# Patient Record
Sex: FEMALE | Race: ASIAN | NOT HISPANIC OR LATINO | Employment: FULL TIME
[De-identification: names, ages, dates, MRNs, and addresses within clinical notes are randomized per-mention and may not be internally consistent; named-entity substitution may affect disease eponyms.]

---

## 2017-10-22 ENCOUNTER — HEALTH MAINTENANCE LETTER (OUTPATIENT)
Age: 32
End: 2017-10-22

## 2018-04-04 ENCOUNTER — HOSPITAL ENCOUNTER (EMERGENCY)
Facility: CLINIC | Age: 33
Discharge: HOME | End: 2018-04-04
Attending: NURSE PRACTITIONER | Admitting: NURSE PRACTITIONER
Payer: COMMERCIAL

## 2018-04-04 ENCOUNTER — APPOINTMENT (OUTPATIENT)
Dept: GENERAL RADIOLOGY | Facility: CLINIC | Age: 33
End: 2018-04-04
Attending: NURSE PRACTITIONER
Payer: COMMERCIAL

## 2018-04-04 VITALS
OXYGEN SATURATION: 98 % | DIASTOLIC BLOOD PRESSURE: 85 MMHG | SYSTOLIC BLOOD PRESSURE: 134 MMHG | RESPIRATION RATE: 12 BRPM | TEMPERATURE: 98.8 F

## 2018-04-04 DIAGNOSIS — J06.9 VIRAL URI WITH COUGH: ICD-10-CM

## 2018-04-04 LAB
FLUAV+FLUBV AG SPEC QL: NEGATIVE
FLUAV+FLUBV AG SPEC QL: NEGATIVE
INTERNAL QC OK POCT: YES
S PYO AG THROAT QL IA.RAPID: NEGATIVE
SPECIMEN SOURCE: NORMAL

## 2018-04-04 PROCEDURE — G0463 HOSPITAL OUTPT CLINIC VISIT: HCPCS | Mod: 25 | Performed by: NURSE PRACTITIONER

## 2018-04-04 PROCEDURE — 71046 X-RAY EXAM CHEST 2 VIEWS: CPT

## 2018-04-04 PROCEDURE — 99203 OFFICE O/P NEW LOW 30 MIN: CPT | Mod: Z6 | Performed by: NURSE PRACTITIONER

## 2018-04-04 PROCEDURE — 87880 STREP A ASSAY W/OPTIC: CPT | Performed by: NURSE PRACTITIONER

## 2018-04-04 PROCEDURE — 87081 CULTURE SCREEN ONLY: CPT | Performed by: NURSE PRACTITIONER

## 2018-04-04 PROCEDURE — 87804 INFLUENZA ASSAY W/OPTIC: CPT | Performed by: NURSE PRACTITIONER

## 2018-04-04 NOTE — ED AVS SNAPSHOT
[facility] Emergency Department    [address] [facility] [address]    Phone:  [phone]    Fax:  [phone]                                       Lady Saavedra   MRN: [id number]    Department:  [facility] Emergency Department   Date of Visit:  4/4/2018           After Visit Summary Signature Page     I have received my discharge instructions, and my questions have been answered. I have discussed any challenges I see with this plan with the nurse or doctor.    ..........................................................................................................................................  Patient/Patient Representative Signature      ..........................................................................................................................................  Patient Representative Print Name and Relationship to Patient    ..................................................               ................................................  Date                                            Time    ..........................................................................................................................................  Reviewed by Signature/Title    ...................................................              ..............................................  Date                                                            Time

## 2018-04-04 NOTE — DISCHARGE INSTRUCTIONS

## 2018-04-04 NOTE — ED PROVIDER NOTES
History     Chief Complaint   Patient presents with     Cough     uri, bodyaches     HPI  Lady Saavedra is a 33 year old female who presents to urgent care for evaluation of cough, body aches, and sore throat.  Patient states she has had cold symptoms for the past 7-8 days.  Sore throat and fever started 2 days ago.  Cough is nonproductive.  Reports fever up to 103 at home.  Denies nausea or vomiting.  Tolerating fluids.  Denies chest pain or shortness of breath.  Exposed to her child who has URI symptoms and being evaluated here as well.    Problem List:    Patient Active Problem List    Diagnosis Date Noted     Presence of intrauterine contraceptive device (IUD) 02/10/2015     Priority: Medium     Sklya 2/10/2015        CARDIOVASCULAR SCREENING; LDL GOAL LESS THAN 160 10/31/2010     Priority: Medium     Dysplasia of cervix, low grade (ANN 1) 10/18/2009     Priority: Medium     10/6/09:Pap--ASCUS, + HPV 16, recommend colposcopy.  11/16/09:Colpo--benign. Repeat pap 6 months.  6/28/10:pap--NIL. Repeat pap 6 months.  2/14/12:Pap--ASCUS, +HR HPV 16. Plan [facility]-  3/12/12:[facility]--ANN 1. Repeat pap 6 months. Reminder in epic.   1/31/13:Pap--NIL, Negative HPV. Repeat pap 6 months. Reminder in epic.   3/5/14: Pt lost to follow-up. Pap tracking file closed.   6/9/14: Pap - ASCUS, Neg high risk HPV. Pt pregnant. Pap at 6 week PP visit. In reminders  2/10/15:Pap--NIL, neg HPV            Past Medical History:    Past Medical History:   Diagnosis Date     ASCUS favor benign 6/2014     Cervical high risk HPV (human papillomavirus) test positive      Chickenpox      Depression      Gestational HTN 1/1/2015     History of colposcopy with cervical biopsy 3/2012     Seasonal allergies      Urinary tract bacterial infections        Past Surgical History:    Past Surgical History:   Procedure Laterality Date     HC ENLARGE BREAST WITH IMPLANT       MOUTH SURGERY  age 18    wisdom teeth       Family History:    Family History    Problem Relation Age of Onset     Unknown/Adopted Mother      Unknown/Adopted Father        Social History:  Marital Status:  Single [1]  Social History   Substance Use Topics     Smoking status: Never Smoker     Smokeless tobacco: Never Used     Alcohol use No      Comment: occas- quit with pregnancy        Medications:      buPROPion (WELLBUTRIN XL) 150 MG 24 hr tablet   Misc. Devices (BREAST PUMP) MISC   ibuprofen 200 MG capsule   ferrous gluconate (FERGON) 324 (38 FE) MG tablet   Prenatal Vit-Fe Fumarate-FA (PRENATAL MULTIVITAMIN  PLUS IRON) 27-0.8 MG TABS         Review of Systems  As mentioned above in the history present illness. All other systems were reviewed and are negative.    Physical Exam   BP: 134/85  Heart Rate: 108  Temp: 98.8  F (37.1  C)  Resp: 12  SpO2: 98 %      Physical Exam    GENERAL APPEARANCE: healthy, alert and no distress  EYES: EOMI,  PERRL, conjunctiva clear  HENT: ear canals and TM's normal.  Nose normal.  Posterior oropharynx erythema without exudate.  Uvula is midline.  No unilateral peritonsillar swelling.  NECK: supple, nontender, no lymphadenopathy  RESP: lungs clear to auscultation - no rales, rhonchi or wheezes  CV: regular rates and rhythm, normal S1 S2, no murmur noted      ED Course     ED Course     Procedures               Results for orders placed or performed during the hospital encounter of 04/04/18 (from the past 24 hour(s))   Rapid strep group A screen POCT   Result Value Ref Range    Rapid Strep A Screen NEGATIVE neg    Internal QC OK Yes    Influenza A/B antigen   Result Value Ref Range    Influenza A/B Agn Specimen Nasal     Influenza A Negative NEG^Negative    Influenza B Negative NEG^Negative   XR Chest 2 Views    Narrative    XR CHEST 2 VW 4/4/2018 2:12 PM    HISTORY: Cough and fever.    COMPARISON: None.    FINDINGS: No airspace consolidation, pleural effusion or pneumothorax.  Normal heart size.      Impression    IMPRESSION: No acute cardiopulmonary  abnormality.    ELA BALDERAS MD       Medications - No data to display    Assessments & Plan (with Medical Decision Making)   RST negative with culture pending.  No evidence of peritonsillar cellulitis or abscess.  Chest xray negative for pneumonia.  Influenza negative. However I strongly suspect an influenza like illness or other viral URI.  Symptomatic treatment. Instructed to have a low threshold for returning to the emergency department for any worsening symptoms including chest pain or shortness of breath.  Instructed  to do follow-up with her primary care provider if not improving in 2-3 days or persistent fever.  Stay well hydrated and continue OTC symptomatic treatment.        I have reviewed the nursing notes.    I have reviewed the findings, diagnosis, plan and need for follow up with the patient.      New Prescriptions    No medications on file       Final diagnoses:   Viral URI with cough       4/4/2018   [facility] EMERGENCY DEPARTMENT     Roseann Gonsalves APRN CNP  04/04/18 1444

## 2018-04-04 NOTE — ED AVS SNAPSHOT
[facility] Emergency Department    [address] [facility] [address]    Phone:  [phone]    Fax:  [phone]                                       Lady Saavedra   MRN: [id number]    Department:  [facility] Emergency Department   Date of Visit:  4/4/2018           Patient Information     Date Of Birth          1985        Your diagnoses for this visit were:     Viral URI with cough        You were seen by Roseann Gonsalves APRN CNP.      Follow-up Information     Follow up with Ana M Moseley MD.    Specialty:  Family Practice    Why:  As needed    Contact information:    [address] [facility] [address]  [phone]          Discharge Instructions         Viral Upper Respiratory Illness (Adult)  You have a viral upper respiratory illness (URI), which is another term for the common cold. This illness is contagious during the first few days. It is spread through the air by coughing and sneezing. It may also be spread by direct contact (touching the sick person and then touching your own eyes, nose, or mouth). Frequent handwashing will decrease risk of spread. Most viral illnesses go away within 7 to 10 days with rest and simple home remedies. Sometimes the illness may last for several weeks. Antibiotics will not kill a virus, and they are generally not prescribed for this condition.    Home care    If symptoms are severe, rest at home for the first 2 to 3 days. When you resume activity, don't let yourself get too tired.    Avoid being exposed to cigarette smoke (yours or others ).    You may use acetaminophen or ibuprofen to control pain and fever, unless another medicine was prescribed. (Note: If you have chronic liver or kidney disease, have ever had a stomach ulcer or gastrointestinal bleeding, or are taking blood-thinning medicines, talk with your healthcare provider before using these medicines.) Aspirin should never be given to anyone under 18 years of age  who is ill with a viral infection or fever. It may cause severe liver or brain damage.    Your appetite may be poor, so a light diet is fine. Avoid dehydration by drinking 6 to 8 glasses of fluids per day (water, soft drinks, juices, tea, or soup). Extra fluids will help loosen secretions in the nose and lungs.    Over-the-counter cold medicines will not shorten the length of time you re sick, but they may be helpful for the following symptoms: cough, sore throat, and nasal and sinus congestion. (Note: Do not use decongestants if you have high blood pressure.)  Follow-up care  Follow up with your healthcare provider, or as advised.  When to seek medical advice  Call your healthcare provider right away if any of these occur:    Cough with lots of colored sputum (mucus)    Severe headache; face, neck, or ear pain    Difficulty swallowing due to throat pain    Fever of 100.4 F (38 C)  Call 911, or get immediate medical care  Call emergency services right away if any of these occur:    Chest pain, shortness of breath, wheezing, or difficulty breathing    Coughing up blood    Inability to swallow due to throat pain  Date Last Reviewed: 9/13/2015 2000-2017 [employer]. [address] [place] [address]. All rights reserved. This information is not intended as a substitute for professional medical care. Always follow your healthcare professional's instructions.          24 Hour Appointment Hotline       To make an appointment at any [facility], call [phone] ([phone]). If you don't have a family doctor or clinic, we will help you find one. [facility] clinics are conveniently located to serve the needs of you and your family.             Review of your medicines      Our records show that you are taking the medicines listed below. If these are incorrect, please call your family doctor or clinic.        Dose / Directions Last dose taken    breast pump Misc   Dose:  1 each   Quantity:   1 Device        1 each as needed   Refills:  0        buPROPion 150 MG 24 hr tablet   Commonly known as:  WELLBUTRIN XL   Dose:  150 mg   Quantity:  30 tablet        Take 1 tablet (150 mg) by mouth every morning   Refills:  1        ferrous gluconate 324 (38 FE) MG tablet   Commonly known as:  FERGON   Dose:  324 mg   Quantity:  100 tablet        Take 1 tablet (324 mg) by mouth 2 times daily   Refills:  0        ibuprofen 200 MG capsule   Dose:  200 mg   Quantity:  120 capsule        Take 200 mg by mouth every 4 hours as needed for fever   Refills:  0        prenatal multivitamin plus iron 27-0.8 MG Tabs per tablet   Dose:  1 tablet   Indication:  Pregnancy        Take 1 tablet by mouth daily   Refills:  0                Procedures and tests performed during your visit     Beta strep group A r/o culture    Influenza A/B antigen    Rapid strep group A screen POCT    XR Chest 2 Views      Orders Needing Specimen Collection     None      Pending Results     Date and Time Order Name Status Description    4/4/2018 1341 Beta strep group A r/o culture In process             Pending Culture Results     Date and Time Order Name Status Description    4/4/2018 1341 Beta strep group A r/o culture In process             Pending Results Instructions     If you had any lab results that were not finalized at the time of your Discharge, you can call the ED Lab Result RN at [phone]. You will be contacted by this team for any positive Lab results or changes in treatment. The nurses are available 7 days a week from 10A to 6:30P.  You can leave a message 24 hours per day and they will return your call.        Test Results From Your Hospital Stay        4/4/2018  2:32 PM      Component Results     Component Value Ref Range & Units Status    Influenza A/B Agn Specimen Nasal  Final    Influenza A Negative NEG^Negative Final    Influenza B Negative NEG^Negative Final    Test results must be correlated with clinical data. If necessary,  results   should be confirmed by a molecular assay or viral culture.           4/4/2018  1:41 PM      Component Results     Component Value Ref Range & Units Status    Rapid Strep A Screen NEGATIVE neg Final    Internal QC OK Yes  Final         4/4/2018  1:53 PM         4/4/2018  2:17 PM      Narrative     XR CHEST 2 VW 4/4/2018 2:12 PM    HISTORY: Cough and fever.    COMPARISON: None.    FINDINGS: No airspace consolidation, pleural effusion or pneumothorax.  Normal heart size.        Impression     IMPRESSION: No acute cardiopulmonary abnormality.    ELA BALDERAS MD                Thank you for choosing [facility]       Thank you for choosing [facility] for your care. Our goal is always to provide you with excellent care. Hearing back from our patients is one way we can continue to improve our services. Please take a few minutes to complete the written survey that you may receive in the mail after you visit with us. Thank you!        [employer] Information     [employer] gives you secure access to your electronic health record. If you see a primary care provider, you can also send messages to your care team and make appointments. If you have questions, please call your primary care clinic.  If you do not have a primary care provider, please call [phone] and they will assist you.        Care EveryWhere ID     This is your Care EveryWhere ID. This could be used by other organizations to access your [facility] medical records  [id number]        Equal Access to Services     GAETANO KONG : Cachorro paulo Soastrid, waaxda luqadaha, qaybta kaalmada adeegyada, ruma smith. So [facility] [phone].    ATENCIÓN: Si habla español, tiene a horner disposición servicios gratuitos de asistencia lingüística. Llame al [phone].    We comply with applicable federal civil rights laws and [place] laws. We do not discriminate on the basis of race, color, national origin, age, disability, sex, sexual  orientation, or gender identity.            After Visit Summary       This is your record. Keep this with you and show to your community pharmacist(s) and doctor(s) at your next visit.

## 2018-04-06 LAB
BACTERIA SPEC CULT: NORMAL
Lab: NORMAL
SPECIMEN SOURCE: NORMAL

## 2018-04-16 ENCOUNTER — HOSPITAL ENCOUNTER (EMERGENCY)
Facility: CLINIC | Age: 33
Discharge: HOME | End: 2018-04-16
Attending: PHYSICIAN ASSISTANT | Admitting: PHYSICIAN ASSISTANT
Payer: COMMERCIAL

## 2018-04-16 VITALS
OXYGEN SATURATION: 98 % | DIASTOLIC BLOOD PRESSURE: 81 MMHG | RESPIRATION RATE: 14 BRPM | TEMPERATURE: 98.6 F | SYSTOLIC BLOOD PRESSURE: 118 MMHG

## 2018-04-16 DIAGNOSIS — J01.90 ACUTE BACTERIAL SINUSITIS: ICD-10-CM

## 2018-04-16 DIAGNOSIS — R07.0 THROAT PAIN: ICD-10-CM

## 2018-04-16 DIAGNOSIS — J20.9 ACUTE BRONCHITIS WITH SYMPTOMS > 10 DAYS: ICD-10-CM

## 2018-04-16 DIAGNOSIS — B96.89 ACUTE BACTERIAL SINUSITIS: ICD-10-CM

## 2018-04-16 LAB
BASOPHILS # BLD AUTO: 0 10E9/L (ref 0–0.2)
BASOPHILS NFR BLD AUTO: 0.2 %
DIFFERENTIAL METHOD BLD: ABNORMAL
EOSINOPHIL # BLD AUTO: 0.1 10E9/L (ref 0–0.7)
EOSINOPHIL NFR BLD AUTO: 1.2 %
ERYTHROCYTE [DISTWIDTH] IN BLOOD BY AUTOMATED COUNT: 11.9 % (ref 10–15)
HCT VFR BLD AUTO: 40.9 % (ref 35–47)
HETEROPH AB SER QL: NEGATIVE
HGB BLD-MCNC: 13.7 G/DL (ref 11.7–15.7)
IMM GRANULOCYTES # BLD: 0 10E9/L (ref 0–0.4)
IMM GRANULOCYTES NFR BLD: 0.2 %
INTERNAL QC OK POCT: YES
LYMPHOCYTES # BLD AUTO: 1.7 10E9/L (ref 0.8–5.3)
LYMPHOCYTES NFR BLD AUTO: 14.2 %
MCH RBC QN AUTO: 30.2 PG (ref 26.5–33)
MCHC RBC AUTO-ENTMCNC: 33.5 G/DL (ref 31.5–36.5)
MCV RBC AUTO: 90 FL (ref 78–100)
MONOCYTES # BLD AUTO: 0.5 10E9/L (ref 0–1.3)
MONOCYTES NFR BLD AUTO: 4.1 %
NEUTROPHILS # BLD AUTO: 9.6 10E9/L (ref 1.6–8.3)
NEUTROPHILS NFR BLD AUTO: 80.1 %
PLATELET # BLD AUTO: 332 10E9/L (ref 150–450)
RBC # BLD AUTO: 4.53 10E12/L (ref 3.8–5.2)
S PYO AG THROAT QL IA.RAPID: NEGATIVE
WBC # BLD AUTO: 11.9 10E9/L (ref 4–11)

## 2018-04-16 PROCEDURE — G0463 HOSPITAL OUTPT CLINIC VISIT: HCPCS

## 2018-04-16 PROCEDURE — 99213 OFFICE O/P EST LOW 20 MIN: CPT | Performed by: PHYSICIAN ASSISTANT

## 2018-04-16 PROCEDURE — 87077 CULTURE AEROBIC IDENTIFY: CPT | Performed by: PHYSICIAN ASSISTANT

## 2018-04-16 PROCEDURE — 87880 STREP A ASSAY W/OPTIC: CPT | Performed by: PHYSICIAN ASSISTANT

## 2018-04-16 PROCEDURE — 86308 HETEROPHILE ANTIBODY SCREEN: CPT | Performed by: PHYSICIAN ASSISTANT

## 2018-04-16 PROCEDURE — 85025 COMPLETE CBC W/AUTO DIFF WBC: CPT | Performed by: PHYSICIAN ASSISTANT

## 2018-04-16 PROCEDURE — 87081 CULTURE SCREEN ONLY: CPT | Performed by: PHYSICIAN ASSISTANT

## 2018-04-16 RX ORDER — BENZONATATE 200 MG/1
200 CAPSULE ORAL 3 TIMES DAILY PRN
Qty: 30 CAPSULE | Refills: 0 | Status: SHIPPED | OUTPATIENT
Start: 2018-04-16 | End: 2019-11-08

## 2018-04-16 ASSESSMENT — ENCOUNTER SYMPTOMS
VOMITING: 0
DIARRHEA: 0
RHINORRHEA: 1
SORE THROAT: 1
NECK PAIN: 0
DIZZINESS: 0
CONSTIPATION: 0
NAUSEA: 0
VOICE CHANGE: 1
WHEEZING: 0
HEADACHES: 1
COUGH: 1
TROUBLE SWALLOWING: 0
SHORTNESS OF BREATH: 0
LIGHT-HEADEDNESS: 0
SINUS PRESSURE: 1
FATIGUE: 1
NECK STIFFNESS: 0

## 2018-04-16 NOTE — ED AVS SNAPSHOT
[facility] Emergency Department    [address] [facility] [address]    Phone:  [phone]    Fax:  [phone]                                       Lady Saavedra   MRN: [id number]    Department:  [facility] Emergency Department   Date of Visit:  4/16/2018           Patient Information     Date Of Birth          1985        Your diagnoses for this visit were:     Acute bronchitis with symptoms > 10 days     Acute bacterial sinusitis     Throat pain        You were seen by Jo Pal PA-C.      Follow-up Information     Follow up with Ana M Moseley MD In 1 week.    Specialty:  Family Practice    Why:  As needed    Contact information:    [address] [facility] [address]  [phone]          Discharge Instructions       Use Medication as directed  Patient advised to call for any lab results (if obtained during visit) within 2-3 days.     Patient informed to rest, warm compresses over sinuses as needed, stay hydrated, cool humidifier, salt water gargles, and nasal saline sprays as needed.  Lots of rest and fluids.  Tylenol or ibuprofen as needed.    Return if any worsening symptoms or if not improving.  Follow up with primary care provider in 1-2 weeks.    Go to Emergency Room if sx worsen or change, worst headache of life, visual changes, confusion, fevers > 104F occur.     Patient voiced understanding of instructions given.       24 Hour Appointment Hotline       To make an appointment at any [facility] clinic, call [phone]). If you don't have a family doctor or clinic, we will help you find one. [facility] clinics are conveniently located to serve the needs of you and your family.             Review of your medicines      START taking        Dose / Directions Last dose taken    amoxicillin-clavulanate 875-125 MG per tablet   Commonly known as:  AUGMENTIN   Dose:  1 tablet   Quantity:  20 tablet        Take 1 tablet by mouth 2 times daily  for 10 days   Refills:  0        benzonatate 200 MG capsule   Commonly known as:  TESSALON   Dose:  200 mg   Quantity:  30 capsule        Take 1 capsule (200 mg) by mouth 3 times daily as needed   Refills:  0          Our records show that you are taking the medicines listed below. If these are incorrect, please call your family doctor or clinic.        Dose / Directions Last dose taken    breast pump Misc   Dose:  1 each   Quantity:  1 Device        1 each as needed   Refills:  0        buPROPion 150 MG 24 hr tablet   Commonly known as:  WELLBUTRIN XL   Dose:  150 mg   Quantity:  30 tablet        Take 1 tablet (150 mg) by mouth every morning   Refills:  1        ferrous gluconate 324 (38 Fe) MG tablet   Commonly known as:  FERGON   Dose:  324 mg   Quantity:  100 tablet        Take 1 tablet (324 mg) by mouth 2 times daily   Refills:  0        ibuprofen 200 MG capsule   Dose:  200 mg   Quantity:  120 capsule        Take 200 mg by mouth every 4 hours as needed for fever   Refills:  0        prenatal multivitamin plus iron 27-0.8 MG Tabs per tablet   Dose:  1 tablet   Indication:  Pregnancy        Take 1 tablet by mouth daily   Refills:  0                Prescriptions were sent or printed at these locations (2 Prescriptions)                   [facility] [place], [place] - [address]   [address] [address]    Telephone:  [phone]   Fax:  [phone]   Hours:                  E-Prescribed (2 of 2)         amoxicillin-clavulanate (AUGMENTIN) 875-125 MG per tablet               benzonatate (TESSALON) 200 MG capsule                Procedures and tests performed during your visit     Beta strep group A r/o culture    CBC with platelets differential    Mononucleosis screen    Rapid strep group A screen POCT      Orders Needing Specimen Collection     None      Pending Results     No orders found from 4/14/2018 to 4/17/2018.            Pending Culture Results     No orders found from  4/14/2018 to 4/17/2018.            Pending Results Instructions     If you had any lab results that were not finalized at the time of your Discharge, you can call the ED Lab Result RN at [phone]. You will be contacted by this team for any positive Lab results or changes in treatment. The nurses are available 7 days a week from 10A to 6:30P.  You can leave a message 24 hours per day and they will return your call.        Test Results From Your Hospital Stay        4/16/2018  1:19 PM      Component Results     Component Value Ref Range & Units Status    Rapid Strep A Screen negative neg Final    Internal QC OK Yes  Final         4/16/2018  1:41 PM      Component Results     Component Value Ref Range & Units Status    WBC 11.9 (H) 4.0 - 11.0 10e9/L Final    RBC Count 4.53 3.8 - 5.2 10e12/L Final    Hemoglobin 13.7 11.7 - 15.7 g/dL Final    Hematocrit 40.9 35.0 - 47.0 % Final    MCV 90 78 - 100 fl Final    MCH 30.2 26.5 - 33.0 pg Final    MCHC 33.5 31.5 - 36.5 g/dL Final    RDW 11.9 10.0 - 15.0 % Final    Platelet Count 332 150 - 450 10e9/L Final    Diff Method Automated Method  Final    % Neutrophils 80.1 % Final    % Lymphocytes 14.2 % Final    % Monocytes 4.1 % Final    % Eosinophils 1.2 % Final    % Basophils 0.2 % Final    % Immature Granulocytes 0.2 % Final    Absolute Neutrophil 9.6 (H) 1.6 - 8.3 10e9/L Final    Absolute Lymphocytes 1.7 0.8 - 5.3 10e9/L Final    Absolute Monocytes 0.5 0.0 - 1.3 10e9/L Final    Absolute Eosinophils 0.1 0.0 - 0.7 10e9/L Final    Absolute Basophils 0.0 0.0 - 0.2 10e9/L Final    Abs Immature Granulocytes 0.0 0 - 0.4 10e9/L Final         4/16/2018  2:01 PM      Component Results     Component Value Ref Range & Units Status    Mononucleosis Screen Negative NEG^Negative Final                Thank you for choosing [facility]       Thank you for choosing [facility] for your care. Our goal is always to provide you with excellent care. Hearing back from our patients is one way we can  continue to improve our services. Please take a few minutes to complete the written survey that you may receive in the mail after you visit with us. Thank you!        [employer] Information     [employer] gives you secure access to your electronic health record. If you see a primary care provider, you can also send messages to your care team and make appointments. If you have questions, please call your primary care clinic.  If you do not have a primary care provider, please call [phone] and they will assist you.        Care EveryWhere ID     This is your Care EveryWhere ID. This could be used by other organizations to access your [facility] medical records  [id number]        Equal Access to Services     GAETANO KONG : Cachorro Lockett, talia polo, isabella goddard, ruma smith. So [facility] [phone].    ATENCIÓN: Si habla español, tiene a horner disposición servicios gratuitos de asistencia lingüística. Shauna al [phone].    We comply with applicable federal civil rights laws and [place] laws. We do not discriminate on the basis of race, color, national origin, age, disability, sex, sexual orientation, or gender identity.            After Visit Summary       This is your record. Keep this with you and show to your community pharmacist(s) and doctor(s) at your next visit.

## 2018-04-16 NOTE — DISCHARGE INSTRUCTIONS
Use Medication as directed  Patient advised to call for any lab results (if obtained during visit) within 2-3 days.     Patient informed to rest, warm compresses over sinuses as needed, stay hydrated, cool humidifier, salt water gargles, and nasal saline sprays as needed.  Lots of rest and fluids.  Tylenol or ibuprofen as needed.    Return if any worsening symptoms or if not improving.  Follow up with primary care provider in 1-2 weeks.    Go to Emergency Room if sx worsen or change, worst headache of life, visual changes, confusion, fevers > 104F occur.     Patient voiced understanding of instructions given.

## 2018-04-16 NOTE — ED PROVIDER NOTES
History     Chief Complaint   Patient presents with     Pharyngitis     x 2 weeks     HPI  Lady Saavedra is a 33 year old female who presents with     ENT Symptoms             Symptoms: cc Present Absent Comment   Fever/Chills   x    Fatigue  x     Muscle Aches   x    Eye Irritation   x    Sneezing   x    Nasal Tone/Drg  x     Sinus Pressure/Pain  x     Loss of smell   x    Dental pain   x    Sore Throat  x     Swollen Glands  x     Ear Pain/Fullness  x  Left on and off    Cough  x     Wheeze   x    Chest Pain   x    Shortness of breath   x    Rash   x    Other   x  headache     Symptom duration:  2 weeks   Symptom severity:  mild and getting worse    Treatments tried:  tylenol/ibuprofen, over the counter cough medication   Contacts:  son with otitis media 2 weeks ago.          Problem list, Medication list, Allergies, and Medical/Social/Surgical histories reviewed in [facility] and updated as appropriate.    Problem List:    Patient Active Problem List    Diagnosis Date Noted     Presence of intrauterine contraceptive device (IUD) 02/10/2015     Priority: Medium     Sklya 2/10/2015        CARDIOVASCULAR SCREENING; LDL GOAL LESS THAN 160 10/31/2010     Priority: Medium     Dysplasia of cervix, low grade (ANN 1) 10/18/2009     Priority: Medium     10/6/09:Pap--ASCUS, + HPV 16, recommend colposcopy.  11/16/09:Colpo--benign. Repeat pap 6 months.  6/28/10:pap--NIL. Repeat pap 6 months.  2/14/12:Pap--ASCUS, +HR HPV 16. Plan [place]-  3/12/12:[place]--ANN 1. Repeat pap 6 months. Reminder in epic.   1/31/13:Pap--NIL, Negative HPV. Repeat pap 6 months. Reminder in epic.   3/5/14: Pt lost to follow-up. Pap tracking file closed.   6/9/14: Pap - ASCUS, Neg high risk HPV. Pt pregnant. Pap at 6 week PP visit. In reminders  2/10/15:Pap--NIL, neg HPV            Past Medical History:    Past Medical History:   Diagnosis Date     ASCUS favor benign 6/2014     Cervical high risk HPV (human papillomavirus) test positive      Chickenpox       Depression      Gestational HTN 1/1/2015     History of colposcopy with cervical biopsy 3/2012     Seasonal allergies      Urinary tract bacterial infections        Past Surgical History:    Past Surgical History:   Procedure Laterality Date     HC ENLARGE BREAST WITH IMPLANT       MOUTH SURGERY  age 18    wisdom teeth       Family History:    Family History   Problem Relation Age of Onset     Unknown/Adopted Mother      Unknown/Adopted Father        Social History:  Marital Status:  Single [1]  Social History   Substance Use Topics     Smoking status: Never Smoker     Smokeless tobacco: Never Used     Alcohol use No      Comment: occas- quit with pregnancy        Medications:      amoxicillin-clavulanate (AUGMENTIN) 875-125 MG per tablet   benzonatate (TESSALON) 200 MG capsule   buPROPion (WELLBUTRIN XL) 150 MG 24 hr tablet   Misc. Devices (BREAST PUMP) MISC   ibuprofen 200 MG capsule   ferrous gluconate (FERGON) 324 (38 FE) MG tablet   Prenatal Vit-Fe Fumarate-FA (PRENATAL MULTIVITAMIN  PLUS IRON) 27-0.8 MG TABS         Review of Systems   Constitutional: Positive for fatigue.   HENT: Positive for congestion, ear pain, rhinorrhea, sinus pressure, sore throat and voice change (hoarse voice on and off ). Negative for tinnitus and trouble swallowing.    Respiratory: Positive for cough. Negative for shortness of breath and wheezing.    Cardiovascular: Negative for chest pain.   Gastrointestinal: Negative for constipation, diarrhea, nausea and vomiting.   Musculoskeletal: Negative for neck pain and neck stiffness.   Neurological: Positive for headaches. Negative for dizziness and light-headedness.   All other systems reviewed and are negative.      Physical Exam   BP: 118/81  Heart Rate: 74  Temp: 98.6  F (37  C)  Resp: 14  SpO2: 98 %      Physical Exam     Constitutional: healthy, alert, no distress and cooperative   Cardiovascular: RRR. No murmurs, clicks gallops or rub  Respiratory: Lungs clear to auscultation  bilaterally. No rales, rhonchi, wheezing appreciated. No accessory muscle use or retractions.   ENT: right TM normal without fluid or infection, left TM fluid noted, neck has bilateral anterior cervical nodes enlarged, tonsils mildly red, +2 bilateral enlargement, without exudate present, mild maxillary sinus tenderness with palpation, post nasal drip noted and nasal mucosa congested  Abdomen: Abdomen soft, non-tender. BS normal. No masses, organomegaly  SKIN: no suspicious lesions or rashes    Discussed obtaining chest x-ray in office today due to cough, but at this time we will hold off due to clear breath sounds throughout and patient declining x-ray at this time.     Results for orders placed or performed during the hospital encounter of 04/16/18 (from the past 24 hour(s))   Rapid strep group A screen POCT   Result Value Ref Range    Rapid Strep A Screen negative neg    Internal QC OK Yes    CBC with platelets differential   Result Value Ref Range    WBC 11.9 (H) 4.0 - 11.0 10e9/L    RBC Count 4.53 3.8 - 5.2 10e12/L    Hemoglobin 13.7 11.7 - 15.7 g/dL    Hematocrit 40.9 35.0 - 47.0 %    MCV 90 78 - 100 fl    MCH 30.2 26.5 - 33.0 pg    MCHC 33.5 31.5 - 36.5 g/dL    RDW 11.9 10.0 - 15.0 %    Platelet Count 332 150 - 450 10e9/L    Diff Method Automated Method     % Neutrophils 80.1 %    % Lymphocytes 14.2 %    % Monocytes 4.1 %    % Eosinophils 1.2 %    % Basophils 0.2 %    % Immature Granulocytes 0.2 %    Absolute Neutrophil 9.6 (H) 1.6 - 8.3 10e9/L    Absolute Lymphocytes 1.7 0.8 - 5.3 10e9/L    Absolute Monocytes 0.5 0.0 - 1.3 10e9/L    Absolute Eosinophils 0.1 0.0 - 0.7 10e9/L    Absolute Basophils 0.0 0.0 - 0.2 10e9/L    Abs Immature Granulocytes 0.0 0 - 0.4 10e9/L             ED Course     ED Course     Procedures              Critical Care time:  none               Results for orders placed or performed during the hospital encounter of 04/16/18 (from the past 24 hour(s))   Rapid strep group A screen POCT    Result Value Ref Range    Rapid Strep A Screen negative neg    Internal QC OK Yes    CBC with platelets differential   Result Value Ref Range    WBC 11.9 (H) 4.0 - 11.0 10e9/L    RBC Count 4.53 3.8 - 5.2 10e12/L    Hemoglobin 13.7 11.7 - 15.7 g/dL    Hematocrit 40.9 35.0 - 47.0 %    MCV 90 78 - 100 fl    MCH 30.2 26.5 - 33.0 pg    MCHC 33.5 31.5 - 36.5 g/dL    RDW 11.9 10.0 - 15.0 %    Platelet Count 332 150 - 450 10e9/L    Diff Method Automated Method     % Neutrophils 80.1 %    % Lymphocytes 14.2 %    % Monocytes 4.1 %    % Eosinophils 1.2 %    % Basophils 0.2 %    % Immature Granulocytes 0.2 %    Absolute Neutrophil 9.6 (H) 1.6 - 8.3 10e9/L    Absolute Lymphocytes 1.7 0.8 - 5.3 10e9/L    Absolute Monocytes 0.5 0.0 - 1.3 10e9/L    Absolute Eosinophils 0.1 0.0 - 0.7 10e9/L    Absolute Basophils 0.0 0.0 - 0.2 10e9/L    Abs Immature Granulocytes 0.0 0 - 0.4 10e9/L   Mononucleosis screen   Result Value Ref Range    Mononucleosis Screen Negative NEG^Negative       Medications - No data to display    Assessments & Plan (with Medical Decision Making)     I have reviewed the nursing notes.    I have reviewed the findings, diagnosis, plan and need for follow up with the patient.   will treat with antibiotic due to 2 weeks of symptoms that are worsening and slightly elevated WBCs and neutrophils. Discussed with patient that augment has coverage in sinuses, throat, and lungs. Patient to return if symptoms worsen/change, chest pain, shortness of breath, or fevers occur. Patient to follow up with primary care provider for recheck in 1-2 weeks if symptoms fail to improve.     New Prescriptions    AMOXICILLIN-CLAVULANATE (AUGMENTIN) 875-125 MG PER TABLET    Take 1 tablet by mouth 2 times daily for 10 days    BENZONATATE (TESSALON) 200 MG CAPSULE    Take 1 capsule (200 mg) by mouth 3 times daily as needed       Final diagnoses:   Acute bronchitis with symptoms > 10 days   Acute bacterial sinusitis   Throat pain       4/16/2018    [facility] EMERGENCY DEPARTMENT     Jo Pal PA-C  04/16/18 [id number]

## 2018-04-16 NOTE — ED AVS SNAPSHOT
[facility] Emergency Department    [address] [address]    Phone:  [phone]    Fax:  [phone]                                       Lady Saavedra   MRN: [id number]    Department:  [facility] Emergency Department   Date of Visit:  4/16/2018           After Visit Summary Signature Page     I have received my discharge instructions, and my questions have been answered. I have discussed any challenges I see with this plan with the nurse or doctor.    ..........................................................................................................................................  Patient/Patient Representative Signature      ..........................................................................................................................................  Patient Representative Print Name and Relationship to Patient    ..................................................               ................................................  Date                                            Time    ..........................................................................................................................................  Reviewed by Signature/Title    ...................................................              ..............................................  Date                                                            Time

## 2018-04-18 LAB
BACTERIA SPEC CULT: ABNORMAL
SPECIMEN SOURCE: ABNORMAL

## 2018-10-29 ENCOUNTER — HEALTH MAINTENANCE LETTER (OUTPATIENT)
Age: 33
End: 2018-10-29

## 2019-01-13 ENCOUNTER — HOSPITAL ENCOUNTER (EMERGENCY)
Facility: CLINIC | Age: 34
Discharge: HOME | End: 2019-01-13
Attending: PHYSICIAN ASSISTANT | Admitting: PHYSICIAN ASSISTANT
Payer: COMMERCIAL

## 2019-01-13 VITALS
SYSTOLIC BLOOD PRESSURE: 128 MMHG | BODY MASS INDEX: 27.6 KG/M2 | WEIGHT: 150 LBS | RESPIRATION RATE: 16 BRPM | TEMPERATURE: 98.8 F | OXYGEN SATURATION: 97 % | DIASTOLIC BLOOD PRESSURE: 87 MMHG | HEIGHT: 62 IN

## 2019-01-13 DIAGNOSIS — J02.9 PHARYNGITIS, UNSPECIFIED ETIOLOGY: ICD-10-CM

## 2019-01-13 LAB
INTERNAL QC OK POCT: YES
S PYO AG THROAT QL IA.RAPID: NEGATIVE

## 2019-01-13 PROCEDURE — 99213 OFFICE O/P EST LOW 20 MIN: CPT | Mod: Z6 | Performed by: PHYSICIAN ASSISTANT

## 2019-01-13 PROCEDURE — G0463 HOSPITAL OUTPT CLINIC VISIT: HCPCS | Performed by: PHYSICIAN ASSISTANT

## 2019-01-13 PROCEDURE — 87081 CULTURE SCREEN ONLY: CPT | Performed by: PHYSICIAN ASSISTANT

## 2019-01-13 PROCEDURE — 87880 STREP A ASSAY W/OPTIC: CPT | Performed by: NURSE PRACTITIONER

## 2019-01-13 ASSESSMENT — MIFFLIN-ST. JEOR: SCORE: 1340.71

## 2019-01-13 NOTE — ED AVS SNAPSHOT
[facility] Emergency Department  [address] [address]  Phone:  [phone]  Fax:  [phone]                                    Lady Saavedra   MRN: [id number]    Department:  [facility] Emergency Department   Date of Visit:  1/13/2019           After Visit Summary Signature Page    I have received my discharge instructions, and my questions have been answered. I have discussed any challenges I see with this plan with the nurse or doctor.    ..........................................................................................................................................  Patient/Patient Representative Signature      ..........................................................................................................................................  Patient Representative Print Name and Relationship to Patient    ..................................................               ................................................  Date                                   Time    ..........................................................................................................................................  Reviewed by Signature/Title    ...................................................              ..............................................  Date                                               Time          22EPIC Rev 08/18

## 2019-01-13 NOTE — ED PROVIDER NOTES
History     Chief Complaint   Patient presents with     Pharyngitis     body aches since yesterday     HPI  Lady Saavedra is a 33 year old female who presents to the urgent care with concern over sore throat which is been present for the last 24 hours.  She initially complains of low-grade fever up to 100, chills, myalgias, cough.  She has not had any nasal congestion, dyspnea, wheezing, nausea, vomiting, diarrhea or abdominal pain.  She denies any close contacts with strep throat recently.    Problem List:    Patient Active Problem List    Diagnosis Date Noted     Presence of intrauterine contraceptive device (IUD) 02/10/2015     Priority: Medium     Sklya 2/10/2015        CARDIOVASCULAR SCREENING; LDL GOAL LESS THAN 160 10/31/2010     Priority: Medium     Dysplasia of cervix, low grade (ANN 1) 10/18/2009     Priority: Medium     10/6/09:Pap--ASCUS, + HPV 16, recommend colposcopy.  11/16/09:Colpo--benign. Repeat pap 6 months.  6/28/10:pap--NIL. Repeat pap 6 months.  2/14/12:Pap--ASCUS, +HR HPV 16. Plan [place]-  3/12/12:[place]--ANN 1. Repeat pap 6 months. Reminder in epic.   1/31/13:Pap--NIL, Negative HPV. Repeat pap 6 months. Reminder in epic.   3/5/14: Pt lost to follow-up. Pap tracking file closed.   6/9/14: Pap - ASCUS, Neg high risk HPV. Pt pregnant. Pap at 6 week PP visit. In reminders  2/10/15:Pap--NIL, neg HPV            Past Medical History:    Past Medical History:   Diagnosis Date     ASCUS favor benign 6/2014     Cervical high risk HPV (human papillomavirus) test positive      Chickenpox      Depression      Gestational HTN 1/1/2015     History of colposcopy with cervical biopsy 3/2012     Seasonal allergies      Urinary tract bacterial infections        Past Surgical History:    Past Surgical History:   Procedure Laterality Date     HC ENLARGE BREAST WITH IMPLANT       MOUTH SURGERY  age 18    wisdom teeth       Family History:    Family History   Problem Relation Age of Onset     Unknown/Adopted  "Mother      Unknown/Adopted Father        Social History:  Marital Status:  Single [1]  Social History     Tobacco Use     Smoking status: Never Smoker     Smokeless tobacco: Never Used   Substance Use Topics     Alcohol use: No     Comment: occas- quit with pregnancy     Drug use: No        Medications:      benzonatate (TESSALON) 200 MG capsule   buPROPion (WELLBUTRIN XL) 150 MG 24 hr tablet   ferrous gluconate (FERGON) 324 (38 FE) MG tablet   ibuprofen 200 MG capsule   Misc. Devices (BREAST PUMP) MISC   Prenatal Vit-Fe Fumarate-FA (PRENATAL MULTIVITAMIN  PLUS IRON) 27-0.8 MG TABS     Review of Systems  CONSTITUTIONAL:POSITIVE  for fever up to 100, chills, myalgias   INTEGUMENTARY/SKIN: NEGATIVE for worrisome rashes, moles or lesions  EYES: NEGATIVE for vision changes or irritation  ENT/MOUTH: POSITIVE for sore throat and NEGATIVE for ear pain   RESP:POSITIVE for mild cough and NEGATIVE for SOB/dyspnea and wheezing  GI: NEGATIVE for abdominal pain, diarrhea, nausea and vomiting  Physical Exam   BP: 128/87  Heart Rate: 110  Temp: 98.8  F (37.1  C)  Resp: 16  Height: 156.2 cm (5' 1.5\")  Weight: 68 kg (150 lb)  SpO2: 97 %  Physical Exam  GENERAL APPEARANCE: healthy, alert and no distress  EYES: EOMI,  PERRL, conjunctiva clear  HENT: ear canals and TM's normal.  Posterior pharynx is erythematous with post 2 tonsillar hypertrophy  NECK: supple, nontender, no lymphadenopathy  RESP: lungs clear to auscultation - no rales, rhonchi or wheezes  CV: tachycardia, regular rhythm, normal S1 S2, no murmur noted  SKIN: no suspicious lesions or rashes  ED Course        Procedures        Critical Care time:  none          Results for orders placed or performed during the hospital encounter of 01/13/19 (from the past 24 hour(s))   Rapid strep group A screen POCT   Result Value Ref Range    Rapid Strep A Screen negative neg    Internal QC OK Yes      Medications - No data to display    Assessments & Plan (with Medical Decision " Making)     I have reviewed the nursing notes.    I have reviewed the findings, diagnosis, plan and need for follow up with the patient.          Medication List      There are no discharge medications for this visit.       Final diagnoses:   Pharyngitis, unspecified etiology     RST negative with culture pending.  No evidence of peritonsillar cellulitis or abscess.   I do not suspect mono at this time.  Similarly I have low concern for influenza, pneumonia, I did discuss her/benefits of further evaluation and patient agreed to defer.  Similarly I discussed her/benefits of Decadron and she declined at this time. She was instructed to continue OTC symptomatic treatment.  Follow up with PCP if no improvement of fever in 2-3 days.  Worrisome reasons to seek care sooner discussed.      Disclaimer: This note consists of symbols derived from keyboarding, dictation, and/or voice recognition software. As a result, there may be errors in the script that have gone undetected.  Please consider this when interpreting information found in the chart.    1/13/2019   [facility] EMERGENCY DEPARTMENT     Belkis Fitzgerald PA-C  01/13/19 [id number]

## 2019-01-15 LAB
BACTERIA SPEC CULT: NORMAL
Lab: NORMAL
SPECIMEN SOURCE: NORMAL

## 2019-01-15 NOTE — RESULT ENCOUNTER NOTE
Final Beta strep group A r/o culture is NEGATIVE for Group A streptococcus.    No treatment or change in treatment per [facility] Strep protocol.

## 2019-11-08 ENCOUNTER — OFFICE VISIT (OUTPATIENT)
Dept: OBGYN | Facility: CLINIC | Age: 34
End: 2019-11-08
Payer: COMMERCIAL

## 2019-11-08 VITALS
RESPIRATION RATE: 16 BRPM | HEART RATE: 73 BPM | SYSTOLIC BLOOD PRESSURE: 145 MMHG | DIASTOLIC BLOOD PRESSURE: 96 MMHG | BODY MASS INDEX: 29.33 KG/M2 | TEMPERATURE: 98.3 F | WEIGHT: 159.4 LBS | HEIGHT: 62 IN

## 2019-11-08 DIAGNOSIS — Z30.432 ENCOUNTER FOR REMOVAL OF INTRAUTERINE CONTRACEPTIVE DEVICE: Primary | ICD-10-CM

## 2019-11-08 DIAGNOSIS — Z12.4 CERVICAL CANCER SCREENING: ICD-10-CM

## 2019-11-08 PROCEDURE — 58301 REMOVE INTRAUTERINE DEVICE: CPT | Performed by: OBSTETRICS & GYNECOLOGY

## 2019-11-08 PROCEDURE — G0145 SCR C/V CYTO,THINLAYER,RESCR: HCPCS | Performed by: OBSTETRICS & GYNECOLOGY

## 2019-11-08 PROCEDURE — 87624 HPV HI-RISK TYP POOLED RSLT: CPT | Performed by: OBSTETRICS & GYNECOLOGY

## 2019-11-08 ASSESSMENT — MIFFLIN-ST. JEOR: SCORE: 1378.34

## 2019-11-08 NOTE — NURSING NOTE
"Initial BP (!) 145/96 (BP Location: Right arm, Patient Position: Chair, Cuff Size: Adult Regular)   Pulse 73   Temp 98.3  F (36.8  C) (Tympanic)   Resp 16   Ht 1.562 m (5' 1.5\")   Wt 72.3 kg (159 lb 6.4 oz)   LMP 10/29/2019   Breastfeeding? No   BMI 29.63 kg/m   Estimated body mass index is 29.63 kg/m  as calculated from the following:    Height as of this encounter: 1.562 m (5' 1.5\").    Weight as of this encounter: 72.3 kg (159 lb 6.4 oz). .    Fior Fields, [facility]    "

## 2019-11-08 NOTE — PROGRESS NOTES
IUD Removal:  SUBJECTIVE:    Is a pregnancy test required: No.  Was a consent obtained?  Yes    Lady Saavedra is a 34 year old female,No obstetric history on file., Patient's last menstrual period was 10/29/2019. who presents today for IUD removal. Her current IUD was placed 2015. She has not had problems with the IUD. She requests removal of the IUD because she desires to conceive    Today's PHQ-2 Score:   PHQ-2 ( 1999 Pf[employer]) 2/10/2015   Q1: Little interest or pleasure in doing things 1   Q2: Feeling down, depressed or hopeless 1   PHQ-2 Score 2       PROCEDURE:    A speculum exam was performed and the cervix was visualized. The IUD string was visualized. Using ring forceps, the string  was grasped and the IUD removed intact.    POST PROCEDURE:    The patient tolerated the procedure well with minimal discomfort. Patient was discharged in stable condition.    Call if bleeding, pain or fever occur. and Pregnancy counseling given, including folic acid supplementation 800-1000 mg per day.    Deepika Carvajal MD

## 2019-11-12 LAB
COPATH REPORT: NORMAL
PAP: NORMAL

## 2019-11-14 LAB
FINAL DIAGNOSIS: NORMAL
HPV HR 12 DNA CVX QL NAA+PROBE: NEGATIVE
HPV16 DNA SPEC QL NAA+PROBE: NEGATIVE
HPV18 DNA SPEC QL NAA+PROBE: NEGATIVE
SPECIMEN DESCRIPTION: NORMAL
SPECIMEN SOURCE CVX/VAG CYTO: NORMAL

## 2020-02-24 ENCOUNTER — HEALTH MAINTENANCE LETTER (OUTPATIENT)
Age: 35
End: 2020-02-24

## 2020-11-30 ENCOUNTER — VIRTUAL VISIT (OUTPATIENT)
Dept: FAMILY MEDICINE | Facility: OTHER | Age: 35
End: 2020-11-30

## 2020-12-01 NOTE — PROGRESS NOTES
"Date: 2020 19:25:11  Clinician: Brandon Jackson  Clinician NPI: [id number]  Patient: Lady Saavedra  Patient : 1985  Patient Address: [address] [place] [address]  Patient Phone: [phone]  Visit Protocol: Low back pain  Patient Summary:  Lady is a 35 year old ( : 1985 ) female who initiated a [facility] Visit for evaluation of Low Back Pain. When asked the question \"Please sign me up to receive news, health information and promotions. \", Lady responded \"No\".    Her back pain began suddenly today. The pain is present on both sides and is located in the buttocks, low back, and mid back area.   The pain goes away when resting and varies depending on the activity.   She is able to walk on her toes and is able to walk on her heels with her toes lifted off the ground.   Lady is experiencing back muscle spasms and shooting pain.    Symptom Details   Pain: The pain is severe (7-9 on a 10 point pain scale). The pain shoots into her buttocks and thighs.    Lady denies loss of bowel control, numbness or tingling in the legs, vomiting, urinary retention, saddle anesthesia, urinary frequency, dysuria, abdominal pain, feeling feverish, hematuria, leg weakness, loss of bladder control, and a rash in the same area as the back pain. Her pain does not decrease when bending forward.   Precipitating events  The back pain did not begin in response to an injury that happened at her work. She doesn't know what caused her back pain.   Pertinent medical history  Lady has had similar episodes of back pain in the past. She has not had cancer, back surgery, kidney stones, and unintended weight loss in the last three months.   She has not seen a provider to treat this episode of low back pain.   aLdy has not been told by her provider to avoid NSAIDs.   Treatments  Lady tried using therapeutic remedies (such as cold pack, heat, chiropractic treatment, physical therapy) to manage her low " back pain.   Other approaches used to manage the back pain as reported by the patient (free text): I've been sitting with a heating pad on my back for about an hour and it seems a little better.   Lady has not tried using over-the-counter medications and prescription medications to manage her back pain.   Lady needs a return to work/school note.   She denies pregnancy and denies breastfeeding. She is currently menstruating.   Lady does not smoke or use smokeless tobacco.     MEDICATIONS: No current medications, ALLERGIES: NKDA  Clinician Response:  Dear Lady,   Based on the information you have provided, you may have low back pain due to pinched or compressed spinal nerves.  When the nerve root becomes compressed, the pressure could cause various symptoms including pain, weakness, numbness, and tingling. A more complete health history, examination, and testing are possibly needed. For this reason, I would like you to make an appointment to be seen in person.  Medication information  I am prescribing:       Methocarbamol (Robaxin-750) 750 mg oral tablet. Take 2 tablets by mouth every 6 hours for 2 days, then 1 tablet every 6 hours for 5 days. There are no refills with this prescription.      Ibuprofen 800 mg oral tablet. Take 1 tablet by mouth 3 times per day as needed for 7 days. Do not exceed 2400mg in 24 hours. There are no refills with this prescription.    Taking this medication with food will decrease the risk of stomach discomfort.     Unless you are allergic to the over-the-counter medication(s) below, I recommend using:     Lidocaine (Aspercreme) 4% patch. Apply the patch to the painful area. Patch may remain in place for up to 12 hours. No more than 1 patch should be used in a 24-hour period.   Over-the-counter medications do not require a prescription. Ask the pharmacist if you have any questions.  Self care  The following tips will help prevent and reduce back pain:     Apply heating pads  on the sore area for a short duration (10 minutes per hour and as needed). A hot bath or a heating pad on your lower back may also help reduce pain and stiffness.    Maintaining a good posture reduces stress on the back muscles. To help reduce the stress on your back:    Stand and sit up straight.    Try not to slouch when standing or sitting.    Try not to stay in the same position for a long time.    Switch positions every 20 to 30 minutes if possible.    When lifting heavy objects, squat down and use your legs rather than bending at the waist.          Stress can make your back pain worse. For this reason, take time to relax and try some relaxation techniques when you feel stressed.    Click the following link for more information: Prevent back pain.     If you have a history of fracture or osteoporosis, follow up with your primary care provider to find out whether your low back pain is caused by the fracture or osteoporosis.  When to seek care  Please make an appointment to be seen in a clinic or urgent care if any of the following occur:     You develop new symptoms or your symptoms becomes worse    A painful rash that appears as a stripe along one side of the body    Unexplained fever    Unbearable pain    Unrelenting night pain     Seek immediate medical care if you have problems with bowel or bladder control, worsening weakness or numbness in your legs, or numbness in the inner thighs, groin, or buttocks.   Diagnosis: Acute radiculopathy/radiculitis  Diagnosis ICD: M54.10  Prescription: methocarbamol (Robaxin-750) 750 mg oral tablet 36 tablet, 7 days supply. Take 2 tablets by mouth every 6 hours for 2 days, then 1 tablet every 6 hours for 5 days. Refills: 0, Refill as needed: no, Allow substitutions: yes  Prescription: ibuprofen (IBU) 800 mg oral tablet 21 tablet, 7 days supply. Take 1 tablet by mouth 3 times per day as needed for 7 days. Refills: 0, Refill as needed: no, Allow substitutions: yes  Pharmacy:  [id number] - [phone] - [address] [place] [id number]

## 2020-12-07 ENCOUNTER — OFFICE VISIT (OUTPATIENT)
Dept: FAMILY MEDICINE | Facility: CLINIC | Age: 35
End: 2020-12-07
Payer: COMMERCIAL

## 2020-12-07 VITALS
TEMPERATURE: 98.5 F | HEART RATE: 73 BPM | HEIGHT: 62 IN | BODY MASS INDEX: 29.48 KG/M2 | WEIGHT: 160.2 LBS | RESPIRATION RATE: 16 BRPM | OXYGEN SATURATION: 98 % | DIASTOLIC BLOOD PRESSURE: 80 MMHG | SYSTOLIC BLOOD PRESSURE: 124 MMHG

## 2020-12-07 DIAGNOSIS — Z23 ENCOUNTER FOR IMMUNIZATION: ICD-10-CM

## 2020-12-07 DIAGNOSIS — M53.3 SI (SACROILIAC) JOINT DYSFUNCTION: Primary | ICD-10-CM

## 2020-12-07 PROCEDURE — 90686 IIV4 VACC NO PRSV 0.5 ML IM: CPT | Performed by: FAMILY MEDICINE

## 2020-12-07 PROCEDURE — 90471 IMMUNIZATION ADMIN: CPT | Performed by: FAMILY MEDICINE

## 2020-12-07 PROCEDURE — 99203 OFFICE O/P NEW LOW 30 MIN: CPT | Mod: 25 | Performed by: FAMILY MEDICINE

## 2020-12-07 RX ORDER — IBUPROFEN 800 MG/1
TABLET, FILM COATED ORAL
COMMUNITY
Start: 2020-11-30 | End: 2021-03-25

## 2020-12-07 RX ORDER — METHOCARBAMOL 750 MG/1
TABLET, FILM COATED ORAL
COMMUNITY
Start: 2020-11-30 | End: 2021-03-25

## 2020-12-07 ASSESSMENT — MIFFLIN-ST. JEOR: SCORE: 1384.91

## 2020-12-07 NOTE — PATIENT INSTRUCTIONS
SI joint dysfunction from a muscle spasm or overuse.  Continue to use the muscle relaxer at bedtime while you feel the muscles are tight.  Ibuprofen avoid due to the stomach upset unless you need some pain relief, eat with it.    Bridging: pelvic tilt plus pushing the heels and lifting the pelvis into the air.  Piriformis stretch, pull the heel and ankle up towards you.  Clams: lay on side with knees bent lifting knee up like a clam 10 times both sides.    Patient Education     Exercises to Strengthen Your Lower Back  Strong lower back and abdominal muscles work together to support your spine. The exercises below will help strengthen the lower back. It's important that you start exercising slowly and increase levels gradually.   Always start any exercise program with stretching. If you feel pain while doing any of these exercises, stop and talk to your doctor about a more specific exercise program that better suits your condition.    Low back stretch  The point of stretching is to make you more flexible and increase your range of motion. Stretch only as much as you are able. Stretch slowly. Don't push your stretch to the limit. If at any point you feel pain while stretching, this is your (temporary) limit.     Lie on your back with your knees bent and both feet on the ground.    Slowly raise your left knee to your chest as you flatten your lower back against the floor. Hold for 5 seconds.    Relax and repeat the exercise with your right knee.    Do 10 of these exercises for each leg.    Repeat hugging both knees to your chest at the same time.  Building lower back strength  Start your exercise routine with 10 to 30 minutes a day, 1 to 3 times a day.  Initial exercises  Lying on your back:  1. Ankle pumps. Move your foot up and down, towards your head, and then away. Repeat 10 times with each foot.  2. Heel slides. Slowly bend your knee, drawing the heel of your foot towards you. Then slide your heel/foot from you,  straightening your knee. Don't lift your foot off the floor (this is not a leg lift).  3. Abdominal contraction. Bend your knees and put your hands on your stomach. Tighten your stomach muscles. Hold for 5 seconds, then relax. Repeat 10 times.  4. Straight leg raise. Bend one leg at the knee and keep the other leg straight. Tighten your stomach muscles. Slowly lift your straight leg 6 to 12 inches off the floor and hold for up to 5 seconds. Repeat 10 times on each side.  Standin. Wall squats. Stand with your back against the wall. Move your feet about 12 inches away from the wall. Tighten your stomach muscles, and slowly bend your knees until they are at about a 45 degree angle. Don't go down too far. Hold about 5 seconds. Then slowly return to your starting position. Repeat 10 times.  2. Heel raises. Stand facing the wall. Slowly raise the heels of your feet up and down, while keeping your toes on the floor. If you have trouble balancing, you can touch the wall with your hands. Repeat 10 times.  More advanced exercises  When you feel comfortable enough, try these exercises.  1. Kneeling lumbar extension. Start on your hands and knees. At the same time, raise and straighten your right arm and left leg until they are parallel to the ground. Hold for 2 seconds and come back slowly to a starting position. Repeat with left arm and right leg, alternating 10 times.  2. Prone lumbar extension. Lie face down, arms extended overhead, palms on the floor. At the same time, raise your right arm and left leg as high as comfortably possible. Hold for 10 seconds and slowly return to start. Repeat with left arm and right leg, alternating 10 times. Gradually build up to 20 times. (Advanced: Repeat this exercise raising both arms and both legs a few inches off the floor at the same time. Hold for 5 seconds and release.)  3. Pelvic tilt. Lie on the floor on your back with your knees bent at 90 degrees. Your feet should be flat on  the floor. Inhale, exhale, then slowly contract your abdominal muscles bringing your navel toward your spine. Let your pelvis rock back until your lower back is flat on the floor. Hold for 10 seconds while breathing smoothly.  4. Abdominal crunch. Perform a pelvic tilt (above) flattening your lower back against the floor. Holding the tension in your abdominal muscles, take another breath and raise your shoulder blades off the ground (this is not a full sit-up). Keep your head in line with your body (don t bend your neck forward). Hold for 2 seconds, then slowly lower.  [employer] last reviewed this educational content on 8/1/2019 2000-2020 [employer]. [address] [place] [address], [place] [id number]. All rights reserved. This information is not intended as a substitute for professional medical care. Always follow your healthcare professional's instructions.

## 2020-12-07 NOTE — PROGRESS NOTES
"Subjective     Lady Saavedra is a 35 year old female who presents to clinic today for the following health issues:    HPI         Back Pain  Onset/Duration: x 1 week. Did [facility] a week ago and they gave her a muscle relaxer and 800 MG  Description:   Location of pain: low back bilateral but primarily on the left side. Patient has also had a cramp in her left thigh about 3-4 days.  Character of pain: achy and with certain movement it will become sharper.  Pain radiation: radiates into the right buttocks, radiates into the right leg, cramping pain on left upper thigh.  New numbness or weakness in legs, not attributed to pain: no   Intensity: Currently 0/10. But as the day goes on and the more she is on her feet the more her back hurts.  Progression of Symptoms: improving but pain is still there.  History:   Specific cause: none  Pain interferes with job: YES  History of back problems: no prior back problems  Any previous MRI or X-rays: None  Sees a specialist for back pain: No  Alleviating factors:   Improved by: heat, muscle relaxants and NSAIDs    Precipitating factors:  Worsened by: Bending and prolonged standing  Therapies tried and outcome: heat, muscle relaxants and NSAIDs- all of these help for a ;little while but the pain is still present.    Woke up with low left back pain.    Accompanying Signs & Symptoms:  Risk of Fracture: None  Risk of Cauda Equina: None  Risk of Infection: None  Risk of Cancer: None  Risk of Ankylosing Spondylitis: Onset at age <35, male, AND morning back stiffness no       Review of Systems   Constitutional, HEENT, cardiovascular, pulmonary, gi and gu systems are negative, except as otherwise noted.      Objective    /80   Pulse 73   Temp 98.5  F (36.9  C) (Tympanic)   Resp 16   Ht 1.575 m (5' 2\")   Wt 72.7 kg (160 lb 3.2 oz)   LMP 11/26/2020 (Exact Date)   SpO2 98%   BMI 29.30 kg/m    Body mass index is 29.3 kg/m .  Physical Exam          Physical Exam:     Vitals:    " "12/07/20 0953   BP: 124/80   Pulse: 73   Resp: 16   Temp: 98.5  F (36.9  C)   TempSrc: Tympanic   SpO2: 98%   Weight: 72.7 kg (160 lb 3.2 oz)   Height: 1.575 m (5' 2\")     Body mass index is 29.3 kg/m .  GENERAL:: healthy, alert and no distress  Lumber/Thoracic Spine Exam: Tender:  left SI joint    Non-tender:  lumbar spinous processes, lumbar facet joints, left para lumbar muscles, right para lumbar muscles, left sciatic notch, right sciatic notch    Range of Motion:  lumbar flexion  full, lumbar extension  full, left lateral lumbar bending  full, right lateral lumbar bending feels stretch full, left lateral lumbar rotation  full, right lateral lumbar rotation  full    Strength and Neuro:    L4: Squat and Rise 5/5, knee extension 5/5 symmetric and Patella reflex 2+ symmetric, medial foot normal sensation  L5:  Heel Walking, dorsiflexion of ankle/great toe 5/5 symmetric; Sensation dorsal foot normal  S1: Walk on toes, plantarflexion of ankle/toes 5/5 symmetric; Achilles 2+ symmetric sensation lateral foot normal;     Special tests:  negative straight leg raises  Hip Exam: Hip ROM full  NEURO: Lower extremity light touch sensory exam grossly normal, Reflexes 2+ symmetric at patella and achilles.          No results found for this or any previous visit (from the past 24 hour(s)).        Assessment & Plan     Lady was seen today for back pain.    Diagnoses and all orders for this visit:    SI (sacroiliac) joint dysfunction: causing left low back pain in the SI joint from a muscle spasm  -continue muscle relaxer at bedtime  -IBP as needed as it causes stomach upset when taken scheduled.  -stretching and strengthening as discussed  -if not continuing to improve then plan formal Physical therapy.         BMI:   Estimated body mass index is 29.3 kg/m  as calculated from the following:    Height as of this encounter: 1.575 m (5' 2\").    Weight as of this encounter: 72.7 kg (160 lb 3.2 oz).   Weight management plan: " Discussed healthy diet and exercise guidelines         See Patient Instructions    Return in about 4 weeks (around 1/4/2021), or if symptoms worsen or fail to improve.    Franko Coyne MD  [facility]

## 2021-03-25 ENCOUNTER — VIRTUAL VISIT (OUTPATIENT)
Dept: URGENT CARE | Facility: CLINIC | Age: 36
End: 2021-03-25
Payer: COMMERCIAL

## 2021-03-25 ENCOUNTER — AMBULATORY - HEALTHEAST (OUTPATIENT)
Dept: FAMILY MEDICINE | Facility: CLINIC | Age: 36
End: 2021-03-25

## 2021-03-25 DIAGNOSIS — Z20.822 SUSPECTED COVID-19 VIRUS INFECTION: Primary | ICD-10-CM

## 2021-03-25 DIAGNOSIS — Z20.822 SUSPECTED COVID-19 VIRUS INFECTION: ICD-10-CM

## 2021-03-25 PROCEDURE — 99213 OFFICE O/P EST LOW 20 MIN: CPT | Mod: TEL | Performed by: NURSE PRACTITIONER

## 2021-03-25 NOTE — PROGRESS NOTES
"  Lady Saavedra is a 36 year old female who is being evaluated via a billable telephone visit.      The patient has been notified of following:     \"This telephone visit will be conducted via a call between you and your physician/provider. We have found that certain health care needs can be provided without the need for a physical exam.  This service lets us provide the care you need with a short phone conversation.  If a prescription is necessary we can send it directly to your pharmacy.  If lab work is needed we can place an order for that and you can then stop by our lab to have the test done at a later time.    If during the course of the call the physician/provider feels a telephone visit is not appropriate, you will not be charged for this service.\"     Patient has given verbal consent for Telephone visit?  Yes       ASSESSMENT:       ICD-10-CM    1. Suspected COVID-19 virus infection  Z20.822 Symptomatic COVID-19 Virus (Coronavirus) by PCR         Worrisome symptoms discussed with instructions to go to the ED.  Patient verbalized understanding and agreed with this plan.  Chief Complaint:    Chief Complaint   Patient presents with     Covid 19 Testing       HPI: Lady Saavedra is an 36 year old female who calls with concerns that include headache with ST 3 days ago, then has had diarrhea, body aches, fatigue, slight cough since then. 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      ROS:      A 10 point ROS is negative except as expressed in HPI.    Past Medical History  Past Medical History:   Diagnosis Date     Abnormal Pap smear of cervix 2009, 2012, 2014    see problem list     Cervical high risk HPV (human papillomavirus) test positive 2009, 2012    see problem list     Chickenpox      Depression      Gestational HTN 1/1/2015     History of colposcopy with cervical biopsy 3/2012    ANN 1     Seasonal allergies     Was on clariten uses occasional sudafed     Urinary tract bacterial infections     as " teenager         Allergies:  No Known Allergies     Current Meds:  No current outpatient medications on file.     PHYSICAL EXAM:     Patient is alert and oriented. Able to speak in full sentences. No wheezing or cough heard during telephone conversation. Mood is appropriate.     Patti Lindo CNP  3/25/2021, 10:40 AM      Phone call duration:  12 minutes

## 2021-03-26 LAB
SARS-COV-2 PCR COMMENT: NORMAL
SARS-COV-2 RNA SPEC QL NAA+PROBE: NEGATIVE
SARS-COV-2 VIRUS SPECIMEN SOURCE: NORMAL

## 2021-03-27 ENCOUNTER — COMMUNICATION - HEALTHEAST (OUTPATIENT)
Dept: SCHEDULING | Facility: CLINIC | Age: 36
End: 2021-03-27

## 2021-04-05 ENCOUNTER — TELEPHONE (OUTPATIENT)
Dept: FAMILY MEDICINE | Facility: CLINIC | Age: 36
End: 2021-04-05

## 2021-04-05 ENCOUNTER — HOSPITAL ENCOUNTER (INPATIENT)
Facility: CLINIC | Age: 36
LOS: 4 days | Discharge: HOME | DRG: 177 | End: 2021-04-09
Attending: FAMILY MEDICINE | Admitting: FAMILY MEDICINE
Payer: COMMERCIAL

## 2021-04-05 ENCOUNTER — APPOINTMENT (OUTPATIENT)
Dept: CT IMAGING | Facility: CLINIC | Age: 36
DRG: 177 | End: 2021-04-05
Attending: FAMILY MEDICINE
Payer: COMMERCIAL

## 2021-04-05 ENCOUNTER — APPOINTMENT (OUTPATIENT)
Dept: GENERAL RADIOLOGY | Facility: CLINIC | Age: 36
DRG: 177 | End: 2021-04-05
Attending: FAMILY MEDICINE
Payer: COMMERCIAL

## 2021-04-05 ENCOUNTER — VIRTUAL VISIT (OUTPATIENT)
Dept: FAMILY MEDICINE | Facility: CLINIC | Age: 36
End: 2021-04-05
Payer: COMMERCIAL

## 2021-04-05 VITALS — WEIGHT: 160 LBS | TEMPERATURE: 97.5 F | HEIGHT: 62 IN | BODY MASS INDEX: 29.44 KG/M2

## 2021-04-05 DIAGNOSIS — J12.82 PNEUMONIA DUE TO 2019 NOVEL CORONAVIRUS: ICD-10-CM

## 2021-04-05 DIAGNOSIS — R06.02 SHORTNESS OF BREATH: ICD-10-CM

## 2021-04-05 DIAGNOSIS — U07.1 PNEUMONIA DUE TO 2019 NOVEL CORONAVIRUS: ICD-10-CM

## 2021-04-05 DIAGNOSIS — U07.1 2019 NOVEL CORONAVIRUS DISEASE (COVID-19): Primary | ICD-10-CM

## 2021-04-05 DIAGNOSIS — R06.82 TACHYPNEA: ICD-10-CM

## 2021-04-05 LAB
ALBUMIN SERPL-MCNC: 3.4 G/DL (ref 3.4–5)
ALP SERPL-CCNC: 62 U/L (ref 40–150)
ALT SERPL W P-5'-P-CCNC: 91 U/L (ref 0–50)
ANION GAP SERPL CALCULATED.3IONS-SCNC: 6 MMOL/L (ref 3–14)
AST SERPL W P-5'-P-CCNC: 84 U/L (ref 0–45)
BASOPHILS # BLD AUTO: 0 10E9/L (ref 0–0.2)
BASOPHILS NFR BLD AUTO: 0 %
BILIRUB SERPL-MCNC: 0.4 MG/DL (ref 0.2–1.3)
BUN SERPL-MCNC: 6 MG/DL (ref 7–30)
CALCIUM SERPL-MCNC: 9 MG/DL (ref 8.5–10.1)
CHLORIDE SERPL-SCNC: 107 MMOL/L (ref 94–109)
CO2 SERPL-SCNC: 26 MMOL/L (ref 20–32)
CREAT SERPL-MCNC: 0.56 MG/DL (ref 0.52–1.04)
D DIMER PPP FEU-MCNC: 0.9 UG/ML FEU (ref 0–0.5)
DIFFERENTIAL METHOD BLD: ABNORMAL
EOSINOPHIL # BLD AUTO: 0 10E9/L (ref 0–0.7)
EOSINOPHIL NFR BLD AUTO: 0 %
ERYTHROCYTE [DISTWIDTH] IN BLOOD BY AUTOMATED COUNT: 11.7 % (ref 10–15)
GFR SERPL CREATININE-BSD FRML MDRD: >90 ML/MIN/{1.73_M2}
GLUCOSE SERPL-MCNC: 103 MG/DL (ref 70–99)
HCG SERPL QL: NEGATIVE
HCT VFR BLD AUTO: 43.2 % (ref 35–47)
HGB BLD-MCNC: 14.2 G/DL (ref 11.7–15.7)
LYMPHOCYTES # BLD AUTO: 0.7 10E9/L (ref 0.8–5.3)
LYMPHOCYTES NFR BLD AUTO: 13 %
MCH RBC QN AUTO: 29.9 PG (ref 26.5–33)
MCHC RBC AUTO-ENTMCNC: 32.9 G/DL (ref 31.5–36.5)
MCV RBC AUTO: 91 FL (ref 78–100)
MONOCYTES # BLD AUTO: 0.2 10E9/L (ref 0–1.3)
MONOCYTES NFR BLD AUTO: 3 %
NEUTROPHILS # BLD AUTO: 4.3 10E9/L (ref 1.6–8.3)
NEUTROPHILS NFR BLD AUTO: 84 %
PLATELET # BLD AUTO: 213 10E9/L (ref 150–450)
PLATELET # BLD EST: ABNORMAL 10*3/UL
POTASSIUM SERPL-SCNC: 3.4 MMOL/L (ref 3.4–5.3)
PROT SERPL-MCNC: 7.9 G/DL (ref 6.8–8.8)
RBC # BLD AUTO: 4.75 10E12/L (ref 3.8–5.2)
RBC MORPH BLD: NORMAL
SODIUM SERPL-SCNC: 139 MMOL/L (ref 133–144)
TROPONIN I SERPL-MCNC: <0.015 UG/L (ref 0–0.04)
WBC # BLD AUTO: 5.1 10E9/L (ref 4–11)

## 2021-04-05 PROCEDURE — 71045 X-RAY EXAM CHEST 1 VIEW: CPT

## 2021-04-05 PROCEDURE — 250N000009 HC RX 250: Performed by: FAMILY MEDICINE

## 2021-04-05 PROCEDURE — 120N000001 HC R&B MED SURG/OB

## 2021-04-05 PROCEDURE — 99223 1ST HOSP IP/OBS HIGH 75: CPT | Mod: AI | Performed by: FAMILY MEDICINE

## 2021-04-05 PROCEDURE — 85025 COMPLETE CBC W/AUTO DIFF WBC: CPT | Performed by: FAMILY MEDICINE

## 2021-04-05 PROCEDURE — 99285 EMERGENCY DEPT VISIT HI MDM: CPT | Performed by: FAMILY MEDICINE

## 2021-04-05 PROCEDURE — 84484 ASSAY OF TROPONIN QUANT: CPT | Performed by: FAMILY MEDICINE

## 2021-04-05 PROCEDURE — 85379 FIBRIN DEGRADATION QUANT: CPT | Performed by: FAMILY MEDICINE

## 2021-04-05 PROCEDURE — 258N000003 HC RX IP 258 OP 636: Performed by: PHYSICIAN ASSISTANT

## 2021-04-05 PROCEDURE — 250N000009 HC RX 250: Performed by: PHYSICIAN ASSISTANT

## 2021-04-05 PROCEDURE — 99285 EMERGENCY DEPT VISIT HI MDM: CPT | Mod: 25 | Performed by: FAMILY MEDICINE

## 2021-04-05 PROCEDURE — 80053 COMPREHEN METABOLIC PANEL: CPT | Performed by: FAMILY MEDICINE

## 2021-04-05 PROCEDURE — 71275 CT ANGIOGRAPHY CHEST: CPT

## 2021-04-05 PROCEDURE — 99213 OFFICE O/P EST LOW 20 MIN: CPT | Mod: GT | Performed by: NURSE PRACTITIONER

## 2021-04-05 PROCEDURE — 250N000011 HC RX IP 250 OP 636: Performed by: FAMILY MEDICINE

## 2021-04-05 PROCEDURE — XW033E5 INTRODUCTION OF REMDESIVIR ANTI-INFECTIVE INTO PERIPHERAL VEIN, PERCUTANEOUS APPROACH, NEW TECHNOLOGY GROUP 5: ICD-10-PCS | Performed by: FAMILY MEDICINE

## 2021-04-05 PROCEDURE — 84703 CHORIONIC GONADOTROPIN ASSAY: CPT | Performed by: FAMILY MEDICINE

## 2021-04-05 PROCEDURE — 99207 PR APP CREDIT; MD BILLING SHARED VISIT: CPT | Performed by: PHYSICIAN ASSISTANT

## 2021-04-05 PROCEDURE — 96374 THER/PROPH/DIAG INJ IV PUSH: CPT | Mod: 59 | Performed by: FAMILY MEDICINE

## 2021-04-05 PROCEDURE — 250N000011 HC RX IP 250 OP 636: Performed by: PHYSICIAN ASSISTANT

## 2021-04-05 RX ORDER — AMOXICILLIN 250 MG
1 CAPSULE ORAL 2 TIMES DAILY PRN
Status: DISCONTINUED | OUTPATIENT
Start: 2021-04-05 | End: 2021-04-09 | Stop reason: HOSPADM

## 2021-04-05 RX ORDER — NALOXONE HYDROCHLORIDE 0.4 MG/ML
0.4 INJECTION, SOLUTION INTRAMUSCULAR; INTRAVENOUS; SUBCUTANEOUS
Status: DISCONTINUED | OUTPATIENT
Start: 2021-04-05 | End: 2021-04-09 | Stop reason: HOSPADM

## 2021-04-05 RX ORDER — ACETAMINOPHEN 650 MG/1
650 SUPPOSITORY RECTAL EVERY 4 HOURS PRN
Status: DISCONTINUED | OUTPATIENT
Start: 2021-04-05 | End: 2021-04-09 | Stop reason: HOSPADM

## 2021-04-05 RX ORDER — POLYETHYLENE GLYCOL 3350 17 G/17G
17 POWDER, FOR SOLUTION ORAL DAILY PRN
Status: DISCONTINUED | OUTPATIENT
Start: 2021-04-05 | End: 2021-04-09 | Stop reason: HOSPADM

## 2021-04-05 RX ORDER — NALOXONE HYDROCHLORIDE 0.4 MG/ML
0.2 INJECTION, SOLUTION INTRAMUSCULAR; INTRAVENOUS; SUBCUTANEOUS
Status: DISCONTINUED | OUTPATIENT
Start: 2021-04-05 | End: 2021-04-09 | Stop reason: HOSPADM

## 2021-04-05 RX ORDER — LIDOCAINE 40 MG/G
CREAM TOPICAL
Status: DISCONTINUED | OUTPATIENT
Start: 2021-04-05 | End: 2021-04-09 | Stop reason: HOSPADM

## 2021-04-05 RX ORDER — PROCHLORPERAZINE 25 MG
25 SUPPOSITORY, RECTAL RECTAL EVERY 12 HOURS PRN
Status: DISCONTINUED | OUTPATIENT
Start: 2021-04-05 | End: 2021-04-09 | Stop reason: HOSPADM

## 2021-04-05 RX ORDER — ACETAMINOPHEN 325 MG/1
650 TABLET ORAL EVERY 4 HOURS PRN
Status: DISCONTINUED | OUTPATIENT
Start: 2021-04-05 | End: 2021-04-09 | Stop reason: HOSPADM

## 2021-04-05 RX ORDER — OXYCODONE HYDROCHLORIDE 5 MG/1
5-10 TABLET ORAL
Status: DISCONTINUED | OUTPATIENT
Start: 2021-04-05 | End: 2021-04-09 | Stop reason: HOSPADM

## 2021-04-05 RX ORDER — ONDANSETRON 4 MG/1
4 TABLET, ORALLY DISINTEGRATING ORAL EVERY 6 HOURS PRN
Status: DISCONTINUED | OUTPATIENT
Start: 2021-04-05 | End: 2021-04-09 | Stop reason: HOSPADM

## 2021-04-05 RX ORDER — DEXAMETHASONE SODIUM PHOSPHATE 10 MG/ML
6 INJECTION, SOLUTION INTRAMUSCULAR; INTRAVENOUS ONCE
Status: COMPLETED | OUTPATIENT
Start: 2021-04-05 | End: 2021-04-05

## 2021-04-05 RX ORDER — BENZONATATE 100 MG/1
100 CAPSULE ORAL 3 TIMES DAILY PRN
Status: DISCONTINUED | OUTPATIENT
Start: 2021-04-05 | End: 2021-04-09 | Stop reason: HOSPADM

## 2021-04-05 RX ORDER — AMOXICILLIN 250 MG
2 CAPSULE ORAL 2 TIMES DAILY PRN
Status: DISCONTINUED | OUTPATIENT
Start: 2021-04-05 | End: 2021-04-09 | Stop reason: HOSPADM

## 2021-04-05 RX ORDER — PROCHLORPERAZINE MALEATE 5 MG
10 TABLET ORAL EVERY 6 HOURS PRN
Status: DISCONTINUED | OUTPATIENT
Start: 2021-04-05 | End: 2021-04-09 | Stop reason: HOSPADM

## 2021-04-05 RX ORDER — IOPAMIDOL 755 MG/ML
67 INJECTION, SOLUTION INTRAVASCULAR ONCE
Status: COMPLETED | OUTPATIENT
Start: 2021-04-05 | End: 2021-04-05

## 2021-04-05 RX ORDER — ONDANSETRON 2 MG/ML
4 INJECTION INTRAMUSCULAR; INTRAVENOUS EVERY 6 HOURS PRN
Status: DISCONTINUED | OUTPATIENT
Start: 2021-04-05 | End: 2021-04-09 | Stop reason: HOSPADM

## 2021-04-05 RX ORDER — DEXTROMETHORPHAN POLISTIREX 30 MG/5ML
15 SUSPENSION ORAL 2 TIMES DAILY PRN
Status: DISCONTINUED | OUTPATIENT
Start: 2021-04-05 | End: 2021-04-09 | Stop reason: HOSPADM

## 2021-04-05 RX ADMIN — DEXAMETHASONE SODIUM PHOSPHATE 6 MG: 10 INJECTION, SOLUTION INTRAMUSCULAR; INTRAVENOUS at 13:15

## 2021-04-05 RX ADMIN — SODIUM CHLORIDE 50 ML: 9 INJECTION, SOLUTION INTRAVENOUS at 20:00

## 2021-04-05 RX ADMIN — IOPAMIDOL 67 ML: 755 INJECTION, SOLUTION INTRAVENOUS at 16:17

## 2021-04-05 RX ADMIN — REMDESIVIR 200 MG: 100 INJECTION, POWDER, LYOPHILIZED, FOR SOLUTION INTRAVENOUS at 17:50

## 2021-04-05 RX ADMIN — SODIUM CHLORIDE 98 ML: 9 INJECTION, SOLUTION INTRAVENOUS at 16:17

## 2021-04-05 RX ADMIN — ENOXAPARIN SODIUM 40 MG: 40 INJECTION SUBCUTANEOUS at 17:15

## 2021-04-05 ASSESSMENT — ACTIVITIES OF DAILY LIVING (ADL)
ADLS_ACUITY_SCORE: 12
ADLS_ACUITY_SCORE: 12

## 2021-04-05 ASSESSMENT — MIFFLIN-ST. JEOR
SCORE: 1407.25
SCORE: 1379.01

## 2021-04-05 NOTE — H&P
[facility]    Hospitalist History and Physical       Date of Admission:  4/5/2021  Assessment & Plan   Lady Saavedra is a 36 year old female with no significant cardiopulmonary disease who was admitted to [facility] on 4/5/2021 for evaluation of  fever, cough, dyspnea, headache and nausea or vomiting and found to be COVID-19 positive.    COVID-19 Diagnosis Details:  Onset of COVID symptoms 3/22/21   Date of positive test results 3/31/21   Research study No     # Confirmed COVID-19 infection    # Acute Hypoxic Respiratory Failure secondary to COVID-19 infection  # Viral Pneumonia secondary to COVID-19 infection  - Initial chest x-ray showed multifocal airspace disease. Oxygen needs have been stable, currently on 1.5 L due to hypoxemia.   - Admit to medical floor with continuous pulse oximetry, COVID-19 special precautions, minimized lab draws, and care consolidation  - Oxygen: continue current support with nasal cannula at 1.5 L/min; titrate to keep SpO2 between 90-96%  - Labs: CBC with differential, CMP, D-dimer and troponin done on admission and reviewed by me. Will trend daily until patient reaches clinical stability.  - Imaging: chest CT with contrast ordered  - Breathing treatments: no inhalers needed; avoid nebulizers in favor of MDIs   - IV fluids: not indicated at this time  - Antibiotics: not indicated   - Treatments: Dexamethasone 6 mg x 10 days or hospital discharge, started on 4/5/21 and Remdesivir x 5 days or hospital discharge, started on 4/5/21  - DVT Prophylaxis: At high risk of thrombotic complications due to COVID-19 disease (last DDimer displayed below).          - PROPHYLACTIC dosing: lovenox 40mg daily  - Discharge Anticoagulation: At discharge consider one of the following for 30 days and until the patient has returned to normal mobility:        - Apixaban (Eliquis) 2.5 mg two times a day        - Rivaroxaban (Xarelto) 10 mg once  daily    D Dimer   Date Value Ref Range Status   04/05/2021 0.9 (H) 0.0 - 0.50 ug/ml FEU Final     Comment:     This D-dimer assay is intended for use in conjunction with a clinical pretest   probability assessment model to exclude pulmonary embolism (PE) and deep   venous thrombosis (DVT) in outpatients suspected of PE or DVT. The cut-off   value is 0.5 ug/mL FEU.         Elevated LFTs  ALT 91, AST 84. Suspect related to COVID infection.  - follow CMP     Code Status: Full Code    Diet: Combination Diet Regular Diet Adult    Wells Catheter: not present  Disposition Plan   Expected discharge: 2 - 3 days, recommended to prior living arrangement once stable on room air.  Entered: Jaclyn Taylor PA-C 04/05/2021, 4:45 PM     The patient's care was discussed with the Attending Physician, Dr. Norris Onofre and Patient.    Jaclyn Taylor PA-C  [facility]  Contact information available via [facility] Paging/Directory    ______________________________________________________________________    Chief Complaint   Shortness of breath and cough, known COVID 19 diagnosis    History is obtained from the patient    History of Present Illness   Lady Saavedra is a 36 year old female who presents with known COVID-19 diagnosis with worsening shortness of breath and cough.    She initially developed symptoms on March 22, her fiancé who she lives with also had symptoms around the time and ultimately tested positive.  Her initial symptoms included headache and myalgias.  She had a Covid 19 test on March 31 which was positive.  Over the past several days her symptoms have worsened.  She has had worsening cough, shortness of breath, nausea with occasional emesis and headaches.  She has not had any chest pain, diarrhea or change in taste/smell.    She presented to the ED due to concerns of worsening cough and shortness of breath.  In the ED she was noted to be hypoxemic and was started on oxygen.  Upon  admission she desatted to 85% when she ambulated to the bathroom.  She is currently resting comfortably in 1.5 L.  She has no history of asthma or other respiratory illness.  She has no chronic medical issues.  She has not received the COVID-19 vaccine.      Review of Systems    The 10 point Review of Systems is negative other than noted in the HPI or here.     Past Medical History    I have reviewed this patient's medical history and updated it with pertinent information if needed.   Past Medical History:   Diagnosis Date     Abnormal Pap smear of cervix 2009, 2012, 2014    see problem list     Cervical high risk HPV (human papillomavirus) test positive 2009, 2012    see problem list     Chickenpox      Depression      Gestational HTN 1/1/2015     History of colposcopy with cervical biopsy 3/2012    ANN 1     Seasonal allergies     Was on clariten uses occasional sudafed     Urinary tract bacterial infections     as teenager       Past Surgical History   I have reviewed this patient's surgical history and updated it with pertinent information if needed.  Past Surgical History:   Procedure Laterality Date     HC ENLARGE BREAST WITH IMPLANT       MOUTH SURGERY  age 18    wisdom teeth       Social History   I have reviewed this patient's social history and updated it with pertinent information if needed.  She lives in [facility] [place] [facility] with her parents, fiancé, and son.  She works at [employer].  She has been working in person.  She does not smoke.  No significant alcohol use.  No drug use.  Social History     Tobacco Use     Smoking status: Never Smoker     Smokeless tobacco: Never Used   Substance Use Topics     Alcohol use: No     Comment: once a month     Drug use: No       Family History   I have reviewed this patient's family history and updated it with pertinent information if needed.   Family History   Adopted: Yes   Problem Relation Age of Onset     Unknown/Adopted Mother      Unknown/Adopted Father      Prior to  Admission Medications   None     Allergies   No Known Allergies    Physical Exam   Vital Signs: Temp: 98.5  F (36.9  C) Temp src: Oral BP: 127/84 Pulse: 92   Resp: 18 SpO2: 94 % O2 Device: Nasal cannula Oxygen Delivery: 1 LPM  Weight: 166 lbs 3.63 oz    Constitutional: Laying down comfortably in bed, frequently coughing and mildly dyspneic, awake, alert, cooperative, and appears stated age  ENT: Oropharynx clear and moist.  Respiratory: Mild tachypnea and frequent coughing, good air exchange, scant bibasilar crackles otherwise clear to auscultation bilaterally, no rhonchi or wheezing  Cardiovascular: regular rate and rhythm, and no murmur noted.  No lower extremity edema.  Pedal pulses are 2+ bilaterally.  GI: normal bowel sounds, soft, non-distended, non-tender  Genitounirinary: Deferred  Skin: No clear rashes noted.  Musculoskeletal: Normal bulk and tone.  Moves all 4 extremities appropriately.  Neurologic: Awake, alert, oriented x3.  Neuropsychiatric: Calm, pleasant, cooperative.  Appropriate affect and mood.  Normal thought process and content.  Short-term memory is grossly intact.    Data   Data reviewed today: I reviewed all medications, new labs and imaging results over the last 24 hours. I personally reviewed no images or EKG's today.    Recent Labs   Lab 04/05/21  1316   WBC 5.1   HGB 14.2   MCV 91         POTASSIUM 3.4   CHLORIDE 107   CO2 26   BUN 6*   CR 0.56   ANIONGAP 6   DIANA 9.0   *   ALBUMIN 3.4   PROTTOTAL 7.9   BILITOTAL 0.4   ALKPHOS 62   ALT 91*   AST 84*   TROPI <0.015     Recent Results (from the past 24 hour(s))   XR Chest Port 1 View    Narrative    CHEST ONE VIEW PORTABLE   4/5/2021 1:05 PM     HISTORY: COVID; hypoxia.    COMPARISON: 4/4/2018      Impression    IMPRESSION: Patchy bilateral groundglass and interstitial opacities.  No lobar consolidation or effusions.    Commonly reported imaging features of (COVID-19) pneumonia are  present. Influenza pneumonia, organizing  pneumonia, drug toxicity and  connective tissue disease can cause a similar imaging pattern.    KHADIJAH JORDAN MD

## 2021-04-05 NOTE — ED NOTES
"     Emergency Department Patient Sign-out       Brief HPI:  This is a 36 year old female signed out to me by Dr. Armenta .  See initial ED Provider note for details of the presentation.            Significant Events prior to my assuming care: History and physical exam as well as lab diagnostics and imaging studies.  CT scan chest pending for evaluation of potential pulmonary embolism      Exam:   Patient Vitals for the past 24 hrs:   BP Temp Temp src Pulse Resp SpO2 Height Weight   04/05/21 1537 -- -- -- -- -- 94 % -- --   04/05/21 1500 -- -- -- -- -- (!) 85 % -- --   04/05/21 1453 127/84 98.5  F (36.9  C) Oral 92 18 96 % 1.575 m (5' 2\") 75.4 kg (166 lb 3.6 oz)   04/05/21 1430 112/79 -- -- 94 -- 98 % -- --   04/05/21 1415 112/86 -- -- 90 -- 96 % -- --   04/05/21 1400 122/86 -- -- 95 -- 98 % -- --   04/05/21 1345 112/83 -- -- 90 -- 97 % -- --   04/05/21 1330 121/89 -- -- 96 -- 95 % -- --   04/05/21 1315 (!) 141/99 -- -- 101 -- -- -- --   04/05/21 1248 (!) 123/91 -- -- 95 -- 97 % -- --   04/05/21 1245 (!) 123/91 -- -- 95 -- 93 % -- --   04/05/21 1230 115/86 -- -- 104 -- 93 % -- --   04/05/21 1215 113/72 -- -- 103 -- 93 % -- --   04/05/21 1203 129/81 98.6  F (37  C) Tympanic 102 18 93 % -- 72.6 kg (160 lb)           ED RESULTS:   Results for orders placed or performed during the hospital encounter of 04/05/21 (from the past 24 hour(s))   XR Chest Port 1 View     Status: None    Collection Time: 04/05/21  1:05 PM    Narrative    CHEST ONE VIEW PORTABLE   4/5/2021 1:05 PM     HISTORY: COVID; hypoxia.    COMPARISON: 4/4/2018      Impression    IMPRESSION: Patchy bilateral groundglass and interstitial opacities.  No lobar consolidation or effusions.    Commonly reported imaging features of (COVID-19) pneumonia are  present. Influenza pneumonia, organizing pneumonia, drug toxicity and  connective tissue disease can cause a similar imaging pattern.    KHADIJAH JORDAN MD   CBC with platelets differential     Status: Abnormal "    Collection Time: 04/05/21  1:16 PM   Result Value Ref Range    WBC 5.1 4.0 - 11.0 10e9/L    RBC Count 4.75 3.8 - 5.2 10e12/L    Hemoglobin 14.2 11.7 - 15.7 g/dL    Hematocrit 43.2 35.0 - 47.0 %    MCV 91 78 - 100 fl    MCH 29.9 26.5 - 33.0 pg    MCHC 32.9 31.5 - 36.5 g/dL    RDW 11.7 10.0 - 15.0 %    Platelet Count 213 150 - 450 10e9/L    Diff Method Manual Differential     % Neutrophils 84.0 %    % Lymphocytes 13.0 %    % Monocytes 3.0 %    % Eosinophils 0.0 %    % Basophils 0.0 %    Absolute Neutrophil 4.3 1.6 - 8.3 10e9/L    Absolute Lymphocytes 0.7 (L) 0.8 - 5.3 10e9/L    Absolute Monocytes 0.2 0.0 - 1.3 10e9/L    Absolute Eosinophils 0.0 0.0 - 0.7 10e9/L    Absolute Basophils 0.0 0.0 - 0.2 10e9/L    RBC Morphology Normal     Platelet Estimate       Automated count confirmed.  Platelet morphology is normal.   D dimer quantitative     Status: Abnormal    Collection Time: 04/05/21  1:16 PM   Result Value Ref Range    D Dimer 0.9 (H) 0.0 - 0.50 ug/ml [facility]   Comprehensive metabolic panel     Status: Abnormal    Collection Time: 04/05/21  1:16 PM   Result Value Ref Range    Sodium 139 133 - 144 mmol/L    Potassium 3.4 3.4 - 5.3 mmol/L    Chloride 107 94 - 109 mmol/L    Carbon Dioxide 26 20 - 32 mmol/L    Anion Gap 6 3 - 14 mmol/L    Glucose 103 (H) 70 - 99 mg/dL    Urea Nitrogen 6 (L) 7 - 30 mg/dL    Creatinine 0.56 0.52 - 1.04 mg/dL    GFR Estimate >90 >60 mL/min/[1.73_m2]    GFR Estimate If Black >90 >60 mL/min/[1.73_m2]    Calcium 9.0 8.5 - 10.1 mg/dL    Bilirubin Total 0.4 0.2 - 1.3 mg/dL    Albumin 3.4 3.4 - 5.0 g/dL    Protein Total 7.9 6.8 - 8.8 g/dL    Alkaline Phosphatase 62 40 - 150 U/L    ALT 91 (H) 0 - 50 U/L    AST 84 (H) 0 - 45 U/L   Troponin I     Status: None    Collection Time: 04/05/21  1:16 PM   Result Value Ref Range    Troponin I ES <0.015 0.000 - 0.045 ug/L   HCG qualitative Blood     Status: None    Collection Time: 04/05/21  1:16 PM   Result Value Ref Range    HCG Qualitative Serum Negative  NEG^Negative   CT Chest Pulmonary Embolism w Contrast     Status: None (Preliminary result)    Collection Time: 04/05/21  4:35 PM    Narrative    CT CHEST PULMONARY EMBOLISM WITH CONTRAST  4/5/2021 4:35 PM    HISTORY:  Cough and shortness of breath, COVID positive with elevated  d-dimer, rule out pulmonary embolism.    TECHNIQUE: Scans obtained from the apices through the diaphragm with  IV contrast. 67 mL Isovue 370 IV injected. Radiation dose for this  scan was reduced using automated exposure control, adjustment of the  mA and/or kV according to patient size, or iterative reconstruction  technique.    COMPARISON:  Chest x-ray on same day earlier    FINDINGS:  Chest/mediastinum: No evidence of pulmonary embolism. No cardiomegaly  or significant pericardial effusion. Multiple prominent mediastinal  hilar lymph nodes, likely reactive. Bilateral breast implants.    Lungs and pleura: No pleural effusion or pneumothorax. Bilateral  basilar and subpleural predominant patchy consolidative pulmonary  opacities, worrisome for infection including atypical infection like  COVID-19.    Upper abdomen: Limits evaluation of the upper abdomen due to lack of  coverage and timing of contrast.    Bones and soft tissue: No suspicious osseous lesion.      Impression    IMPRESSION:   1. No evidence of pulmonary embolism.  2. Bilateral patchy consolidative pulmonary opacities, worrisome for  infection including atypical infection like COVID-19.       ED MEDICATIONS:   Medications   lidocaine 1 % 0.1-1 mL (has no administration in time range)   lidocaine (LMX4) kit (has no administration in time range)   sodium chloride (PF) 0.9% PF flush 3 mL (3 mLs Intracatheter Given 4/5/21 1715)   sodium chloride (PF) 0.9% PF flush 3 mL (has no administration in time range)   sodium chloride (PF) 0.9% PF flush 3 mL (has no administration in time range)   Medication instructions: Do NOT use nebulized medications (has no administration in time range)    dexamethasone (DECADRON) tablet 6 mg (has no administration in time range)   remdesivir 200 mg in sodium chloride 0.9 % 250 mL intermittent infusion (has no administration in time range)     Followed by   0.9% sodium chloride BOLUS (has no administration in time range)   remdesivir 100 mg in sodium chloride 0.9 % 250 mL intermittent infusion (has no administration in time range)     And   0.9% sodium chloride BOLUS (has no administration in time range)   acetaminophen (TYLENOL) tablet 650 mg (has no administration in time range)     Or   acetaminophen (TYLENOL) Suppository 650 mg (has no administration in time range)   oxyCODONE (ROXICODONE) tablet 5-10 mg (has no administration in time range)   senna-docusate (SENOKOT-S/PERICOLACE) 8.6-50 MG per tablet 1 tablet (has no administration in time range)     Or   senna-docusate (SENOKOT-S/PERICOLACE) 8.6-50 MG per tablet 2 tablet (has no administration in time range)   polyethylene glycol (MIRALAX) Packet 17 g (has no administration in time range)   ondansetron (ZOFRAN-ODT) ODT tab 4 mg (has no administration in time range)     Or   ondansetron (ZOFRAN) injection 4 mg (has no administration in time range)   prochlorperazine (COMPAZINE) injection 10 mg (has no administration in time range)     Or   prochlorperazine (COMPAZINE) tablet 10 mg (has no administration in time range)     Or   prochlorperazine (COMPAZINE) suppository 25 mg (has no administration in time range)   enoxaparin ANTICOAGULANT (LOVENOX) injection 40 mg (40 mg Subcutaneous Given 4/5/21 1715)   dextromethorphan (DELSYM) liquid 15 mg (has no administration in time range)   benzonatate (TESSALON) capsule 100 mg (has no administration in time range)   naloxone (NARCAN) injection 0.2 mg (has no administration in time range)     Or   naloxone (NARCAN) injection 0.4 mg (has no administration in time range)     Or   naloxone (NARCAN) injection 0.2 mg (has no administration in time range)     Or   naloxone  (NARCAN) injection 0.4 mg (has no administration in time range)   dexamethasone PF (DECADRON) injection 6 mg (6 mg Intravenous Given 4/5/21 1315)   iopamidol (ISOVUE-370) solution 67 mL (67 mLs Intravenous Given 4/5/21 1617)   sodium chloride 0.9 % bag 500mL for CT scan flush use (98 mLs Intravenous Given 4/5/21 1617)         Impression:    ICD-10-CM    1. Pneumonia due to 2019 novel coronavirus  U07.1 HCG qualitative Blood    J12.82        Plan:    Pending studies include CT scan chest for evaluation of potential pulmonary embolism in the context of elevated D-dimer, Covid positive patient with Covid pneumonia.        5:17 PM  CT chest PE study reviewed as above without evidence for pulmonary embolism.  Findings are consistent with COVID-19 pneumonia.    MD William Rodriguez Kevin Ronald, MD  04/05/21 0231

## 2021-04-05 NOTE — ED TRIAGE NOTES
Pt tested positive for COVID on wed, her SOB has been increasing, now she becomes more SOB with talking.

## 2021-04-05 NOTE — TELEPHONE ENCOUNTER
S-(situation): patient's spouse calling (permisssion from patient obtained to talk to him). Concerns that COVID symptoms are worsening, patient initially diagnosed through [facility], they are not sure how/where to pursue follow up care. Requesting an appointment.     B-(background): patient completed a visit through [facility] 3/25/21. Tested positive for COVID (per patient test was done at [place]     A-(assessment): patient continues to have a bad cough that is worsening, more frequent. She had a fever the first couple days of illness, but has been afebrile since. They do have an oximeter at home and have been monitoring sats.  reports that they have been around 95% but last night overnight dropped to 92-94% a few times. Patient feels that it is harder to take a deep breath in but denies other signs of distress. Taking fluids well and urinating adequately. Appetite has been less the past day or so.     R-(recommendations): assisted in scheduling virtual appointment this morning. Also discussed signs of increased work of breathing/respiratory distress in which patient should instead go to ER.     [facility] RN

## 2021-04-05 NOTE — PROGRESS NOTES
Kassy is a 36 year old who is being evaluated via a billable video visit.      How would you like to obtain your AVS? MyChart  If the video visit is dropped, the invitation should be resent by: Text to cell phone: [phone]  Will anyone else be joining your video visit? No    Video Start Time: 1041    Assessment & Plan     2019 novel coronavirus disease (COVID-19)  Clearly tachypneic, dyspneic at rest. Worsening over past 3 days. Advised should be seen in ER, will proceed there now.    Shortness of breath  See above    Tachypnea  See above.         Patient Instructions   Given the worsening shortness of breath and how fast you are breathing, I would recommend you be evaluated in the ER. Please go there now.      Return in about 1 day (around 4/6/2021).    MAUDE [facility]   Kassy is a 36 year old who presents for the following health issues     HPI       Concern for COVID-19  About how many days ago did these symptoms start? 03/22/2021  Is this your first visit for this illness? No   How would you describe your symptoms since your last visit? My symptoms have worsened  In the 14 days before your symptoms started, have you had close contact with someone with COVID-19 (Coronavirus)? Yes, I have been in contact with someone who has COVID-19/Coronavirus (confirmed by lab test).  Do you have a fever or chills? No  - using advil every 4 hoiurs  Are you having new or worsening difficulty breathing? Yes   Please describe what kind of difficulty you are having breathing:Severe dyspnea (ADLs and mobility in home limited by breathing, shortness of breath at rest, speaking in less than full sentences)  Do you have new or worsening cough? Yes, I am coughing up mucus.  Have you had any new or unexplained body aches? YES    Have you experienced any of the following NEW symptoms?    Headache: YES    Sore throat: No    Loss of taste or smell: No    Chest pain: YES - feels  tight    Diarrhea: YES - but now constipated     Rash: No  What treatments have you tried? advil  Who do you live with? 5  Are you, or a household member, a healthcare worker or a ? No  Do you live in a nursing home, group home, or shelter? No  Do you have a way to get food/medications if quarantined? Yes, I have a friend or family member who can help me.    Above HPI reviewed. Additionally, had onset of symptoms 3/21. Negative NP swab on 3/25, however had positive saliva test 2 days later. Over past 3 days, has had worsening of cough, shortness of breath. Is now short of breath at rest. While on video chat, respiratory rate is at least 40 and is clearly dyspneic. Does endorse occasional chest pain.               Review of Systems   Constitutional, HEENT, cardiovascular, pulmonary, gi and gu systems are negative, except as otherwise noted.      Objective           Vitals:  No vitals were obtained today due to virtual visit.    Physical Exam   GENERAL: Healthy, alert and no distress  EYES: Eyes grossly normal to inspection.  No discharge or erythema, or obvious scleral/conjunctival abnormalities.  RESP: clearly tachypneic and dyspneic while resting on couch, frequent cough.  SKIN: Visible skin clear. No significant rash, abnormal pigmentation or lesions.  NEURO: Cranial nerves grossly intact.  Mentation and speech appropriate for age.  PSYCH: Mentation appears normal, affect normal/bright, judgement and insight intact, normal speech and appearance well-groomed.        Video-Visit Details    Type of service:  Video Visit    Video End Time:1045    Originating Location (pt. Location): Home    Distant Location (provider location):  [facility]     Platform used for Video Visit: "[employer], Part of [employer]"

## 2021-04-05 NOTE — ED NOTES
Shortness of breath and cough.  Patient was diagnosed with COVID last Thursday.  Patient denies fever, nausea/vomiting.

## 2021-04-05 NOTE — PROGRESS NOTES
"Pt is up independent in room. Is coughing frequently, is on oxygen at 1.5 liters at 94%. Will get SOA on exertion. Was started on Dexamethasone, Remdesivir and Lovenox.  Chest CT was completed- results are pending.  /84   Pulse 92   Temp 98.5  F (36.9  C) (Oral)   Resp 18   Ht 1.575 m (5' 2\")   Wt 75.4 kg (166 lb 3.6 oz)   LMP 03/22/2021 (Approximate)   SpO2 94%   BMI 30.40 kg/m      "

## 2021-04-05 NOTE — ED PROVIDER NOTES
History     Chief Complaint   Patient presents with     Shortness of Breath     HPI    Lady Saavedra is a 36 year old female who presents with shortness of breath cough and fevers.  She has been ill since 3/21/2021 with progressively worsening dyspnea and cough.  She did a Covid test through [facility] that came back positive.  Results are in the photograph below after the physical exam.  She denies any significant identified chronic medical problems.  She does not smoke.  She has no history of asthma.  Has been having fevers.  She gets extremely short of breath when she tries to ambulate at all.  This prompted her visit today.  Her chest feels tight.  She has no abdominal pain.  She has not had any irritative or obstructive voiding symptoms.  She has no current diarrhea.    Allergies:  No Known Allergies    Problem List:    Patient Active Problem List    Diagnosis Date Noted     Pneumonia due to 2019 novel coronavirus 04/05/2021     Priority: Medium     CARDIOVASCULAR SCREENING; LDL GOAL LESS THAN 160 10/31/2010     Priority: Medium        Past Medical History:    Past Medical History:   Diagnosis Date     Abnormal Pap smear of cervix 2009, 2012, 2014     Cervical high risk HPV (human papillomavirus) test positive 2009, 2012     Chickenpox      Depression      Gestational HTN 1/1/2015     History of colposcopy with cervical biopsy 3/2012     Seasonal allergies      Urinary tract bacterial infections        Past Surgical History:    Past Surgical History:   Procedure Laterality Date     HC ENLARGE BREAST WITH IMPLANT       MOUTH SURGERY  age 18    wisdom teeth       Family History:    Family History   Problem Relation Age of Onset     Unknown/Adopted Mother      Unknown/Adopted Father        Social History:  Marital Status:  Single [1]  Social History     Tobacco Use     Smoking status: Never Smoker     Smokeless tobacco: Never Used   Substance Use Topics     Alcohol use: No     Comment: once a month     Drug  use: No        Medications:    No current outpatient medications on file.        Review of Systems    All other systems are reviewed and are negative    Physical Exam   BP: 129/81  Pulse: 102  Temp: 98.6  F (37  C)  Resp: 18  Weight: 72.6 kg (160 lb)  SpO2: 93 %      Physical Exam  Nursing note and vitals were reviewed.  Constitutional: Awake and alert, adequately nourished and developed appearing 36-year-old moderate to severe respiratory distress who is tachypneic and speaks in short sentences but who answers questions appropriately and cooperates with examination.  HEENT: EOMI.   Neck: Freely mobile.  Cardiovascular: Cardiac examination reveals normal heart rate and regular rhythm without murmur.  Pulmonary/Chest: Breathing is moderately labored.  Breath sounds are coarse crackles bilaterally without prolongation of the expiratory phase.  There are retractions with ambulation but which resolves at rest.  Is tachypneic at rest.  His sats are in the low 90s at rest but when she gets up to use the commode she drops into the mid 80s on room air.  Abdomen: Soft, nontender, no HSM or masses rebound or guarding.  Musculoskeletal: Extremities are warm and well-perfused and without edema  Neurological: Alert, oriented, thought content logical, coherent   Skin: Warm, dry, no rashes.  Psychiatric: Affect congruent with acute illness.          ED Course        Procedures               Critical Care time:  none               Results for orders placed or performed during the hospital encounter of 04/05/21 (from the past 24 hour(s))   XR Chest Port 1 View    Narrative    CHEST ONE VIEW PORTABLE   4/5/2021 1:05 PM     HISTORY: COVID; hypoxia.    COMPARISON: 4/4/2018      Impression    IMPRESSION: Patchy bilateral groundglass and interstitial opacities.  No lobar consolidation or effusions.    Commonly reported imaging features of (COVID-19) pneumonia are  present. Influenza pneumonia, organizing pneumonia, drug toxicity  and  connective tissue disease can cause a similar imaging pattern.    KHADIJAH JORDAN MD   CBC with platelets differential   Result Value Ref Range    WBC 5.1 4.0 - 11.0 10e9/L    RBC Count 4.75 3.8 - 5.2 10e12/L    Hemoglobin 14.2 11.7 - 15.7 g/dL    Hematocrit 43.2 35.0 - 47.0 %    MCV 91 78 - 100 fl    MCH 29.9 26.5 - 33.0 pg    MCHC 32.9 31.5 - 36.5 g/dL    RDW 11.7 10.0 - 15.0 %    Platelet Count 213 150 - 450 10e9/L    Diff Method Manual Differential     % Neutrophils 84.0 %    % Lymphocytes 13.0 %    % Monocytes 3.0 %    % Eosinophils 0.0 %    % Basophils 0.0 %    Absolute Neutrophil 4.3 1.6 - 8.3 10e9/L    Absolute Lymphocytes 0.7 (L) 0.8 - 5.3 10e9/L    Absolute Monocytes 0.2 0.0 - 1.3 10e9/L    Absolute Eosinophils 0.0 0.0 - 0.7 10e9/L    Absolute Basophils 0.0 0.0 - 0.2 10e9/L    RBC Morphology Normal     Platelet Estimate       Automated count confirmed.  Platelet morphology is normal.   D dimer quantitative   Result Value Ref Range    D Dimer 0.9 (H) 0.0 - 0.50 ug/ml FEU   Comprehensive metabolic panel   Result Value Ref Range    Sodium 139 133 - 144 mmol/L    Potassium 3.4 3.4 - 5.3 mmol/L    Chloride 107 94 - 109 mmol/L    Carbon Dioxide 26 20 - 32 mmol/L    Anion Gap 6 3 - 14 mmol/L    Glucose 103 (H) 70 - 99 mg/dL    Urea Nitrogen 6 (L) 7 - 30 mg/dL    Creatinine 0.56 0.52 - 1.04 mg/dL    GFR Estimate >90 >60 mL/min/[1.73_m2]    GFR Estimate If Black >90 >60 mL/min/[1.73_m2]    Calcium 9.0 8.5 - 10.1 mg/dL    Bilirubin Total 0.4 0.2 - 1.3 mg/dL    Albumin 3.4 3.4 - 5.0 g/dL    Protein Total 7.9 6.8 - 8.8 g/dL    Alkaline Phosphatase 62 40 - 150 U/L    ALT 91 (H) 0 - 50 U/L    AST 84 (H) 0 - 45 U/L   Troponin I   Result Value Ref Range    Troponin I ES <0.015 0.000 - 0.045 ug/L       Medications   iopamidol (ISOVUE-370) solution 67 mL (has no administration in time range)   sodium chloride 0.9 % bag 500mL for CT scan flush use (has no administration in time range)   dexamethasone PF (DECADRON)  injection 6 mg (6 mg Intravenous Given 4/5/21 1746)       Assessments & Plan (with Medical Decision Making)     36-year-old female presented with dyspnea in the setting of a positive Covid test and typical symptoms of Covid pneumonia.  She was significantly hypoxic with ambulation despite not being hypoxic at rest.  She therefore will require admission given the progression of her illness and her significant increased work of breathing.  She was started on supplemental O2 of 2 L by nasal cannula and had significant improvement with this. chest x-ray showed scattered groundglass opacities consistent with Covid pneumonia.  I have low suspicion for a secondary infection or alternative cause for her symptoms.  Her D-dimer was elevated and so she will have CT scan of the chest to rule out PE.  This is pending at the time of shift change.  We are awaiting results of pregnancy testing.  She has been sexually active but had a normal.  2 weeks ago that was on time.  Remainder of her laboratory investigation is reassuring.  I discussed her case with Rodri Jhaveri MD of the hospital service and they will assume care on admission.  [facility] does not have available beds and so she cannot go there.  Dr. Thomas will follow up on the CT results but it will not change her disposition.  Discussed the plan with the patient and she is agreeable to this and her questions were answered.    I have reviewed the nursing notes.    I have reviewed the findings, diagnosis, plan and need for follow up with the patient.       New Prescriptions    No medications on file       Final diagnoses:   Pneumonia due to 2019 novel coronavirus       4/5/2021   [facility] EMERGENCY DEPT     Ron Armenta MD  04/05/21 6177

## 2021-04-05 NOTE — PATIENT INSTRUCTIONS
Given the worsening shortness of breath and how fast you are breathing, I would recommend you be evaluated in the ER. Please go there now.

## 2021-04-05 NOTE — PROGRESS NOTES
[facility] [facility] [place]MISSION NOTE    Patient admitted to room 2211 at approximately 1450 via cart from emergency room. Patient was accompanied by transport tech.     Verbal SBAR report received from Oralia BOSCH prior to patient arrival.     Patient ambulated to bed independently. Patient alert and oriented X 3. The patient is not having any pain.  . Admission vital signs: Blood pressure 112/79, pulse 94, temperature 98.6  F (37  C), temperature source Tympanic, resp. rate 18, weight 72.6 kg (160 lb), last menstrual period 03/22/2021, SpO2 96 %, not currently breastfeeding. Patient was oriented to plan of care, call light, bed controls, tv, telephone, bathroom and visiting hours.     Risk Assessment    The following safety risks were identified during admission: isolation. Yellow risk band applied: YES.     Skin Initial Assessment    This writer admitted this patient and completed a full skin assessment and Jamil score in the Adult PCS flowsheet. Appropriate interventions initiated as needed.     Skin check refused per pt.    Education    Patient has a Anderson to Observation order: No  Observation education completed and documented: N/A      Rema Oleary RN

## 2021-04-06 LAB
ANION GAP SERPL CALCULATED.3IONS-SCNC: 8 MMOL/L (ref 3–14)
BUN SERPL-MCNC: 11 MG/DL (ref 7–30)
CALCIUM SERPL-MCNC: 8.9 MG/DL (ref 8.5–10.1)
CHLORIDE SERPL-SCNC: 106 MMOL/L (ref 94–109)
CO2 SERPL-SCNC: 25 MMOL/L (ref 20–32)
CREAT SERPL-MCNC: 0.52 MG/DL (ref 0.52–1.04)
CRP SERPL-MCNC: 26.6 MG/L (ref 0–8)
D DIMER PPP FEU-MCNC: 0.8 UG/ML FEU (ref 0–0.5)
ERYTHROCYTE [DISTWIDTH] IN BLOOD BY AUTOMATED COUNT: 11.6 % (ref 10–15)
FIBRINOGEN PPP-MCNC: 668 MG/DL (ref 200–420)
GFR SERPL CREATININE-BSD FRML MDRD: >90 ML/MIN/{1.73_M2}
GLUCOSE SERPL-MCNC: 124 MG/DL (ref 70–99)
HCT VFR BLD AUTO: 39.9 % (ref 35–47)
HGB BLD-MCNC: 13.7 G/DL (ref 11.7–15.7)
MCH RBC QN AUTO: 30.4 PG (ref 26.5–33)
MCHC RBC AUTO-ENTMCNC: 34.3 G/DL (ref 31.5–36.5)
MCV RBC AUTO: 89 FL (ref 78–100)
PLATELET # BLD AUTO: 270 10E9/L (ref 150–450)
POTASSIUM SERPL-SCNC: 3.5 MMOL/L (ref 3.4–5.3)
RBC # BLD AUTO: 4.51 10E12/L (ref 3.8–5.2)
SODIUM SERPL-SCNC: 139 MMOL/L (ref 133–144)
WBC # BLD AUTO: 3.5 10E9/L (ref 4–11)

## 2021-04-06 PROCEDURE — 36415 COLL VENOUS BLD VENIPUNCTURE: CPT | Performed by: PHYSICIAN ASSISTANT

## 2021-04-06 PROCEDURE — 250N000012 HC RX MED GY IP 250 OP 636 PS 637: Performed by: PHYSICIAN ASSISTANT

## 2021-04-06 PROCEDURE — 120N000001 HC R&B MED SURG/OB

## 2021-04-06 PROCEDURE — 80048 BASIC METABOLIC PNL TOTAL CA: CPT | Performed by: PHYSICIAN ASSISTANT

## 2021-04-06 PROCEDURE — 99233 SBSQ HOSP IP/OBS HIGH 50: CPT | Performed by: FAMILY MEDICINE

## 2021-04-06 PROCEDURE — 85379 FIBRIN DEGRADATION QUANT: CPT | Performed by: PHYSICIAN ASSISTANT

## 2021-04-06 PROCEDURE — 258N000003 HC RX IP 258 OP 636: Performed by: PHYSICIAN ASSISTANT

## 2021-04-06 PROCEDURE — 250N000011 HC RX IP 250 OP 636: Performed by: PHYSICIAN ASSISTANT

## 2021-04-06 PROCEDURE — 85027 COMPLETE CBC AUTOMATED: CPT | Performed by: PHYSICIAN ASSISTANT

## 2021-04-06 PROCEDURE — 86140 C-REACTIVE PROTEIN: CPT | Performed by: PHYSICIAN ASSISTANT

## 2021-04-06 PROCEDURE — 85384 FIBRINOGEN ACTIVITY: CPT | Performed by: PHYSICIAN ASSISTANT

## 2021-04-06 PROCEDURE — 250N000009 HC RX 250: Performed by: PHYSICIAN ASSISTANT

## 2021-04-06 RX ADMIN — SODIUM CHLORIDE 50 ML: 9 INJECTION, SOLUTION INTRAVENOUS at 17:29

## 2021-04-06 RX ADMIN — DEXAMETHASONE 6 MG: 2 TABLET ORAL at 13:09

## 2021-04-06 RX ADMIN — ENOXAPARIN SODIUM 40 MG: 40 INJECTION SUBCUTANEOUS at 17:25

## 2021-04-06 RX ADMIN — REMDESIVIR 100 MG: 100 INJECTION, POWDER, LYOPHILIZED, FOR SOLUTION INTRAVENOUS at 17:28

## 2021-04-06 ASSESSMENT — ACTIVITIES OF DAILY LIVING (ADL)
ADLS_ACUITY_SCORE: 12

## 2021-04-06 NOTE — PROGRESS NOTES
[facility]ist [facility]      Subjective:  She has persistent cough.  Shortness of breath with exertion.  No fever.  Slept poorly overnight.  Eating okay.    Review of Systems:  CONSTITUTIONAL: Insomnia  INTEGUMENTARY/SKIN: NEGATIVE for worrisome rashes, moles or lesions  EYES: NEGATIVE for vision changes or irritation  ENT/MOUTH: NEGATIVE for ear, mouth and throat problems  RESP: Dyspnea on exertion, cough  BREAST: NEGATIVE for masses, tenderness or discharge  CV: NEGATIVE for chest pain, palpitations or peripheral edema  GI: NEGATIVE for nausea, abdominal pain, heartburn, or change in bowel habits  : NEGATIVE for frequency, dysuria, or hematuria  MUSCULOSKELETAL: NEGATIVE for significant arthralgias or myalgia  NEURO: NEGATIVE for weakness, dizziness or paresthesias  ENDOCRINE: NEGATIVE for temperature intolerance, skin/hair changes  HEME: NEGATIVE for bleeding problems  PSYCHIATRIC: NEGATIVE for changes in mood or affect    Physical Exam:  Vitals Were Reviewed    Patient Vitals for the past 16 hrs:   BP Temp Temp src Pulse Resp SpO2   04/06/21 0707 115/80 98.6  F (37  C) Oral 84 16 91 %   04/06/21 0046 116/77 99.4  F (37.4  C) Oral 92 19 92 %   04/05/21 1932 125/71 99.4  F (37.4  C) Oral 105 18 93 %   04/05/21 1537 -- -- -- -- -- 94 %       No intake or output data in the 24 hours ending 04/06/21 0708    GENERAL APPEARANCE: healthy, alert and no distress  EYES: conjunctiva clear, eyes grossly normal  RESP: Persistent cough, a few scattered crackles  CV: regular rate and rhythm, normal S1 S2, no S3 or S4 and no murmur, click or rub   ABDOMEN: soft, nontender, no HSM or masses and bowel sounds normal  MS: no clubbing, cyanosis; no edema  SKIN: clear without significant rashes or lesions    Lab:  Recent Labs   Lab Test 04/06/21  0530 04/05/21  1316    139   POTASSIUM 3.5 3.4   CHLORIDE 106 107   CO2 25 26   ANIONGAP 8 6   * 103*   BUN 11 6*   CR 0.52 0.56   DIANA 8.9 9.0     CBC RESULTS:    Recent Labs   Lab Test 04/06/21  0530 04/05/21  1316   WBC 3.5* 5.1   RBC 4.51 4.75   HGB 13.7 14.2   HCT 39.9 43.2    213       Results for orders placed or performed during the hospital encounter of 04/05/21 (from the past 24 hour(s))   XR Chest Port 1 View    Narrative    CHEST ONE VIEW PORTABLE   4/5/2021 1:05 PM     HISTORY: COVID; hypoxia.    COMPARISON: 4/4/2018      Impression    IMPRESSION: Patchy bilateral groundglass and interstitial opacities.  No lobar consolidation or effusions.    Commonly reported imaging features of (COVID-19) pneumonia are  present. Influenza pneumonia, organizing pneumonia, drug toxicity and  connective tissue disease can cause a similar imaging pattern.    KHADIJAH JORDAN MD   CBC with platelets differential   Result Value Ref Range    WBC 5.1 4.0 - 11.0 10e9/L    RBC Count 4.75 3.8 - 5.2 10e12/L    Hemoglobin 14.2 11.7 - 15.7 g/dL    Hematocrit 43.2 35.0 - 47.0 %    MCV 91 78 - 100 fl    MCH 29.9 26.5 - 33.0 pg    MCHC 32.9 31.5 - 36.5 g/dL    RDW 11.7 10.0 - 15.0 %    Platelet Count 213 150 - 450 10e9/L    Diff Method Manual Differential     % Neutrophils 84.0 %    % Lymphocytes 13.0 %    % Monocytes 3.0 %    % Eosinophils 0.0 %    % Basophils 0.0 %    Absolute Neutrophil 4.3 1.6 - 8.3 10e9/L    Absolute Lymphocytes 0.7 (L) 0.8 - 5.3 10e9/L    Absolute Monocytes 0.2 0.0 - 1.3 10e9/L    Absolute Eosinophils 0.0 0.0 - 0.7 10e9/L    Absolute Basophils 0.0 0.0 - 0.2 10e9/L    RBC Morphology Normal     Platelet Estimate       Automated count confirmed.  Platelet morphology is normal.   D dimer quantitative   Result Value Ref Range    D Dimer 0.9 (H) 0.0 - 0.50 ug/ml FEU   Comprehensive metabolic panel   Result Value Ref Range    Sodium 139 133 - 144 mmol/L    Potassium 3.4 3.4 - 5.3 mmol/L    Chloride 107 94 - 109 mmol/L    Carbon Dioxide 26 20 - 32 mmol/L    Anion Gap 6 3 - 14 mmol/L    Glucose 103 (H) 70 - 99 mg/dL    Urea Nitrogen 6 (L) 7 - 30 mg/dL    Creatinine 0.56  0.52 - 1.04 mg/dL    GFR Estimate >90 >60 mL/min/[1.73_m2]    GFR Estimate If Black >90 >60 mL/min/[1.73_m2]    Calcium 9.0 8.5 - 10.1 mg/dL    Bilirubin Total 0.4 0.2 - 1.3 mg/dL    Albumin 3.4 3.4 - 5.0 g/dL    Protein Total 7.9 6.8 - 8.8 g/dL    Alkaline Phosphatase 62 40 - 150 U/L    ALT 91 (H) 0 - 50 U/L    AST 84 (H) 0 - 45 U/L   Troponin I   Result Value Ref Range    Troponin I ES <0.015 0.000 - 0.045 ug/L   HCG qualitative Blood   Result Value Ref Range    HCG Qualitative Serum Negative NEG^Negative   CT Chest Pulmonary Embolism w Contrast    Narrative    CT CHEST PULMONARY EMBOLISM WITH CONTRAST  4/5/2021 4:35 PM    HISTORY:  Cough and shortness of breath, COVID positive with elevated  d-dimer, rule out pulmonary embolism.    TECHNIQUE: Scans obtained from the apices through the diaphragm with  IV contrast. 67 mL Isovue 370 IV injected. Radiation dose for this  scan was reduced using automated exposure control, adjustment of the  mA and/or kV according to patient size, or iterative reconstruction  technique.    COMPARISON:  Chest x-ray on same day earlier    FINDINGS:  Chest/mediastinum: No evidence of pulmonary embolism. No cardiomegaly  or significant pericardial effusion. Multiple prominent mediastinal  hilar lymph nodes, likely reactive. Bilateral breast implants.    Lungs and pleura: No pleural effusion or pneumothorax. Bilateral  basilar and subpleural predominant patchy consolidative pulmonary  opacities, worrisome for infection including atypical infection like  COVID-19.    Upper abdomen: Limits evaluation of the upper abdomen due to lack of  coverage and timing of contrast.    Bones and soft tissue: No suspicious osseous lesion.      Impression    IMPRESSION:   1. No evidence of pulmonary embolism.  2. Bilateral patchy consolidative pulmonary opacities, worrisome for  infection including atypical infection like COVID-19.    KRUNAL PRESCOTT MD   CBC with platelets   Result Value Ref Range    WBC  3.5 (L) 4.0 - 11.0 10e9/L    RBC Count 4.51 3.8 - 5.2 10e12/L    Hemoglobin 13.7 11.7 - 15.7 g/dL    Hematocrit 39.9 35.0 - 47.0 %    MCV 89 78 - 100 fl    MCH 30.4 26.5 - 33.0 pg    MCHC 34.3 31.5 - 36.5 g/dL    RDW 11.6 10.0 - 15.0 %    Platelet Count 270 150 - 450 10e9/L   Basic metabolic panel   Result Value Ref Range    Sodium 139 133 - 144 mmol/L    Potassium 3.5 3.4 - 5.3 mmol/L    Chloride 106 94 - 109 mmol/L    Carbon Dioxide 25 20 - 32 mmol/L    Anion Gap 8 3 - 14 mmol/L    Glucose 124 (H) 70 - 99 mg/dL    Urea Nitrogen 11 7 - 30 mg/dL    Creatinine 0.52 0.52 - 1.04 mg/dL    GFR Estimate >90 >60 mL/min/[1.73_m2]    GFR Estimate If Black >90 >60 mL/min/[1.73_m2]    Calcium 8.9 8.5 - 10.1 mg/dL   Fibrinogen activity   Result Value Ref Range    Fibrinogen 668 (H) 200 - 420 mg/dL   CRP inflammation   Result Value Ref Range    CRP Inflammation 26.6 (H) 0.0 - 8.0 mg/L   D dimer quantitative   Result Value Ref Range    D Dimer 0.8 (H) 0.0 - 0.50 ug/ml FEU       Assessment and Plan:    Lady Saavedra is a 36 year old female with no significant cardiopulmonary disease who was admitted to [facility] on 4/5/2021 for evaluation of  fever, cough, dyspnea, headache and nausea or vomiting and found to be COVID-19 positive.     COVID-19 Diagnosis Details:  Onset of COVID symptoms 3/22/21   Date of positive test results 3/31/21   Research study No      # Confirmed COVID-19 infection    # Acute Hypoxic Respiratory Failure secondary to COVID-19 infection  # Viral Pneumonia secondary to COVID-19 infection  - Initial chest x-ray showed multifocal airspace disease. Oxygen needs have been stable, currently on 1.5 L due to hypoxemia.   - Admit to medical floor with continuous pulse oximetry, COVID-19 special precautions, minimized lab draws, and care consolidation  - Oxygen: continue current support with nasal cannula at 1.0 L/min; titrate to keep SpO2 between 90-96%  - Imaging: chest CT with no  evidence of PE, consistent with covid  - Breathing treatments: no inhalers needed; avoid nebulizers in favor of MDIs   - IV fluids: not indicated   - Antibiotics: not indicated   - Treatments: Dexamethasone 6 mg x 10 days or hospital discharge, started on 4/5/21 and Remdesivir x 5 days or hospital discharge, started on 4/5/21  - DVT Prophylaxis: At high risk of thrombotic complications due to COVID-19 disease (last DDimer displayed below).          - PROPHYLACTIC dosing: lovenox 40mg daily  - Discharge Anticoagulation: At discharge consider one of the following for 30 days and until the patient has returned to normal mobility:        - Apixaban (Eliquis) 2.5 mg two times a day        - Rivaroxaban (Xarelto) 10 mg once daily     D dimer 0.9--0.8  crp 26.6  Wbc 5.1--3.5    Elevated LFTs  ALT 91, AST 84. Suspect related to COVID infection.    Will check again 4/7/21         Code Status: Full Code    Diet: Combination Diet Regular Diet Adult    Wells Catheter: not present     Plan-as above, discharge when oxygenation improved.  Probably 1-2 more days in the hospital.    5:43 PM  Oxygen needs up to three liters.

## 2021-04-06 NOTE — PLAN OF CARE
AOx4, up independent in room. 1L O2 via NC, sats 93-94%, SOB w/exertion, frequent dry nonproductive cough. IV SL, denies pain, VSS. Rested comfortably throughout shift.

## 2021-04-06 NOTE — PROGRESS NOTES
Pt requiring 3L NC oxygen to keep O2 above 90%. Pt has frequent cough. LS have more pronounced crackles to the LLL and sound diminished on the right. Pt is not using accessory muscles but does report SOB. Pt is not in respiratory distress. Provider made aware that O2 requirement is going up. No new orders.

## 2021-04-06 NOTE — PLAN OF CARE
"Pt is alert and oriented. No respiratory distress but is dyspneic with exertion. Frequent cough. Lung sounds are diminished. Pt bumped up to 2L NC as she kept desaturating to 88%. On 2L NC pt 92-95%. Given IS and educated on usage and benefits. Pt demonstrated proper usage and verbalized understanding. See MAR, vitals and flow sheets for more details. /77 (BP Location: Right arm)   Pulse 80   Temp 98.5  F (36.9  C) (Oral)   Resp 16   Ht 1.575 m (5' 2\")   Wt 75.4 kg (166 lb 3.6 oz)   LMP 03/22/2021 (Approximate)   SpO2 94%   BMI 30.40 kg/m      "

## 2021-04-07 LAB
ALBUMIN SERPL-MCNC: 3.4 G/DL (ref 3.4–5)
ALP SERPL-CCNC: 60 U/L (ref 40–150)
ALT SERPL W P-5'-P-CCNC: 162 U/L (ref 0–50)
ANION GAP SERPL CALCULATED.3IONS-SCNC: 7 MMOL/L (ref 3–14)
AST SERPL W P-5'-P-CCNC: 108 U/L (ref 0–45)
BILIRUB SERPL-MCNC: 0.4 MG/DL (ref 0.2–1.3)
BUN SERPL-MCNC: 16 MG/DL (ref 7–30)
CALCIUM SERPL-MCNC: 9 MG/DL (ref 8.5–10.1)
CHLORIDE SERPL-SCNC: 106 MMOL/L (ref 94–109)
CO2 SERPL-SCNC: 25 MMOL/L (ref 20–32)
CREAT SERPL-MCNC: 0.51 MG/DL (ref 0.52–1.04)
CRP SERPL-MCNC: 7.4 MG/L (ref 0–8)
D DIMER PPP FEU-MCNC: 0.7 UG/ML FEU (ref 0–0.5)
ERYTHROCYTE [DISTWIDTH] IN BLOOD BY AUTOMATED COUNT: 11.5 % (ref 10–15)
FIBRINOGEN PPP-MCNC: 543 MG/DL (ref 200–420)
GFR SERPL CREATININE-BSD FRML MDRD: >90 ML/MIN/{1.73_M2}
GLUCOSE SERPL-MCNC: 121 MG/DL (ref 70–99)
HCT VFR BLD AUTO: 41.3 % (ref 35–47)
HGB BLD-MCNC: 14 G/DL (ref 11.7–15.7)
MCH RBC QN AUTO: 29.9 PG (ref 26.5–33)
MCHC RBC AUTO-ENTMCNC: 33.9 G/DL (ref 31.5–36.5)
MCV RBC AUTO: 88 FL (ref 78–100)
PLATELET # BLD AUTO: 356 10E9/L (ref 150–450)
POTASSIUM SERPL-SCNC: 3.8 MMOL/L (ref 3.4–5.3)
PROT SERPL-MCNC: 8 G/DL (ref 6.8–8.8)
RBC # BLD AUTO: 4.68 10E12/L (ref 3.8–5.2)
SODIUM SERPL-SCNC: 138 MMOL/L (ref 133–144)
WBC # BLD AUTO: 5.5 10E9/L (ref 4–11)

## 2021-04-07 PROCEDURE — 36415 COLL VENOUS BLD VENIPUNCTURE: CPT | Performed by: FAMILY MEDICINE

## 2021-04-07 PROCEDURE — 99233 SBSQ HOSP IP/OBS HIGH 50: CPT | Performed by: FAMILY MEDICINE

## 2021-04-07 PROCEDURE — 85379 FIBRIN DEGRADATION QUANT: CPT | Performed by: FAMILY MEDICINE

## 2021-04-07 PROCEDURE — 85384 FIBRINOGEN ACTIVITY: CPT | Performed by: FAMILY MEDICINE

## 2021-04-07 PROCEDURE — 250N000012 HC RX MED GY IP 250 OP 636 PS 637: Performed by: PHYSICIAN ASSISTANT

## 2021-04-07 PROCEDURE — 120N000001 HC R&B MED SURG/OB

## 2021-04-07 PROCEDURE — 258N000003 HC RX IP 258 OP 636: Performed by: PHYSICIAN ASSISTANT

## 2021-04-07 PROCEDURE — 86140 C-REACTIVE PROTEIN: CPT | Performed by: FAMILY MEDICINE

## 2021-04-07 PROCEDURE — 250N000011 HC RX IP 250 OP 636: Performed by: PHYSICIAN ASSISTANT

## 2021-04-07 PROCEDURE — 85027 COMPLETE CBC AUTOMATED: CPT | Performed by: FAMILY MEDICINE

## 2021-04-07 PROCEDURE — 80053 COMPREHEN METABOLIC PANEL: CPT | Performed by: FAMILY MEDICINE

## 2021-04-07 PROCEDURE — 250N000009 HC RX 250: Performed by: PHYSICIAN ASSISTANT

## 2021-04-07 RX ADMIN — REMDESIVIR 100 MG: 100 INJECTION, POWDER, LYOPHILIZED, FOR SOLUTION INTRAVENOUS at 16:58

## 2021-04-07 RX ADMIN — SODIUM CHLORIDE 50 ML: 9 INJECTION, SOLUTION INTRAVENOUS at 17:02

## 2021-04-07 RX ADMIN — ENOXAPARIN SODIUM 40 MG: 40 INJECTION SUBCUTANEOUS at 16:58

## 2021-04-07 RX ADMIN — DEXAMETHASONE 6 MG: 2 TABLET ORAL at 13:11

## 2021-04-07 ASSESSMENT — ACTIVITIES OF DAILY LIVING (ADL)
ADLS_ACUITY_SCORE: 12

## 2021-04-07 NOTE — PLAN OF CARE
"Pt remains on 2L NC. No respiratory distress. Pt gets dyspneic upon walking to bathroom. Denies needs at this time. Vitals stable. No major shift reports to discuss. See MAR, flowsheets, and vitals for more details.     /79 (BP Location: Right arm)   Pulse 68   Temp 98.1  F (36.7  C) (Oral)   Resp 18   Ht 1.575 m (5' 2\")   Wt 75.4 kg (166 lb 3.6 oz)   LMP 03/22/2021 (Approximate)   SpO2 93%   BMI 30.40 kg/m       "

## 2021-04-07 NOTE — PLAN OF CARE
"Pt is A/O, able to make needs known. Blood pressure 128/76, pulse 78, temperature 97.3  F (36.3  C), temperature source Oral, resp. rate 16, height 1.575 m (5' 2\"), weight 75.4 kg (166 lb 3.6 oz), last menstrual period 03/22/2021, SpO2 93 %, not currently breastfeeding.  Stable on 2-3 L of supplemental oxygen. Denies pain. Up independently in the room. Currently on Decadron and IV Remdesevir. Discharge pending respiratory status, wean off oxygen. Continue to assess per POC.  "

## 2021-04-07 NOTE — PROGRESS NOTES
[facility]ist Service      Subjective:  Coughing  Tired  Eating ok  No fever    Review of Systems:  CONSTITUTIONAL:weak  INTEGUMENTARY/SKIN: NEGATIVE for worrisome rashes, moles or lesions  EYES: NEGATIVE for vision changes or irritation  ENT/MOUTH: NEGATIVE for ear, mouth and throat problems  RESP:cough, dao  BREAST: NEGATIVE for masses, tenderness or discharge  CV: NEGATIVE for chest pain, palpitations or peripheral edema  GI: NEGATIVE for nausea, abdominal pain, heartburn, or change in bowel habits  : NEGATIVE for frequency, dysuria, or hematuria  MUSCULOSKELETAL: NEGATIVE for significant arthralgias or myalgia  NEURO: NEGATIVE for weakness, dizziness or paresthesias  ENDOCRINE: NEGATIVE for temperature intolerance, skin/hair changes  HEME: NEGATIVE for bleeding problems  PSYCHIATRIC: NEGATIVE for changes in mood or affect    Physical Exam:  Vitals Were Reviewed    Patient Vitals for the past 16 hrs:   BP Temp Temp src Pulse Resp SpO2   04/07/21 0600 -- -- -- -- -- 92 %   04/07/21 0439 128/76 97.3  F (36.3  C) Oral 78 16 93 %   04/07/21 0200 -- -- -- -- -- 94 %   04/07/21 0000 115/75 97.7  F (36.5  C) Oral 68 16 92 %   04/06/21 2200 -- -- -- -- -- 94 %   04/06/21 2043 122/81 98.3  F (36.8  C) Oral 82 16 91 %   04/06/21 2000 -- -- -- -- -- 96 %   04/06/21 1930 -- -- -- -- -- 97 %   04/06/21 1900 -- -- -- -- -- 97 %   04/06/21 1738 -- -- -- -- -- 93 %   04/06/21 1708 -- -- -- -- -- (!) 88 %       No intake or output data in the 24 hours ending 04/07/21 0722    GENERAL APPEARANCE: healthy, alert and no distress  RESP: some crackle bases, non distressed  CV: regular rate and rhythm, normal S1 S2, no S3 or S4 and no murmur, click or rub   ABDOMEN: soft, nontender, no HSM or masses and bowel sounds normal  MS: no clubbing, cyanosis; no edema  SKIN: clear without significant rashes or lesions    Lab:  Recent Labs   Lab Test 04/07/21  0531 04/06/21  0530    139   POTASSIUM 3.8 3.5   CHLORIDE 106 106    CO2 25 25   ANIONGAP 7 8   * 124*   BUN 16 11   CR 0.51* 0.52   DIANA 9.0 8.9     CBC RESULTS:   Recent Labs   Lab Test 04/07/21  0531 04/06/21  0530   WBC 5.5 3.5*   RBC 4.68 4.51   HGB 14.0 13.7   HCT 41.3 39.9    270       Results for orders placed or performed during the hospital encounter of 04/05/21 (from the past 24 hour(s))   CBC with platelets   Result Value Ref Range    WBC 5.5 4.0 - 11.0 10e9/L    RBC Count 4.68 3.8 - 5.2 10e12/L    Hemoglobin 14.0 11.7 - 15.7 g/dL    Hematocrit 41.3 35.0 - 47.0 %    MCV 88 78 - 100 fl    MCH 29.9 26.5 - 33.0 pg    MCHC 33.9 31.5 - 36.5 g/dL    RDW 11.5 10.0 - 15.0 %    Platelet Count 356 150 - 450 10e9/L   Fibrinogen activity   Result Value Ref Range    Fibrinogen 543 (H) 200 - 420 mg/dL   CRP inflammation   Result Value Ref Range    CRP Inflammation 7.4 0.0 - 8.0 mg/L   D dimer quantitative   Result Value Ref Range    D Dimer 0.7 (H) 0.0 - 0.50 ug/ml FEU   Comprehensive metabolic panel   Result Value Ref Range    Sodium 138 133 - 144 mmol/L    Potassium 3.8 3.4 - 5.3 mmol/L    Chloride 106 94 - 109 mmol/L    Carbon Dioxide 25 20 - 32 mmol/L    Anion Gap 7 3 - 14 mmol/L    Glucose 121 (H) 70 - 99 mg/dL    Urea Nitrogen 16 7 - 30 mg/dL    Creatinine 0.51 (L) 0.52 - 1.04 mg/dL    GFR Estimate >90 >60 mL/min/[1.73_m2]    GFR Estimate If Black >90 >60 mL/min/[1.73_m2]    Calcium 9.0 8.5 - 10.1 mg/dL    Bilirubin Total 0.4 0.2 - 1.3 mg/dL    Albumin 3.4 3.4 - 5.0 g/dL    Protein Total 8.0 6.8 - 8.8 g/dL    Alkaline Phosphatase 60 40 - 150 U/L     (H) 0 - 50 U/L     (H) 0 - 45 U/L       Assessment and Plan:    Lady Saavedra is a 36 year old female with no significant cardiopulmonary disease who was admitted to [facility] on 4/5/2021 for evaluation of  fever, cough, dyspnea, headache and nausea or vomiting and found to be COVID-19 positive.     COVID-19 Diagnosis Details:  Onset of COVID symptoms 3/22/21   Date of  positive test results 3/31/21   Research study No      # Confirmed COVID-19 infection    # Acute Hypoxic Respiratory Failure secondary to COVID-19 infection  # Viral Pneumonia secondary to COVID-19 infection  - Initial chest x-ray showed multifocal airspace disease. Oxygen needs have increased some since admit. Now 3 liters  - Oxygen: continue current support with nasal cannula at 3.0 L/min; titrate to keep SpO2 between 90-96%  - Imaging: chest CT with no evidence of PE, consistent with covid  - Breathing treatments: no inhalers needed; avoid nebulizers in favor of MDIs   - IV fluids: not indicated   - Antibiotics: not indicated   - Treatments: Dexamethasone 6 mg x 10 days or hospital discharge, started on 4/5/21 and Remdesivir x 5 days or hospital discharge, started on 4/5/21  - DVT Prophylaxis: At high risk of thrombotic complications due to COVID-19 disease (last DDimer displayed below).          - PROPHYLACTIC dosing: lovenox 40mg daily  - Discharge Anticoagulation: At discharge consider one of the following for 30 days and until the patient has returned to normal mobility:        - Apixaban (Eliquis) 2.5 mg two times a day        - Rivaroxaban (Xarelto) 10 mg once daily     D dimer 0.9--0.8--0.7  crp 26.6--7.4  Wbc 5.1--3.5  Fibrinogen 668 --543     Elevated LFTs  Suspect related to COVID infection.  ALT 91--162  AST 84--108  follow      Code Status: Full Code    Diet: Combination Diet Regular Diet Adult    Wells Catheter: not present     Plan-as above, discharge when oxygenation improved.  Probably 1-2 more days in the hospital.     9:43 AM briefly discussed with [facility]. Pt to remain here.

## 2021-04-08 LAB
ALBUMIN SERPL-MCNC: 3.2 G/DL (ref 3.4–5)
ALP SERPL-CCNC: 51 U/L (ref 40–150)
ALT SERPL W P-5'-P-CCNC: 155 U/L (ref 0–50)
ANION GAP SERPL CALCULATED.3IONS-SCNC: 5 MMOL/L (ref 3–14)
AST SERPL W P-5'-P-CCNC: 73 U/L (ref 0–45)
BILIRUB SERPL-MCNC: 0.3 MG/DL (ref 0.2–1.3)
BUN SERPL-MCNC: 16 MG/DL (ref 7–30)
CALCIUM SERPL-MCNC: 9 MG/DL (ref 8.5–10.1)
CHLORIDE SERPL-SCNC: 104 MMOL/L (ref 94–109)
CO2 SERPL-SCNC: 26 MMOL/L (ref 20–32)
CREAT SERPL-MCNC: 0.5 MG/DL (ref 0.52–1.04)
CRP SERPL-MCNC: <2.9 MG/L (ref 0–8)
D DIMER PPP FEU-MCNC: 0.3 UG/ML FEU (ref 0–0.5)
ERYTHROCYTE [DISTWIDTH] IN BLOOD BY AUTOMATED COUNT: 11.4 % (ref 10–15)
FIBRINOGEN PPP-MCNC: 454 MG/DL (ref 200–420)
GFR SERPL CREATININE-BSD FRML MDRD: >90 ML/MIN/{1.73_M2}
GLUCOSE SERPL-MCNC: 119 MG/DL (ref 70–99)
HCT VFR BLD AUTO: 40 % (ref 35–47)
HGB BLD-MCNC: 13.5 G/DL (ref 11.7–15.7)
MCH RBC QN AUTO: 30.1 PG (ref 26.5–33)
MCHC RBC AUTO-ENTMCNC: 33.8 G/DL (ref 31.5–36.5)
MCV RBC AUTO: 89 FL (ref 78–100)
PLATELET # BLD AUTO: 405 10E9/L (ref 150–450)
POTASSIUM SERPL-SCNC: 3.6 MMOL/L (ref 3.4–5.3)
PROT SERPL-MCNC: 7.4 G/DL (ref 6.8–8.8)
RBC # BLD AUTO: 4.48 10E12/L (ref 3.8–5.2)
SODIUM SERPL-SCNC: 135 MMOL/L (ref 133–144)
WBC # BLD AUTO: 6.9 10E9/L (ref 4–11)

## 2021-04-08 PROCEDURE — 250N000009 HC RX 250: Performed by: PHYSICIAN ASSISTANT

## 2021-04-08 PROCEDURE — 250N000011 HC RX IP 250 OP 636: Performed by: PHYSICIAN ASSISTANT

## 2021-04-08 PROCEDURE — 80053 COMPREHEN METABOLIC PANEL: CPT | Performed by: FAMILY MEDICINE

## 2021-04-08 PROCEDURE — 99233 SBSQ HOSP IP/OBS HIGH 50: CPT | Performed by: FAMILY MEDICINE

## 2021-04-08 PROCEDURE — 85384 FIBRINOGEN ACTIVITY: CPT | Performed by: FAMILY MEDICINE

## 2021-04-08 PROCEDURE — 120N000001 HC R&B MED SURG/OB

## 2021-04-08 PROCEDURE — 258N000003 HC RX IP 258 OP 636: Performed by: PHYSICIAN ASSISTANT

## 2021-04-08 PROCEDURE — 250N000012 HC RX MED GY IP 250 OP 636 PS 637: Performed by: PHYSICIAN ASSISTANT

## 2021-04-08 PROCEDURE — 85027 COMPLETE CBC AUTOMATED: CPT | Performed by: FAMILY MEDICINE

## 2021-04-08 PROCEDURE — 86140 C-REACTIVE PROTEIN: CPT | Performed by: FAMILY MEDICINE

## 2021-04-08 PROCEDURE — 99207 PR CDG-MDM COMPONENT: MEETS HIGH - UP CODED: CPT | Performed by: FAMILY MEDICINE

## 2021-04-08 PROCEDURE — 85379 FIBRIN DEGRADATION QUANT: CPT | Performed by: FAMILY MEDICINE

## 2021-04-08 PROCEDURE — 36415 COLL VENOUS BLD VENIPUNCTURE: CPT | Performed by: FAMILY MEDICINE

## 2021-04-08 RX ADMIN — REMDESIVIR 100 MG: 100 INJECTION, POWDER, LYOPHILIZED, FOR SOLUTION INTRAVENOUS at 17:23

## 2021-04-08 RX ADMIN — ENOXAPARIN SODIUM 40 MG: 40 INJECTION SUBCUTANEOUS at 17:22

## 2021-04-08 RX ADMIN — DEXAMETHASONE 6 MG: 2 TABLET ORAL at 13:59

## 2021-04-08 RX ADMIN — SODIUM CHLORIDE 50 ML: 9 INJECTION, SOLUTION INTRAVENOUS at 18:40

## 2021-04-08 ASSESSMENT — ACTIVITIES OF DAILY LIVING (ADL)
ADLS_ACUITY_SCORE: 12

## 2021-04-08 NOTE — PLAN OF CARE
"Patient continues to require 2L O2 per NC to maintain O2 sats >92%.  Remains up independent in her room, when asked if she is feeling any better each day she states \"not really.\"  Was able to eat her entire dinner.  Continues on IV remdesivir.    "

## 2021-04-08 NOTE — PROGRESS NOTES
[facility] Hospitalist Progress Note           Assessment & Plan      Lady Saavedra is a 36 year old female with no significant cardiopulmonary disease who was admitted to [facility] on 4/5/2021 for evaluation of  fever, cough, dyspnea, headache and nausea or vomiting and found to be COVID-19 positive.     COVID-19 Diagnosis Details:  Onset of COVID symptoms 3/22/21   Date of positive test results 3/31/21   Research study No      # Confirmed COVID-19 infection    # Acute Hypoxic Respiratory Failure secondary to COVID-19 infection  # Viral Pneumonia secondary to COVID-19 infection  - Initial chest x-ray showed multifocal airspace disease. Oxygen needs have increased some since admit. Now 3 liters  - Oxygen: continue current support with nasal cannula at 3.0 L/min; titrate to keep SpO2 between 90-96%  - Imaging: chest CT with no evidence of PE, consistent with covid  - Breathing treatments: no inhalers needed; avoid nebulizers in favor of MDIs   - IV fluids: not indicated   - Antibiotics: not indicated   - Treatments: Dexamethasone 6 mg x 10 days or hospital discharge, started on 4/5/21 and Remdesivir x 5 days or hospital discharge, started on 4/5/21  - DVT Prophylaxis: At high risk of thrombotic complications due to COVID-19 disease (last DDimer displayed below).          - PROPHYLACTIC dosing: lovenox 40mg daily  - Discharge Anticoagulation: At discharge consider one of the following for 30 days and until the patient has returned to normal mobility:        - Apixaban (Eliquis) 2.5 mg two times a day        - Rivaroxaban (Xarelto) 10 mg once daily     D dimer 0.9--0.8--0.7--0.3   crp 26.6--7.4--2.9   Wbc 5.1--3.5--5.5--6.9  Fibrinogen 668 --543--454  4/8/2021 -- much improved, but not quite safe for discharge home today - hope for discharge home tomorrow AM off oxygen.       Elevated LFTs  Suspect related to COVID infection.  ALT 91--162--155  AST 84--108--73  Improving, likely  recheck at follow-up with primary care provider.        Code Status: Full Code      Diet: Combination Diet Regular Diet Adult      Wells Catheter: not present               Diet  Orders Placed This Encounter      Combination Diet Regular Diet Adult        Prophylaxis  On lovenox           Disposition  Improving, hope for discharge home tomorrow AM.             Interval History:   Improving,  Weaning off oxygen at rest this afternoon, but still very winded with just getting up to the bathroom.  No fever or chills. No dyspnea at rest.  Feels improved but doesn't feel safe for discharge home yet given how quickly she gets winded still.    No pain             Review of Systems:    ROS: 10 point ROS neg other than the symptoms noted above in the HPI.           Medications:   Current active medications and PTA medications reviewed, see medication list for details.            Physical Exam:   Vitals were reviewed  Patient Vitals for the past 24 hrs:   BP Temp Temp src Pulse Resp SpO2   21 1626 121/78 98.7  F (37.1  C) Oral 71 18 94 %   21 1400 -- -- -- -- -- 92 %   21 1215 120/79 98.2  F (36.8  C) Oral 70 18 93 %   21 1040 -- -- -- -- -- 93 %   21 0900 -- -- -- -- -- 95 %   21 0825 129/87 98.1  F (36.7  C) Oral 82 18 97 %   21 0251 126/75 98.3  F (36.8  C) Oral 66 18 97 %   21 2240 130/77 98.3  F (36.8  C) Oral 77 18 96 %   21 1700 122/84 98.1  F (36.7  C) Oral 69 20 94 %       Temperatures:  Current - Temp: 98.7  F (37.1  C); Max - Temp  Av.3  F (36.8  C)  Min: 98.1  F (36.7  C)  Max: 98.7  F (37.1  C)  Respiration range: Resp  Av.3  Min: 18  Max: 20  Pulse range: Pulse  Av.5  Min: 66  Max: 82  Blood pressure range: Systolic (24hrs), Av , Min:120 , Max:130   ; Diastolic (24hrs), Av, Min:75, Max:87    Pulse oximetry range: SpO2  Av.6 %  Min: 92 %  Max: 97 %  I/O last 3 completed shifts:  In: 750 [P.O.:750]  Out: -     Intake/Output Summary  (Last 24 hours) at 4/8/2021 1647  Last data filed at 4/8/2021 1400  Gross per 24 hour   Intake 750 ml   Output --   Net 750 ml     EXAM:  General: awake and alert, NAD, oriented x 3  Head: normocephalic  Neck: unremarkable, no lymphadenopathy   HEENT: oropharynx pink and moist    Heart: Regular rate and rhythm, no murmurs, rubs, or gallops  Lungs: clear to auscultation bilaterally with good air movement throughout  Abdomen: soft, non-tender, no masses or organomegaly  Extremities: no edema in lower extremities   Skin unremarkable.               Data:     Results for orders placed or performed during the hospital encounter of 04/05/21 (from the past 24 hour(s))   CBC with platelets   Result Value Ref Range    WBC 6.9 4.0 - 11.0 10e9/L    RBC Count 4.48 3.8 - 5.2 10e12/L    Hemoglobin 13.5 11.7 - 15.7 g/dL    Hematocrit 40.0 35.0 - 47.0 %    MCV 89 78 - 100 fl    MCH 30.1 26.5 - 33.0 pg    MCHC 33.8 31.5 - 36.5 g/dL    RDW 11.4 10.0 - 15.0 %    Platelet Count 405 150 - 450 10e9/L   Fibrinogen activity   Result Value Ref Range    Fibrinogen 454 (H) 200 - 420 mg/dL   CRP inflammation   Result Value Ref Range    CRP Inflammation <2.9 0.0 - 8.0 mg/L   D dimer quantitative   Result Value Ref Range    D Dimer 0.3 0.0 - 0.50 ug/ml FEU   Comprehensive metabolic panel   Result Value Ref Range    Sodium 135 133 - 144 mmol/L    Potassium 3.6 3.4 - 5.3 mmol/L    Chloride 104 94 - 109 mmol/L    Carbon Dioxide 26 20 - 32 mmol/L    Anion Gap 5 3 - 14 mmol/L    Glucose 119 (H) 70 - 99 mg/dL    Urea Nitrogen 16 7 - 30 mg/dL    Creatinine 0.50 (L) 0.52 - 1.04 mg/dL    GFR Estimate >90 >60 mL/min/[1.73_m2]    GFR Estimate If Black >90 >60 mL/min/[1.73_m2]    Calcium 9.0 8.5 - 10.1 mg/dL    Bilirubin Total 0.3 0.2 - 1.3 mg/dL    Albumin 3.2 (L) 3.4 - 5.0 g/dL    Protein Total 7.4 6.8 - 8.8 g/dL    Alkaline Phosphatase 51 40 - 150 U/L     (H) 0 - 50 U/L    AST 73 (H) 0 - 45 U/L           Attestation:  I have reviewed today's vital  signs, notes, medications, labs and imaging.  Amount of time spent in direct patient care: 40 minutes.     Bertrand Lilly MD, MD

## 2021-04-08 NOTE — PLAN OF CARE
Pt remains on 2L oxygen in order to keep her sats> 92%. She has been 96-97% with the 2 L, but without the oxygen she drops to 87%. Lungs are diminished w/ crackles in her LLL. She reports feeling short of air w/ activity. Uses her IS with encouragement to 1000ml.

## 2021-04-08 NOTE — PLAN OF CARE
Patient remains afebrile. On RA her 02 sat range from 0213-5984 has been 91-92% on RA. On review of orders patient is to have supplemental oxygen to maintain 02 sats greater than 92%. With intermittent non productive cough, dyspnea on exertion I.e. walking to the bathroom 02 placed at 1 lpm via nasal cannula and 02 sats now at 94%. Denies pain.Offered cough medication but declined.

## 2021-04-08 NOTE — PLAN OF CARE
Patient afebrile this shift, VSS and oxygen sats now 92-94% on RA.   Reports feeling tired, but steady on feet and independent in room.   Denies dizziness. Eating and drinking.

## 2021-04-09 VITALS
SYSTOLIC BLOOD PRESSURE: 127 MMHG | RESPIRATION RATE: 18 BRPM | OXYGEN SATURATION: 96 % | DIASTOLIC BLOOD PRESSURE: 75 MMHG | TEMPERATURE: 97.9 F | HEART RATE: 75 BPM | WEIGHT: 166.23 LBS | BODY MASS INDEX: 30.59 KG/M2 | HEIGHT: 62 IN

## 2021-04-09 LAB
ALBUMIN SERPL-MCNC: 3.2 G/DL (ref 3.4–5)
ALP SERPL-CCNC: 52 U/L (ref 40–150)
ALT SERPL W P-5'-P-CCNC: 125 U/L (ref 0–50)
ANION GAP SERPL CALCULATED.3IONS-SCNC: 5 MMOL/L (ref 3–14)
AST SERPL W P-5'-P-CCNC: 40 U/L (ref 0–45)
BILIRUB SERPL-MCNC: 0.3 MG/DL (ref 0.2–1.3)
BUN SERPL-MCNC: 15 MG/DL (ref 7–30)
CALCIUM SERPL-MCNC: 8.7 MG/DL (ref 8.5–10.1)
CHLORIDE SERPL-SCNC: 104 MMOL/L (ref 94–109)
CO2 SERPL-SCNC: 28 MMOL/L (ref 20–32)
CREAT SERPL-MCNC: 0.54 MG/DL (ref 0.52–1.04)
CRP SERPL-MCNC: <2.9 MG/L (ref 0–8)
D DIMER PPP FEU-MCNC: 0.5 UG/ML FEU (ref 0–0.5)
ERYTHROCYTE [DISTWIDTH] IN BLOOD BY AUTOMATED COUNT: 11.3 % (ref 10–15)
FIBRINOGEN PPP-MCNC: 495 MG/DL (ref 200–420)
GFR SERPL CREATININE-BSD FRML MDRD: >90 ML/MIN/{1.73_M2}
GLUCOSE SERPL-MCNC: 121 MG/DL (ref 70–99)
HCT VFR BLD AUTO: 40.6 % (ref 35–47)
HGB BLD-MCNC: 13.6 G/DL (ref 11.7–15.7)
MCH RBC QN AUTO: 29.8 PG (ref 26.5–33)
MCHC RBC AUTO-ENTMCNC: 33.5 G/DL (ref 31.5–36.5)
MCV RBC AUTO: 89 FL (ref 78–100)
PLATELET # BLD AUTO: 442 10E9/L (ref 150–450)
POTASSIUM SERPL-SCNC: 3.6 MMOL/L (ref 3.4–5.3)
PROT SERPL-MCNC: 7.3 G/DL (ref 6.8–8.8)
RBC # BLD AUTO: 4.56 10E12/L (ref 3.8–5.2)
SODIUM SERPL-SCNC: 137 MMOL/L (ref 133–144)
WBC # BLD AUTO: 8.4 10E9/L (ref 4–11)

## 2021-04-09 PROCEDURE — 36415 COLL VENOUS BLD VENIPUNCTURE: CPT | Performed by: FAMILY MEDICINE

## 2021-04-09 PROCEDURE — 85384 FIBRINOGEN ACTIVITY: CPT | Performed by: FAMILY MEDICINE

## 2021-04-09 PROCEDURE — 99239 HOSP IP/OBS DSCHRG MGMT >30: CPT | Performed by: FAMILY MEDICINE

## 2021-04-09 PROCEDURE — 250N000012 HC RX MED GY IP 250 OP 636 PS 637: Performed by: PHYSICIAN ASSISTANT

## 2021-04-09 PROCEDURE — 250N000013 HC RX MED GY IP 250 OP 250 PS 637: Performed by: PHYSICIAN ASSISTANT

## 2021-04-09 PROCEDURE — 85379 FIBRIN DEGRADATION QUANT: CPT | Performed by: FAMILY MEDICINE

## 2021-04-09 PROCEDURE — 85027 COMPLETE CBC AUTOMATED: CPT | Performed by: FAMILY MEDICINE

## 2021-04-09 PROCEDURE — 86140 C-REACTIVE PROTEIN: CPT | Performed by: FAMILY MEDICINE

## 2021-04-09 PROCEDURE — 80053 COMPREHEN METABOLIC PANEL: CPT | Performed by: FAMILY MEDICINE

## 2021-04-09 RX ADMIN — BENZONATATE 100 MG: 100 CAPSULE ORAL at 09:38

## 2021-04-09 RX ADMIN — ACETAMINOPHEN 650 MG: 325 TABLET, FILM COATED ORAL at 09:38

## 2021-04-09 RX ADMIN — DEXAMETHASONE 6 MG: 2 TABLET ORAL at 13:16

## 2021-04-09 ASSESSMENT — ACTIVITIES OF DAILY LIVING (ADL)
ADLS_ACUITY_SCORE: 12

## 2021-04-09 NOTE — PLAN OF CARE
Patient alert and oriented x4. Denies pain. Up independently. Afebrile. On 0.5 L O2 via NC with sats between 92-94%. Saline locked. Able to make needs known.     Carl Tena RN on 4/9/2021 at 6:58 AM

## 2021-04-09 NOTE — DISCHARGE SUMMARY
[facility] Discharge Summary    Lady Saavedra MRN# [id number]   Age: 36 year old YOB: 1985     Date of Admission:  4/5/2021  Date of Discharge::  4/9/2021  Admitting Physician:  Rodri Jhaveri MD  Discharge Physician:  Bertrand Lilly MD, MD             Admission Diagnoses:   Pneumonia due to 2019 novel coronavirus [U07.1, J12.82]          Principle Discharge Diagnosis:       Acute Hypoxic Respiratory Failure secondary to COVID-19 infection    See hospital course for further details           Medications Prior to Admission:     No medications prior to admission.             Discharge Medications:     Current Discharge Medication List      START taking these medications    Details   rivaroxaban ANTICOAGULANT (XARELTO) 10 MG TABS tablet Take 1 tablet (10 mg) by mouth daily (with dinner)  Qty: 30 tablet, Refills: 0    Associated Diagnoses: Pneumonia due to 2019 novel coronavirus                     Brief History of Illness:     From Admission H+P:   Lady Saavedra is a 36 year old female who presents with known COVID-19 diagnosis with worsening shortness of breath and cough.     She initially developed symptoms on March 22, her fiancé who she lives with also had symptoms around the time and ultimately tested positive.  Her initial symptoms included headache and myalgias.  She had a Covid 19 test on March 31 which was positive.  Over the past several days her symptoms have worsened.  She has had worsening cough, shortness of breath, nausea with occasional emesis and headaches.  She has not had any chest pain, diarrhea or change in taste/smell.     She presented to the ED due to concerns of worsening cough and shortness of breath.  In the ED she was noted to be hypoxemic and was started on oxygen.  Upon admission she desatted to 85% when she ambulated to the bathroom.  She is currently resting comfortably in 1.5 L.  She has no history of asthma or other respiratory illness.  She has no  "chronic medical issues.  She has not received the COVID-19 vaccine.               TODAY:     Subjective:  Doing well, on room air and breathing much improved.  Strength and activity tolerance much better.  No pain.  Feels ready for discharge home today.        ROS:  . ROS: 10 point ROS neg other than the symptoms noted above in the HPI.   /75   Pulse 75   Temp 97.9  F (36.6  C) (Oral)   Resp 18   Ht 1.575 m (5' 2\")   Wt 75.4 kg (166 lb 3.6 oz)   LMP 03/22/2021 (Approximate)   SpO2 96%   BMI 30.40 kg/m     EXAM:  General: awake and alert, NAD, oriented x 3  Head: normocephalic  Neck: unremarkable, no lymphadenopathy   HEENT: oropharynx pink and moist    Heart: Regular rate and rhythm, no murmurs, rubs, or gallops  Lungs: clear to auscultation bilaterally with good air movement throughout  Abdomen: soft, non-tender, no masses or organomegaly  Extremities: no edema in lower extremities   Skin unremarkable.            Hospital Course:     Lady Saavedra is a 36 year old female with no significant cardiopulmonary disease who was admitted to [facility] on 4/5/2021 for evaluation of  fever, cough, dyspnea, headache and nausea or vomiting and found to be COVID-19 positive.     COVID-19 Diagnosis Details:  Onset of COVID symptoms 3/22/21   Date of positive test results 3/31/21   Research study No      # Acute Hypoxic Respiratory Failure secondary to COVID-19 infection  # Viral Pneumonia secondary to COVID-19 infection  - Initial chest x-ray showed multifocal airspace disease. Oxygen needs have increased some since admit. Now 3 liters  - Oxygen: continue current support with nasal cannula at 3.0 L/min; titrate to keep SpO2 between 90-96%  - Imaging: chest CT with no evidence of PE, consistent with covid  - Breathing treatments: no inhalers needed; avoid nebulizers in favor of MDIs   - IV fluids: not indicated   - Antibiotics: not indicated   - Treatments: Dexamethasone 6 mg x 10 " days or hospital discharge, started on 4/5/21 and Remdesivir x 5 days or hospital discharge, started on 4/5/21  - DVT Prophylaxis: At high risk of thrombotic complications due to COVID-19 disease (last DDimer displayed below).          - PROPHYLACTIC dosing: lovenox 40mg daily  - Discharge Anticoagulation: At discharge consider one of the following for 30 days and until the patient has returned to normal mobility:        - Apixaban (Eliquis) 2.5 mg two times a day        - Rivaroxaban (Xarelto) 10 mg once daily  D dimer 0.9--0.8--0.7--0.3   crp 26.6--7.4--2.9   Wbc 5.1--3.5--5.5--6.9  Fibrinogen 668 --543--454  4/8/2021 -- much improved, but not quite safe for discharge home today - hope for discharge home tomorrow AM off oxygen.    4/9/2021 -- now doing well off oxygen.  Ok for discharge today.  Continue with routine contact precautions of 20 days from onset of symptoms for severe disease.        Elevated LFTs  Suspect related to COVID infection.  Normalizing -recheck at follow-up with primary care provider.        Code Status: Full Code       Diet: Combination Diet Regular Diet Adult        Wells Catheter: not present     Diet  Orders Placed This Encounter      Combination Diet Regular Diet Adult        Prophylaxis  On lovenox while inpatient, will discharge on xarelto 10 mg x 30 days.                 Discharge Instructions and Follow-Up:     Discharge diet: Orders Placed This Encounter      Combination Diet Regular Diet Adult       Discharge activity: Activity as tolerated   Discharge follow-up: Follow up with primary care provider in 7 days           Discharge Disposition:     Discharged to home      Attestation:  I have reviewed today's vital signs, notes, medications, labs and imaging.  Amount of time performed on this discharge summary: 50 minutes.    Bertrand Lilly MD, MD

## 2021-04-09 NOTE — PLAN OF CARE
ANDREI MOYAG DISCHARGE NOTE    Patient discharged to home at 3:34 PM via wheel chair. Accompanied by boyfriend who is waiting for her and staff. Discharge instructions reviewed with patient, opportunity offered to ask questions. Prescriptions filled and sent with patient upon discharge. All belongings sent with patient.    Carlie Mabry RN

## 2021-04-09 NOTE — PLAN OF CARE
"Patient alert/oriented. Independent. Was 96% on 0.5 LPM. Now sats 94% and holding steady on RA. Patient feels better except mild HA and non-productive cough. Tylenol and Tessalon given prn. VSS.  /79   Pulse 74   Temp 98.7  F (37.1  C) (Oral)   Resp 16   Ht 1.575 m (5' 2\")   Wt 75.4 kg (166 lb 3.6 oz)   LMP 03/22/2021 (Approximate)   SpO2 94%   BMI 30.40 kg/m    Namita Yao RN on 4/9/2021 at 9:46 AM    "

## 2021-04-12 ENCOUNTER — PATIENT OUTREACH (OUTPATIENT)
Dept: CARE COORDINATION | Facility: CLINIC | Age: 36
End: 2021-04-12

## 2021-04-12 DIAGNOSIS — U07.1 2019 NOVEL CORONAVIRUS DISEASE (COVID-19): Primary | ICD-10-CM

## 2021-04-12 NOTE — PROGRESS NOTES
Clinic Care Coordination Contact  [facility]/Voicemail       Clinical Data: Care Coordinator Outreach  Outreach attempted x 1.  Left message on patient's voicemail with call back information and requested return call.  Plan:  Care Coordinator will try to reach patient again in 1-2 business days.

## 2021-04-13 NOTE — PROGRESS NOTES
Clinic Care Coordination Contact  [facility]/Voicemail       Clinical Data: Care Coordinator Outreach  Outreach attempted x 2.  Left message on patient's voicemail with call back information and requested return call.  Plan:Care Coordinator will do no further outreaches at this time.

## 2021-04-17 ENCOUNTER — HEALTH MAINTENANCE LETTER (OUTPATIENT)
Age: 36
End: 2021-04-17

## 2021-04-19 ENCOUNTER — VIRTUAL VISIT (OUTPATIENT)
Dept: FAMILY MEDICINE | Facility: CLINIC | Age: 36
End: 2021-04-19
Payer: COMMERCIAL

## 2021-04-19 DIAGNOSIS — Z51.81 ENCOUNTER FOR THERAPEUTIC DRUG MONITORING: ICD-10-CM

## 2021-04-19 DIAGNOSIS — U07.1 2019 NOVEL CORONAVIRUS DISEASE (COVID-19): Primary | ICD-10-CM

## 2021-04-19 PROCEDURE — 99213 OFFICE O/P EST LOW 20 MIN: CPT | Mod: TEL | Performed by: FAMILY MEDICINE

## 2021-04-19 NOTE — LETTER
April 19, 2021      Lady Saavedra  [id number] [address] [place] [id number]        To Whom It May Concern:    Lady Saavedra  was diagnosed with and treated for Covid-19 and pneumonia on April 5-9, 2021. Due to continued exertional shortness of breath, please continue to excuse her from work through April 25, 2021.      Thank you for your kind consideration!      Sincerely,        Cecilio Patricia MD

## 2021-04-19 NOTE — PATIENT INSTRUCTIONS
"Continue rivaroxaban to complete a full one month course.    Schedule lab only appointment in early May 2021 to measure kidney function as a monitoring for the use of the medication.    Continue all supportive and symptomatic treatment.  Drink plenty of oral fluids.    Off work for another week due to shortness of breath when exertin.    Schedule Get Well Loop appointment.      Patient Education   Coping with Life after COVID-19  Being in the hospital because of COVID-19 is scary. Going home can be scary, too. You may face changes to your life, the way you work or what you can eat.   It's hard to adjust to change, and it's normal to feel afraid, frustrated or even angry. These feelings usually go away over time. If your feelings don't start to get better, it's called \"adjustment disorder.\"   What signs should I look out for?  Adjustment disorder can happen to anyone in a time of stress. It makes it hard to cope with daily life. You may feel lonely or fight with loved ones, even if you're glad to be home.   Watch for these signs:    Fear or worry    Hard time focusing    Sadness or anger    Trouble talking to family or friends    Feeling like you don't fit in or isolating yourself    Problems with sleep    Drinking alcohol or taking drugs to cope  What can I do?  You can help yourself get better. Feeling you have control helps you move forward. You may wonder if you'll be able to do things you did before. Be patient. Do your best to make the most of every day. Try to build relationships, be as active as you can, eat right and keep a sense of humor. Avoid smoking and drinking too much alcohol.   Call your family doctor or clinic if you're not sure what to do. They can guide you to care or other services.  When should I get help?  Think about getting counseling if your sadness or frustration gets worse. Together with a trained counselor, you can talk about your problems adjusting to life after your hospital stay. You " "can come up with new ways to handle changes that give you more control.   Get help if you're thinking about hurting yourself. If you need help right away, call 911 or go to the nearest emergency room. You can also try the Crisis Text Line.  Crisis Text Line: 374-313 (http://www.crisistextline.org)  The Crisis Text Line serves anyone, in any crisis. It gives free, 24/7 support. Here's how it works:  1. Text HOME to 633765 from anywhere in the [place], anytime, about any type of crisis.  2. A live, trained Crisis Counselor will text you back quickly.  3. The volunteer Crisis Counselor can help you move from a \"hot moment\" to a \"cool moment.\" They can also help you work out a safety plan.  For informational purposes only. Not to replace the advice of your health care provider. Copyright   2020 [facility]. All rights reserved. Clinically reviewed by the Ambulatory COVID-19 Care Map Team. [employer] [id number] - 04/20.       "

## 2021-04-19 NOTE — PROGRESS NOTES
"Kassy is a 36 year old who is being evaluated via a billable telephone visit.      What phone number would you like to be contacted at? [phone]  How would you like to obtain your AVS? [employer]    Assessment & Plan     2019 novel coronavirus disease (COVID-19)    Encounter for therapeutic drug monitoring  - Basic metabolic panel    Gradually improving per patient.  Still with less severe symptoms per patient.  Tolerating anticoagulant well. She  Understands to take this for a month.  Reinforced to schedule Get well loop appointment.  Advised to push oral fluids and other symptomatic and supportive treatment.  Recheck BMP in a few weeks to check renal function while on rivaroxaban.  Advised reasons to contact the care team.  Reasons to go to ER discussed in detail with patient.    Patient asked about going back to work. Since still experiencing some WHELAN and frequent enough coughing, defer RTW until next week. Letter excusing patient from work sent to patient through [employer].  Patient Instructions   Continue rivaroxaban to complete a full one month course.    Schedule lab only appointment in early May 2021 to measure kidney function as a monitoring for the use of the medication.    Continue all supportive and symptomatic treatment.  Drink plenty of oral fluids.    Off work for another week due to shortness of breath when exertin.    Schedule Get Well Loop appointment.      Patient Education   Coping with Life after COVID-19  Being in the hospital because of COVID-19 is scary. Going home can be scary, too. You may face changes to your life, the way you work or what you can eat.   It's hard to adjust to change, and it's normal to feel afraid, frustrated or even angry. These feelings usually go away over time. If your feelings don't start to get better, it's called \"adjustment disorder.\"   What signs should I look out for?  Adjustment disorder can happen to anyone in a time of stress. It makes it hard to cope with daily " "life. You may feel lonely or fight with loved ones, even if you're glad to be home.   Watch for these signs:    Fear or worry    Hard time focusing    Sadness or anger    Trouble talking to family or friends    Feeling like you don't fit in or isolating yourself    Problems with sleep    Drinking alcohol or taking drugs to cope  What can I do?  You can help yourself get better. Feeling you have control helps you move forward. You may wonder if you'll be able to do things you did before. Be patient. Do your best to make the most of every day. Try to build relationships, be as active as you can, eat right and keep a sense of humor. Avoid smoking and drinking too much alcohol.   Call your family doctor or clinic if you're not sure what to do. They can guide you to care or other services.  When should I get help?  Think about getting counseling if your sadness or frustration gets worse. Together with a trained counselor, you can talk about your problems adjusting to life after your hospital stay. You can come up with new ways to handle changes that give you more control.   Get help if you're thinking about hurting yourself. If you need help right away, call 911 or go to the nearest emergency room. You can also try the Crisis Text Line.  Crisis Text Line: 152-785 (http://www.crisistextline.org)  The Crisis Text Line serves anyone, in any crisis. It gives free, 24/7 support. Here's how it works:  1. Text HOME to 676306 from anywhere in the [place], anytime, about any type of crisis.  2. A live, trained Crisis Counselor will text you back quickly.  3. The volunteer Crisis Counselor can help you move from a \"hot moment\" to a \"cool moment.\" They can also help you work out a safety plan.  For informational purposes only. Not to replace the advice of your health care provider. Copyright   2020 [employer]. All rights reserved. Clinically reviewed by the Ambulatory COVID-19 Care Map Team. [employer] [id number] - 04/20.   "         Return in about 1 week (around 4/26/2021) for In-clinic visit for worsening symptoms.    Cecilio Patricia MD  [facility] ETHAN Elena is a 36 year old who presents for the following health issues:  Chief Complaint   Patient presents with     Hospital F/U     Pt being seen for post hospital f/u.       [facility]         Hospital Follow-up Visit:    Hospital/Nursing Home/IP Rehab Facility: [facility]  Date of Admission: 4/5/21  Date of Discharge: 4/9/21  Reason(s) for Admission: pneumonia from covid      Was your hospitalization related to COVID-19? YES   How are you feeling today? a little better  In the past 24 hours have you had shortness of breath when speaking, walking, or climbing stairs? My breathing issues have stayed the same (little better, hard with wearing a mask)  Do you have a cough? Yes, I have a cough but it's not worse  When is the last time you had a fever greater than 100? In the hospital  Are you having any other symptoms? Fatigue and Headaches   Do you have any other stressors you would like to discuss with your provider? OTHER: pt has questions regarding going back to work       Was the patient in the ICU or did the patient experience delirium during hospitalization?  No          Problems taking medications regularly:  None  Medication changes since discharge: None  Problems adhering to non-medication therapy:  None    Summary of hospitalization:  [facility] discharge summary reviewed  Diagnostic Tests/Treatments reviewed.  Follow up needed: Covid-19 Get Well Loop  Other Healthcare Providers Involved in Patient s Care:         Get Well Loop Team  Update since discharge: improved.       Post Discharge Medication Reconciliation: discharge medications reconciled, continue medications without change.  Plan of care communicated with patient                Review of Systems   C: NEGATIVE for fever, chills, change in weight  I: NEGATIVE for worrisome  rashes, moles or lesions  E: NEGATIVE for vision changes or irritation  ENT/MOUTH: see above  RESP:as above  CV: NEGATIVE for chest pain, palpitations or peripheral edema  GI: NEGATIVE for nausea, abdominal pain, heartburn, or change in bowel habits  M: NEGATIVE for significant arthralgias or myalgia      Objective           Vitals:  No vitals were obtained today due to virtual visit.    Physical Exam   alert and no distress  PSYCH: Alert and oriented times 3; coherent speech, normal   rate and volume, able to articulate logical thoughts, able   to abstract reason, no tangential thoughts, no hallucinations   or delusions  Her affect is normal  RESP: No cough, no audible wheezing, able to talk in full sentences  Remainder of exam unable to be completed due to telephone visits    No results found for any visits on 04/19/21.        Phone call duration: 11 minutes

## 2021-04-26 ENCOUNTER — MYC MEDICAL ADVICE (OUTPATIENT)
Dept: FAMILY MEDICINE | Facility: CLINIC | Age: 36
End: 2021-04-26

## 2021-04-28 ENCOUNTER — MYC MEDICAL ADVICE (OUTPATIENT)
Dept: FAMILY MEDICINE | Facility: CLINIC | Age: 36
End: 2021-04-28

## 2021-04-28 NOTE — TELEPHONE ENCOUNTER
I have not received anything for patient as of 4/27/2021.  Will notify patient when form is received.

## 2021-04-30 NOTE — TELEPHONE ENCOUNTER
[employer]ML[employer] form completed and routed to Dr. Patricia for review and signature.   Patient notified of status of form via My chart.

## 2021-05-05 DIAGNOSIS — Z51.81 ENCOUNTER FOR THERAPEUTIC DRUG MONITORING: ICD-10-CM

## 2021-05-05 LAB
ANION GAP SERPL CALCULATED.3IONS-SCNC: 7 MMOL/L (ref 3–14)
BUN SERPL-MCNC: 8 MG/DL (ref 7–30)
CALCIUM SERPL-MCNC: 8.8 MG/DL (ref 8.5–10.1)
CHLORIDE SERPL-SCNC: 107 MMOL/L (ref 94–109)
CO2 SERPL-SCNC: 22 MMOL/L (ref 20–32)
CREAT SERPL-MCNC: 0.54 MG/DL (ref 0.52–1.04)
GFR SERPL CREATININE-BSD FRML MDRD: >90 ML/MIN/{1.73_M2}
GLUCOSE SERPL-MCNC: 81 MG/DL (ref 70–99)
POTASSIUM SERPL-SCNC: 3.7 MMOL/L (ref 3.4–5.3)
SODIUM SERPL-SCNC: 136 MMOL/L (ref 133–144)

## 2021-05-05 PROCEDURE — 80048 BASIC METABOLIC PNL TOTAL CA: CPT | Performed by: FAMILY MEDICINE

## 2021-05-05 PROCEDURE — 36415 COLL VENOUS BLD VENIPUNCTURE: CPT | Performed by: FAMILY MEDICINE

## 2021-05-06 NOTE — TELEPHONE ENCOUNTER
Deepika, has Dr. Patricia reviewed the form, patient is my charting for update, thanks.    DANITZA James

## 2021-05-07 NOTE — TELEPHONE ENCOUNTER
Form received back today from Dr. Patricia with note:  At time patient had virtual visit she was still symptomatic.  Patient needs follow up virtual visit preferable video to sign this form.    Patient notified via My Chart of Dr. Patricia's response.     Form is in Awaiting Response tray on forms desk.

## 2021-05-11 ENCOUNTER — VIRTUAL VISIT (OUTPATIENT)
Dept: FAMILY MEDICINE | Facility: CLINIC | Age: 36
End: 2021-05-11
Payer: COMMERCIAL

## 2021-05-11 DIAGNOSIS — U07.1 2019 NOVEL CORONAVIRUS DISEASE (COVID-19): Primary | ICD-10-CM

## 2021-05-11 PROCEDURE — 99213 OFFICE O/P EST LOW 20 MIN: CPT | Mod: GT | Performed by: FAMILY MEDICINE

## 2021-05-11 NOTE — PATIENT INSTRUCTIONS
"Get in touch with [facility] team.    Form for work will be faxed to [employer] today.    If you develop new symptoms, contact the care team.    You may try to get vaccinated for Covid19 in June 2021. They will ask you your medical history and they will give you guidance if you have met the time needed to lapse between your recent covid-19 infection and getting a vaccine.    Patient Education     Coping with Life after COVID-19  Being in the hospital because of COVID-19 is scary. Going home can be scary, too. You may face changes to your life, the way you work or what you can eat.   It's hard to adjust to change, and it's normal to feel afraid, frustrated or even angry. These feelings usually go away over time. If your feelings don't start to get better, it's called \"adjustment disorder.\"   What signs should I look out for?  Adjustment disorder can happen to anyone in a time of stress. It makes it hard to cope with daily life. You may feel lonely or fight with loved ones, even if you're glad to be home.   Watch for these signs:    Fear or worry    Hard time focusing    Sadness or anger    Trouble talking to family or friends    Feeling like you don't fit in or isolating yourself    Problems with sleep    Drinking alcohol or taking drugs to cope  What can I do?  You can help yourself get better. Feeling you have control helps you move forward. You may wonder if you'll be able to do things you did before. Be patient. Do your best to make the most of every day. Try to build relationships, be as active as you can, eat right and keep a sense of humor. Avoid smoking and drinking too much alcohol.   Call your family doctor or clinic if you're not sure what to do. They can guide you to care or other services.  When should I get help?  Think about getting counseling if your sadness or frustration gets worse. Together with a trained counselor, you can talk about your problems adjusting to life after your hospital stay. You can " "come up with new ways to handle changes that give you more control.   Get help if you're thinking about hurting yourself. If you need help right away, call 911 or go to the nearest emergency room. You can also try the Crisis Text Line.  Crisis Text Line: 655-264 (http://www.crisistextline.org)  The Crisis Text Line serves anyone, in any crisis. It gives free, 24/7 support. Here's how it works:  1. Text HOME to 108729 from anywhere in the [place], anytime, about any type of crisis.  2. A live, trained Crisis Counselor will text you back quickly.  3. The volunteer Crisis Counselor can help you move from a \"hot moment\" to a \"cool moment.\" They can also help you work out a safety plan.  For informational purposes only. Not to replace the advice of your health care provider. Copyright   2020 [facility]. All rights reserved. Clinically reviewed by the Ambulatory COVID-19 Care Map Team. [employer] [id number] - 04/20.           "

## 2021-05-11 NOTE — PROGRESS NOTES
"Kassy is a 36 year old who is being evaluated via a billable video visit.      How would you like to obtain your AVS? MyChart  If the video visit is dropped, the invitation should be resent by: Text to cell phone: [phone]  Will anyone else be joining your video visit? No    Video Start Time: 2:03 pm    Assessment & Plan     2019 novel coronavirus disease (COVID-19)  Resolved infection but patient continues to have some lingering symptoms.  Not in distress and not showing severe signs on video visit.  Reviewed with patient the form from [employer]. Filled out the information needed as discussed with patient.  She plans to return to work on May 31, 2021. Will give limited hours for 2 weeks, then resume regular hours.  Advised to gradually increase work load.  Reinforced to get in touch with [facility].  Return precautions discussed and given to patient.     Patient Instructions   Get in touch with [facility] team.    Form for work will be faxed to [employer] today.    If you develop new symptoms, contact the care team.    You may try to get vaccinated for Covid19 in June 2021. They will ask you your medical history and they will give you guidance if you have met the time needed to lapse between your recent covid-19 infection and getting a vaccine.    Patient Education     Coping with Life after COVID-19  Being in the hospital because of COVID-19 is scary. Going home can be scary, too. You may face changes to your life, the way you work or what you can eat.   It's hard to adjust to change, and it's normal to feel afraid, frustrated or even angry. These feelings usually go away over time. If your feelings don't start to get better, it's called \"adjustment disorder.\"   What signs should I look out for?  Adjustment disorder can happen to anyone in a time of stress. It makes it hard to cope with daily life. You may feel lonely or fight with loved ones, even if you're glad to be home.   Watch for these signs:    Fear or " "worry    Hard time focusing    Sadness or anger    Trouble talking to family or friends    Feeling like you don't fit in or isolating yourself    Problems with sleep    Drinking alcohol or taking drugs to cope  What can I do?  You can help yourself get better. Feeling you have control helps you move forward. You may wonder if you'll be able to do things you did before. Be patient. Do your best to make the most of every day. Try to build relationships, be as active as you can, eat right and keep a sense of humor. Avoid smoking and drinking too much alcohol.   Call your family doctor or clinic if you're not sure what to do. They can guide you to care or other services.  When should I get help?  Think about getting counseling if your sadness or frustration gets worse. Together with a trained counselor, you can talk about your problems adjusting to life after your hospital stay. You can come up with new ways to handle changes that give you more control.   Get help if you're thinking about hurting yourself. If you need help right away, call 911 or go to the nearest emergency room. You can also try the Crisis Text Line.  Crisis Text Line: 551-586 (http://www.crisistextline.org)  The Crisis Text Line serves anyone, in any crisis. It gives free, 24/7 support. Here's how it works:  1. Text HOME to 626608 from anywhere in the [place], anytime, about any type of crisis.  2. A live, trained Crisis Counselor will text you back quickly.  3. The volunteer Crisis Counselor can help you move from a \"hot moment\" to a \"cool moment.\" They can also help you work out a safety plan.  For informational purposes only. Not to replace the advice of your health care provider. Copyright   2020 [facility]. All rights reserved. Clinically reviewed by the Ambulatory COVID-19 Care Map Team. [employer]ee [id number] - 04/20.               No follow-ups on file.    Cecilio Patricia MD  [facility] " 36 year old who presents for the following health issues  accompanied by her:  Chief Complaint   Patient presents with     Forms     Pt needs STD paperwork filled out for work due to being out with covid.  Pt has been out of work since 3/24/21 through present.       HPI     Patient was diagnosed with Covid-19 on 4/5/2021. Developed pneumonia. Infections have since resolved but patient continues to have intermittent cough, easily tired with moderate exertion and intermittent headaches.  Denies acute dyspnea, chest pain, palpitations, or dyspnea at rest. Denies recurrent fever.    How are you feeling today? Much better, different daily  In the past 24 hours have you had shortness of breath when speaking, walking, or climbing stairs? My breathing issues have improved  Do you have a cough? Yes, I have a cough but it's not worse  When is the last time you had a fever greater than 100? Has not had any above 100, always ran in the 99's  Are you having any other symptoms? Fatigue and Headaches, dizzy spells last week   Do you have any other stressors you would like to discuss with your provider? OTHER: short term disabilitty paperwork            Review of Systems   Constitutional, HEENT, cardiovascular, pulmonary, GI, , musculoskeletal, neuro, skin, endocrine and psych systems are negative, except as otherwise noted.      Objective           Vitals:  No vitals were obtained today due to virtual visit.    Physical Exam   GENERAL: Healthy, alert and no distress  EYES: Eyes grossly normal to inspection.  No discharge or erythema, or obvious scleral/conjunctival abnormalities.  RESP: rare dry cough. No audible wheeze,  or visible cyanosis.  No visible retractions or increased work of breathing.    SKIN: Visible skin clear. No significant rash, abnormal pigmentation or lesions.  NEURO: Cranial nerves grossly intact.  Mentation and speech appropriate for age.  PSYCH: Mentation appears normal, affect normal/bright, judgement and  insight intact, normal speech and appearance well-groomed.    Orders Only on 05/05/2021   Component Date Value Ref Range Status     Sodium 05/05/2021 136  133 - 144 mmol/L Final     Potassium 05/05/2021 3.7  3.4 - 5.3 mmol/L Final     Chloride 05/05/2021 107  94 - 109 mmol/L Final     Carbon Dioxide 05/05/2021 22  20 - 32 mmol/L Final     Anion Gap 05/05/2021 7  3 - 14 mmol/L Final     Glucose 05/05/2021 81  70 - 99 mg/dL Final     Urea Nitrogen 05/05/2021 8  7 - 30 mg/dL Final     Creatinine 05/05/2021 0.54  0.52 - 1.04 mg/dL Final     GFR Estimate 05/05/2021 >90  >60 mL/min/[1.73_m2] Final    Comment: Non  GFR Calc  Starting 12/18/2018, serum creatinine based estimated GFR (eGFR) will be   calculated using the Chronic Kidney Disease Epidemiology Collaboration   (CKD-EPI) equation.       GFR Estimate If Black 05/05/2021 >90  >60 mL/min/[1.73_m2] Final    Comment:  GFR Calc  Starting 12/18/2018, serum creatinine based estimated GFR (eGFR) will be   calculated using the Chronic Kidney Disease Epidemiology Collaboration   (CKD-EPI) equation.       Calcium 05/05/2021 8.8  8.5 - 10.1 mg/dL Final             Video-Visit Details    Type of service:  Video Visit    Video End Time:2:17 PM    Originating Location (pt. Location): Home    Distant Location (provider location):  [facility]     Platform used for Video Visit: [employer]

## 2021-05-12 NOTE — TELEPHONE ENCOUNTER
Form completed, signed, and faxed to [facility] at [phone].  Copy of form sent to scan and copy placed in basket.

## 2021-05-20 ENCOUNTER — MYC MEDICAL ADVICE (OUTPATIENT)
Dept: FAMILY MEDICINE | Facility: CLINIC | Age: 36
End: 2021-05-20

## 2021-05-21 NOTE — TELEPHONE ENCOUNTER
This was completed at the end of the day of 5/11/2021 to my recollection, and placed in the outbox.

## 2021-06-05 ENCOUNTER — MYC MEDICAL ADVICE (OUTPATIENT)
Dept: FAMILY MEDICINE | Facility: CLINIC | Age: 36
End: 2021-06-05

## 2021-06-09 ENCOUNTER — OFFICE VISIT (OUTPATIENT)
Dept: FAMILY MEDICINE | Facility: CLINIC | Age: 36
End: 2021-06-09
Payer: COMMERCIAL

## 2021-06-09 VITALS
HEART RATE: 78 BPM | TEMPERATURE: 98.6 F | DIASTOLIC BLOOD PRESSURE: 80 MMHG | BODY MASS INDEX: 31.65 KG/M2 | WEIGHT: 172 LBS | RESPIRATION RATE: 20 BRPM | HEIGHT: 62 IN | SYSTOLIC BLOOD PRESSURE: 136 MMHG | OXYGEN SATURATION: 99 %

## 2021-06-09 DIAGNOSIS — R42 DIZZINESS: ICD-10-CM

## 2021-06-09 DIAGNOSIS — R79.89 LOW TSH LEVEL: Primary | ICD-10-CM

## 2021-06-09 DIAGNOSIS — R53.83 EASY FATIGABILITY: Primary | ICD-10-CM

## 2021-06-09 DIAGNOSIS — R74.01 ELEVATED ALT MEASUREMENT: ICD-10-CM

## 2021-06-09 LAB
ALBUMIN SERPL-MCNC: 4.3 G/DL (ref 3.4–5)
ALP SERPL-CCNC: 60 U/L (ref 40–150)
ALT SERPL W P-5'-P-CCNC: 67 U/L (ref 0–50)
ANION GAP SERPL CALCULATED.3IONS-SCNC: 7 MMOL/L (ref 3–14)
AST SERPL W P-5'-P-CCNC: 32 U/L (ref 0–45)
BASOPHILS # BLD AUTO: 0 10E9/L (ref 0–0.2)
BASOPHILS NFR BLD AUTO: 0.3 %
BILIRUB SERPL-MCNC: 0.4 MG/DL (ref 0.2–1.3)
BUN SERPL-MCNC: 10 MG/DL (ref 7–30)
CALCIUM SERPL-MCNC: 9.2 MG/DL (ref 8.5–10.1)
CHLORIDE SERPL-SCNC: 111 MMOL/L (ref 94–109)
CO2 SERPL-SCNC: 24 MMOL/L (ref 20–32)
CREAT SERPL-MCNC: 0.58 MG/DL (ref 0.52–1.04)
DIFFERENTIAL METHOD BLD: NORMAL
EOSINOPHIL # BLD AUTO: 0.1 10E9/L (ref 0–0.7)
EOSINOPHIL NFR BLD AUTO: 1.9 %
ERYTHROCYTE [DISTWIDTH] IN BLOOD BY AUTOMATED COUNT: 12.5 % (ref 10–15)
GFR SERPL CREATININE-BSD FRML MDRD: >90 ML/MIN/{1.73_M2}
GLUCOSE SERPL-MCNC: 101 MG/DL (ref 70–99)
HCT VFR BLD AUTO: 40.9 % (ref 35–47)
HGB BLD-MCNC: 13.9 G/DL (ref 11.7–15.7)
LYMPHOCYTES # BLD AUTO: 1.8 10E9/L (ref 0.8–5.3)
LYMPHOCYTES NFR BLD AUTO: 26.2 %
MCH RBC QN AUTO: 31.2 PG (ref 26.5–33)
MCHC RBC AUTO-ENTMCNC: 34 G/DL (ref 31.5–36.5)
MCV RBC AUTO: 92 FL (ref 78–100)
MONOCYTES # BLD AUTO: 0.5 10E9/L (ref 0–1.3)
MONOCYTES NFR BLD AUTO: 7.1 %
NEUTROPHILS # BLD AUTO: 4.4 10E9/L (ref 1.6–8.3)
NEUTROPHILS NFR BLD AUTO: 64.5 %
PLATELET # BLD AUTO: 287 10E9/L (ref 150–450)
POTASSIUM SERPL-SCNC: 3.6 MMOL/L (ref 3.4–5.3)
PROT SERPL-MCNC: 7.9 G/DL (ref 6.8–8.8)
RBC # BLD AUTO: 4.45 10E12/L (ref 3.8–5.2)
SODIUM SERPL-SCNC: 142 MMOL/L (ref 133–144)
T4 FREE SERPL-MCNC: 1.22 NG/DL (ref 0.76–1.46)
TSH SERPL DL<=0.005 MIU/L-ACNC: 0.18 MU/L (ref 0.4–4)
WBC # BLD AUTO: 6.8 10E9/L (ref 4–11)

## 2021-06-09 PROCEDURE — 84439 ASSAY OF FREE THYROXINE: CPT | Performed by: FAMILY MEDICINE

## 2021-06-09 PROCEDURE — 99213 OFFICE O/P EST LOW 20 MIN: CPT | Performed by: FAMILY MEDICINE

## 2021-06-09 PROCEDURE — 80050 GENERAL HEALTH PANEL: CPT | Performed by: FAMILY MEDICINE

## 2021-06-09 PROCEDURE — 36415 COLL VENOUS BLD VENIPUNCTURE: CPT | Performed by: FAMILY MEDICINE

## 2021-06-09 ASSESSMENT — MIFFLIN-ST. JEOR: SCORE: 1433.44

## 2021-06-09 NOTE — LETTER
[facility]  [address] [address]  [phone]          June 9, 2021    RE:  Lady Saavedra                                                                                                                                                       [id number] [place] [id number]            To whom it may concern:    Lady Saavedra was evaluated today for recurrent dizziness and fatigue after her Covid-19 infection in April 2019. Please excuse her from work from today, June 9, 2021 through June 23, 2021 due to the dizziness and easy fatigability.    Thank you for your consideration!    Sincerely,        Cecilio Patricia MD

## 2021-06-09 NOTE — PROGRESS NOTES
Assessment & Plan     Easy fatigability  - CBC with platelets differential  - Comprehensive metabolic panel  - TSH with free T4 reflex  - T4 free    Dizziness  - CBC with platelets differential  - Comprehensive metabolic panel  - TSH with free T4 reflex    Recurrent/persistent symptoms since Covid-19 infection 2 months ago.  Exam today did not reveal distress signs, fever nor abnormal neuro signs.  Discussed other possible causes with patient.   Will recheck some labs to rule out possible causes.  Advised healthy nutrition, oral hydration, and activity as tolerated.  Advised fall precautions.  Reassess in 2 weeks.  Excuse from work for at least next 2 weeks.  Return precautions discussed and given to patient.     Patient Instructions   You will be contacted in 1-2 days for results of your lab tests.    No acute signs today.     Possible Covid19 long-hauler syndrome.          Thank you for choosing [facility].  You may be receiving an email and/or telephone survey request from [facility] Customer Experience regarding your visit today.  Please take a few minutes to respond to the survey to let us know how we are doing.      If you have questions or concerns, please contact us via [employer] or you can contact your care team at [phone] option 2.    Our Clinic hours are:  Monday - Thursday 7am-6pm  Friday 7am-5pm    The [place] outpatient lab hours are:  Monday - Friday 7am-4:30pm    Appointments are required, call [phone]    If you have clinical questions after hours or would like to schedule an appointment,  call the clinic at [phone].    Patient Education   Coping with Life after COVID-19  Being in the hospital because of COVID-19 is scary. Going home can be scary, too. You may face changes to your life, the way you work or what you can eat.   It's hard to adjust to change, and it's normal to feel afraid, frustrated or even angry. These feelings usually go away over time. If your feelings don't  "start to get better, it's called \"adjustment disorder.\"   What signs should I look out for?  Adjustment disorder can happen to anyone in a time of stress. It makes it hard to cope with daily life. You may feel lonely or fight with loved ones, even if you're glad to be home.   Watch for these signs:    Fear or worry    Hard time focusing    Sadness or anger    Trouble talking to family or friends    Feeling like you don't fit in or isolating yourself    Problems with sleep    Drinking alcohol or taking drugs to cope  What can I do?  You can help yourself get better. Feeling you have control helps you move forward. You may wonder if you'll be able to do things you did before. Be patient. Do your best to make the most of every day. Try to build relationships, be as active as you can, eat right and keep a sense of humor. Avoid smoking and drinking too much alcohol.   Call your family doctor or clinic if you're not sure what to do. They can guide you to care or other services.  When should I get help?  Think about getting counseling if your sadness or frustration gets worse. Together with a trained counselor, you can talk about your problems adjusting to life after your hospital stay. You can come up with new ways to handle changes that give you more control.   Get help if you're thinking about hurting yourself. If you need help right away, call 911 or go to the nearest emergency room. You can also try the Crisis Text Line.  Crisis Text Line: 888-549 (http://www.crisistextline.org)  The Crisis Text Line serves anyone, in any crisis. It gives free, 24/7 support. Here's how it works:  1. Text HOME to 338333 from anywhere in the [place], anytime, about any type of crisis.  2. A live, trained Crisis Counselor will text you back quickly.  3. The volunteer Crisis Counselor can help you move from a \"hot moment\" to a \"cool moment.\" They can also help you work out a safety plan.  For informational purposes only. Not to replace the " advice of your health care provider. Copyright   2020 [facility]. All rights reserved. Clinically reviewed by the Ambulatory COVID-19 Care Map Team. [employer] [id number] - 04/20.           Return in about 2 weeks (around 6/23/2021) for Virtual visit for if not getting better; in clinic visit at anytime if with new symptoms.    Cecilio Patricia MD  [facility] ETHAN Elena is a 36 year old who presents for the following health issues  accompanied by herself:    HPI     Acute Illness  ER to Hospital on 4-5-21-Pneumonia due to 2019 novel coronavirus.   Acute illness concerns: Dizziness and a return of symptoms like when she had Covid.  Onset/Duration: Dizziness when she went back to work last week.  She has missed work this week due to feeling like she is going to pass out.  Other return of symptoms the past 4 days.  3 days ago coughing out phlegm, yellowish.  This is now gone.  Seems like she will have a slight cough when having to talk at work.  Symptoms:  Fever: YES- Very slight-99.2-99.6.  Chills/Sweats: YES- sweating-not sure if related to the heat.  Has been staying in the basement where it is cooler.  Headache (location?): YES- Will not go away with taking OTC medication.  Location can vary.  Sometimes in the back by the neck.  Sometimes on the top of the head.  Wearing a mask and walking around at work.  Sinus Pressure: no  Conjunctivitis:  no  Ear Pain: no  Rhinorrhea: YES- Slight.  Congestion: no  Sore Throat: no  Cough: YES - See above.  Wheeze: no  Decreased Appetite: YES- Slight  Nausea: no  Vomiting: no  Diarrhea: YES- 3-4 days ago.  Having 7-8 episodes in a 24 hour period. Has been able to drink fluids.  Dysuria/Freq.: no  Dysuria or Hematuria: no  Fatigue/Achiness: YES- very fatigue.  After an hour of being back at work she will have to take a break.  Will feel dizzy with standing up and head movements.  Will feel shaky and will try to eat something.  Memory:   "She has been having issues with her memory since Covid.  She will be talking and can't finish her sentence.  Will go in a room and can't remember what she is going to do.  Sick/Strep Exposure: no  Therapies tried and outcome: Tylenol and Advil as needed.    Patient states she has had off and on headache since Covid-19 resolved.    Today, patient states she still feels \"tired\". The fatigue has prevented her from going to work.  Denies chest pain, dyspnea or palpitation.  Patient reports she gained weight since Covid-19 diagnosis.  Patient reports she tends to be \"forgetful\" - more immediate memory.    Patient Active Problem List   Diagnosis     CARDIOVASCULAR SCREENING; LDL GOAL LESS THAN 160     Pneumonia due to 2019 novel coronavirus     Past Surgical History:   Procedure Laterality Date     HC ENLARGE BREAST WITH IMPLANT       MOUTH SURGERY  age 18    wisdom teeth       Social History     Tobacco Use     Smoking status: Never Smoker     Smokeless tobacco: Never Used   Substance Use Topics     Alcohol use: Not Currently     Family History   Adopted: Yes   Problem Relation Age of Onset     Unknown/Adopted Mother      Unknown/Adopted Father          No current outpatient medications on file.     No Known Allergies  Review of Systems   Constitutional, HEENT, cardiovascular, pulmonary, GI, , musculoskeletal, neuro, skin, endocrine and psych systems are negative, except as otherwise noted.      Objective    BP (!) 142/86   Pulse 78   Temp 98.6  F (37  C) (Tympanic)   Resp 20   Ht 1.575 m (5' 2\")   Wt 78 kg (172 lb)   SpO2 99%   BMI 31.46 kg/m    Body mass index is 31.46 kg/m .  Physical Exam   GENERAL APPEARANCE: alert and no distress  EYES: pink conj, no icterus, PERRL, EOMI  HENT: ear canals and TM's normal, nose and mouth without ulcers or lesions, oropharynx clear and oral mucous membranes moist  NECK: no adenopathy, no asymmetry, masses, or scars and thyroid normal to palpation  RESP: lungs clear to " auscultation - no rales, rhonchi or wheezes  CV: regular rates and rhythm, normal S1 S2, no S3 or S4, no murmur, click or rub, no peripheral edema and peripheral pulses strong  ABDOMEN: soft, nontender, no hepatosplenomegaly, no masses and bowel sounds normal  MS: no musculoskeletal defects are noted and gait is age appropriate without ataxia  SKIN: no suspicious lesions or rashes  NEURO: Normal strength and tone, sensory exam grossly normal, mentation intact and speech normal    Results for orders placed or performed in visit on 06/09/21   CBC with platelets differential     Status: None   Result Value Ref Range    WBC 6.8 4.0 - 11.0 10e9/L    RBC Count 4.45 3.8 - 5.2 10e12/L    Hemoglobin 13.9 11.7 - 15.7 g/dL    Hematocrit 40.9 35.0 - 47.0 %    MCV 92 78 - 100 fl    MCH 31.2 26.5 - 33.0 pg    MCHC 34.0 31.5 - 36.5 g/dL    RDW 12.5 10.0 - 15.0 %    Platelet Count 287 150 - 450 10e9/L    % Neutrophils 64.5 %    % Lymphocytes 26.2 %    % Monocytes 7.1 %    % Eosinophils 1.9 %    % Basophils 0.3 %    Absolute Neutrophil 4.4 1.6 - 8.3 10e9/L    Absolute Lymphocytes 1.8 0.8 - 5.3 10e9/L    Absolute Monocytes 0.5 0.0 - 1.3 10e9/L    Absolute Eosinophils 0.1 0.0 - 0.7 10e9/L    Absolute Basophils 0.0 0.0 - 0.2 10e9/L    Diff Method Automated Method    Comprehensive metabolic panel     Status: Abnormal   Result Value Ref Range    Sodium 142 133 - 144 mmol/L    Potassium 3.6 3.4 - 5.3 mmol/L    Chloride 111 (H) 94 - 109 mmol/L    Carbon Dioxide 24 20 - 32 mmol/L    Anion Gap 7 3 - 14 mmol/L    Glucose 101 (H) 70 - 99 mg/dL    Urea Nitrogen 10 7 - 30 mg/dL    Creatinine 0.58 0.52 - 1.04 mg/dL    GFR Estimate >90 >60 mL/min/[1.73_m2]    GFR Estimate If Black >90 >60 mL/min/[1.73_m2]    Calcium 9.2 8.5 - 10.1 mg/dL    Bilirubin Total 0.4 0.2 - 1.3 mg/dL    Albumin 4.3 3.4 - 5.0 g/dL    Protein Total 7.9 6.8 - 8.8 g/dL    Alkaline Phosphatase 60 40 - 150 U/L    ALT 67 (H) 0 - 50 U/L    AST 32 0 - 45 U/L   TSH with free T4  reflex     Status: Abnormal   Result Value Ref Range    TSH 0.18 (L) 0.40 - 4.00 mU/L   T4 free     Status: None   Result Value Ref Range    T4 Free 1.22 0.76 - 1.46 ng/dL

## 2021-06-09 NOTE — PATIENT INSTRUCTIONS
"You will be contacted in 1-2 days for results of your lab tests.    No acute signs today.     Possible Covid19 long-hauler syndrome.          Thank you for choosing [facility].  You may be receiving an email and/or telephone survey request from [facility] Customer Experience regarding your visit today.  Please take a few minutes to respond to the survey to let us know how we are doing.      If you have questions or concerns, please contact us via "[employer] or you can contact your care team at [phone] option 2.    Our Clinic hours are:  Monday - Thursday 7am-6pm  Friday 7am-5pm    The [place] outpatient lab hours are:  Monday - Friday 7am-4:30pm    Appointments are required, call [phone]    If you have clinical questions after hours or would like to schedule an appointment,  call the clinic at [phone].    Patient Education   Coping with Life after COVID-19  Being in the hospital because of COVID-19 is scary. Going home can be scary, too. You may face changes to your life, the way you work or what you can eat.   It's hard to adjust to change, and it's normal to feel afraid, frustrated or even angry. These feelings usually go away over time. If your feelings don't start to get better, it's called \"adjustment disorder.\"   What signs should I look out for?  Adjustment disorder can happen to anyone in a time of stress. It makes it hard to cope with daily life. You may feel lonely or fight with loved ones, even if you're glad to be home.   Watch for these signs:    Fear or worry    Hard time focusing    Sadness or anger    Trouble talking to family or friends    Feeling like you don't fit in or isolating yourself    Problems with sleep    Drinking alcohol or taking drugs to cope  What can I do?  You can help yourself get better. Feeling you have control helps you move forward. You may wonder if you'll be able to do things you did before. Be patient. Do your best to make the most of every day. Try to " "build relationships, be as active as you can, eat right and keep a sense of humor. Avoid smoking and drinking too much alcohol.   Call your family doctor or clinic if you're not sure what to do. They can guide you to care or other services.  When should I get help?  Think about getting counseling if your sadness or frustration gets worse. Together with a trained counselor, you can talk about your problems adjusting to life after your hospital stay. You can come up with new ways to handle changes that give you more control.   Get help if you're thinking about hurting yourself. If you need help right away, call 911 or go to the nearest emergency room. You can also try the Crisis Text Line.  Crisis Text Line: 554-535 (http://www.crisistextline.org)  The Crisis Text Line serves anyone, in any crisis. It gives free, 24/7 support. Here's how it works:  1. Text HOME to 939556 from anywhere in the [place], anytime, about any type of crisis.  2. A live, trained Crisis Counselor will text you back quickly.  3. The volunteer Crisis Counselor can help you move from a \"hot moment\" to a \"cool moment.\" They can also help you work out a safety plan.  For informational purposes only. Not to replace the advice of your health care provider. Copyright   2020 [employer]. All rights reserved. Clinically reviewed by the Ambulatory COVID-19 Care Map Team. [employer] [id number] - 04/20.       "

## 2021-06-11 ENCOUNTER — MYC MEDICAL ADVICE (OUTPATIENT)
Dept: FAMILY MEDICINE | Facility: CLINIC | Age: 36
End: 2021-06-11

## 2021-06-14 ENCOUNTER — TELEPHONE (OUTPATIENT)
Dept: FAMILY MEDICINE | Facility: CLINIC | Age: 36
End: 2021-06-14

## 2021-06-14 ENCOUNTER — HOSPITAL ENCOUNTER (EMERGENCY)
Facility: CLINIC | Age: 36
Discharge: HOME | End: 2021-06-14
Attending: FAMILY MEDICINE | Admitting: FAMILY MEDICINE
Payer: COMMERCIAL

## 2021-06-14 ENCOUNTER — APPOINTMENT (OUTPATIENT)
Dept: CT IMAGING | Facility: CLINIC | Age: 36
End: 2021-06-14
Attending: FAMILY MEDICINE
Payer: COMMERCIAL

## 2021-06-14 VITALS
HEART RATE: 67 BPM | TEMPERATURE: 97.9 F | DIASTOLIC BLOOD PRESSURE: 81 MMHG | WEIGHT: 172 LBS | RESPIRATION RATE: 19 BRPM | OXYGEN SATURATION: 99 % | BODY MASS INDEX: 31.46 KG/M2 | SYSTOLIC BLOOD PRESSURE: 143 MMHG

## 2021-06-14 DIAGNOSIS — R51.9 ACUTE NONINTRACTABLE HEADACHE, UNSPECIFIED HEADACHE TYPE: ICD-10-CM

## 2021-06-14 DIAGNOSIS — R00.2 PALPITATIONS: ICD-10-CM

## 2021-06-14 LAB
ALBUMIN SERPL-MCNC: 4.4 G/DL (ref 3.4–5)
ALP SERPL-CCNC: 58 U/L (ref 40–150)
ALT SERPL W P-5'-P-CCNC: 76 U/L (ref 0–50)
ANION GAP SERPL CALCULATED.3IONS-SCNC: 8 MMOL/L (ref 3–14)
AST SERPL W P-5'-P-CCNC: 37 U/L (ref 0–45)
BASOPHILS # BLD AUTO: 0 10E9/L (ref 0–0.2)
BASOPHILS NFR BLD AUTO: 0.6 %
BILIRUB SERPL-MCNC: 0.2 MG/DL (ref 0.2–1.3)
BUN SERPL-MCNC: 10 MG/DL (ref 7–30)
CALCIUM SERPL-MCNC: 9.2 MG/DL (ref 8.5–10.1)
CHLORIDE SERPL-SCNC: 108 MMOL/L (ref 94–109)
CO2 SERPL-SCNC: 24 MMOL/L (ref 20–32)
CREAT SERPL-MCNC: 0.52 MG/DL (ref 0.52–1.04)
CRP SERPL-MCNC: <2.9 MG/L (ref 0–8)
DIFFERENTIAL METHOD BLD: NORMAL
EOSINOPHIL # BLD AUTO: 0.1 10E9/L (ref 0–0.7)
EOSINOPHIL NFR BLD AUTO: 2 %
ERYTHROCYTE [DISTWIDTH] IN BLOOD BY AUTOMATED COUNT: 12.3 % (ref 10–15)
GFR SERPL CREATININE-BSD FRML MDRD: >90 ML/MIN/{1.73_M2}
GLUCOSE SERPL-MCNC: 92 MG/DL (ref 70–99)
HCG SERPL QL: NEGATIVE
HCT VFR BLD AUTO: 40.5 % (ref 35–47)
HGB BLD-MCNC: 14.1 G/DL (ref 11.7–15.7)
IMM GRANULOCYTES # BLD: 0 10E9/L (ref 0–0.4)
IMM GRANULOCYTES NFR BLD: 0.2 %
LYMPHOCYTES # BLD AUTO: 2 10E9/L (ref 0.8–5.3)
LYMPHOCYTES NFR BLD AUTO: 30.1 %
MAGNESIUM SERPL-MCNC: 2.3 MG/DL (ref 1.6–2.3)
MCH RBC QN AUTO: 31.2 PG (ref 26.5–33)
MCHC RBC AUTO-ENTMCNC: 34.8 G/DL (ref 31.5–36.5)
MCV RBC AUTO: 90 FL (ref 78–100)
MONOCYTES # BLD AUTO: 0.4 10E9/L (ref 0–1.3)
MONOCYTES NFR BLD AUTO: 5.5 %
NEUTROPHILS # BLD AUTO: 4.1 10E9/L (ref 1.6–8.3)
NEUTROPHILS NFR BLD AUTO: 61.6 %
NRBC # BLD AUTO: 0 10*3/UL
NRBC BLD AUTO-RTO: 0 /100
PHOSPHATE SERPL-MCNC: 3.8 MG/DL (ref 2.5–4.5)
PLATELET # BLD AUTO: 288 10E9/L (ref 150–450)
POTASSIUM SERPL-SCNC: 3.8 MMOL/L (ref 3.4–5.3)
PROT SERPL-MCNC: 8 G/DL (ref 6.8–8.8)
RBC # BLD AUTO: 4.52 10E12/L (ref 3.8–5.2)
SODIUM SERPL-SCNC: 140 MMOL/L (ref 133–144)
T4 FREE SERPL-MCNC: 1.21 NG/DL (ref 0.76–1.46)
TROPONIN I SERPL-MCNC: <0.015 UG/L (ref 0–0.04)
TSH SERPL DL<=0.005 MIU/L-ACNC: 0.13 MU/L (ref 0.4–4)
TSH SERPL DL<=0.005 MIU/L-ACNC: 0.13 MU/L (ref 0.4–4)
WBC # BLD AUTO: 6.6 10E9/L (ref 4–11)

## 2021-06-14 PROCEDURE — 70450 CT HEAD/BRAIN W/O DYE: CPT

## 2021-06-14 PROCEDURE — 84443 ASSAY THYROID STIM HORMONE: CPT | Performed by: FAMILY MEDICINE

## 2021-06-14 PROCEDURE — 86140 C-REACTIVE PROTEIN: CPT | Performed by: FAMILY MEDICINE

## 2021-06-14 PROCEDURE — 84703 CHORIONIC GONADOTROPIN ASSAY: CPT | Performed by: FAMILY MEDICINE

## 2021-06-14 PROCEDURE — 99284 EMERGENCY DEPT VISIT MOD MDM: CPT | Mod: 25 | Performed by: FAMILY MEDICINE

## 2021-06-14 PROCEDURE — 99285 EMERGENCY DEPT VISIT HI MDM: CPT | Mod: 25 | Performed by: FAMILY MEDICINE

## 2021-06-14 PROCEDURE — 84484 ASSAY OF TROPONIN QUANT: CPT | Performed by: FAMILY MEDICINE

## 2021-06-14 PROCEDURE — 84439 ASSAY OF FREE THYROXINE: CPT | Performed by: FAMILY MEDICINE

## 2021-06-14 PROCEDURE — 84100 ASSAY OF PHOSPHORUS: CPT | Performed by: FAMILY MEDICINE

## 2021-06-14 PROCEDURE — 83735 ASSAY OF MAGNESIUM: CPT | Performed by: FAMILY MEDICINE

## 2021-06-14 PROCEDURE — 84443 ASSAY THYROID STIM HORMONE: CPT | Mod: 91 | Performed by: FAMILY MEDICINE

## 2021-06-14 PROCEDURE — 80053 COMPREHEN METABOLIC PANEL: CPT | Performed by: FAMILY MEDICINE

## 2021-06-14 PROCEDURE — 85025 COMPLETE CBC W/AUTO DIFF WBC: CPT | Performed by: FAMILY MEDICINE

## 2021-06-14 PROCEDURE — 93005 ELECTROCARDIOGRAM TRACING: CPT | Performed by: FAMILY MEDICINE

## 2021-06-14 PROCEDURE — 93010 ELECTROCARDIOGRAM REPORT: CPT | Performed by: FAMILY MEDICINE

## 2021-06-14 NOTE — TELEPHONE ENCOUNTER
Patient has a appt today for racing heart.  Need to triage further. Left message for the patient to call the clinic.  Judy BECERRIL RN

## 2021-06-14 NOTE — TELEPHONE ENCOUNTER
S-(situation): Patient is having racing heart beats.    B-(background): Pt was hospitalized for covid in April.    A-(assessment): Heart rate is as high as 135 and lasts until she sits down and rests. It happens when she stands up and does stairs.  She is not sure if heart rate is reqular or irregular.  No chest pain  No shortness of breath  O2 sats are 98% on room air.  No fever  Feels weak  Dizzy for 2 weeks  Still having headache frequently  R-(recommendations): Patient will go to the ED. She will get a .

## 2021-06-14 NOTE — ED TRIAGE NOTES
Post covid-long hauler. Pt having issues with heart racing/palpitations especially with standing. Pt has been seen in the clinic recently had blood work. Today, pt's heart rate was up to 145 per her watch, currently 87.

## 2021-06-15 NOTE — ED PROVIDER NOTES
History     Chief Complaint   Patient presents with     Palpitations     Post covid-long hauler. Pt having issues with heart racing/palpitations especially with standing. Pt has been seen in the clinic recently had blood work.      HPI  Lady Saavedra is a 36 year old female, past medical history is significant for Covid pneumonia in 4/21, presents to the emergency department concerns of palpitations/racing heart, lightheadedness, disequilibrium over the last 2 or 3 weeks sometimes associated with headache x1 week.  History is obtained from the patient who presents with her mother with concerns of primarily rapid heartbeat/palpitations which she notices when she goes from sitting or lying down to standing stating typically her heart rate will go from the 60-70 range all the way up to 120.  Sometimes but not always she feels lightheaded with this and occasionally also has a sensation of disequilibrium not room spinning type vertigo.  Head movement or position of her head does not make any difference and does not elicit the vertigo type symptoms.  She notes no blurred vision or double vision, no nausea or vomiting.  Eating and drinking normally.  She saw her primary care provider last week and some lab diagnostics were obtained and reviewed with her with a plan to follow-up further in clinic tomorrow but with the palpitations and racing heart which were initially absent with her first clinical presentation with her primary she was advised by the nurse triage line to come to the ER immediately.  She did advise no chest pain or back pain, no abdominal pain.  Headache is described as all over, generally aching, usually around 4-5/10.  Does not necessarily correlate with the other symptoms at presentation.  Tylenol helps.  Absent currently.      Allergies:  No Known Allergies    Problem List:    Patient Active Problem List    Diagnosis Date Noted     Pneumonia due to 2019 novel coronavirus 04/05/2021     Priority:  Medium     CARDIOVASCULAR SCREENING; LDL GOAL LESS THAN 160 10/31/2010     Priority: Medium        Past Medical History:    Past Medical History:   Diagnosis Date     Abnormal Pap smear of cervix 2009, 2012, 2014     Cervical high risk HPV (human papillomavirus) test positive 2009, 2012     Chickenpox      Depression      Gestational HTN 1/1/2015     History of colposcopy with cervical biopsy 3/2012     Seasonal allergies      Urinary tract bacterial infections        Past Surgical History:    Past Surgical History:   Procedure Laterality Date     HC ENLARGE BREAST WITH IMPLANT       MOUTH SURGERY  age 18    wisdom teeth       Family History:    Family History   Adopted: Yes   Problem Relation Age of Onset     Unknown/Adopted Mother      Unknown/Adopted Father        Social History:  Marital Status:  Single [1]  Social History     Tobacco Use     Smoking status: Never Smoker     Smokeless tobacco: Never Used   Substance Use Topics     Alcohol use: Not Currently     Drug use: No        Medications:    No current outpatient medications on file.        Review of Systems   All other systems reviewed and are negative.      Physical Exam   BP: (!) 173/85  Pulse: 81  Temp: 97.9  F (36.6  C)  Resp: 18  Weight: 78 kg (172 lb)  SpO2: 99 %      Physical Exam  Vitals signs and nursing note reviewed.   Constitutional:       Appearance: Normal appearance. She is normal weight.   HENT:      Head: Normocephalic and atraumatic.      Right Ear: Tympanic membrane normal.      Left Ear: Tympanic membrane normal.      Nose: Nose normal.      Mouth/Throat:      Mouth: Mucous membranes are dry.      Pharynx: Oropharynx is clear.   Eyes:      Extraocular Movements: Extraocular movements intact.      Conjunctiva/sclera: Conjunctivae normal.      Pupils: Pupils are equal, round, and reactive to light.   Neck:      Musculoskeletal: Normal range of motion and neck supple.   Cardiovascular:      Rate and Rhythm: Normal rate and regular rhythm.       Pulses: Normal pulses.      Heart sounds: Normal heart sounds.   Pulmonary:      Effort: Pulmonary effort is normal.      Breath sounds: Normal breath sounds.   Abdominal:      General: Bowel sounds are normal.      Palpations: Abdomen is soft.   Musculoskeletal: Normal range of motion.   Skin:     General: Skin is warm and dry.      Capillary Refill: Capillary refill takes less than 2 seconds.   Neurological:      General: No focal deficit present.      Mental Status: She is alert and oriented to person, place, and time.   Psychiatric:         Mood and Affect: Mood normal.         Behavior: Behavior normal.         ED Course        Procedures               EKG Interpretation:      Interpreted by Bertrand Thomas MD  Time reviewed: Time obtained 1733 time interpreted same.  75 bpm sinus rhythm, diffuse nonspecific T abnormality.  No definite acute ischemia or infarct.          Critical Care time:  none               Results for orders placed or performed during the hospital encounter of 06/14/21 (from the past 24 hour(s))   CBC with platelets differential   Result Value Ref Range    WBC 6.6 4.0 - 11.0 10e9/L    RBC Count 4.52 3.8 - 5.2 10e12/L    Hemoglobin 14.1 11.7 - 15.7 g/dL    Hematocrit 40.5 35.0 - 47.0 %    MCV 90 78 - 100 fl    MCH 31.2 26.5 - 33.0 pg    MCHC 34.8 31.5 - 36.5 g/dL    RDW 12.3 10.0 - 15.0 %    Platelet Count 288 150 - 450 10e9/L    Diff Method Automated Method     % Neutrophils 61.6 %    % Lymphocytes 30.1 %    % Monocytes 5.5 %    % Eosinophils 2.0 %    % Basophils 0.6 %    % Immature Granulocytes 0.2 %    Nucleated RBCs 0 0 /100    Absolute Neutrophil 4.1 1.6 - 8.3 10e9/L    Absolute Lymphocytes 2.0 0.8 - 5.3 10e9/L    Absolute Monocytes 0.4 0.0 - 1.3 10e9/L    Absolute Eosinophils 0.1 0.0 - 0.7 10e9/L    Absolute Basophils 0.0 0.0 - 0.2 10e9/L    Abs Immature Granulocytes 0.0 0 - 0.4 10e9/L    Absolute Nucleated RBC 0.0    CRP inflammation   Result Value Ref Range    CRP  Inflammation <2.9 0.0 - 8.0 mg/L   Comprehensive metabolic panel   Result Value Ref Range    Sodium 140 133 - 144 mmol/L    Potassium 3.8 3.4 - 5.3 mmol/L    Chloride 108 94 - 109 mmol/L    Carbon Dioxide 24 20 - 32 mmol/L    Anion Gap 8 3 - 14 mmol/L    Glucose 92 70 - 99 mg/dL    Urea Nitrogen 10 7 - 30 mg/dL    Creatinine 0.52 0.52 - 1.04 mg/dL    GFR Estimate >90 >60 mL/min/[1.73_m2]    GFR Estimate If Black >90 >60 mL/min/[1.73_m2]    Calcium 9.2 8.5 - 10.1 mg/dL    Bilirubin Total 0.2 0.2 - 1.3 mg/dL    Albumin 4.4 3.4 - 5.0 g/dL    Protein Total 8.0 6.8 - 8.8 g/dL    Alkaline Phosphatase 58 40 - 150 U/L    ALT 76 (H) 0 - 50 U/L    AST 37 0 - 45 U/L   Magnesium   Result Value Ref Range    Magnesium 2.3 1.6 - 2.3 mg/dL   Phosphorus   Result Value Ref Range    Phosphorus 3.8 2.5 - 4.5 mg/dL   Troponin I   Result Value Ref Range    Troponin I ES <0.015 0.000 - 0.045 ug/L   TSH   Result Value Ref Range    TSH 0.13 (L) 0.40 - 4.00 mU/L   HCG qualitative Blood   Result Value Ref Range    HCG Qualitative Serum Negative NEG^Negative   TSH with free T4 reflex   Result Value Ref Range    TSH 0.13 (L) 0.40 - 4.00 mU/L   T4 free   Result Value Ref Range    T4 Free 1.21 0.76 - 1.46 ng/dL   CT Head w/o Contrast    Narrative    EXAM: CT HEAD W/O CONTRAST  LOCATION: [facility]  DATE/TIME: 6/14/2021 9:03 PM    INDICATION: Headaches.  COMPARISON: None.  TECHNIQUE: Routine CT Head without IV contrast. Multiplanar reformats. Dose reduction techniques were used.    FINDINGS:  INTRACRANIAL CONTENTS: No intracranial hemorrhage, extraaxial collection, or mass effect.  No CT evidence of acute infarct. Normal parenchymal attenuation. Normal ventricles and sulci.     VISUALIZED ORBITS/SINUSES/MASTOIDS: No intraorbital abnormality. No paranasal sinus mucosal disease. No middle ear or mastoid effusion.    BONES/SOFT TISSUES: No acute abnormality.      Impression    IMPRESSION:  1.  Normal head CT.   11:08 PM  No significant  changes.  She has not had palpitations on monitor, her heart rate has been consistently below 100 bpm.  I discussed her diagnostic evaluation with her here today with respect especially to her TSH which is suppressed at 0.13 but with a normal free T4.  Her remaining lab diagnostics are without significant abnormals.  Head CT is acutely negative.  I suspect that her multisystem concerns are potentially related to a post Covid syndrome of some type.  I have asked her to keep a symptom diary and to follow-up in clinic with her primary care provider.  She is always welcome to return to the emergency department if worse or changes.      Medications - No data to display    Assessments & Plan (with Medical Decision Making)   Assessments and plan with medical decision making at the time stamp above.      Disclaimer: This note consists of symbols derived from keyboarding, dictation and/or voice recognition software. As a result, there may be errors in the script that have gone undetected. Please consider this when interpreting information found in this chart.    I have reviewed the nursing notes.    I have reviewed the findings, diagnosis, plan and need for follow up with the patient.       New Prescriptions    No medications on file       Final diagnoses:   Palpitations   Acute nonintractable headache, unspecified headache type       6/14/2021   [facility] EMERGENCY DEPT     Bertrand Thomas MD  06/17/21 [id number]

## 2021-06-22 ENCOUNTER — OFFICE VISIT (OUTPATIENT)
Dept: FAMILY MEDICINE | Facility: CLINIC | Age: 36
End: 2021-06-22
Payer: COMMERCIAL

## 2021-06-22 VITALS
HEART RATE: 80 BPM | OXYGEN SATURATION: 98 % | WEIGHT: 173.4 LBS | BODY MASS INDEX: 30.72 KG/M2 | SYSTOLIC BLOOD PRESSURE: 132 MMHG | DIASTOLIC BLOOD PRESSURE: 88 MMHG | RESPIRATION RATE: 14 BRPM | TEMPERATURE: 98 F | HEIGHT: 63 IN

## 2021-06-22 DIAGNOSIS — Z86.16 HISTORY OF 2019 NOVEL CORONAVIRUS DISEASE (COVID-19): ICD-10-CM

## 2021-06-22 DIAGNOSIS — R00.2 PALPITATIONS: Primary | ICD-10-CM

## 2021-06-22 PROBLEM — J45.909 ASTHMA: Status: ACTIVE | Noted: 2021-06-22

## 2021-06-22 PROCEDURE — 99214 OFFICE O/P EST MOD 30 MIN: CPT | Performed by: FAMILY MEDICINE

## 2021-06-22 ASSESSMENT — MIFFLIN-ST. JEOR: SCORE: 1447.73

## 2021-06-22 NOTE — PROGRESS NOTES
"    Assessment & Plan     Palpitations  - Leadless EKG Monitor 3 to 7 Days  - CARDIOLOGY EVAL ADULT REFERRAL    History of 2019 novel coronavirus disease (COVID-19)  - CARDIOLOGY EVAL ADULT REFERRAL    STill considering Covid-19 long hauler syndrome.  Now presents with symptomatic palpitations.  Reviewed her ER visit - work up reassuring then.  Obtain leadless cardiac monitor for 7 days.  Refer to cardiology for further evaluation.  Activity as tolerated.  Excuse from work until cardiology work up is completed and patient has no concerning rrecurrent symptoms.  Advised reasons to call 911 or be brought to the ER.    TSH was suppressed in the ER but her FT4 was in normal range.  Will need to repeat in a few weeks to verify that she is not hyperthyroid.    Patient Instructions   Contact Cardiac Services  at [phone], to schedule zio patch monitor appointment, and cardiology consult if you do not receive a call from the  in the next 2 business days.    Off work for now due to recurrent symptomatic palpitations.      Patient Education     Heart Palpitations    Palpitations are the feeling that your heart is beating hard, fast, or irregular. Some describe it as \"pounding,\" \"flip-flopping in the chest,\" or \"skipped beats.\" Palpitations may occur in someone with heart disease. But they can also occur in a healthy person.  Heart-related causes:    Heart rhythm problem (arrhythmia)    Heart valve disease    Disease of the heart muscle (cardiomyopathy)    Coronary artery disease    High blood pressure  Non-heart-related causes:    Certain medicines such as asthma inhalers and decongestants    Some herbal supplements, energy drinks and pills, and weight loss pills    Illegal stimulant drugs such as cocaine, crank, methamphetamine, PCP, bath salts, and ecstasy    Caffeine, alcohol, and tobacco    Health conditions such as thyroid disease, anemia, anxiety, and panic disorder  Sometimes the cause can't be " found.  Home care  Follow these home care tips:    Don't use too much caffeine, alcohol, or tobacco, or any stimulant drugs.    Tell your doctor about any prescription or over-the-counter or herbal medicines you take.  Follow-up care    Follow up with your doctor, or as advised.    Call 911  This is the fastest and safest way to get to the emergency department. The paramedics can also start treatment on the way to the hospital, if needed.  Don't wait until your symptoms are severe to call 911. These are reasons to call 911:    Chest pain    Shortness of breath    Feeling lightheaded, faint, or dizzy, or losing consciousness    Very irregular heartbeat    Rapid heartbeat that makes you uncomfortable    Slower than usual heart rate along with symptoms    Chest pain with weakness, dizziness, heavy sweating, nausea, or vomiting    Extreme drowsiness, confusion, or weakness    Weakness of an arm or leg, or on one side of the face    Trouble with speech or vision  When to seek medical advice  Call your healthcare provider right away if you have palpitations that last longer than normal, or are different from your past palpitations.  Celect last reviewed this educational content on 11/1/2019 2000-2021 [employer]. All rights reserved. This information is not intended as a substitute for professional medical care. Always follow your healthcare professional's instructions.               Return in about 2 weeks (around 7/6/2021) for cardiology appointment.    Cecilio Patricia MD  [facility] ETHAN Elena is a 36 year old who presents for the following health issues :  Chief Complaint   Patient presents with     ER F/U     Pt here for post e/r f/u for heart palpitaqtions.       [facility]     ED/UC Followup:    Facility:  [facility]  Date of visit: 6/14/21  Reason for visit: heart palpitations  Current Status: pt continues to have heart racing at times, symptoms of dizziness      Since ER  "visit, patient states she experienced 3 episodes of palpitations with symptoms (dizziness, shaky).  After sitting down, they resolve. Less than 10 minutes per episode.    Dizziness  Onset/Duration: 1 month ago, worse this past week  Description:   Do you feel faint: YES- at times  Does it feel like the surroundings (bed, room) are moving: YES- little bit  Unsteady/off balance: YES  Have you passed out or fallen: no  Intensity: moderate, severe  Progression of Symptoms: worsening and intermittent  Accompanying Signs & Symptoms:  Heart palpitations or chest pain: YES- heart palpitations, pt had shooting pain under right arm yesterday  Nausea, vomiting: YES- nausea  Weakness or lack of coordination in arms or legs: no  Vision or speech changes: no  Numbness or tingling: YES- right hand tingling with episode  Ringing in ears (Tinnitus): no  Hearing Loss: no  Gets sweaty with episodes  History:   Head trauma/concussion history: no  Previous similar symptoms: no  Recent bleeding history: no  Any new medications (BP?): no  Precipitating factors:   Worse with activity: YES  Worse with head movement: no  Alleviating factors:   Does staying in a fixed position give relief: YES  Therapies tried and outcome: None    Patient is s/p Covid-19. Likely a long-hauler.    Denies known hx of heart condition.  Patient is adopted so does not know biological family history.      Review of Systems   Constitutional, HEENT, cardiovascular, pulmonary, GI, , musculoskeletal, neuro, skin, endocrine and psych systems are negative, except as otherwise noted.      Objective    /88   Pulse 80   Temp 98  F (36.7  C) (Tympanic)   Resp 14   Ht 1.588 m (5' 2.5\")   Wt 78.7 kg (173 lb 6.4 oz)   SpO2 98%   BMI 31.21 kg/m    Body mass index is 31.21 kg/m .  Physical Exam   GENERAL: healthy, alert and no distress, ambulatory w/o assist  NECK: no tenderness, no adenopathy,  Thyroid not enlarged  RESP: lungs clear to auscultation - no rales, no " rhonchi, no wheezes  CV: regular rates and rhythm, no murmur  MS: no edema  SKIN: no suspicious lesions, no rashes  NEURO: no tremors      Admission on 06/14/2021, Discharged on 06/14/2021   Component Date Value Ref Range Status     WBC 06/14/2021 6.6  4.0 - 11.0 10e9/L Final     RBC Count 06/14/2021 4.52  3.8 - 5.2 10e12/L Final     Hemoglobin 06/14/2021 14.1  11.7 - 15.7 g/dL Final     Hematocrit 06/14/2021 40.5  35.0 - 47.0 % Final     MCV 06/14/2021 90  78 - 100 fl Final     MCH 06/14/2021 31.2  26.5 - 33.0 pg Final     MCHC 06/14/2021 34.8  31.5 - 36.5 g/dL Final     RDW 06/14/2021 12.3  10.0 - 15.0 % Final     Platelet Count 06/14/2021 288  150 - 450 10e9/L Final     Diff Method 06/14/2021 Automated Method   Final     % Neutrophils 06/14/2021 61.6  % Final     % Lymphocytes 06/14/2021 30.1  % Final     % Monocytes 06/14/2021 5.5  % Final     % Eosinophils 06/14/2021 2.0  % Final     % Basophils 06/14/2021 0.6  % Final     % Immature Granulocytes 06/14/2021 0.2  % Final     Nucleated RBCs 06/14/2021 0  0 /100 Final     Absolute Neutrophil 06/14/2021 4.1  1.6 - 8.3 10e9/L Final     Absolute Lymphocytes 06/14/2021 2.0  0.8 - 5.3 10e9/L Final     Absolute Monocytes 06/14/2021 0.4  0.0 - 1.3 10e9/L Final     Absolute Eosinophils 06/14/2021 0.1  0.0 - 0.7 10e9/L Final     Absolute Basophils 06/14/2021 0.0  0.0 - 0.2 10e9/L Final     Abs Immature Granulocytes 06/14/2021 0.0  0 - 0.4 10e9/L Final     Absolute Nucleated RBC 06/14/2021 0.0   Final     CRP Inflammation 06/14/2021 <2.9  0.0 - 8.0 mg/L Final     Sodium 06/14/2021 140  133 - 144 mmol/L Final     Potassium 06/14/2021 3.8  3.4 - 5.3 mmol/L Final     Chloride 06/14/2021 108  94 - 109 mmol/L Final     Carbon Dioxide 06/14/2021 24  20 - 32 mmol/L Final     Anion Gap 06/14/2021 8  3 - 14 mmol/L Final     Glucose 06/14/2021 92  70 - 99 mg/dL Final     Urea Nitrogen 06/14/2021 10  7 - 30 mg/dL Final     Creatinine 06/14/2021 0.52  0.52 - 1.04 mg/dL Final     GFR  Estimate 06/14/2021 >90  >60 mL/min/[1.73_m2] Final    Comment: Non  GFR Calc  Starting 12/18/2018, serum creatinine based estimated GFR (eGFR) will be   calculated using the Chronic Kidney Disease Epidemiology Collaboration   (CKD-EPI) equation.       GFR Estimate If Black 06/14/2021 >90  >60 mL/min/[1.73_m2] Final    Comment:  GFR Calc  Starting 12/18/2018, serum creatinine based estimated GFR (eGFR) will be   calculated using the Chronic Kidney Disease Epidemiology Collaboration   (CKD-EPI) equation.       Calcium 06/14/2021 9.2  8.5 - 10.1 mg/dL Final     Bilirubin Total 06/14/2021 0.2  0.2 - 1.3 mg/dL Final     Albumin 06/14/2021 4.4  3.4 - 5.0 g/dL Final     Protein Total 06/14/2021 8.0  6.8 - 8.8 g/dL Final     Alkaline Phosphatase 06/14/2021 58  40 - 150 U/L Final     ALT 06/14/2021 76* 0 - 50 U/L Final     AST 06/14/2021 37  0 - 45 U/L Final     Magnesium 06/14/2021 2.3  1.6 - 2.3 mg/dL Final     Phosphorus 06/14/2021 3.8  2.5 - 4.5 mg/dL Final     Troponin I ES 06/14/2021 <0.015  0.000 - 0.045 ug/L Final    Comment: The 99th percentile for upper reference range is 0.045 ug/L.  Troponin values   in the range of 0.045 - 0.120 ug/L may be associated with risks of adverse   clinical events.       TSH 06/14/2021 0.13* 0.40 - 4.00 mU/L Final     HCG Qualitative Serum 06/14/2021 Negative  NEG^Negative Final    Comment: This test is for screening purposes.  Results should be interpreted along with   the clinical picture.  Confirmation testing is available if warranted by   ordering [id number], HCG Quantitative Pregnancy.       TSH 06/14/2021 0.13* 0.40 - 4.00 mU/L Final     T4 Free 06/14/2021 1.21  0.76 - 1.46 ng/dL Final

## 2021-06-22 NOTE — PATIENT INSTRUCTIONS
"Contact Cardiac Services  at [phone], to schedule zio patch monitor appointment, and cardiology consult if you do not receive a call from the  in the next 2 business days.    Off work for now due to recurrent symptomatic palpitations.      Patient Education     Heart Palpitations    Palpitations are the feeling that your heart is beating hard, fast, or irregular. Some describe it as \"pounding,\" \"flip-flopping in the chest,\" or \"skipped beats.\" Palpitations may occur in someone with heart disease. But they can also occur in a healthy person.  Heart-related causes:    Heart rhythm problem (arrhythmia)    Heart valve disease    Disease of the heart muscle (cardiomyopathy)    Coronary artery disease    High blood pressure  Non-heart-related causes:    Certain medicines such as asthma inhalers and decongestants    Some herbal supplements, energy drinks and pills, and weight loss pills    Illegal stimulant drugs such as cocaine, crank, methamphetamine, PCP, bath salts, and ecstasy    Caffeine, alcohol, and tobacco    Health conditions such as thyroid disease, anemia, anxiety, and panic disorder  Sometimes the cause can't be found.  Home care  Follow these home care tips:    Don't use too much caffeine, alcohol, or tobacco, or any stimulant drugs.    Tell your doctor about any prescription or over-the-counter or herbal medicines you take.  Follow-up care    Follow up with your doctor, or as advised.    Call 911  This is the fastest and safest way to get to the emergency department. The paramedics can also start treatment on the way to the hospital, if needed.  Don't wait until your symptoms are severe to call 911. These are reasons to call 911:    Chest pain    Shortness of breath    Feeling lightheaded, faint, or dizzy, or losing consciousness    Very irregular heartbeat    Rapid heartbeat that makes you uncomfortable    Slower than usual heart rate along with symptoms    Chest pain with weakness, " dizziness, heavy sweating, nausea, or vomiting    Extreme drowsiness, confusion, or weakness    Weakness of an arm or leg, or on one side of the face    Trouble with speech or vision  When to seek medical advice  Call your healthcare provider right away if you have palpitations that last longer than normal, or are different from your past palpitations.  Jimena last reviewed this educational content on 11/1/2019 2000-2021 [employer]. All rights reserved. This information is not intended as a substitute for professional medical care. Always follow your healthcare professional's instructions.

## 2021-06-22 NOTE — LETTER
June 22, 2021      Lady Saavedra  [address] [address] [place] [id number]        To Whom It May Concern:    Lady Saavedra  was seen on June 22, 2021.  Please continue to excuse her from work until July 31, 2021 due to persistent symptoms from a recent illness.        Sincerely,        Cecilio Patricia MD

## 2021-06-28 ENCOUNTER — HOSPITAL ENCOUNTER (OUTPATIENT)
Dept: CARDIOLOGY | Facility: CLINIC | Age: 36
Discharge: HOME | End: 2021-06-28
Attending: FAMILY MEDICINE | Admitting: FAMILY MEDICINE
Payer: COMMERCIAL

## 2021-06-28 ENCOUNTER — MYC MEDICAL ADVICE (OUTPATIENT)
Dept: FAMILY MEDICINE | Facility: CLINIC | Age: 36
End: 2021-06-28

## 2021-06-28 DIAGNOSIS — R00.2 PALPITATIONS: ICD-10-CM

## 2021-06-28 PROCEDURE — 93244 EXT ECG>48HR<7D REV&INTERPJ: CPT | Performed by: INTERNAL MEDICINE

## 2021-06-28 PROCEDURE — 93242 EXT ECG>48HR<7D RECORDING: CPT

## 2021-07-06 DIAGNOSIS — R74.01 ELEVATED ALT MEASUREMENT: ICD-10-CM

## 2021-07-06 DIAGNOSIS — R79.89 LOW TSH LEVEL: ICD-10-CM

## 2021-07-06 LAB
ALT SERPL W P-5'-P-CCNC: 58 U/L (ref 0–50)
T4 FREE SERPL-MCNC: 0.67 NG/DL (ref 0.76–1.46)
TSH SERPL DL<=0.005 MIU/L-ACNC: 7.81 MU/L (ref 0.4–4)

## 2021-07-06 PROCEDURE — 36415 COLL VENOUS BLD VENIPUNCTURE: CPT | Performed by: FAMILY MEDICINE

## 2021-07-06 PROCEDURE — 84439 ASSAY OF FREE THYROXINE: CPT | Performed by: FAMILY MEDICINE

## 2021-07-06 PROCEDURE — 84443 ASSAY THYROID STIM HORMONE: CPT | Performed by: FAMILY MEDICINE

## 2021-07-06 PROCEDURE — 84460 ALANINE AMINO (ALT) (SGPT): CPT | Performed by: FAMILY MEDICINE

## 2021-07-09 DIAGNOSIS — E03.9 HYPOTHYROIDISM, UNSPECIFIED TYPE: Primary | ICD-10-CM

## 2021-07-09 RX ORDER — LEVOTHYROXINE SODIUM 25 UG/1
25 TABLET ORAL DAILY
Qty: 30 TABLET | Refills: 0 | Status: SHIPPED | OUTPATIENT
Start: 2021-07-09 | End: 2021-08-12

## 2021-07-12 ENCOUNTER — TELEPHONE (OUTPATIENT)
Dept: ENDOCRINOLOGY | Facility: CLINIC | Age: 36
End: 2021-07-12

## 2021-07-12 ENCOUNTER — MYC MEDICAL ADVICE (OUTPATIENT)
Dept: FAMILY MEDICINE | Facility: CLINIC | Age: 36
End: 2021-07-12

## 2021-07-12 NOTE — TELEPHONE ENCOUNTER
[facility] Call Center    Phone Message    May a detailed message be left on voicemail: yes     Reason for Call: Appointment Intake    Referring Provider Name: Hypothyroidism, unspecified type     Diagnosis and/or Symptoms: Cecilio Patricia MD in [facility]       Pt scheduled 07/29/21 at the [facility] (preferred location). Per guideline send encounter for review if no appt in 2 weeks.       Action Taken: Other: [facility]    Travel Screening: Not Applicable

## 2021-07-21 ENCOUNTER — MYC MEDICAL ADVICE (OUTPATIENT)
Dept: FAMILY MEDICINE | Facility: CLINIC | Age: 36
End: 2021-07-21

## 2021-07-21 NOTE — TELEPHONE ENCOUNTER
Routed to provider.  Please seen MyChart message from pt asking for letter to remain off work.    Savanah Lange RN

## 2021-07-21 NOTE — LETTER
July 21, 2021      Lady Saavedra  [address] [address] [place] [phone]        To Whom It May Concern:    Lady has reported to have persistent symptoms of dizziness, racing heart and general fatigue, and is undergoing evaluation. Please excuse he from work at least through August 12, 2021.    Thank you for your consideration!    Sincerely,        Cecilio Patricia MD

## 2021-07-22 ENCOUNTER — MYC MEDICAL ADVICE (OUTPATIENT)
Dept: FAMILY MEDICINE | Facility: CLINIC | Age: 36
End: 2021-07-22

## 2021-07-22 NOTE — TELEPHONE ENCOUNTER
Left voicemail and sent mychart if patient got the letter it will be at my desk.    Sophia Rao CSS on 7/22/2021 at 8:25 AM

## 2021-07-29 ENCOUNTER — OFFICE VISIT (OUTPATIENT)
Dept: ENDOCRINOLOGY | Facility: CLINIC | Age: 36
End: 2021-07-29
Payer: COMMERCIAL

## 2021-07-29 VITALS
OXYGEN SATURATION: 97 % | WEIGHT: 179 LBS | DIASTOLIC BLOOD PRESSURE: 101 MMHG | HEART RATE: 88 BPM | SYSTOLIC BLOOD PRESSURE: 147 MMHG | BODY MASS INDEX: 32.22 KG/M2 | TEMPERATURE: 97.7 F

## 2021-07-29 DIAGNOSIS — E03.9 HYPOTHYROIDISM, UNSPECIFIED TYPE: ICD-10-CM

## 2021-07-29 DIAGNOSIS — E01.0 THYROMEGALY: Primary | ICD-10-CM

## 2021-07-29 LAB
T4 FREE SERPL-MCNC: 0.49 NG/DL (ref 0.76–1.46)
TSH SERPL DL<=0.005 MIU/L-ACNC: 70.91 MU/L (ref 0.4–4)

## 2021-07-29 PROCEDURE — 86376 MICROSOMAL ANTIBODY EACH: CPT | Performed by: INTERNAL MEDICINE

## 2021-07-29 PROCEDURE — 99204 OFFICE O/P NEW MOD 45 MIN: CPT | Performed by: INTERNAL MEDICINE

## 2021-07-29 PROCEDURE — 84439 ASSAY OF FREE THYROXINE: CPT | Performed by: INTERNAL MEDICINE

## 2021-07-29 PROCEDURE — 84443 ASSAY THYROID STIM HORMONE: CPT | Performed by: INTERNAL MEDICINE

## 2021-07-29 PROCEDURE — 36415 COLL VENOUS BLD VENIPUNCTURE: CPT | Performed by: INTERNAL MEDICINE

## 2021-07-29 NOTE — NURSING NOTE
"Initial BP (!) 147/101 (BP Location: Left arm, Patient Position: Sitting, Cuff Size: Adult Regular)   Pulse 88   Temp 97.7  F (36.5  C) (Tympanic)   SpO2 97%  Estimated body mass index is 31.21 kg/m  as calculated from the following:    Height as of 6/22/21: 1.588 m (5' 2.5\").    Weight as of 6/22/21: 78.7 kg (173 lb 6.4 oz).  Patient is accompanied to visit by: here alone  Assistive equipment used in clinic today: none    Kathryn RICCIMANGO      "

## 2021-07-29 NOTE — LETTER
"    7/29/2021         RE: Lady Saavedra  [address] [address] [place] [id number]        Dear Colleague,    Thank you for referring your patient, Lady Saavedra, to the [facility] ENDOCRINOLOGY. Please see a copy of my visit note below.    CC: Abnormal thyroid function tests     HPI:   Patient presents for evaluation of abnormal thyroid function tests.   She presented on 6/14/21 to the ED with palpitations.   Associated SOB and lightheadedness.   She had actually labs done on 6/8 which showed subclinical hyperthyroidism.   Similar results on 6/14/21.     Labs repeated 7/6/21 showing subclinical hypothyroidism.   Started on levothyroxine 25 mcg every day.   No other medication changes.     No prior thyroid disease.   No change in how she feels.   She has been unable to work due to lightheadedness.   Also gets hot easily and has frequent headaches.     She had COVID in 4/2021.   One child who is 5 y/o.     Menses have been lighter recently.   Occurring at a fairly regular interval.     New issue of diarrhea. Episodes of sudden intense hunger.   New issue of memory impairment.    Trouble staying asleep at night.     ROS: 10 point ROS neg other than the symptoms noted above in the HPI.    PMH:   Patient Active Problem List   Diagnosis     CARDIOVASCULAR SCREENING; LDL GOAL LESS THAN 160     Pneumonia due to 2019 novel coronavirus      Meds:  Current Outpatient Medications   Medication     levothyroxine (SYNTHROID/LEVOTHROID) 25 MCG tablet     No current facility-administered medications for this visit.     FHX:   Adopted.     SHX:  Non-smoker.     Exam:   Vital signs:  Temp: 97.7  F (36.5  C) Temp src: Tympanic BP: (!) 147/101 Pulse: 88     SpO2: 97 %       Weight: 81.2 kg (179 lb)  Estimated body mass index is 32.22 kg/m  as calculated from the following:    Height as of 6/22/21: 1.588 m (5' 2.5\").    Weight as of this encounter: 81.2 kg (179 lb).  Gen: In NAD.   HEENT: no proptosis or lid lag, " EOMI, thyroid diffusely enlarged w/o clear nodule.   Card: S1 S2 RRR no m/r/g. no LE edema.   Pulm: CTA b/l.   GI: NT ND +BS.   MSK: no gross deformities.   Derm: no rashes or lesions.   Neuro: no tremor, +2 DTR's.     A/P:   Abnormal thyroid function tests - Subclinical hyperthyroidism followed by subclinical hypothyroidism. Suspicious for thyroiditis. She did have COVID in 4/2021. No recent pregnancies.     I am not sure how many of her symptoms are due to these labs versus post-COVID phenomenon.   -Continue levothyroxine for now.   -Labs today. Including TPO.     Thyromegaly - Nodules versus thyroiditis? I have reviewed the natural history of thyroid nodules and thyroid cancer with the patient.   -Schedule thyroid ultrasound.   Call [facility] radiology scheduling for your procedure:  For scheduling in the [place] ([place], [facility], and [place]) call [phone] or [phone]          Abbe Cesar M.D          Again, thank you for allowing me to participate in the care of your patient.        Sincerely,        Abbe Cesar MD

## 2021-07-29 NOTE — PATIENT INSTRUCTIONS
-Continue levothyroxine for now.   -Labs today.   -Schedule thyroid ultrasound.   Call [facility] radiology scheduling for your procedure:  For scheduling in the [place] ([place], [facility], and [place]) call [phone] or [phone]

## 2021-07-29 NOTE — PROGRESS NOTES
"CC: Abnormal thyroid function tests     HPI:   Patient presents for evaluation of abnormal thyroid function tests.   She presented on 6/14/21 to the ED with palpitations.   Associated SOB and lightheadedness.   She had actually labs done on 6/8 which showed subclinical hyperthyroidism.   Similar results on 6/14/21.     Labs repeated 7/6/21 showing subclinical hypothyroidism.   Started on levothyroxine 25 mcg every day.   No other medication changes.     No prior thyroid disease.   No change in how she feels.   She has been unable to work due to lightheadedness.   Also gets hot easily and has frequent headaches.     She had COVID in 4/2021.   One child who is 7 y/o.     Menses have been lighter recently.   Occurring at a fairly regular interval.     New issue of diarrhea. Episodes of sudden intense hunger.   New issue of memory impairment.    Trouble staying asleep at night.     ROS: 10 point ROS neg other than the symptoms noted above in the HPI.    PMH:   Patient Active Problem List   Diagnosis     CARDIOVASCULAR SCREENING; LDL GOAL LESS THAN 160     Pneumonia due to 2019 novel coronavirus      Meds:  Current Outpatient Medications   Medication     levothyroxine (SYNTHROID/LEVOTHROID) 25 MCG tablet     No current facility-administered medications for this visit.     FHX:   Adopted.     SHX:  Non-smoker.     Exam:   Vital signs:  Temp: 97.7  F (36.5  C) Temp src: Tympanic BP: (!) 147/101 Pulse: 88     SpO2: 97 %       Weight: 81.2 kg (179 lb)  Estimated body mass index is 32.22 kg/m  as calculated from the following:    Height as of 6/22/21: 1.588 m (5' 2.5\").    Weight as of this encounter: 81.2 kg (179 lb).  Gen: In NAD.   HEENT: no proptosis or lid lag, EOMI, thyroid diffusely enlarged w/o clear nodule.   Card: S1 S2 RRR no m/r/g. no LE edema.   Pulm: CTA b/l.   GI: NT ND +BS.   MSK: no gross deformities.   Derm: no rashes or lesions.   Neuro: no tremor, +2 DTR's.     A/P:   Abnormal thyroid function tests - " Subclinical hyperthyroidism followed by subclinical hypothyroidism. Suspicious for thyroiditis. She did have COVID in 4/2021. No recent pregnancies.     I am not sure how many of her symptoms are due to these labs versus post-COVID phenomenon.   -Continue levothyroxine for now.   -Labs today. Including TPO.     Thyromegaly - Nodules versus thyroiditis? I have reviewed the natural history of thyroid nodules and thyroid cancer with the patient.   -Schedule thyroid ultrasound.   Call [facility] radiology scheduling for your procedure:  For scheduling in the [place] ([place], [facility], and [place]) call [phone] or [phone]          Abbe Cesar M.D

## 2021-07-30 LAB — THYROPEROXIDASE AB SERPL-ACNC: >5000 IU/ML

## 2021-07-31 ENCOUNTER — HOSPITAL ENCOUNTER (OUTPATIENT)
Dept: ULTRASOUND IMAGING | Facility: CLINIC | Age: 36
Discharge: HOME | End: 2021-07-31
Attending: INTERNAL MEDICINE | Admitting: INTERNAL MEDICINE
Payer: COMMERCIAL

## 2021-07-31 DIAGNOSIS — E01.0 THYROMEGALY: ICD-10-CM

## 2021-07-31 PROCEDURE — 76536 US EXAM OF HEAD AND NECK: CPT

## 2021-08-01 DIAGNOSIS — E03.9 HYPOTHYROIDISM, UNSPECIFIED TYPE: ICD-10-CM

## 2021-08-03 RX ORDER — LEVOTHYROXINE SODIUM 25 UG/1
TABLET ORAL
Qty: 30 TABLET | Refills: 0 | OUTPATIENT
Start: 2021-08-03

## 2021-08-06 ENCOUNTER — MYC MEDICAL ADVICE (OUTPATIENT)
Dept: ENDOCRINOLOGY | Facility: CLINIC | Age: 36
End: 2021-08-06

## 2021-08-06 NOTE — LETTER
[facility] ENDOCRINOLOGY  [address] [facility] [address] [address]  Phone: [phone]  Fax: [phone]    August 11, 2021        Lady Saavedra  [address] [place] [phone]          To whom it may concern:    RE: Lady Saavedra is being followed at the [facility] Endocrinology clinic and may return to work on 9/11/21 with no restrictions    Please contact me for questions or concerns.      Sincerely,    DANITZA Roman MD

## 2021-08-06 NOTE — LETTER
Lady Saavedra  [address] [address] [place] [id number]      September 10, 2021      To whom it may concern,    Please allow the above listed person the days of 9/11/21-9/18/21 off in order to complete evaluation and treatment for ongoing medical issues.     Sincerely,    Abbe Cesar MD

## 2021-08-11 NOTE — TELEPHONE ENCOUNTER
Per routing comment:    Sure. If she has not improved by 9/4, she needs to see primary care that week before expires on 9/11.     Abbe Cesar MD on 8/10/2021 at 4:43 PM

## 2021-08-12 ENCOUNTER — LAB (OUTPATIENT)
Dept: LAB | Facility: CLINIC | Age: 36
End: 2021-08-12

## 2021-08-12 ENCOUNTER — OFFICE VISIT (OUTPATIENT)
Dept: CARDIOLOGY | Facility: CLINIC | Age: 36
End: 2021-08-12
Attending: FAMILY MEDICINE
Payer: COMMERCIAL

## 2021-08-12 ENCOUNTER — MYC MEDICAL ADVICE (OUTPATIENT)
Dept: FAMILY MEDICINE | Facility: CLINIC | Age: 36
End: 2021-08-12

## 2021-08-12 VITALS
HEART RATE: 94 BPM | BODY MASS INDEX: 31.86 KG/M2 | DIASTOLIC BLOOD PRESSURE: 114 MMHG | WEIGHT: 177 LBS | SYSTOLIC BLOOD PRESSURE: 156 MMHG

## 2021-08-12 DIAGNOSIS — R00.2 PALPITATIONS: ICD-10-CM

## 2021-08-12 DIAGNOSIS — Z86.16 HISTORY OF 2019 NOVEL CORONAVIRUS DISEASE (COVID-19): ICD-10-CM

## 2021-08-12 LAB
CRP SERPL-MCNC: <2.9 MG/L (ref 0–8)
D DIMER PPP FEU-MCNC: <0.27 UG/ML FEU (ref 0–0.5)
ERYTHROCYTE [SEDIMENTATION RATE] IN BLOOD BY WESTERGREN METHOD: 11 MM/HR (ref 0–20)

## 2021-08-12 PROCEDURE — 86140 C-REACTIVE PROTEIN: CPT | Performed by: INTERNAL MEDICINE

## 2021-08-12 PROCEDURE — 36415 COLL VENOUS BLD VENIPUNCTURE: CPT | Performed by: INTERNAL MEDICINE

## 2021-08-12 PROCEDURE — 85652 RBC SED RATE AUTOMATED: CPT | Performed by: INTERNAL MEDICINE

## 2021-08-12 PROCEDURE — 85379 FIBRIN DEGRADATION QUANT: CPT | Performed by: INTERNAL MEDICINE

## 2021-08-12 PROCEDURE — 99204 OFFICE O/P NEW MOD 45 MIN: CPT | Performed by: INTERNAL MEDICINE

## 2021-08-12 RX ORDER — PROPRANOLOL HYDROCHLORIDE 20 MG/1
20 TABLET ORAL 3 TIMES DAILY PRN
Qty: 90 TABLET | Refills: 3 | Status: SHIPPED | OUTPATIENT
Start: 2021-08-12 | End: 2021-09-29

## 2021-08-12 NOTE — PATIENT INSTRUCTIONS
1. Echocardiogram   2. Labs today (CRP, ESR, D-Dimer). If d-dimer is high we will obtain a CT scan.  3. Start propanolol 20 mg once-three times daily as needed (can increase dose to 40 mg if tolerated)  4. 3 month follow up Dr. Birmingham. If still feeling bad we will obtain a treadmill stress test.

## 2021-08-12 NOTE — LETTER
8/12/2021    Physician No Ref-Primary  No address on file    RE: Lady Thompsonleta       Dear Colleague,    I had the pleasure of seeing Lady COBIAN Quentin in the [facility] Heart Care.        HPI:     This is a 36 year old female with no PMH here for evaluation. She had COVID in April and here to discuss her symptoms. Describes lightheadedness, and heart rates shoot up to 160s with exertion. No chest pain or shortness of breath. Was hospitalized for about a week requiring oxygen. CT negative for PE, bilateral opacities present. Also diagnosed with thyroiditis likely triggerered by COVID as well.     ROS + diarrhea, abdominal cramping, no SOB, LE edema. No syncope.     ASSESSMENT/PLAN:    1. Long-haul COVID: discussed risks for postural orthostatic tachycardia syndrome (POTS), inappropriate sinus tachycardia, increased risk for VTE,  Amari/pericarditis for cardiovascular complications  -Echocardiogram  -Ziopatch reviewed  -inflammatory markers today  -DDimer: if high will repeat CTPE  -Propanolol 20-40 mg tid prn   -follow up 3 months with me    Lalita Birmingham MD [facility]      PAST MEDICAL HISTORY  Past Medical History:   Diagnosis Date     Abnormal Pap smear of cervix 2009, 2012, 2014    see problem list     Asthma 6/22/2021     Cervical high risk HPV (human papillomavirus) test positive 2009, 2012    see problem list     Chickenpox      Depression      Depressive disorder      Gestational HTN 1/1/2015     History of colposcopy with cervical biopsy 3/2012    ANN 1     Seasonal allergies     Was on clariten uses occasional sudafed     Urinary tract bacterial infections     as teenager       CURRENT MEDICATIONS  Current Outpatient Medications   Medication Sig Dispense Refill     levothyroxine (SYNTHROID/LEVOTHROID) 75 MCG tablet Take 1 tablet (75 mcg) by mouth daily 30 tablet 11       PAST SURGICAL HISTORY:  Past Surgical History:   Procedure Laterality Date      BIOPSY       COSMETIC SURGERY       HC ENLARGE BREAST WITH IMPLANT       MOUTH SURGERY  age 18    wisdom teeth       ALLERGIES   No Known Allergies    FAMILY HISTORY  Family History   Adopted: Yes   Problem Relation Age of Onset     Unknown/Adopted Mother      Unknown/Adopted Father          SOCIAL HISTORY  Social History     Socioeconomic History     Marital status: Single     Spouse name: Not on file     Number of children: 0     Years of education: Not on file     Highest education level: Not on file   Occupational History     Not on file   Tobacco Use     Smoking status: Never Smoker     Smokeless tobacco: Never Used   Substance and Sexual Activity     Alcohol use: No     Drug use: No     Sexual activity: Yes     Partners: Male     Birth control/protection: None   Other Topics Concern     Parent/sibling w/ CABG, MI or angioplasty before 65F 55M? No   Social History Narrative     Not on file     Social Determinants of Health     Financial Resource Strain:      Difficulty of Paying Living Expenses:    Food Insecurity:      Worried About Running Out of Food in the Last Year:      Ran Out of Food in the Last Year:    Transportation Needs:      Lack of Transportation (Medical):      Lack of Transportation (Non-Medical):    Physical Activity:      Days of Exercise per Week:      Minutes of Exercise per Session:    Stress:      Feeling of Stress :    Social Connections:      Frequency of Communication with Friends and Family:      Frequency of Social Gatherings with Friends and Family:      Attends [employer] Services:      Active Member of Clubs or Organizations:      Attends Club or Organization Meetings:      Marital Status:    Intimate Partner Violence:      Fear of Current or Ex-Partner:      Emotionally Abused:      Physically Abused:      Sexually Abused:        ROS:   Constitutional: No fever, chills, or sweats. No weight gain/loss   ENT: No visual disturbance, ear ache, epistaxis, sore  throat  Allergies/Immunologic: Negative  Respiratory: No cough, hemoptysia  Cardiovascular: As per HPI  GI: No nausea, vomiting, hematemesis, melena, or hematochezia  : No urinary frequency, dysuria, or hematuria  Integument: Negative  Psychiatric: Negative  Neuro: Negative  Endocrinology: Negative   Musculoskeletal: Negative  Vascular: No walking impairment, claudication, ischemic rest pain or nonhealing wounds    EXAM:  BP (!) 156/114   Pulse 94   Wt 80.3 kg (177 lb)   BMI 31.86 kg/m    In general, the patient is a pleasant female in no apparent distress.    HEENT: NC/AT.  PERRLA.  EOMI.  Sclerae white, not injected.   Neck: No adenopathy.  No thyromegaly. Carotids +2/2 bilaterally without bruits.  No jugular venous distension.   Heart: RRR. Normal S1, S2 splits physiologically. No murmur, rub, click, or gallop.   Lungs: CTA.  No ronchi, wheezes, rales.   Abdomen: Soft, nontender, nondistended.   Extremities: No clubbing, cyanosis, or edema.   Vascular: No bruits are noted.    Labs:  LIPID RESULTS:  Lab Results   Component Value Date    CHOL 173 03/22/2011    HDL 43 (L) 03/22/2011     03/22/2011    TRIG 107 03/22/2011    CHOLHDLRATIO 4.0 03/22/2011       LIVER ENZYME RESULTS:  Lab Results   Component Value Date    AST 37 06/14/2021    ALT 58 (H) 07/06/2021       CBC RESULTS:  Lab Results   Component Value Date    WBC 6.6 06/14/2021    RBC 4.52 06/14/2021    HGB 14.1 06/14/2021    HCT 40.5 06/14/2021    MCV 90 06/14/2021    MCH 31.2 06/14/2021    MCHC 34.8 06/14/2021    RDW 12.3 06/14/2021     06/14/2021       BMP RESULTS:  Lab Results   Component Value Date     06/14/2021    POTASSIUM 3.8 06/14/2021    CHLORIDE 108 06/14/2021    CO2 24 06/14/2021    ANIONGAP 8 06/14/2021    GLC 92 06/14/2021    BUN 10 06/14/2021    CR 0.52 06/14/2021    GFRESTIMATED >90 06/14/2021    GFRESTBLACK >90 06/14/2021    DIANA 9.2 06/14/2021        A1C RESULTS:  No results found for: A1C          Thank you for  allowing me to participate in the care of your patient.      Sincerely,     Lalita Birmingham MD     [facility]  cc:   Cecilio Patricia MD  [address] [place] [address]

## 2021-08-12 NOTE — PROGRESS NOTES
HPI:     This is a 36 year old female with no PMH here for evaluation. She had COVID in April and here to discuss her symptoms. Describes lightheadedness, and heart rates shoot up to 160s with exertion. No chest pain or shortness of breath. Was hospitalized for about a week requiring oxygen. CT negative for PE, bilateral opacities present. Also diagnosed with thyroiditis likely triggerered by COVID as well.     ROS + diarrhea, abdominal cramping, no SOB, LE edema. No syncope.     ASSESSMENT/PLAN:    1. Long-haul COVID: discussed risks for postural orthostatic tachycardia syndrome (POTS), inappropriate sinus tachycardia, increased risk for VTE,  Amari/pericarditis for cardiovascular complications  -Echocardiogram  -Ziopatch reviewed  -inflammatory markers today  -DDimer: if high will repeat CTPE  -Propanolol 20-40 mg tid prn   -follow up 3 months with me    Lalita Birmingham MD [facility]      PAST MEDICAL HISTORY  Past Medical History:   Diagnosis Date     Abnormal Pap smear of cervix 2009, 2012, 2014    see problem list     Asthma 6/22/2021     Cervical high risk HPV (human papillomavirus) test positive 2009, 2012    see problem list     Chickenpox      Depression      Depressive disorder      Gestational HTN 1/1/2015     History of colposcopy with cervical biopsy 3/2012    ANN 1     Seasonal allergies     Was on clariten uses occasional sudafed     Urinary tract bacterial infections     as teenager       CURRENT MEDICATIONS  Current Outpatient Medications   Medication Sig Dispense Refill     levothyroxine (SYNTHROID/LEVOTHROID) 75 MCG tablet Take 1 tablet (75 mcg) by mouth daily 30 tablet 11       PAST SURGICAL HISTORY:  Past Surgical History:   Procedure Laterality Date     BIOPSY       COSMETIC SURGERY       HC ENLARGE BREAST WITH IMPLANT       MOUTH SURGERY  age 18    wisdom teeth       ALLERGIES   No Known Allergies    FAMILY HISTORY  Family History   Adopted: Yes   Problem Relation Age of Onset      Unknown/Adopted Mother      Unknown/Adopted Father          SOCIAL HISTORY  Social History     Socioeconomic History     Marital status: Single     Spouse name: Not on file     Number of children: 0     Years of education: Not on file     Highest education level: Not on file   Occupational History     Not on file   Tobacco Use     Smoking status: Never Smoker     Smokeless tobacco: Never Used   Substance and Sexual Activity     Alcohol use: No     Drug use: No     Sexual activity: Yes     Partners: Male     Birth control/protection: None   Other Topics Concern     Parent/sibling w/ CABG, MI or angioplasty before 65F 55M? No   Social History Narrative     Not on file     Social Determinants of Health     Financial Resource Strain:      Difficulty of Paying Living Expenses:    Food Insecurity:      Worried About Running Out of Food in the Last Year:      Ran Out of Food in the Last Year:    Transportation Needs:      Lack of Transportation (Medical):      Lack of Transportation (Non-Medical):    Physical Activity:      Days of Exercise per Week:      Minutes of Exercise per Session:    Stress:      Feeling of Stress :    Social Connections:      Frequency of Communication with Friends and Family:      Frequency of Social Gatherings with Friends and Family:      Attends Manda[employer] Services:      Active Member of Clubs or Organizations:      Attends Club or Organization Meetings:      Marital Status:    Intimate Partner Violence:      Fear of Current or Ex-Partner:      Emotionally Abused:      Physically Abused:      Sexually Abused:        ROS:   Constitutional: No fever, chills, or sweats. No weight gain/loss   ENT: No visual disturbance, ear ache, epistaxis, sore throat  Allergies/Immunologic: Negative  Respiratory: No cough, hemoptysia  Cardiovascular: As per HPI  GI: No nausea, vomiting, hematemesis, melena, or hematochezia  : No urinary frequency, dysuria, or hematuria  Integument: Negative  Psychiatric:  Negative  Neuro: Negative  Endocrinology: Negative   Musculoskeletal: Negative  Vascular: No walking impairment, claudication, ischemic rest pain or nonhealing wounds    EXAM:  BP (!) 156/114   Pulse 94   Wt 80.3 kg (177 lb)   BMI 31.86 kg/m    In general, the patient is a pleasant female in no apparent distress.    HEENT: NC/AT.  PERRLA.  EOMI.  Sclerae white, not injected.   Neck: No adenopathy.  No thyromegaly. Carotids +2/2 bilaterally without bruits.  No jugular venous distension.   Heart: RRR. Normal S1, S2 splits physiologically. No murmur, rub, click, or gallop.   Lungs: CTA.  No ronchi, wheezes, rales.   Abdomen: Soft, nontender, nondistended.   Extremities: No clubbing, cyanosis, or edema.   Vascular: No bruits are noted.    Labs:  LIPID RESULTS:  Lab Results   Component Value Date    CHOL 173 03/22/2011    HDL 43 (L) 03/22/2011     03/22/2011    TRIG 107 03/22/2011    CHOLHDLRATIO 4.0 03/22/2011       LIVER ENZYME RESULTS:  Lab Results   Component Value Date    AST 37 06/14/2021    ALT 58 (H) 07/06/2021       CBC RESULTS:  Lab Results   Component Value Date    WBC 6.6 06/14/2021    RBC 4.52 06/14/2021    HGB 14.1 06/14/2021    HCT 40.5 06/14/2021    MCV 90 06/14/2021    MCH 31.2 06/14/2021    MCHC 34.8 06/14/2021    RDW 12.3 06/14/2021     06/14/2021       BMP RESULTS:  Lab Results   Component Value Date     06/14/2021    POTASSIUM 3.8 06/14/2021    CHLORIDE 108 06/14/2021    CO2 24 06/14/2021    ANIONGAP 8 06/14/2021    GLC 92 06/14/2021    BUN 10 06/14/2021    CR 0.52 06/14/2021    GFRESTIMATED >90 06/14/2021    GFRESTBLACK >90 06/14/2021    DIANA 9.2 06/14/2021        A1C RESULTS:  No results found for: A1C

## 2021-08-31 ENCOUNTER — HOSPITAL ENCOUNTER (OUTPATIENT)
Dept: CARDIOLOGY | Facility: CLINIC | Age: 36
Discharge: HOME | End: 2021-08-31
Attending: INTERNAL MEDICINE | Admitting: INTERNAL MEDICINE
Payer: COMMERCIAL

## 2021-08-31 ENCOUNTER — LAB (OUTPATIENT)
Dept: LAB | Facility: CLINIC | Age: 36
End: 2021-08-31
Payer: COMMERCIAL

## 2021-08-31 ENCOUNTER — OFFICE VISIT (OUTPATIENT)
Dept: FAMILY MEDICINE | Facility: CLINIC | Age: 36
End: 2021-08-31
Payer: COMMERCIAL

## 2021-08-31 VITALS
OXYGEN SATURATION: 99 % | HEART RATE: 71 BPM | DIASTOLIC BLOOD PRESSURE: 82 MMHG | RESPIRATION RATE: 14 BRPM | TEMPERATURE: 97.8 F | SYSTOLIC BLOOD PRESSURE: 126 MMHG | BODY MASS INDEX: 30.41 KG/M2 | WEIGHT: 171.6 LBS | HEIGHT: 63 IN

## 2021-08-31 DIAGNOSIS — E03.9 HYPOTHYROIDISM, UNSPECIFIED TYPE: ICD-10-CM

## 2021-08-31 DIAGNOSIS — K52.9 CHRONIC DIARRHEA OF UNKNOWN ORIGIN: Primary | ICD-10-CM

## 2021-08-31 DIAGNOSIS — R00.2 PALPITATIONS: ICD-10-CM

## 2021-08-31 DIAGNOSIS — R25.3 MUSCLE TWITCH: ICD-10-CM

## 2021-08-31 DIAGNOSIS — Z86.16 HISTORY OF 2019 NOVEL CORONAVIRUS DISEASE (COVID-19): ICD-10-CM

## 2021-08-31 LAB
HOLD SPECIMEN: NORMAL
LVEF ECHO: NORMAL
T4 FREE SERPL-MCNC: 1.08 NG/DL (ref 0.76–1.46)
TSH SERPL DL<=0.005 MIU/L-ACNC: 8.22 MU/L (ref 0.4–4)

## 2021-08-31 PROCEDURE — 84443 ASSAY THYROID STIM HORMONE: CPT

## 2021-08-31 PROCEDURE — 93306 TTE W/DOPPLER COMPLETE: CPT | Mod: 26 | Performed by: INTERNAL MEDICINE

## 2021-08-31 PROCEDURE — 86376 MICROSOMAL ANTIBODY EACH: CPT

## 2021-08-31 PROCEDURE — 99214 OFFICE O/P EST MOD 30 MIN: CPT | Performed by: FAMILY MEDICINE

## 2021-08-31 PROCEDURE — 36415 COLL VENOUS BLD VENIPUNCTURE: CPT

## 2021-08-31 PROCEDURE — 84439 ASSAY OF FREE THYROXINE: CPT

## 2021-08-31 PROCEDURE — 93306 TTE W/DOPPLER COMPLETE: CPT

## 2021-08-31 ASSESSMENT — MIFFLIN-ST. JEOR: SCORE: 1439.56

## 2021-08-31 NOTE — PATIENT INSTRUCTIONS
Schedule gastroenterology consult for the diarrhea.    You preferred to observe if the muscle twitching resolvs.  Likely benign condition and self-limited.   If you do have jerky involuntary movements, see doctor again.  If with convulsions, you need to be evaluated in the ER.    Eat a good variety of healthy food.  Eat food rich in calcium.  Drink plenty of water a day.  Reduce caffeine if you consume beverages containing it.        Patient Education     Diarrhea with Uncertain Cause (Adult)    Diarrhea is when stools are loose and watery. This can be caused by:    Viral infections    Bacterial infections    Food poisoning    Parasites    Irritable bowel syndrome (IBS)    Inflammatory bowel diseases such as ulcerative colitis, Crohn's disease, and celiac disease    Food intolerance, such as to lactose, the sugar found in milk and milk products    Reaction to medicines like antibiotics, laxatives, cancer drugs, and antacids  Along with diarrhea, you may also have:    Abdominal pain and cramping    Nausea and vomiting    Loss of bowel control    Fever and chills    Bloody stools  In some cases, antibiotics may help to treat diarrhea. You may have a stool sample test. This is done to see what is causing your diarrhea, and if antibiotics will help treat it. The results of a stool sample test may take up to 2 days. The healthcare provider may not give you antibiotics until he or she has the stool test results.  Diarrhea can cause dehydration. This is the loss of too much water and other fluids from the body. When this occurs, body fluid must be replaced. This can be done with oral rehydration solutions. Oral rehydration solutions are available at drugstores and grocery stores without a prescription. Sports drinks are not the best choice if you are very dehydrated. They have too much sugar and not enough electrolytes.  Home care  Follow all instructions given by your healthcare provider. Rest at home for the next 24  hours, or until you feel better. Avoid caffeine, tobacco, and alcohol. These can make diarrhea, cramping, and pain worse.  If taking medicines:    Over-the-counter nausea and diarrhea medicines are generally OK unless you experience fever or blood stool. Check with your doctor first in those circumstances.    You may use acetaminophen or NSAID medicines like ibuprofen or naproxen to reduce pain and fever. Don t use these if you have chronic liver or kidney disease, or ever had a stomach ulcer or gastrointestinal bleeding. Don't use NSAID medicines if you are already taking one for another condition (like arthritis) or are on daily aspirin therapy (such as for heart disease or after a stroke). Talk with your healthcare provider first.    If antibiotics were prescribed, be sure you take them until they are finished. Don t stop taking them even when you feel better. Antibiotics must be taken as a full course.  To prevent the spread of illness:    Remember that washing with soap and water and using alcohol-based  is the best way to prevent the spread of infection. Dry your hands with a single use towel (like a paper towel).    Clean the toilet after each use.    Wash your hands before eating.    Wash your hands before and after preparing food. Keep in mind that people with diarrhea or vomiting should not prepare food for others.    Wash your hands after using cutting boards, countertops, and knives that have been in contact with raw foods.    Wash and then peel fruits and vegetables.    Keep uncooked meats away from cooked and ready-to-eat foods.    Use a food thermometer when cooking. Cook poultry to at least 165 F (74 C). Cook ground meat (beef, veal, pork, lamb) to at least 160 F (71 C). Cook fresh beef, veal, lamb, and pork to at least 145 F (63 C).    Don t eat raw or undercooked eggs (poached or wesly side up), poultry, meat, or unpasteurized milk and juices.  Food and drinks  The main goal while treating  vomiting or diarrhea is to prevent dehydration. This is done by taking small amounts of liquids often.    Keep in mind that liquids are more important than food right now.    Drink only small amounts of liquids at a time.    Don t force yourself to eat, especially if you are having cramping, vomiting, or diarrhea. Don t eat large amounts at a time, even if you are hungry.    If you eat, avoid fatty, greasy, spicy, or fried foods.    Don t eat dairy foods or drink milk if you have diarrhea. These can make diarrhea worse.  During the first 24 hours you can try:    Oral rehydration solutions.  Sports drinks may be used if you are not too dehydrated and are otherwise healthy.    Soft drinks without caffeine    Ginger ale    Water (plain or flavored)    Decaf tea or coffee    Clear broth, consommé, or bouillon    Gelatin, popsicles, or frozen fruit juice bars  The second 24 hours, if you are feeling better, you can add:    Hot cereal, plain toast, bread, rolls, or crackers    Plain noodles, rice, mashed potatoes, chicken noodle soup, or rice soup    Unsweetened canned fruit (no pineapple)    Bananas  As you recover:    Limit fat intake to less than 15 grams per day. Don t eat margarine, butter, oils, mayonnaise, sauces, gravies, fried foods, peanut butter, meat, poultry, or fish.    Limit fiber. Don t eat raw or cooked vegetables, fresh fruits except bananas, or bran cereals.    Limit caffeine and chocolate.    Limit dairy.    Don t use spices or seasonings except salt.    Go back to your normal diet over time, as you feel better and your symptoms improve.    If the symptoms come back, go back to a simple diet or clear liquids.  Follow-up care  Follow up with your healthcare provider, or as advised. If a stool sample was taken or cultures were done, call the healthcare provider for the results as instructed.  Call 911  Call 911 if you have any of these symptoms:    Trouble breathing    Confusion    Extreme drowsiness or  trouble walking    Loss of consciousness    Rapid heart rate    Chest pain    Stiff neck    Seizure  When to seek medical advice  Call your healthcare provider right away if any of these occur:    Abdominal pain that gets worse    Constant lower right abdominal pain    Continued vomiting and inability to keep liquids down    Diarrhea more than 5 times a day    Blood in vomit or stool    Dark urine or no urine for 8 hours, dry mouth and tongue, tiredness, weakness, or dizziness    Drowsiness    New rash    You don t get better in 2 to 3 days    Fever of 100.4 F (38 C) or higher, or as directed by your healthcare provider  Jimena last reviewed this educational content on 6/1/2018 2000-2021 [employer]. All rights reserved. This information is not intended as a substitute for professional medical care. Always follow your healthcare professional's instructions.

## 2021-08-31 NOTE — PROGRESS NOTES
Assessment & Plan     Chronic diarrhea of unknown origin  Patient  confrms she has had this for at least several years.  Just worse in the last few weeks.  Doubt infection if it's this long.  DDx: IBD, IBS, celiac disease, other occult colitis.  Agree with patient's desire to consult with GI.  Will defer ordering colonoscopy to them.  Return precautions discussed and given to patient.   - Adult Gastro Ref - Consult Only    Muscle twitch  No red flag symptoms to suspect focal seizures. The character of the symptom appears more benign.  Possible stres related? Patient rececntly has been dealing with Covid long hauler syndrome.  Advised adequate hydration, healthy diet, calcium intake.  Considered EEG - patient defers.  Return precautions discussed and given to patient.       Patient Instructions   Schedule gastroenterology consult for the diarrhea.    You preferred to observe if the muscle twitching resolvs.  Likely benign condition and self-limited.   If you do have jerky involuntary movements, see doctor again.  If with convulsions, you need to be evaluated in the ER.    Eat a good variety of healthy food.  Eat food rich in calcium.  Drink plenty of water a day.  Reduce caffeine if you consume beverages containing it.        Patient Education     Diarrhea with Uncertain Cause (Adult)    Diarrhea is when stools are loose and watery. This can be caused by:    Viral infections    Bacterial infections    Food poisoning    Parasites    Irritable bowel syndrome (IBS)    Inflammatory bowel diseases such as ulcerative colitis, Crohn's disease, and celiac disease    Food intolerance, such as to lactose, the sugar found in milk and milk products    Reaction to medicines like antibiotics, laxatives, cancer drugs, and antacids  Along with diarrhea, you may also have:    Abdominal pain and cramping    Nausea and vomiting    Loss of bowel control    Fever and chills    Bloody stools  In some cases, antibiotics may help to  treat diarrhea. You may have a stool sample test. This is done to see what is causing your diarrhea, and if antibiotics will help treat it. The results of a stool sample test may take up to 2 days. The healthcare provider may not give you antibiotics until he or she has the stool test results.  Diarrhea can cause dehydration. This is the loss of too much water and other fluids from the body. When this occurs, body fluid must be replaced. This can be done with oral rehydration solutions. Oral rehydration solutions are available at drugstores and grocery stores without a prescription. Sports drinks are not the best choice if you are very dehydrated. They have too much sugar and not enough electrolytes.  Home care  Follow all instructions given by your healthcare provider. Rest at home for the next 24 hours, or until you feel better. Avoid caffeine, tobacco, and alcohol. These can make diarrhea, cramping, and pain worse.  If taking medicines:    Over-the-counter nausea and diarrhea medicines are generally OK unless you experience fever or blood stool. Check with your doctor first in those circumstances.    You may use acetaminophen or NSAID medicines like ibuprofen or naproxen to reduce pain and fever. Don t use these if you have chronic liver or kidney disease, or ever had a stomach ulcer or gastrointestinal bleeding. Don't use NSAID medicines if you are already taking one for another condition (like arthritis) or are on daily aspirin therapy (such as for heart disease or after a stroke). Talk with your healthcare provider first.    If antibiotics were prescribed, be sure you take them until they are finished. Don t stop taking them even when you feel better. Antibiotics must be taken as a full course.  To prevent the spread of illness:    Remember that washing with soap and water and using alcohol-based  is the best way to prevent the spread of infection. Dry your hands with a single use towel (like a paper  towel).    Clean the toilet after each use.    Wash your hands before eating.    Wash your hands before and after preparing food. Keep in mind that people with diarrhea or vomiting should not prepare food for others.    Wash your hands after using cutting boards, countertops, and knives that have been in contact with raw foods.    Wash and then peel fruits and vegetables.    Keep uncooked meats away from cooked and ready-to-eat foods.    Use a food thermometer when cooking. Cook poultry to at least 165 F (74 C). Cook ground meat (beef, veal, pork, lamb) to at least 160 F (71 C). Cook fresh beef, veal, lamb, and pork to at least 145 F (63 C).    Don t eat raw or undercooked eggs (poached or wesly side up), poultry, meat, or unpasteurized milk and juices.  Food and drinks  The main goal while treating vomiting or diarrhea is to prevent dehydration. This is done by taking small amounts of liquids often.    Keep in mind that liquids are more important than food right now.    Drink only small amounts of liquids at a time.    Don t force yourself to eat, especially if you are having cramping, vomiting, or diarrhea. Don t eat large amounts at a time, even if you are hungry.    If you eat, avoid fatty, greasy, spicy, or fried foods.    Don t eat dairy foods or drink milk if you have diarrhea. These can make diarrhea worse.  During the first 24 hours you can try:    Oral rehydration solutions.  Sports drinks may be used if you are not too dehydrated and are otherwise healthy.    Soft drinks without caffeine    Ginger ale    Water (plain or flavored)    Decaf tea or coffee    Clear broth, consommé, or bouillon    Gelatin, popsicles, or frozen fruit juice bars  The second 24 hours, if you are feeling better, you can add:    Hot cereal, plain toast, bread, rolls, or crackers    Plain noodles, rice, mashed potatoes, chicken noodle soup, or rice soup    Unsweetened canned fruit (no pineapple)    Bananas  As you recover:    Limit  fat intake to less than 15 grams per day. Don t eat margarine, butter, oils, mayonnaise, sauces, gravies, fried foods, peanut butter, meat, poultry, or fish.    Limit fiber. Don t eat raw or cooked vegetables, fresh fruits except bananas, or bran cereals.    Limit caffeine and chocolate.    Limit dairy.    Don t use spices or seasonings except salt.    Go back to your normal diet over time, as you feel better and your symptoms improve.    If the symptoms come back, go back to a simple diet or clear liquids.  Follow-up care  Follow up with your healthcare provider, or as advised. If a stool sample was taken or cultures were done, call the healthcare provider for the results as instructed.  Call 911  Call 911 if you have any of these symptoms:    Trouble breathing    Confusion    Extreme drowsiness or trouble walking    Loss of consciousness    Rapid heart rate    Chest pain    Stiff neck    Seizure  When to seek medical advice  Call your healthcare provider right away if any of these occur:    Abdominal pain that gets worse    Constant lower right abdominal pain    Continued vomiting and inability to keep liquids down    Diarrhea more than 5 times a day    Blood in vomit or stool    Dark urine or no urine for 8 hours, dry mouth and tongue, tiredness, weakness, or dizziness    Drowsiness    New rash    You don t get better in 2 to 3 days    Fever of 100.4 F (38 C) or higher, or as directed by your healthcare provider  Jimena last reviewed this educational content on 6/1/2018 2000-2021 [employer]. All rights reserved. This information is not intended as a substitute for professional medical care. Always follow your healthcare professional's instructions.               No follow-ups on file.    Cecilio Patricia MD  [facility]    Jayant Elena is a 36 year old who presents for the following health issues:  Chief Complaint   Patient presents with     Diarrhea     Pt being  "seen for diarrhea, would like referral to gi.     Eye Problem     Pt also having twitching in left eye, sometimes left side of body.         HPI     Diarrhea  Onset/Duration: over the past month worse; but has been present for few years.  Description:       Consistency of stool: watery, runny, loose, formed, green and explosive       Blood in stool: no       Number of loose stools past 24 hours: none  Progression of Symptoms: same and intermittent  Accompanying signs and symptoms:       Fever: no       Nausea/Vomiting: YES- nausea       Abdominal pain: YES       Weight loss: YES       Episodes of constipation: YES  History   Ill contacts: no  Recent use of antibiotics: no  Recent travels: no  Recent medication-new or changes(Rx or OTC): YES- levothyroxine dose changed 1 month ago  Precipitating or alleviating factors: None  Therapies tried and outcome: none    Eye(s) Problem  Onset/Duration: past month  Description:   Location: left eye, on occasion will be in arm, leg on left side, sometimes left abdominal wall.  Pain: no  Redness: no  Seem to have started since increasing levothyroxine dose.  Described as small muscle bundle twitching/vibrating - not myoclonic jerks.  Accompanying Signs & Symptoms:  Discharge/mattering: no  Swelling: no  Visual changes: no  Fever: no  Nasal Congestion: no  Bothered by bright lights: no  History:  Trauma: no  Foreign body exposure: no  Wearing contacts: no  Precipitating or alleviating factors: None  Therapies tried and outcome: none      Review of Systems   Constitutional, HEENT, cardiovascular, pulmonary, GI, , musculoskeletal, neuro, skin, endocrine and psych systems are negative, except as otherwise noted.  Still mental fog from Covid-19.  Sometimes winded out when exerting - can vary in intensity.        Objective    /82   Pulse 71   Temp 97.8  F (36.6  C) (Tympanic)   Resp 14   Ht 1.588 m (5' 2.5\")   Wt 77.8 kg (171 lb 9.6 oz)   LMP 08/24/2021 (Approximate)   " SpO2 99%   BMI 30.89 kg/m    Body mass index is 30.89 kg/m .  Physical Exam   GENERAL: obese habitus, alert and no distress  NECK: no tenderness, no adenopathy,  Thyroid not enlarged  RESP: lungs clear to auscultation - no rales, no rhonchi, no wheezes  CV: regular rates and rhythm, normal S1 S2, no S3 or S4 and no murmur, no click or rub  ABD: rounded abdomen, no skin changes, no TTP, no mass, no guarding, no Mark's sign  MS: extremities- no gross deformities noted, no edema; no visible muscle twitching; no involuntary movements  SKIN: no jaundice or rash  NEURO: Patient is A and O X 3; Cranial nerves 2-12 intact;  Strength 5/5 all extremities; DTR: ++ x 4; ; Sensory no gross deficit; no tremors No problems with motor coordination    Results for orders placed or performed during the hospital encounter of 21   Echocardiogram Complete     Status: None   Result Value Ref Range    LVEF  55-60%     Narrative    [id number]  [id number]  [id number]  [id number]^MARIZA^ZARA     [facility]  Echocardiography Laboratory  [address] [address]     Name: KVNG BOURGEOIS  MRN: [id number]  : 1985  Study Date: 2021 02:45 PM  Age: 36 yrs  Gender: Female  Patient Location: [facility]  Reason For Study: Palpitations, History of 2019 novel coronavirus disease  (COVID-19)  Ordering Physician: ZARA DAWKINS  Performed By: Eliana Pacheco RDCS     BSA: 1.8 m2  Height: 63 in  Weight: 177 lb  HR: 71  BP: 123/82 mmHg  ______________________________________________________________________________  Procedure  Complete Echo Adult.  ______________________________________________________________________________  Interpretation Summary     Technically poor study. Echo contrast was not administered. Within those  limitations:     1. Normal biventricular size and function.  2. Normal biatrial size.  3. Mild pulmonic regurgitation. No other significant valve disease within the  limitations of the  study. Aortic valve morphology cannot be assessed.  4. No obvious pericardial effusion  5. RAP and RVSP cannot be estimated.     No prior study available for comparison.  ______________________________________________________________________________  Left Ventricle  The left ventricle is normal in size. There is normal left ventricular wall  thickness. Left ventricular systolic function is normal. The visual ejection  fraction is 55-60%. Left ventricular diastolic function is normal. No regional  wall motion abnormalities noted.     Right Ventricle  The right ventricle is normal size. The right ventricular systolic function is  normal.     Atria  Normal left atrial size. Right atrial size is normal.     Mitral Valve  The mitral valve leaflets appear normal. There is no evidence of stenosis,  fluttering, or prolapse. There is physiologic mitral regurgitation. There is  no mitral valve stenosis.     Tricuspid Valve  The tricuspid valve is not well visualized. There is physiologic tricuspid  regurgitation. Right ventricular systolic pressure could not be approximated  due to inadequate tricuspid regurgitation.     Aortic Valve  The aortic valve is not well visualized. No aortic regurgitation is present.  No aortic stenosis is present.     Pulmonic Valve  The pulmonic valve is not well visualized. There is mild (1+) pulmonic  valvular regurgitation.     Vessels  Normal size aorta. Normal size ascending aorta. Inferior vena cava not well  visualized for estimation of right atrial pressure.     Pericardium  There is no pericardial effusion.     ______________________________________________________________________________  MMode/2D Measurements & Calculations  IVSd: 0.92 cm  LVIDd: 4.3 cm  LVIDs: 2.9 cm  LVPWd: 0.86 cm  FS: 31.2 %     LV mass(C)d: 120.6 grams  LV mass(C)dI: 65.7 grams/m2  Ao root diam: 2.2 cm  LA dimension: 3.1 cm  asc Aorta Diam: 2.5 cm  LA/Ao: 1.4  LA Volume Index (BP): 19.3 ml/m2  RWT: 0.40  TAPSE: 2.3  cm     Doppler Measurements & Calculations  MV E max dwayne: 91.4 cm/sec  MV A max dwayne: 55.6 cm/sec  MV E/A: 1.6     MV dec slope: 523.6 cm/sec2  MV dec time: 0.17 sec  PA acc time: 0.15 sec  E/E' av.1  Lateral E/e': 8.7  Medial E/e': 7.5     ______________________________________________________________________________  Report approved by: MD Ron Conti 2021 04:07 PM         Results for orders placed or performed in visit on 21   T4 FREE     Status: Normal   Result Value Ref Range    Free T4 1.08 0.76 - 1.46 ng/dL   TSH     Status: Abnormal   Result Value Ref Range    TSH 8.22 (H) 0.40 - 4.00 mU/L   Extra Purple Top Tube     Status: None   Result Value Ref Range    Hold Specimen JIC    Extra Tube     Status: None    Narrative    The following orders were created for panel order Extra Tube.  Procedure                               Abnormality         Status                     ---------                               -----------         ------                     Extra Purple Top Tube[[id number]]                            Final result                 Please view results for these tests on the individual orders.

## 2021-09-01 LAB — THYROPEROXIDASE AB SERPL-ACNC: >5000 IU/ML

## 2021-09-02 DIAGNOSIS — E03.9 HYPOTHYROIDISM, UNSPECIFIED TYPE: Primary | ICD-10-CM

## 2021-09-02 RX ORDER — LEVOTHYROXINE SODIUM 100 UG/1
100 TABLET ORAL DAILY
Qty: 30 TABLET | Refills: 11 | Status: SHIPPED | OUTPATIENT
Start: 2021-09-02 | End: 2021-11-18

## 2021-09-03 ENCOUNTER — MYC MEDICAL ADVICE (OUTPATIENT)
Dept: CARDIOLOGY | Facility: CLINIC | Age: 36
End: 2021-09-03

## 2021-09-03 NOTE — TELEPHONE ENCOUNTER
[employer] message from patient. Will route to Dr. Birmingham for review.     Echo 8/31/21:  1. Normal biventricular size and function.  2. Normal biatrial size.  3. Mild pulmonic regurgitation. No other significant valve disease within the  limitations of the study. Aortic valve morphology cannot be assessed.  4. No obvious pericardial effusion  5. RAP and RVSP cannot be estimated.     No prior study available for comparison.    Last OV 8/12/21 w/ Dr. Birmingham:  ASSESSMENT/PLAN:     1. Long-haul COVID: discussed risks for postural orthostatic tachycardia syndrome (POTS), inappropriate sinus tachycardia, increased risk for VTE,  Amari/pericarditis for cardiovascular complications  -Echocardiogram  -Ziopatch reviewed  -inflammatory markers today  -DDimer: if high will repeat CTPE  -Propanolol 20-40 mg tid prn   -follow up 3 months with jon Shoemaker RN September 3, 2021 9:09 AM

## 2021-09-07 ENCOUNTER — MYC MEDICAL ADVICE (OUTPATIENT)
Dept: CARDIOLOGY | Facility: CLINIC | Age: 36
End: 2021-09-07

## 2021-09-08 ENCOUNTER — MYC MEDICAL ADVICE (OUTPATIENT)
Dept: CARDIOLOGY | Facility: CLINIC | Age: 36
End: 2021-09-08

## 2021-09-08 DIAGNOSIS — Z86.16 HISTORY OF 2019 NOVEL CORONAVIRUS DISEASE (COVID-19): Primary | ICD-10-CM

## 2021-09-08 DIAGNOSIS — R41.89 BRAIN FOG: ICD-10-CM

## 2021-09-08 DIAGNOSIS — R51.9 NONINTRACTABLE EPISODIC HEADACHE, UNSPECIFIED HEADACHE TYPE: ICD-10-CM

## 2021-09-08 NOTE — TELEPHONE ENCOUNTER
"Lalita Birmingham MD  [facility] Heart Team 4 2 minutes ago (2:44 PM)   CF  I recommend vaccination. Getting a new variant of COVID on top of long haul symptoms would be far more detrimental than the mild side effects of the vaccine. Hope this helps! I reviewed echocardiogram in other [employer] message. No concerning findings there, overall looks great.     Dr. Birmingham    Message text      Also verbal per Dr. Birmingham, \"she can work from my standpoint, but she should get opinion from neurologist too with noncardiac symptoms.\" Updated patient via [employer].   "

## 2021-09-08 NOTE — TELEPHONE ENCOUNTER
Lalita Birmingham MD  [facility] Heart Team 4 4 minutes ago (2:41 PM)   CF  Good news: no evidence of COVID complications at least by echocardiogram. The function is normal and there is no pericardial fluid. The valve regurgitation is benign (mild) and nothing to worry about.     Dr. Birmingham     Message text      Updated patient with results via [employer].

## 2021-09-08 NOTE — TELEPHONE ENCOUNTER
Patient left a message that she would like to know what her test results are and what they mean. Please call and advise.  11:22 AM 09/08/21 JERRICA Rai CMA

## 2021-09-09 NOTE — TELEPHONE ENCOUNTER
[employer] message received. RN will update patient via [employer] with message that was sent yesterday.     Guerita Birmingham,  Did you receive my other messages about extended time off work? Also when I am able to be vaccinated?  Thanks,  Kassy

## 2021-09-10 DIAGNOSIS — E03.9 HYPOTHYROIDISM, UNSPECIFIED TYPE: Primary | ICD-10-CM

## 2021-09-13 ENCOUNTER — LAB (OUTPATIENT)
Dept: LAB | Facility: CLINIC | Age: 36
End: 2021-09-13
Payer: COMMERCIAL

## 2021-09-13 DIAGNOSIS — E03.9 HYPOTHYROIDISM, UNSPECIFIED TYPE: ICD-10-CM

## 2021-09-13 LAB — TSH SERPL DL<=0.005 MIU/L-ACNC: 1.72 MU/L (ref 0.4–4)

## 2021-09-13 PROCEDURE — 84443 ASSAY THYROID STIM HORMONE: CPT

## 2021-09-13 PROCEDURE — 36415 COLL VENOUS BLD VENIPUNCTURE: CPT

## 2021-09-20 ENCOUNTER — OFFICE VISIT (OUTPATIENT)
Dept: FAMILY MEDICINE | Facility: CLINIC | Age: 36
End: 2021-09-20
Payer: COMMERCIAL

## 2021-09-20 VITALS
DIASTOLIC BLOOD PRESSURE: 64 MMHG | BODY MASS INDEX: 30.12 KG/M2 | OXYGEN SATURATION: 98 % | SYSTOLIC BLOOD PRESSURE: 136 MMHG | RESPIRATION RATE: 18 BRPM | TEMPERATURE: 97.7 F | HEIGHT: 63 IN | WEIGHT: 170 LBS | HEART RATE: 73 BPM

## 2021-09-20 DIAGNOSIS — M79.10 MYALGIA: ICD-10-CM

## 2021-09-20 DIAGNOSIS — F41.9 ANXIETY: ICD-10-CM

## 2021-09-20 DIAGNOSIS — Z23 NEED FOR PROPHYLACTIC VACCINATION AND INOCULATION AGAINST INFLUENZA: ICD-10-CM

## 2021-09-20 DIAGNOSIS — G43.109 MIGRAINE WITH AURA AND WITHOUT STATUS MIGRAINOSUS, NOT INTRACTABLE: Primary | ICD-10-CM

## 2021-09-20 PROCEDURE — 90471 IMMUNIZATION ADMIN: CPT | Performed by: FAMILY MEDICINE

## 2021-09-20 PROCEDURE — 90686 IIV4 VACC NO PRSV 0.5 ML IM: CPT | Performed by: FAMILY MEDICINE

## 2021-09-20 PROCEDURE — 99214 OFFICE O/P EST MOD 30 MIN: CPT | Mod: 25 | Performed by: FAMILY MEDICINE

## 2021-09-20 RX ORDER — FLUOXETINE 10 MG/1
10 CAPSULE ORAL DAILY
Qty: 30 CAPSULE | Refills: 1 | Status: SHIPPED | OUTPATIENT
Start: 2021-09-20 | End: 2021-09-29 | Stop reason: ALTCHOICE

## 2021-09-20 ASSESSMENT — MIFFLIN-ST. JEOR: SCORE: 1432.3

## 2021-09-20 NOTE — PATIENT INSTRUCTIONS
Coping with Life After COVID-19  Being in the hospital because of COVID-19 is scary. Going home can be scary, too. You may face changes to your life, the way you work or what you can eat. It s hard to adjust to change, and it s normal to feel afraid, frustrated or even angry. These feelings usually go away over time. If your feelings don t start to get better, it s called  adjustment disorder.      What signs should I look out for?  Adjustment disorder can happen to anyone in a time of stress. It makes it hard to cope with daily life. You may feel lonely or fight with loved ones, even if you re glad to be home. Watch for these signs:    Fear or worry    Hard time focusing    Sadness or anger    Trouble talking to family or friends    Feeling like you don t fit in or isolating yourself    Problems with sleep     Drinking alcohol or taking drugs to cope    What can I do?  You can help yourself get better. Feeling you have control helps you move forward. You may wonder if you ll be able to do things you did before. Be patient. Do your best to make the most of every day. Try to build relationships, be as active as you can, eat right and keep a sense of humor. Avoid smoking and drinking too much alcohol. Call your family doctor or clinic if you re not sure what to do. They can guide you to care or other services.    When should I get help?  Think about getting counseling if your sadness or frustration gets worse. Together with a trained counselor, you can talk about your problems adjusting to life after your hospital stay. You can come up with new ways to handle changes that give you more control. Your family doctor or care team can help you find a counselor.     Get help if you re thinking about hurting yourself. If you need help right away, call 911 or go to the nearest emergency room. You can also try the Crisis Text Line.    Crisis Text Line: 915-668 (http://www.crisistextline.org)  The Crisis Text Line serves  anyone, in any crisis. It gives free, 24/7 support. Here's how it works:  1. Text HOME to 246407 from anywhere in the [place], anytime, about any type of crisis.  2. A live, trained Crisis Counselor will text you back quickly.  3. The volunteer Crisis Counselor can help you move from a  hot moment  to a  cool moment.  They can also help you work out a safety plan.

## 2021-09-20 NOTE — PROGRESS NOTES
Assessment & Plan     Migraine with aura and without status migrainosus, not intractable  Negative CT and prior blood work.  Though patient's symptoms are more suspicious for ongoing headaches and myalgias related to post-Covid prolonged illness, especially with varied headache location and associated symptoms, with some migrainous features it is appropriate to get an MRI.  - Advised to use ibuprofen more regularly for about a week, reviewed harms versus risks, since patient has been using sparingly, to see if migraine/headache remission would allow her to sustain some relief  - SLEEP EVALUATION & MANAGEMENT REFERRAL - ADULT -  - MR Brain w/o Contrast  - Adult Post COVID Clinic Referral    Myalgia  As above  - Adult Post COVID Clinic Referral    Anxiety/Depression  Longstanding for years.  History of panic issues as well, possibly worsened after experience w/covid and hospitalization, but per patient she doesn't really think it's necessarily worse now since it's been ongoing for years. New medication. F/u more quickly. Recommended talk therapy as well, declines referral for now. Failed celexa in past. Chose prozac in shared decisionmaking for more activating characteristics.  - FLUoxetine (PROZAC) 10 MG capsule  Dispense: 30 capsule; Refill: 1    Need for prophylactic vaccination and inoculation against influenza  - INFLUENZA VACCINE IM > 6 MONTHS VALENT IIV4 (AFLURIA/FLUZONE)    Return in about 4 weeks (around 10/18/2021), or if symptoms worsen or fail to improve, for Follow up symptoms and new medication/anxiety.    Mandie Farooq MD  [facility]            Jayant Elena is a 36 year old who presents for the following health issues  accompanied by her self :    HPI     Concern - patient is here for 2nd opinion  on joint,muscle pain and headaches   Onset: started after COVID   Description: Patient is having headaches joint and muscle pain   Intensity: moderate, severe  Progression of  Symptoms:  same  Accompanying Signs & Symptoms: COVID 19  Previous history of similar problem: none   Precipitating factors:        Worsened by: after any exercise   Alleviating factors:        Improved by: none   Therapies tried and outcome: Patient has been seen several times by specialist and PCP     March-April of this year had covid. Needed hospitalization but didn't need intubation.  Feels like her arms and knees and hands is where she mostly feels the joint and muscle aches  Even carrying the laundry up the stairs she had aching hands heart racing up to 160.  Not having the pains and things ALL the time, just extremely easily triggered  Works at [employer] but she can't do that anymore so she had to quit.    Headache  Onset/Duration: with COVID   Description  Location: bilateral in the frontal area, bilateral in the temporal area, bilateral in the occipital area   Character: throbbing pain, dull pain, sharp pain, squeezing pain mostly throbbing   Frequency:  Everyday   Duration:  Most of the day   Wake with headaches: YES  Able to do daily activities when headache present: YES- sometimes   Intensity:  moderate, severe  Progression of Symptoms:  same  Accompanying signs and symptoms:  Stiff neck: YES  Neck or upper back pain: YES  Sinus or URI symptoms no   Fever: YES- slight   Nausea or vomiting: YES- nausea   Dizziness: no  Numbness/tingling: no  Weakness: YES- sometimes   Visual changes: light   History  Head trauma: no  Family history of migraines: no  Daily pain medication use: YES- tylenol and minimal ibuprofen   Previous tests for headaches: YES- CT  Neurologist evaluation: YES- have appt coming up  Precipitating or Alleviating factors (light/sound/sleep/caffeine): light  Therapies tried and outcome: Ibuprofen (Advil, Motrin)              Outcome - some times it is   Frequent/daily pain medication use: YES 2 times weekly    Headaches vary in location and intensity  Wakes up with them, daily  "headaches, not necessarily better over time either - basically a constant headache and sometimes improves with rest  Also worsened by loud noise  Sometimes gets nausea and her eyes feel weird  Tries not to take too much NSAIDs because she's not supposed to based on being on antiinflammatory diet    Admits to anxiety but also is struggling with diarrhea a lot, and that leads to a worry about leaving the house due to possibly needing toilet quickly, but also recognizes anxiety can lead to the diarrhea.    Musculoskeletal problem/pain  Onset/Duration: after COVID   Description  Location: arms and knee  Joint Swelling: no  Redness: YES  Pain: YES  Warmth: YES  Intensity:  moderate  Progression of Symptoms:  same  Accompanying signs and symptoms:   Fevers: no  Numbness/tingling/weakness: YES- sometimes   History  Trauma to the area: no  Recent illness:  YES- covid   Previous similar problem: no  Previous evaluation:  YES  Precipitating or alleviating factors:  Aggravating factors include: use of arms   Therapies tried and outcome: rest/inactivity and Ibuprofen    Has been on antidepressants in the past. Before having her son and then also after, for postpartum. Celexa, Made her \"feel like a zombie\". No other antidepressants tried before as far as she knows.     Review of Systems   Constitutional, HEENT, cardiovascular, pulmonary, gi and gu systems are negative, except as otherwise noted.      Objective    /64   Pulse 73   Temp 97.7  F (36.5  C) (Tympanic)   Resp 18   Ht 1.588 m (5' 2.5\")   Wt 77.1 kg (170 lb)   LMP 08/24/2021 (Approximate)   SpO2 98%   BMI 30.60 kg/m    Body mass index is 30.6 kg/m .  Physical Exam   GENERAL: healthy, alert and no distress  EYES: PERRLA, normal sclera/conjunctiva  NECK: supple. no adenopathy, no asymmetry  RESP: lungs clear to auscultation - no rales, rhonchi or wheezes  CV: regular rate and rhythm, normal S1 S2, no S3 or S4, no murmur, click or rub, no peripheral edema and " peripheral pulses strong  ABDOMEN: soft, nontender, no hepatosplenomegaly, no masses and bowel sounds normal  MS: no gross musculoskeletal defects noted, no edema  NEURO: CN 2-12 normal. Normal speech, gait, strength, tone.

## 2021-09-23 ENCOUNTER — HOSPITAL ENCOUNTER (OUTPATIENT)
Dept: MRI IMAGING | Facility: CLINIC | Age: 36
Discharge: HOME | End: 2021-09-23
Attending: FAMILY MEDICINE | Admitting: FAMILY MEDICINE
Payer: COMMERCIAL

## 2021-09-23 DIAGNOSIS — G43.109 MIGRAINE WITH AURA AND WITHOUT STATUS MIGRAINOSUS, NOT INTRACTABLE: ICD-10-CM

## 2021-09-23 PROCEDURE — 70551 MRI BRAIN STEM W/O DYE: CPT

## 2021-09-27 ENCOUNTER — TELEPHONE (OUTPATIENT)
Dept: FAMILY MEDICINE | Facility: CLINIC | Age: 36
End: 2021-09-27

## 2021-09-27 ENCOUNTER — MYC MEDICAL ADVICE (OUTPATIENT)
Dept: FAMILY MEDICINE | Facility: CLINIC | Age: 36
End: 2021-09-27

## 2021-09-28 ENCOUNTER — MYC MEDICAL ADVICE (OUTPATIENT)
Dept: FAMILY MEDICINE | Facility: CLINIC | Age: 36
End: 2021-09-28

## 2021-09-28 DIAGNOSIS — G43.109 MIGRAINE WITH AURA AND WITHOUT STATUS MIGRAINOSUS, NOT INTRACTABLE: Primary | ICD-10-CM

## 2021-09-28 DIAGNOSIS — F41.9 ANXIETY: ICD-10-CM

## 2021-10-15 ENCOUNTER — LAB (OUTPATIENT)
Dept: LAB | Facility: CLINIC | Age: 36
End: 2021-10-15
Payer: COMMERCIAL

## 2021-10-15 DIAGNOSIS — E03.9 HYPOTHYROIDISM, UNSPECIFIED TYPE: ICD-10-CM

## 2021-10-15 LAB
T4 FREE SERPL-MCNC: 1.32 NG/DL (ref 0.76–1.46)
TSH SERPL DL<=0.005 MIU/L-ACNC: 0.11 MU/L (ref 0.4–4)

## 2021-10-15 PROCEDURE — 84443 ASSAY THYROID STIM HORMONE: CPT

## 2021-10-15 PROCEDURE — 36415 COLL VENOUS BLD VENIPUNCTURE: CPT

## 2021-10-15 PROCEDURE — 84439 ASSAY OF FREE THYROXINE: CPT

## 2021-10-20 ENCOUNTER — OFFICE VISIT (OUTPATIENT)
Dept: FAMILY MEDICINE | Facility: CLINIC | Age: 36
End: 2021-10-20
Payer: COMMERCIAL

## 2021-10-20 VITALS
BODY MASS INDEX: 31.28 KG/M2 | SYSTOLIC BLOOD PRESSURE: 128 MMHG | WEIGHT: 170 LBS | DIASTOLIC BLOOD PRESSURE: 88 MMHG | OXYGEN SATURATION: 98 % | RESPIRATION RATE: 18 BRPM | HEART RATE: 86 BPM | HEIGHT: 62 IN | TEMPERATURE: 97.9 F

## 2021-10-20 DIAGNOSIS — R53.83 EASY FATIGABILITY: ICD-10-CM

## 2021-10-20 DIAGNOSIS — G43.109 MIGRAINE WITH AURA AND WITHOUT STATUS MIGRAINOSUS, NOT INTRACTABLE: Primary | ICD-10-CM

## 2021-10-20 PROCEDURE — 99213 OFFICE O/P EST LOW 20 MIN: CPT | Performed by: FAMILY MEDICINE

## 2021-10-20 ASSESSMENT — MIFFLIN-ST. JEOR: SCORE: 1424.36

## 2021-10-20 NOTE — PATIENT INSTRUCTIONS
Try the lower dose (half tablet of amitriptyline) for a week or two and then message me in [employer] what you think. If that didn't help, I will send you a medication called venlafaxine instead.

## 2021-10-25 ENCOUNTER — OFFICE VISIT (OUTPATIENT)
Dept: ENDOCRINOLOGY | Facility: CLINIC | Age: 36
End: 2021-10-25
Payer: COMMERCIAL

## 2021-10-25 VITALS
BODY MASS INDEX: 31.28 KG/M2 | WEIGHT: 170 LBS | HEART RATE: 95 BPM | HEIGHT: 62 IN | SYSTOLIC BLOOD PRESSURE: 144 MMHG | OXYGEN SATURATION: 96 % | DIASTOLIC BLOOD PRESSURE: 95 MMHG

## 2021-10-25 DIAGNOSIS — E03.9 HYPOTHYROIDISM, UNSPECIFIED TYPE: Primary | ICD-10-CM

## 2021-10-25 DIAGNOSIS — E01.0 THYROMEGALY: ICD-10-CM

## 2021-10-25 PROCEDURE — 99214 OFFICE O/P EST MOD 30 MIN: CPT | Performed by: INTERNAL MEDICINE

## 2021-10-25 RX ORDER — LEVOTHYROXINE SODIUM 88 UG/1
88 TABLET ORAL DAILY
Qty: 30 TABLET | Refills: 11 | Status: SHIPPED | OUTPATIENT
Start: 2021-10-25 | End: 2022-01-10

## 2021-10-25 ASSESSMENT — MIFFLIN-ST. JEOR: SCORE: 1424.36

## 2021-10-25 NOTE — LETTER
"    10/25/2021         RE: Lady Saavedra  [address] [address] [place] [id number]        Dear Colleague,    Thank you for referring your patient, Lady Saavedra, to the [facility] ENDOCRINOLOGY. Please see a copy of my visit note below.    S:   Patient presents for evaluation of abnormal thyroid function tests.   She presented on 6/14/21 to the ED with palpitations.   Associated SOB and lightheadedness.   She had actually labs done on 6/8 which showed subclinical hyperthyroidism.   Similar results on 6/14/21.     Labs repeated 7/6/21 showing subclinical hypothyroidism.   Started on levothyroxine 25 mcg every day.   No other medication changes.     No prior thyroid disease.   No change in how she feels.   She has been unable to work due to lightheadedness.   Also gets hot easily and has frequent headaches.     She had COVID in 4/2021.   One child who is 7 y/o.     Menses have been lighter recently.   Occurring at a fairly regular interval.     New issue of diarrhea. Episodes of sudden intense hunger.   New issue of memory impairment.    Trouble staying asleep at night.     Returns in 10/2021, continues to have migraines.   She is taking elavil. Dose recently cut in 1/2 due to fatigue.   Migraines starting to act up again as a result.   Lost her job as she still cannot work due to migraines.   Also continues to have diarrhea. Feels this is due to anxiety which is in part why elavil was chosen to help with the migraines. Planning to see GI.   Menses have become irregular.   Sleeping better with the aide of elavil.       ROS: 10 point ROS neg other than the symptoms noted above in the HPI.    Exam:   Vital signs:      BP: (!) 144/95 Pulse: 95     SpO2: 96 %     Height: 157.5 cm (5' 2\") Weight: 77.1 kg (170 lb)  Estimated body mass index is 31.09 kg/m  as calculated from the following:    Height as of this encounter: 1.575 m (5' 2\").    Weight as of this encounter: 77.1 kg (170 lb).  Gen: In NAD. "   HEENT: no proptosis or lid lag, EOMI, thyroid diffusely enlarged w/o clear nodule.   Card: S1 S2 RRR no m/r/g. no LE edema.   Pulm: CTA b/l.   GI: NT ND +BS.   MSK: no gross deformities.   Derm: no rashes or lesions.   Neuro: no tremor, +2 DTR's.     A/P:   Abnormal thyroid function tests - Subclinical hyperthyroidism followed by subclinical hypothyroidism. Suspicious for thyroiditis. She did have COVID in 4/2021. No recent pregnancies.     I am not sure how many of her symptoms are due to these labs versus post-COVID phenomenon.   Levothyroxine increased to 75 mcg in 7/2021.   Increased to 100 mcg every day in 9/2021.    TSH   Date Value Ref Range Status   10/15/2021 0.11 (L) 0.40 - 4.00 mU/L Final   07/06/2021 7.81 (H) 0.40 - 4.00 mU/L Final     T4 Free   Date Value Ref Range Status   07/06/2021 0.67 (L) 0.76 - 1.46 ng/dL Final     Free T4   Date Value Ref Range Status   10/15/2021 1.32 0.76 - 1.46 ng/dL Final     -Reduce levothyroxine to 88 mcg every day.   -Labs in 4-6 weeks.     Thyromegaly - Nodules versus thyroiditis? I have reviewed the natural history of thyroid nodules and thyroid cancer with the patient.   US was consistent with Hashimoto's. No nodules.   -Repeat US PRN.       Abbe Cesar M.D          Again, thank you for allowing me to participate in the care of your patient.        Sincerely,        Abbe Cesar MD

## 2021-10-25 NOTE — PROGRESS NOTES
"S:   Patient presents for evaluation of abnormal thyroid function tests.   She presented on 6/14/21 to the ED with palpitations.   Associated SOB and lightheadedness.   She had actually labs done on 6/8 which showed subclinical hyperthyroidism.   Similar results on 6/14/21.     Labs repeated 7/6/21 showing subclinical hypothyroidism.   Started on levothyroxine 25 mcg every day.   No other medication changes.     No prior thyroid disease.   No change in how she feels.   She has been unable to work due to lightheadedness.   Also gets hot easily and has frequent headaches.     She had COVID in 4/2021.   One child who is 5 y/o.     Menses have been lighter recently.   Occurring at a fairly regular interval.     New issue of diarrhea. Episodes of sudden intense hunger.   New issue of memory impairment.    Trouble staying asleep at night.     Returns in 10/2021, continues to have migraines.   She is taking elavil. Dose recently cut in 1/2 due to fatigue.   Migraines starting to act up again as a result.   Lost her job as she still cannot work due to migraines.   Also continues to have diarrhea. Feels this is due to anxiety which is in part why elavil was chosen to help with the migraines. Planning to see GI.   Menses have become irregular.   Sleeping better with the aide of elavil.       ROS: 10 point ROS neg other than the symptoms noted above in the HPI.    Exam:   Vital signs:      BP: (!) 144/95 Pulse: 95     SpO2: 96 %     Height: 157.5 cm (5' 2\") Weight: 77.1 kg (170 lb)  Estimated body mass index is 31.09 kg/m  as calculated from the following:    Height as of this encounter: 1.575 m (5' 2\").    Weight as of this encounter: 77.1 kg (170 lb).  Gen: In NAD.   HEENT: no proptosis or lid lag, EOMI, thyroid diffusely enlarged w/o clear nodule.   Card: S1 S2 RRR no m/r/g. no LE edema.   Pulm: CTA b/l.   GI: NT ND +BS.   MSK: no gross deformities.   Derm: no rashes or lesions.   Neuro: no tremor, +2 DTR's.     A/P: "   Abnormal thyroid function tests - Subclinical hyperthyroidism followed by subclinical hypothyroidism. Suspicious for thyroiditis. She did have COVID in 4/2021. No recent pregnancies.     I am not sure how many of her symptoms are due to these labs versus post-COVID phenomenon.   Levothyroxine increased to 75 mcg in 7/2021.   Increased to 100 mcg every day in 9/2021.    TSH   Date Value Ref Range Status   10/15/2021 0.11 (L) 0.40 - 4.00 mU/L Final   07/06/2021 7.81 (H) 0.40 - 4.00 mU/L Final     T4 Free   Date Value Ref Range Status   07/06/2021 0.67 (L) 0.76 - 1.46 ng/dL Final     Free T4   Date Value Ref Range Status   10/15/2021 1.32 0.76 - 1.46 ng/dL Final     -Reduce levothyroxine to 88 mcg every day.   -Labs in 4-6 weeks.     Thyromegaly - Nodules versus thyroiditis? I have reviewed the natural history of thyroid nodules and thyroid cancer with the patient.   US was consistent with Hashimoto's. No nodules.   -Repeat US PRN.       Abbe Cesar M.D

## 2021-11-01 ENCOUNTER — MYC MEDICAL ADVICE (OUTPATIENT)
Dept: FAMILY MEDICINE | Facility: CLINIC | Age: 36
End: 2021-11-01

## 2021-11-13 ASSESSMENT — ENCOUNTER SYMPTOMS
SNORES LOUDLY: 1
WEIGHT GAIN: 1
DECREASED CONCENTRATION: 1
BACK PAIN: 1
SPEECH CHANGE: 0
CHILLS: 0
SMELL DISTURBANCE: 0
SINUS CONGESTION: 1
NERVOUS/ANXIOUS: 1
POLYPHAGIA: 0
DYSPNEA ON EXERTION: 1
JOINT SWELLING: 0
BLOOD IN STOOL: 0
MYALGIAS: 1
HYPERTENSION: 1
NECK MASS: 0
TASTE DISTURBANCE: 0
MUSCLE WEAKNESS: 1
TINGLING: 0
BOWEL INCONTINENCE: 0
WEAKNESS: 1
ALTERED TEMPERATURE REGULATION: 1
HEADACHES: 1
SEIZURES: 0
TREMORS: 1
HEMOPTYSIS: 0
LEG PAIN: 1
SHORTNESS OF BREATH: 1
MUSCLE CRAMPS: 1
HALLUCINATIONS: 0
VOMITING: 0
ABDOMINAL PAIN: 1
SINUS PAIN: 1
DEPRESSION: 1
WEIGHT LOSS: 0
PARALYSIS: 0
NAIL CHANGES: 0
PALPITATIONS: 1
ARTHRALGIAS: 1
POOR WOUND HEALING: 0
RECTAL PAIN: 0
SKIN CHANGES: 0
ORTHOPNEA: 0
COUGH DISTURBING SLEEP: 1
DIARRHEA: 1
NIGHT SWEATS: 1
FATIGUE: 1
HEARTBURN: 1
EXERCISE INTOLERANCE: 1
COUGH: 1
SORE THROAT: 1
CONSTIPATION: 1
MEMORY LOSS: 1
POLYDIPSIA: 1
INSOMNIA: 1
SLEEP DISTURBANCES DUE TO BREATHING: 0
NECK PAIN: 1
DECREASED LIBIDO: 1
HOT FLASHES: 0
DISTURBANCES IN COORDINATION: 0
PANIC: 0
TROUBLE SWALLOWING: 1
NUMBNESS: 0
STIFFNESS: 0
BLOATING: 1
FEVER: 0
LOSS OF CONSCIOUSNESS: 0
WHEEZING: 0
LIGHT-HEADEDNESS: 1
POSTURAL DYSPNEA: 0
INCREASED ENERGY: 1
DIZZINESS: 0
HYPOTENSION: 0
HOARSE VOICE: 1
SYNCOPE: 0
NAUSEA: 0
SPUTUM PRODUCTION: 1
DECREASED APPETITE: 0
JAUNDICE: 0

## 2021-11-13 ASSESSMENT — SLEEP AND FATIGUE QUESTIONNAIRES
HOW LIKELY ARE YOU TO NOD OFF OR FALL ASLEEP WHILE SITTING AND TALKING TO SOMEONE: WOULD NEVER DOZE
HOW LIKELY ARE YOU TO NOD OFF OR FALL ASLEEP WHILE SITTING INACTIVE IN A PUBLIC PLACE: WOULD NEVER DOZE
HOW LIKELY ARE YOU TO NOD OFF OR FALL ASLEEP WHILE SITTING QUIETLY AFTER LUNCH WITHOUT ALCOHOL: SLIGHT CHANCE OF DOZING
HOW LIKELY ARE YOU TO NOD OFF OR FALL ASLEEP WHEN YOU ARE A PASSENGER IN A CAR FOR AN HOUR WITHOUT A BREAK: MODERATE CHANCE OF DOZING
HOW LIKELY ARE YOU TO NOD OFF OR FALL ASLEEP WHILE LYING DOWN TO REST IN THE AFTERNOON WHEN CIRCUMSTANCES PERMIT: HIGH CHANCE OF DOZING
HOW LIKELY ARE YOU TO NOD OFF OR FALL ASLEEP WHILE SITTING AND READING: MODERATE CHANCE OF DOZING
HOW LIKELY ARE YOU TO NOD OFF OR FALL ASLEEP WHILE WATCHING TV: MODERATE CHANCE OF DOZING
HOW LIKELY ARE YOU TO NOD OFF OR FALL ASLEEP IN A CAR, WHILE STOPPED FOR A FEW MINUTES IN TRAFFIC: WOULD NEVER DOZE

## 2021-11-18 ENCOUNTER — OFFICE VISIT (OUTPATIENT)
Dept: CARDIOLOGY | Facility: CLINIC | Age: 36
End: 2021-11-18
Attending: INTERNAL MEDICINE
Payer: COMMERCIAL

## 2021-11-18 ENCOUNTER — VIRTUAL VISIT (OUTPATIENT)
Dept: SLEEP MEDICINE | Facility: CLINIC | Age: 36
End: 2021-11-18
Attending: FAMILY MEDICINE
Payer: COMMERCIAL

## 2021-11-18 VITALS — WEIGHT: 170 LBS | HEIGHT: 62 IN | BODY MASS INDEX: 31.28 KG/M2

## 2021-11-18 VITALS
WEIGHT: 175.2 LBS | OXYGEN SATURATION: 98 % | DIASTOLIC BLOOD PRESSURE: 98 MMHG | BODY MASS INDEX: 32.04 KG/M2 | SYSTOLIC BLOOD PRESSURE: 134 MMHG | HEART RATE: 79 BPM

## 2021-11-18 DIAGNOSIS — G47.00 INSOMNIA, UNSPECIFIED TYPE: Primary | ICD-10-CM

## 2021-11-18 DIAGNOSIS — E66.811 CLASS 1 OBESITY DUE TO EXCESS CALORIES WITHOUT SERIOUS COMORBIDITY WITH BODY MASS INDEX (BMI) OF 31.0 TO 31.9 IN ADULT: ICD-10-CM

## 2021-11-18 DIAGNOSIS — G47.19 EXCESSIVE DAYTIME SLEEPINESS: ICD-10-CM

## 2021-11-18 DIAGNOSIS — R00.2 PALPITATIONS: ICD-10-CM

## 2021-11-18 DIAGNOSIS — G43.109 MIGRAINE WITH AURA AND WITHOUT STATUS MIGRAINOSUS, NOT INTRACTABLE: ICD-10-CM

## 2021-11-18 DIAGNOSIS — E66.09 CLASS 1 OBESITY DUE TO EXCESS CALORIES WITHOUT SERIOUS COMORBIDITY WITH BODY MASS INDEX (BMI) OF 31.0 TO 31.9 IN ADULT: ICD-10-CM

## 2021-11-18 DIAGNOSIS — Z86.16 HISTORY OF 2019 NOVEL CORONAVIRUS DISEASE (COVID-19): ICD-10-CM

## 2021-11-18 PROCEDURE — 99204 OFFICE O/P NEW MOD 45 MIN: CPT | Mod: 95 | Performed by: INTERNAL MEDICINE

## 2021-11-18 PROCEDURE — 99214 OFFICE O/P EST MOD 30 MIN: CPT | Performed by: INTERNAL MEDICINE

## 2021-11-18 ASSESSMENT — MIFFLIN-ST. JEOR: SCORE: 1424.36

## 2021-11-18 NOTE — LETTER
11/18/2021    Cecilio Patricia MD  [address] [facility] [id number]    RE: Lady Alatorremike       Dear Colleague,    I had the pleasure of seeing Lady Saavedra in the [facility].             Vascular Cardiology Consultation      HPI:     This is a 36 year old female with no PMH here for evaluation and followup of Rolando Kaufman. She had COVID in April and here to discuss her cardiovascular symptoms. Describes lightheadedness, and heart rates shoot up to 160s with exertion. No chest pain or shortness of breath. Was hospitalized for about a week requiring oxygen. CT negative for PE, bilateral opacities present. Also diagnosed with thyroiditis likely triggerered by COVID as well.      ROS + diarrhea, abdominal cramping, no SOB, LE edema. No syncope.     We obtained echocardiogram, inflammatory markers, DDimer and ziopatch which were unremarkable. She had symptoms correlating to her elevated heart rates (sinus) but no arrhythmia. Echo negative for LV dysfunction or findings suggesting myocarditis.      ASSESSMENT/PLAN:     1. Long-haul COVID: discussed risks for postural orthostatic tachycardia syndrome (POTS), inappropriate sinus tachycardia, increased risk for VTE,  Amari/pericarditis for cardiovascular complications. I believe mainly she has inappropriate ST and cardiac deconditioning which is well described from COVID. Also she may be at increased risk for thromboembolism but luckily her DDimer was normal on initial evaluation.   -Echocardiogram reviewed  -Ziopatch reviewed  -inflammatory markers normal  -DDimer in 6 months   -Propanolol 20-40 mg tid prn - if elevated BP can try Toprol XL 25 mg daily in the future instead of propanolol as needed  -moderate intensity exercise encouraged   -stress echocardiogram in 6 months to evaluate conditioning and elevated exercise induced HR  -follow up 6 months with me     Lalita Birmingham MD [facility]          PAST MEDICAL HISTORY  Past Medical History:   Diagnosis Date     Abnormal Pap smear of cervix 2009, 2012, 2014    see problem list     Cervical high risk HPV (human papillomavirus) test positive 2009, 2012    see problem list     Chickenpox      Depressive disorder      Gestational HTN 01/01/2015     History of colposcopy with cervical biopsy 03/2012    ANN 1     Seasonal allergies     Was on clariten uses occasional sudafed     Urinary tract bacterial infections     as teenager       CURRENT MEDICATIONS  Current Outpatient Medications   Medication Sig Dispense Refill     amitriptyline (ELAVIL) 25 MG tablet Take 1 tablet (25 mg) by mouth At Bedtime 30 tablet 1     levothyroxine (SYNTHROID/LEVOTHROID) 88 MCG tablet Take 1 tablet (88 mcg) by mouth daily 30 tablet 11       PAST SURGICAL HISTORY:  Past Surgical History:   Procedure Laterality Date     BIOPSY       COSMETIC SURGERY       HC ENLARGE BREAST WITH IMPLANT       MOUTH SURGERY  age 18    wisdom teeth       ALLERGIES   No Known Allergies    FAMILY HISTORY  Family History   Adopted: Yes   Problem Relation Age of Onset     Unknown/Adopted Mother      Unknown/Adopted Father          SOCIAL HISTORY  Social History     Socioeconomic History     Marital status:      Spouse name: Not on file     Number of children: 0     Years of education: Not on file     Highest education level: Not on file   Occupational History     Not on file   Tobacco Use     Smoking status: Never Smoker     Smokeless tobacco: Never Used   Vaping Use     Vaping Use: Never used   Substance and Sexual Activity     Alcohol use: No     Drug use: No     Sexual activity: Yes     Partners: Male     Birth control/protection: None   Other Topics Concern     Parent/sibling w/ CABG, MI or angioplasty before 65F 55M? No   Social History Narrative     Not on file     Social Determinants of Health     Financial Resource Strain: Not on file   Food Insecurity: Not on file    Transportation Needs: Not on file   Physical Activity: Not on file   Stress: Not on file   Social Connections: Not on file   Intimate Partner Violence: Not on file   Housing Stability: Not on file         EXAM:  BP (!) 141/97   Pulse 79   Wt 79.5 kg (175 lb 3.2 oz)   SpO2 98%   BMI 32.04 kg/m    In general, the patient is a pleasant female in no apparent distress.    HEENT: NC/AT.  PERRLA.  EOMI.  Sclerae white, not injected.  Nares clear.  Pharynx without erythema or exudate.  Dentition intact.   Neck: No adenopathy.  No thyromegaly. Carotids +2/2 bilaterally without bruits.  No jugular venous distension.   Heart: RRR. Normal S1, S2 splits physiologically. No murmur, rub, click, or gallop.   Lungs: CTA.  No ronchi, wheezes, rales.   Abdomen: Soft, nontender, nondistended. No organomegaly. No AAA.  No bruits.   Extremities: No clubbing, cyanosis, or edema.  No wounds.   Vascular: No bruits are noted.    Labs:  LIPID RESULTS:  Lab Results   Component Value Date    CHOL 173 03/22/2011    HDL 43 (L) 03/22/2011     03/22/2011    TRIG 107 03/22/2011    CHOLHDLRATIO 4.0 03/22/2011       LIVER ENZYME RESULTS:  Lab Results   Component Value Date    AST 37 06/14/2021    ALT 58 (H) 07/06/2021       CBC RESULTS:  Lab Results   Component Value Date    WBC 6.6 06/14/2021    RBC 4.52 06/14/2021    HGB 14.1 06/14/2021    HCT 40.5 06/14/2021    MCV 90 06/14/2021    MCH 31.2 06/14/2021    MCHC 34.8 06/14/2021    RDW 12.3 06/14/2021     06/14/2021       BMP RESULTS:  Lab Results   Component Value Date     06/14/2021    POTASSIUM 3.8 06/14/2021    CHLORIDE 108 06/14/2021    CO2 24 06/14/2021    ANIONGAP 8 06/14/2021    GLC 92 06/14/2021    BUN 10 06/14/2021    CR 0.52 06/14/2021    GFRESTIMATED >90 06/14/2021    GFRESTBLACK >90 06/14/2021    DIANA 9.2 06/14/2021        A1C RESULTS:  No results found for: A1C      Thank you for allowing me to participate in the care of your patient.      Sincerely,     Lalita  MD Vinnie     [facility]  cc:   Lalita Birmingham MD  [address] [place] [address]

## 2021-11-18 NOTE — PATIENT INSTRUCTIONS
1. Stress echocardiogram in 6 months  2. D-Dimer lab in 6 months   3. Follow up with Dr. Birmingham in 6 months   4. If blood pressure high can consider Toprol XL 25 mg daily

## 2021-11-18 NOTE — PATIENT INSTRUCTIONS
Get your blood pressure rechecked when you are out and about and not in the clinic.            Online CBT-I       Research indicates that online CBT-I can be as effective as in-person therapy for motivated patients. It requires comfort with online learning and confidence  that making changes in sleep habits can improve your sleep.    Online CBT-I is not for everyone.  Contraindications include individuals with seizure disorders, bipolar disorder, unstable medical or mental health conditions,  risk of falling, or are pregnant.    Our online CBT-I program is a collaboration between our [facility]omnia [facility] and the [facility]. Different from other online wellness programs, patients have the option to also incorporate live virtual or in-person follow-up to the program.     The cost for an entire 6 week program is $40.     As part of registration, we ask that you sign a consent for the [facility] to share your online CBT-I information with our sleep program.  To optimize telephone support we ask that you also agree to share data from your daily sleep diaries. This can be done by entering the sharing code Fastnote.    The online CBT-I program is formatted for use on computers and mobile devices.    Copy and past the link below which will take you to the following landing page to register for online CBT-I.:    [email or web address]                   Insomnia and Behavioral Sleep Medicine Program    The [facility] Insomnia and Behavioral Sleep Medicine Program provides non-drug treatment for sleep problems including:      Cognitive-behavioral Therapies for Insomnia (CBT-I)    Management of Shift-work and Jet Lag    Management of Delayed, Advanced and Irregular Circadian Rhythm Sleep Disorders    Imagery Rehearsal Therapy (IRT) for Nightmare Disorder    PAP Therapy Desensitization    You have been referred for consultation with a sleep psychologist who specializes in  behavioral sleep medicine and treatment of insomnia.  The [facility] Insomnia and Behavioral Sleep Medicine Program offers individualized telehealth services through our [facility] and online CBT-I.    Preparing for your Consultation:    You will need to keep a Sleep Diary for at least a week prior to your visit.  Simply download he CBTi- galileo on your mobile devise and enter your sleep estimates in the My Sleep section each day.  You can also use the paper sleep diary provided. Your estimates should be your recollection of your last night's sleep.  We recommend you DO watch the clock or gather data during the night.      OR        Make sure to have your CBTI  Galileo or paper sleep diary data available to review during consultation with your sleep provider.  You can also e-mail your sleep diary information to [email or web address] or fax it to [phone].    Frequently Asked Questions    What is CBT-I?    Cognitive Behavioral Therapy for Insomnia, also known as CBT-I, is a highly effective non-drug treatment for insomnia. The American College of Physicians recommends CBT-I as the first treatment for chronic insomnia.  Research has shown CBT-I to be safer and more effective long term than sleeping pills.    What does CBT-I involve?     CBT-I targets behaviors that lead to chronic insomnia:    Habits that weaken the bed as a cue for sleep    Habits that weaken your body's sleep drive and sleep/wake clock     Unhelpful sleep thoughts that increase sleep-related worry and arousal.    The process works like a 4-6 session training program that provides you with the information and coaching needed to implement proven strategies to get a better night's sleep.    People often see improvement in their sleep within a few weeks. Research shows if you keep practicing the skills you learn your sleep is likely to continue to improve 6-12 months after treatment.    Does this program prescribe or  manage sleep medication?    No.  Your prescribing provider is responsible to assist you in managing your sleep medications.  Some people choose to stop using sleep medication prior to or during CBT-I.  Our program can work with your prescribing provider to help reduce or eliminate use of sleep medications.     Can I Get Started Today?    Yes, you Can!  With our Online CBT-I program.     Research indicates that online CBT-I can be as effective as in-person therapy for motivated patients. It requires comfort with online learning and confidence that making changes in sleep habits can improve your sleep. Different from other online CBT-I programs, our insomnia program incorporates virtual visit follow-up. Our online CBT-I program is formatted for use on computers and mobile devices.  The cost for an entire 6 week program is $40.           Online CBT-I is not for everyone.  Contraindications include individuals with seizure disorders, bipolar disorder, unstable medical or mental health conditions, risk of falling, or are pregnant.    If you opt to try online CBT-I, we ask that you sign a consent for the [facility] to share your online CBT-I information with our sleep program.  To optimize care coordination, we ask that you also agree to share data from your daily sleep diaries. This can be done by entering the sharing code TheCityGame.    To get started, copy and past the link below which will take you to the landing page to register:            [email or web address]                                 Are there any recommended self-help workbooks?    Yes!  We recommend you consider:    Say Simón to Insomnia:The Six-Week, Drug-Free Program Developed At [employer].  Justo Lind MD. Available in paperback, Tristan and audiobook.        Overcoming Insomnia: A Cognitive-Behavioral Therapy Approach, Workbook.  Boo Garcia, PhD  and Charlette Young, PhD.  Available in paperback and  "Tristan.        Quiet Your Mind and Get to Sleep: Solutions to Insomnia for Those with Depression, Anxiety, or Chronic Pain.  Berna Griffiths, PhD and Charlette Young, PhD.  Available in paperback and Tristan                        MY TREATMENT INFORMATION FOR SLEEP APNEA-  Lady Saavedra    DOCTOR : Bethany Coronel MD    Am I having a sleep study at a sleep center?  --->Due to insurance clearance delays, you will be contacted within 2-4 weeks to schedule    Am I having a home sleep study?  --->Watch the video for the device you are using:    -/drop off device-   https://www.Synchro.com/watch?v=yGGFBdELGhk    -Disposable device sent out require phone/computer application-   https://www.CYTIMMUNE SCIENCESube.com/watch?v=BCce_vbiwxE      Frequently asked questions:  1. What is Obstructive Sleep Apnea (ADRIANA)? ADRIANA is the most common type of sleep apnea. Apnea means, \"without breath.\"  Apnea is most often caused by narrowing or collapse of the upper airway as muscles relax during sleep.   Almost everyone has occasional apneas. Most people with sleep apnea have had brief interruptions at night frequently for many years.  The severity of sleep apnea is related to how frequent and severe the events are.   2. What are the consequences of ADRIANA? Symptoms include: feeling sleepy during the day, snoring loudly, gasping or stopping of breathing, trouble sleeping, and occasionally morning headaches or heartburn at night.  Sleepiness can be serious and even increase the risk of falling asleep while driving. Other health consequences may include development of high blood pressure and other cardiovascular disease in persons who are susceptible. Untreated ADRIANA  can contribute to heart disease, stroke and diabetes.   3. What are the treatment options? In most situations, sleep apnea is a lifelong disease that must be managed with daily therapy. Medications are not effective for sleep apnea and surgery is generally not considered until other " therapies have been tried. Your treatment is your choice . Continuous Positive Airway (CPAP) works right away and is the therapy that is effective in nearly everyone. An oral device to hold your jaw forward is usually the next most reliable option. Other options include postioning devices (to keep you off your back), weight loss, and surgery including a tongue pacing device. There is more detail about some of these options below.  4. Are my sleep studies covered by insurance? Although we will request verification of coverage, we advise you also check in advance of the study to ensure there is coverage.    Important tips for those choosing CPAP and similar devices   Know your equipment:  CPAP is continuous positive airway pressure that prevents obstructive sleep apnea by keeping the throat from collapsing while you are sleeping. In most cases, the device is  smart  and can slowly self-adjusts if your throat collapses and keeps a record every day of how well you are treated-this information is available to you and your care team.  BPAP is bilevel positive airway pressure that keeps your throat open and also assists each breath with a pressure boost to maintain adequate breathing.  Special kinds of BPAP are used in patients who have inadequate breathing from lung or heart disease. In most cases, the device is  smart  and can slowly self-adjusts to assist breathing. Like CPAP, the device keeps a record of how well you are treated.  Your mask is your connection to the device. You get to choose what feels most comfortable and the staff will help to make sure if fits. Here: are some examples of the different masks that are available:       Key points to remember on your journey with sleep apnea:  1. Sleep study.  PAP devices often need to be adjusted during a sleep study to show that they are effective and adjusted right.  2. Good tips to remember: Try wearing just the mask during a quiet time during the day so your body  adapts to wearing it. A humidifier is recommended for comfort in most cases to prevent drying of your nose and throat. Allergy medication from your provider may help you if you are having nasal congestion.  3. Getting settled-in. It takes more than one night for most of us to get used to wearing a mask. Try wearing just the mask during a quiet time during the day so your body adapts to wearing it. A humidifier is recommended for comfort in most cases. Our team will work with you carefully on the first day and will be in contact within 4 days and again at 2 and 4 weeks for advice and remote device adjustments. Your therapy is evaluated by the device each day.   4. Use it every night. The more you are able to sleep naturally for 7-8 hours, the more likely you will have good sleep and to prevent health risks or symptoms from sleep apnea. Even if you use it 4 hours it helps. Occasionally all of us are unable to use a medical therapy, in sleep apnea, it is not dangerous to miss one night.   5. Communicate. Call our skilled team on the number provided on the first day if your visit for problems that make it difficult to wear the device. Over 2 out of 3 patients can learn to wear the device long-term with help from our team. Remember to call our team or your sleep providers if you are unable to wear the device as we may have other solutions for those who cannot adapt to mask CPAP therapy. It is recommended that you sleep your sleep provider within the first 3 months and yearly after that if you are not having problems.   6. Use it for your health. We encourage use of CPAP masks during daytime quiet periods to allow your face and brain to adapt to the sensation of CPAP so that it will be a more natural sensation to awaken to at night or during naps. This can be very useful during the first few weeks or months of adapting to CPAP though it does not help medically to wear CPAP during wakefulness and  should not be used as a  strategy just to meet guidelines.  7. Take care of your equipment. Make sure you clean your mask and tubing using directions every day and that your filter and mask are replaced as recommended or if they are not working.     BESIDES CPAP, WHAT OTHER THERAPIES ARE THERE?    Positioning Device  Positioning devices are generally used when sleep apnea is mild and only occurs on your back.This example shows a pillow that straps around the waist. It may be appropriate for those whose sleep study shows milder sleep apnea that occurs primarily when lying flat on one's back. Preliminary studies have shown benefit but effectiveness at home may need to be verified by a home sleep test. These devices are generally not covered by medical insurance.  Examples of devices that maintain sleeping on the back to prevent snoring and mild sleep apnea.    Belt type body positioner  http://[employer]/    Electronic reminder  http://nightshifttherapy.com/  http://www.LETSGROOP.Therasis.au/      Oral Appliance  What is oral appliance therapy?  An oral appliance device fits on your teeth at night like a retainer used after having braces. The device is made by a specialized dentist and requires several visits over 1-2 months before a manufactured device is made to fit your teeth and is adjusted to prevent your sleep apnea. Once an oral device is working properly, snoring should be improved. A home sleep test may be recommended at that time if to determine whether the sleep apnea is adequately treated.       Some things to remember:  -Oral devices are often, but not always, covered by your medical insurance. Be sure to check with your insurance provider.   -If you are referred for oral therapy, you will be given a list of specialized dentists to consider or you may choose to visit the Web site of the American Academy of Dental Sleep Medicine  -Oral devices are less likely to work if you have severe sleep apnea or are extremely overweight.     More  detailed information  An oral appliance is a small acrylic device that fits over the upper and lower teeth  (similar to a retainer or a mouth guard). This device slightly moves jaw forward, which moves the base of the tongue forward, opens the airway, improves breathing for effective treat snoring and obstructive sleep apnea in perhaps 7 out of 10 people .  The best working devices are custom-made by a dental device  after a mold is made of the teeth 1, 2, 3.  When is an oral appliance indicated?  Oral appliance therapy is recommended as a first-line treatment for patients with primary snoring, mild sleep apnea, and for patients with moderate sleep apnea who prefer appliance therapy to use of CPAP4, 5. Severity of sleep apnea is determined by sleep testing and is based on the number of respiratory events per hour of sleep.   How successful is oral appliance therapy?  The success rate of oral appliance therapy in patients with mild sleep apnea is 75-80% while in patients with moderate sleep apnea it is 50-70%. The chance of success in patients with severe sleep apnea is 40-50%. The research also shows that oral appliances have a beneficial effect on the cardiovascular health of ADRIANA patients at the same magnitude as CPAP therapy7.  Oral appliances should be a second-line treatment in cases of severe sleep apnea, but if not completely successful then a combination therapy utilizing CPAP plus oral appliance therapy may be effective. Oral appliances tend to be effective in a broad range of patients although studies show that the patients who have the highest success are females, younger patients, those with milder disease, and less severe obesity. 3, 6.   Finding a dentist that practices dental sleep medicine  Specific training is available through the American Academy of Dental Sleep Medicine for dentists interested in working in the field of sleep. To find a dentist who is educated in the field of sleep and  the use of oral appliances, near you, visit the Web site of the American Academy of Dental Sleep Medicine.    References  1. Rupinder, et al. Objectively measured vs self-reported compliance during oral appliance therapy for sleep-disordered breathing. Chest 2013; 144(5): 7914-5736.  2. Valarie et al. Objective measurement of compliance during oral appliance therapy for sleep-disordered breathing. Thorax 2013; 68(1): 91-96.  3. Wolf et al. Mandibular advancement devices in 620 men and women with ADRIANA and snoring: tolerability and predictors of treatment success. Chest 2004; 125: 2651-3313.  4. Virgilio, et al. Oral appliances for snoring and ADRIANA: a review. Sleep 2006; 29: 244-262.  5. John et al. Oral appliance treatment for ADRIANA: an update. J Clin Sleep Med 2014; 10(2): 215-227.  6. Jose et al. Predictors of OSAH treatment outcome. J Dent Res 2007; 86: 9878-2802.      Weight Loss:    Weight loss is a long-term strategy that may improve sleep apnea in some patients.    Weight management is a personal decision and the decision should be based on your interest and the potential benefits.  If you are interested in exploring weight loss strategies, the following discussion covers the impact on weight loss on sleep apnea and the approaches that may be successful.    Being overweight does not necessarily mean you will have health consequences.  Those who have BMI over 35 or over 27 with existing medical conditions carries greater risk.   Weight loss decreases severity of sleep apnea in most people with obesity. For those with mild obesity who have developed snoring with weight gain, even 15-30 pound weight loss can improve and occasionally eliminate sleep apnea.  Structured and life-long dietary and health habits are necessary to lose weight and keep healthier weight levels.     Though there may be significant health benefits from weight loss, long-term weight loss is very difficult to achieve-  studies show success with dietary management in less than 10% of people. In addition, substantial weight loss may require years of dietary control and may be difficult if patients have severe obesity. In these cases, surgical management may be considered.  Finally, older individuals who have tolerated obesity without health complications may be less likely to benefit from weight loss strategies.        Your BMI is Body mass index is 31.09 kg/m .  Weight management is a personal decision.  If you are interested in exploring weight loss strategies, the following discussion covers the approaches that may be successful. Body mass index (BMI) is one way to tell whether you are at a healthy weight, overweight, or obese. It measures your weight in relation to your height.  A BMI of 18.5 to 24.9 is in the healthy range. A person with a BMI of 25 to 29.9 is considered overweight, and someone with a BMI of 30 or greater is considered obese. More than two-thirds of American adults are considered overweight or obese.  Being overweight or obese increases the risk for further weight gain. Excess weight may lead to heart disease and diabetes.  Creating and following plans for healthy eating and physical activity may help you improve your health.  Weight control is part of healthy lifestyle and includes exercise, emotional health, and healthy eating habits. Careful eating habits lifelong are the mainstay of weight control. Though there are significant health benefits from weight loss, long-term weight loss with diet alone may be very difficult to achieve- studies show long-term success with dietary management in less than 10% of people. Attaining a healthy weight may be especially difficult to achieve in those with severe obesity. In some cases, medications, devices and surgical management might be considered.  What can you do?  If you are overweight or obese and are interested in methods for weight loss, you should discuss this  with your provider.     Consider reducing daily calorie intake by 500 calories.     Keep a food journal.     Avoiding skipping meals, consider cutting portions instead.    Diet combined with exercise helps maintain muscle while optimizing fat loss. Strength training is particularly important for building and maintaining muscle mass. Exercise helps reduce stress, increase energy, and improves fitness. Increasing exercise without diet control, however, may not burn enough calories to loose weight.       Start walking three days a week 10-20 minutes at a time    Work towards walking thirty minutes five days a week     Eventually, increase the speed of your walking for 1-2 minutes at time    In addition, we recommend that you review healthy lifestyles and methods for weight loss available through the National Institutes of Health patient information sites:  http://win.niddk.nih.gov/publications/index.htm    And look into health and wellness programs that may be available through your health insurance provider, employer, local community center, or dillon club.          Surgery:    Surgery for obstructive sleep apnea is considered generally only when other therapies fail to work. Surgery may be discussed with you if you are having a difficult time tolerating CPAP and or when there is an abnormal structure that requires surgical correction.  Nose and throat surgeries often enlarge the airway to prevent collapse.  Most of these surgeries create pain for 1-2 weeks and up to half of the most common surgeries are not effective throughout life.  You should carefully discuss the benefits and drawbacks to surgery with your sleep provider and surgeon to determine if it is the best solution for you.   More information  Surgery for ADRIANA is directed at areas that are responsible for narrowing or complete obstruction of the airway during sleep.  There are a wide range of procedures available to enlarge and/or stabilize the airway to  prevent blockage of breathing in the three major areas where it can occur: the palate, tongue, and nasal regions.  Successful surgical treatment depends on the accurate identification of the factors responsible for obstructive sleep apnea in each person.  A personalized approach is required because there is no single treatment that works well for everyone.  Because of anatomic variation, consultation with an examination by a sleep surgeon is a critical first step in determining what surgical options are best for each patient.  In some cases, examination during sedation may be recommended in order to guide the selection of procedures.  Patients will be counseled about risks and benefits as well as the typical recovery course after surgery. Surgery is typically not a cure for a person s ADRIANA.  However, surgery will often significantly improve one s ADRIANA severity (termed  success rate ).  Even in the absence of a cure, surgery will decrease the cardiovascular risk associated with OSA7; improve overall quality of life8 (sleepiness, functionality, sleep quality, etc).      Palate Procedures:  Patients with ADRIANA often have narrowing of their airway in the region of their tonsils and uvula.  The goals of palate procedures are to widen the airway in this region as well as to help the tissues resist collapse.  Modern palate procedure techniques focus on tissue conservation and soft tissue rearrangement, rather than tissue removal.  Often the uvula is preserved in this procedure. Residual sleep apnea is common in patient after pharyngoplasty with an average reduction in sleep apnea events of 33%2.      Tongue Procedures:  ExamWhile patients are awake, the muscles that surround the throat are active and keep this region open for breathing. These muscles relax during sleep, allowing the tongue and other structures to collapse and block breathing.  There are several different tongue procedures available.  Selection of a tongue base  procedure depends on characteristics seen on physical exam.  Generally, procedures are aimed at removing bulky tissues in this area or preventing the back of the tongue from falling back during sleep.  Success rates for tongue surgery range from 50-62%3.    Hypoglossal Nerve Stimulation:  Hypoglossal nerve stimulation has recently received approval from the [place] Food and Drug Administration for the treatment of obstructive sleep apnea.  This is based on research showing that the system was safe and effective in treating sleep apnea6.  Results showed that the median AHI score decreased 68%, from 29.3 to 9.0. This therapy uses an implant system that senses breathing patterns and delivers mild stimulation to airway muscles, which keeps the airway open during sleep.  The system consists of three fully implanted components: a small generator (similar in size to a pacemaker), a breathing sensor, and a stimulation lead.  Using a small handheld remote, a patient turns the therapy on before bed and off upon awakening.    Candidates for this device must be greater than 22 years of age, have moderate to severe ADRIANA (AHI between 20-65), BMI less than 32, have tried CPAP/oral appliance without success, and have appropriate upper airway anatomy (determined by a sleep endoscopy performed by Dr. Rand).    Hypoglossal Nerve Stimulation Pathway:    The sleep surgeon s office will work with the patient through the insurance prior-authorization process (including communications and appeals).    Nasal Procedures:  Nasal obstruction can interfere with nasal breathing during the day and night.  Studies have shown that relief of nasal obstruction can improve the ability of some patients to tolerate positive airway pressure therapy for obstructive sleep apnea1.  Treatment options include medications such as nasal saline, topical corticosteroid and antihistamine sprays, and oral medications such as antihistamines or decongestants.  Non-surgical treatments can include external nasal dilators for selected patients. If these are not successful by themselves, surgery can improve the nasal airway either alone or in combination with these other options.      Combination Procedures:  Combination of surgical procedures and other treatments may be recommended, particularly if patients have more than one area of narrowing or persistent positional disease.  The success rate of combination surgery ranges from 66-80%2,3.    References  1. Winnie SUE. The Role of the Nose in Snoring and Obstructive Sleep Apnoea: An Update.  Eur Arch Otorhinolaryngol. 2011; 268: 1365-73.  2.  Jany SM; Renzo JA; Karen JR; Pallanch JF; Edda MB; Ish SG; Juanito MINER. Surgical modifications of the upper airway for obstructive sleep apnea in adults: a systematic review and meta-analysis. SLEEP 2010;33(10):0389-0734. Ann Marie GARCIA. Hypopharyngeal surgery in obstructive sleep apnea: an evidence-based medicine review.  Arch Otolaryngol Head Neck Surg. 2006 Feb;132(2):206-13.  3. Jose J YH1, Missy Y, Oseas RYANNE. The efficacy of anatomically based multilevel surgery for obstructive sleep apnea. Otolaryngol Head Neck Surg. 2003 Oct;129(4):327-35.  4. Ann Marie GARCIA, Goldberg A. Hypopharyngeal Surgery in Obstructive Sleep Apnea: An Evidence-Based Medicine Review. Arch Otolaryngol Head Neck Surg. 2006 Feb;132(2):206-13.  5. Lidia PJ et al. Upper-Airway Stimulation for Obstructive Sleep Apnea.  N Engl J Med. 2014 Jan 9;370(2):139-49.  6. Kojo Y et al. Increased Incidence of Cardiovascular Disease in Middle-aged Men with Obstructive Sleep Apnea. Am J Respir Crit Care Med; 2002 166: 159-165  7. Ivory SUAREZ et al. Studying Life Effects and Effectiveness of Palatopharyngoplasty (SLEEP) study: Subjective Outcomes of Isolated Uvulopalatopharyngoplasty. Otolaryngol Head Neck Surg. 2011; 144: 623-631.        WHAT IF I ONLY HAVE SNORING?      Mandibular advancement devices, lateral sleep  positioning, long-term weight loss and treatment of nasal allergies has been shown to improve snoring.    Exercising tongue muscles with a game (https://www.ncbi.nlm.nih.gov/pubmed/14132756) or stimulating the tongue during the day with a device (https://doi.org/10.3390/hym74092451) have t snoring    Remember to Drive Safe... Drive Alive     Sleep health profoundly affects your health, mood, and your safety.  Thirty three percent of the population (one in three of us) is not getting enough sleep and many have a sleep disorder. Not getting enough sleep or having an untreated / undertreated sleep condition may make us sleepy without even knowing it. In fact, our driving could be dramatically impaired due to our sleep health. As your provider, here are some things I would like you to know about driving:     Here are some warning signs for impairment and dangerous drowsy driving:              -Having been awake more than 16 hours               -Looking tired               -Eyelid drooping              -Head nodding (it could be too late at this point)              -Driving for more than 30 minutes     Some things you could do to make the driving safer if you are experiencing some drowsiness:              -Stop driving and rest              -Call for transportation              -Make sure your sleep disorder is adequately treated     Some things that have been shown NOT to work when experiencing drowsiness while driving:              -Turning on the radio              -Opening windows              -Eating any  distracting  /  entertaining  foods (e.g., sunflower seeds, candy, or any other)              -Talking on the phone      Your decision may not only impact your life, but also the life of others. Please, remember to drive safe for yourself and all of us.

## 2021-11-18 NOTE — PROGRESS NOTES
Kassy is a 36 year old who is being evaluated via a billable video visit.      How would you like to obtain your AVS? MyChart        Video Start Time: 1:03 PM  Video-Visit Details    Type of service:  Video Visit    Video End Time:1:32 PM    Originating Location (pt. Location): Home    Distant Location (provider location):  [facility]     Platform used for Video Visit: Fuad     Chief complaint:Consult requested by Mandie Farooq MD for the evaluation of headaches and possible sleep disordered breathing    History of Present Illness: 36-year-old female with past medical history notable for thyroiditis and Covid infection in April of this year.  She had a significant weight gain with the thyroiditis and since Covid has had profound fatigue.  She has had a significant disruption in her sleep over the last year.  She has developed significant difficulty initiating and maintaining sleep.  She tries to go to bed between 10 and midnight.  But she often finds it difficult to fall asleep.  She thinks he can take her up to the 1 to 2 hours.  She has been needing to go to the bathroom at night sometimes a couple times.  She often has difficulty returning to sleep.  She denies symptoms of restless legs.  She does note that she tosses and turns and finds it hard to be comfortable.  But is not clearly the urge to move improved by movement.  Her  observes her to snore on occasion but no clear observed apneas.  She usually gets up between 8 and 10 in the morning.  She tries to avoid naps during the day as the naps can affect her ability to sleep at night.  If she does take a nap test to be long.  Her sleep issues seem to feel like they are affecting her during the day.  She is fatigued, irritable, anxious.  She often will wake up with headaches which vary in intensity throughout the day.    Bedroom is cool and dark sometimes she does have the TV on for sound but often will have the fan on for  white noise.  She does not drink any caffeinated beverages no alcohol or use medical or recreational cannabis.  She does have a 7-year-old child but does not disrupt her sleep.    No history of sleepwalking, sleep talking or dream enactment behavior.  She is currently taking amitriptyline for headaches and anxiety.  At first it helped with falling asleep however it is no longer helping with that.      [place] Sleepiness Scale   Sitting and reading: Moderate chance of dozing   Watching TV: Moderate chance of dozing   Sitting, inactive in a public place (e.g. a theatre or a meeting): Would never doze   As a passenger in a car for an hour without a break: Moderate chance of dozing   Lying down to rest in the afternoon when circumstances permit: High chance of dozing   Sitting and talking to someone: Would never doze   Sitting quietly after a lunch without alcohol: Slight chance of dozing   In a car, while stopped for a few minutes in traffic: Would never doze   Total score - [place]: 10   (Less than 10 normal)    Insomnia Severity Scale  DAKOTA Total Score: 19  Total score categories:  0-7 = No clinically significant insomnia   8-14 = Subthreshold insomnia   15-21 = Clinical insomnia (moderate severity)  22-28 = Clinical insomnia (severe)    STOP-BANG  Loud Snore   0  Excessively Tired/Sleepy   1  Observed apnea   0  Hypertension   Elevated measurements but not diagnosed/treated 0-1  BMI> 35 kg/m2   0  Age >50   0  Neck >16 in/40cm   0  Male Gender   0  Total =   1-2  (0-2 low, 3-4 intermediate, 5-8 high risk of ADRIANA)      Past Medical History:   Diagnosis Date     Abnormal Pap smear of cervix 2009, 2012, 2014    see problem list     Cervical high risk HPV (human papillomavirus) test positive 2009, 2012    see problem list     Chickenpox      Depressive disorder      Gestational HTN 01/01/2015     History of colposcopy with cervical biopsy 03/2012    ANN 1     Seasonal allergies     Was on clariten uses occasional sudafed      Urinary tract bacterial infections     as teenager       No Known Allergies    Current Outpatient Medications   Medication     amitriptyline (ELAVIL) 25 MG tablet     levothyroxine (SYNTHROID/LEVOTHROID) 88 MCG tablet     No current facility-administered medications for this visit.       Social History     Socioeconomic History     Marital status:      Spouse name: Not on file     Number of children: 0     Years of education: Not on file     Highest education level: Not on file   Occupational History     Not on file   Tobacco Use     Smoking status: Never Smoker     Smokeless tobacco: Never Used   Vaping Use     Vaping Use: Never used   Substance and Sexual Activity     Alcohol use: No     Drug use: No     Sexual activity: Yes     Partners: Male     Birth control/protection: None   Other Topics Concern     Parent/sibling w/ CABG, MI or angioplasty before 65F 55M? No   Social History Narrative     Not on file     Social Determinants of Health     Financial Resource Strain: Not on file   Food Insecurity: Not on file   Transportation Needs: Not on file   Physical Activity: Not on file   Stress: Not on file   Social Connections: Not on file   Intimate Partner Violence: Not on file   Housing Stability: Not on file       Family History   Adopted: Yes   Problem Relation Age of Onset     Unknown/Adopted Mother      Unknown/Adopted Father        Review of Systems:  Answers for HPI/ROS submitted by the patient on 11/13/2021  General Symptoms: Yes  Skin Symptoms: Yes  HENT Symptoms: Yes  EYE SYMPTOMS: No  HEART SYMPTOMS: Yes  LUNG SYMPTOMS: Yes  INTESTINAL SYMPTOMS: Yes  URINARY SYMPTOMS: No  GYNECOLOGIC SYMPTOMS: Yes  BREAST SYMPTOMS: No  SKELETAL SYMPTOMS: Yes  BLOOD SYMPTOMS: No  NERVOUS SYSTEM SYMPTOMS: Yes  MENTAL HEALTH SYMPTOMS: Yes  Ear pain: No  Ear discharge: No  Hearing loss: No  Tinnitus: No  Nosebleeds: No  Congestion: Yes  Sinus pain: Yes  Trouble swallowing: Yes   Voice hoarseness: Yes  Mouth sores:  No  Sore throat: Yes  Tooth pain: No  Gum tenderness: No  Bleeding gums: No  Change in taste: No  Change in sense of smell: No  Dry mouth: Yes  Hearing aid used: No  Neck lump: No  Fever: No  Loss of appetite: No  Weight loss: No  Weight gain: Yes  Fatigue: Yes  Night sweats: Yes  Chills: No  Increased stress: Yes  Excessive hunger: No  Excessive thirst: Yes  Feeling hot or cold when others believe the temperature is normal: Yes  Loss of height: No  Post-operative complications: No  Surgical site pain: No  Hallucinations: No  Change in or Loss of Energy: Yes  Hyperactivity: No  Confusion: No  Changes in hair: No  Changes in moles/birth marks: No  Itching: No  Rashes: No  Changes in nails: No  Acne: No  Hair in places you don't want it: No  Change in facial hair: No  Warts: No  Non-healing sores: No  Scarring: No  Flaking of skin: Yes  Color changes of hands/feet in cold : No  Sun sensitivity: No  Skin thickening: No  Chest pain or pressure: No  Fast or irregular heartbeat: Yes  Pain in legs with walking: Yes  Trouble breathing while lying down: No  Fingers or toes appear blue: No  High blood pressure: Yes  Low blood pressure: No  Fainting: No  Murmurs: No  Pacemaker: No  Varicose veins: No  Edema or swelling: No  Wake up at night with shortness of breath: No  Light-headedness: Yes  Exercise intolerance: Yes  Cough: Yes  Sputum or phlegm: Yes  Coughing up blood: No  Difficulty breating or shortness of breath: Yes  Snoring: Yes  Wheezing: No  Difficulty breathing on exertion: Yes  Nighttime Cough: Yes  Difficulty breathing when lying flat: No  Heart burn or indigestion: Yes  Nausea: No  Vomiting: No  Abdominal pain: Yes  Bloating: Yes  Constipation: Yes  Diarrhea: Yes  Blood in stool: No  Black stools: No  Rectal or Anal pain: No  Fecal incontinence: No  Yellowing of skin or eyes: No  Vomit with blood: No  Change in stools: Yes  Bleeding or spotting between periods: No  Heavy or painful periods: Yes  Irregular  "periods: Yes  Vaginal discharge: No  Hot flashes: No  Vaginal dryness: No  Genital ulcers: No  Reduced libido: Yes  Painful intercourse: No  Difficulty with sexual arousal: Yes  Post-menopausal bleeding: No  Back pain: Yes  Muscle aches: Yes  Neck pain: Yes  Swollen joints: No  Joint pain: Yes  Bone pain: No  Muscle cramps: Yes  Muscle weakness: Yes  Joint stiffness: No  Bone fracture: No  Trouble with coordination: No  Dizziness or trouble with balance: No  Fainting or black-out spells: No  Memory loss: Yes  Headache: Yes  Seizures: No  Speech problems: No  Tingling: No  Tremor: Yes  Weakness: Yes  Difficulty walking: No  Paralysis: No  Numbness: No  Nervous or Anxious: Yes  Depression: Yes  Trouble sleeping: Yes  Trouble thinking or concentrating: Yes  Mood changes: Yes  Panic attacks: No        EXAM:  Ht 1.575 m (5' 2\")   Wt 77.1 kg (170 lb)   BMI 31.09 kg/m    GENERAL: Alert and no distress  EYES: Eyes grossly normal to inspection.  No discharge or erythema, or obvious scleral/conjunctival abnormalities.  RESP: No audible wheeze, cough, or visible cyanosis.  No visible retractions or increased work of breathing.    SKIN: Visible skin clear. No significant rash, abnormal pigmentation or lesions.  NEURO: Cranial nerves grossly intact.  Mentation and speech appropriate for age.  PSYCH: Mentation appears normal, affect normal, judgement and insight intact, normal speech and appearance well-groomed.         ASSESSMENT:  36-year-old female with difficulty initiating and maintaining sleep temporarily associated with thyroiditis, weight gain and Covid infection.  She has some mild snoring and wakes up with frequent headaches.  She is overall lower risk for obstructive sleep apnea.  Mood disorder could also be contributing to some of her symptoms.  However, identifying and treating significant sleep disordered breathing as well as specifically treating insomnia would be beneficial she may benefit from more specific " treatment of mood disorder as well.    PLAN:  We reviewed the pathophysiology of obstructive sleep apnea.  Recommended home sleep apnea testing.  We reviewed treatment options.  Patient is agreeable to reviewing results of the home sleep apnea test via [facility].  We also reviewed cognitive behavioral therapy for insomnia.  Patient is interested in proceeding with this.  Patient was provided information about our online CBT-I program and a referral for CBT-I consultation.  Consider further evaluation and treatment of mood disorder with primary care.  Continue efforts at weight management.    45 minutes spent on the date of the encounter doing chart review, history and exam, documentation and further activities per the note    Bethany Coronel M.D.  Pulmonary/Critical Care/Sleep Medicine    [facility]   Floor 1, [address]   [address] [place] [address]. [place] [address]   Appointments: [phone]    The above note was dictated using voice recognition software and may include typographical errors. Please contact the author for any clarifications.

## 2021-11-18 NOTE — LETTER
11/18/2021         RE: Lady Saavedra  [id number] [address] [place] [id number]        Dear Colleague,    Thank you for referring your patient, Lady Saavedra, to the [facility]. Please see a copy of my visit note below.    Kassy is a 36 year old who is being evaluated via a billable video visit.      How would you like to obtain your AVS? MyChart        Video Start Time: 1:03 PM  Video-Visit Details    Type of service:  Video Visit    Video End Time:1:32 PM    Originating Location (pt. Location): Home    Distant Location (provider location):  [facility]     Platform used for Video Visit: StemSave     Chief complaint:Consult requested by Mandie Farooq MD for the evaluation of headaches and possible sleep disordered breathing    History of Present Illness: 36-year-old female with past medical history notable for thyroiditis and Covid infection in April of this year.  She had a significant weight gain with the thyroiditis and since Covid has had profound fatigue.  She has had a significant disruption in her sleep over the last year.  She has developed significant difficulty initiating and maintaining sleep.  She tries to go to bed between 10 and midnight.  But she often finds it difficult to fall asleep.  She thinks he can take her up to the 1 to 2 hours.  She has been needing to go to the bathroom at night sometimes a couple times.  She often has difficulty returning to sleep.  She denies symptoms of restless legs.  She does note that she tosses and turns and finds it hard to be comfortable.  But is not clearly the urge to move improved by movement.  Her  observes her to snore on occasion but no clear observed apneas.  She usually gets up between 8 and 10 in the morning.  She tries to avoid naps during the day as the naps can affect her ability to sleep at night.  If she does take a nap test to be long.  Her sleep issues seem to feel like  they are affecting her during the day.  She is fatigued, irritable, anxious.  She often will wake up with headaches which vary in intensity throughout the day.    Bedroom is cool and dark sometimes she does have the TV on for sound but often will have the fan on for white noise.  She does not drink any caffeinated beverages no alcohol or use medical or recreational cannabis.  She does have a 7-year-old child but does not disrupt her sleep.    No history of sleepwalking, sleep talking or dream enactment behavior.  She is currently taking amitriptyline for headaches and anxiety.  At first it helped with falling asleep however it is no longer helping with that.      [place] Sleepiness Scale   Sitting and reading: Moderate chance of dozing   Watching TV: Moderate chance of dozing   Sitting, inactive in a public place (e.g. a theatre or a meeting): Would never doze   As a passenger in a car for an hour without a break: Moderate chance of dozing   Lying down to rest in the afternoon when circumstances permit: High chance of dozing   Sitting and talking to someone: Would never doze   Sitting quietly after a lunch without alcohol: Slight chance of dozing   In a car, while stopped for a few minutes in traffic: Would never doze   Total score - [place]: 10   (Less than 10 normal)    Insomnia Severity Scale  DAKOTA Total Score: 19  Total score categories:  0-7 = No clinically significant insomnia   8-14 = Subthreshold insomnia   15-21 = Clinical insomnia (moderate severity)  22-28 = Clinical insomnia (severe)    STOP-BANG  Loud Snore   0  Excessively Tired/Sleepy   1  Observed apnea   0  Hypertension   Elevated measurements but not diagnosed/treated 0-1  BMI> 35 kg/m2   0  Age >50   0  Neck >16 in/40cm   0  Male Gender   0  Total =   1-2  (0-2 low, 3-4 intermediate, 5-8 high risk of ADRIANA)      Past Medical History:   Diagnosis Date     Abnormal Pap smear of cervix 2009, 2012, 2014    see problem list     Cervical high risk HPV  (human papillomavirus) test positive 2009, 2012    see problem list     Chickenpox      Depressive disorder      Gestational HTN 01/01/2015     History of colposcopy with cervical biopsy 03/2012    ANN 1     Seasonal allergies     Was on clariten uses occasional sudafed     Urinary tract bacterial infections     as teenager       No Known Allergies    Current Outpatient Medications   Medication     amitriptyline (ELAVIL) 25 MG tablet     levothyroxine (SYNTHROID/LEVOTHROID) 88 MCG tablet     No current facility-administered medications for this visit.       Social History     Socioeconomic History     Marital status:      Spouse name: Not on file     Number of children: 0     Years of education: Not on file     Highest education level: Not on file   Occupational History     Not on file   Tobacco Use     Smoking status: Never Smoker     Smokeless tobacco: Never Used   Vaping Use     Vaping Use: Never used   Substance and Sexual Activity     Alcohol use: No     Drug use: No     Sexual activity: Yes     Partners: Male     Birth control/protection: None   Other Topics Concern     Parent/sibling w/ CABG, MI or angioplasty before 65F 55M? No   Social History Narrative     Not on file     Social Determinants of Health     Financial Resource Strain: Not on file   Food Insecurity: Not on file   Transportation Needs: Not on file   Physical Activity: Not on file   Stress: Not on file   Social Connections: Not on file   Intimate Partner Violence: Not on file   Housing Stability: Not on file       Family History   Adopted: Yes   Problem Relation Age of Onset     Unknown/Adopted Mother      Unknown/Adopted Father        Review of Systems:  Answers for HPI/ROS submitted by the patient on 11/13/2021  General Symptoms: Yes  Skin Symptoms: Yes  HENT Symptoms: Yes  EYE SYMPTOMS: No  HEART SYMPTOMS: Yes  LUNG SYMPTOMS: Yes  INTESTINAL SYMPTOMS: Yes  URINARY SYMPTOMS: No  GYNECOLOGIC SYMPTOMS: Yes  BREAST SYMPTOMS: No  SKELETAL  SYMPTOMS: Yes  BLOOD SYMPTOMS: No  NERVOUS SYSTEM SYMPTOMS: Yes  MENTAL HEALTH SYMPTOMS: Yes  Ear pain: No  Ear discharge: No  Hearing loss: No  Tinnitus: No  Nosebleeds: No  Congestion: Yes  Sinus pain: Yes  Trouble swallowing: Yes   Voice hoarseness: Yes  Mouth sores: No  Sore throat: Yes  Tooth pain: No  Gum tenderness: No  Bleeding gums: No  Change in taste: No  Change in sense of smell: No  Dry mouth: Yes  Hearing aid used: No  Neck lump: No  Fever: No  Loss of appetite: No  Weight loss: No  Weight gain: Yes  Fatigue: Yes  Night sweats: Yes  Chills: No  Increased stress: Yes  Excessive hunger: No  Excessive thirst: Yes  Feeling hot or cold when others believe the temperature is normal: Yes  Loss of height: No  Post-operative complications: No  Surgical site pain: No  Hallucinations: No  Change in or Loss of Energy: Yes  Hyperactivity: No  Confusion: No  Changes in hair: No  Changes in moles/birth marks: No  Itching: No  Rashes: No  Changes in nails: No  Acne: No  Hair in places you don't want it: No  Change in facial hair: No  Warts: No  Non-healing sores: No  Scarring: No  Flaking of skin: Yes  Color changes of hands/feet in cold : No  Sun sensitivity: No  Skin thickening: No  Chest pain or pressure: No  Fast or irregular heartbeat: Yes  Pain in legs with walking: Yes  Trouble breathing while lying down: No  Fingers or toes appear blue: No  High blood pressure: Yes  Low blood pressure: No  Fainting: No  Murmurs: No  Pacemaker: No  Varicose veins: No  Edema or swelling: No  Wake up at night with shortness of breath: No  Light-headedness: Yes  Exercise intolerance: Yes  Cough: Yes  Sputum or phlegm: Yes  Coughing up blood: No  Difficulty breating or shortness of breath: Yes  Snoring: Yes  Wheezing: No  Difficulty breathing on exertion: Yes  Nighttime Cough: Yes  Difficulty breathing when lying flat: No  Heart burn or indigestion: Yes  Nausea: No  Vomiting: No  Abdominal pain: Yes  Bloating: Yes  Constipation:  "Yes  Diarrhea: Yes  Blood in stool: No  Black stools: No  Rectal or Anal pain: No  Fecal incontinence: No  Yellowing of skin or eyes: No  Vomit with blood: No  Change in stools: Yes  Bleeding or spotting between periods: No  Heavy or painful periods: Yes  Irregular periods: Yes  Vaginal discharge: No  Hot flashes: No  Vaginal dryness: No  Genital ulcers: No  Reduced libido: Yes  Painful intercourse: No  Difficulty with sexual arousal: Yes  Post-menopausal bleeding: No  Back pain: Yes  Muscle aches: Yes  Neck pain: Yes  Swollen joints: No  Joint pain: Yes  Bone pain: No  Muscle cramps: Yes  Muscle weakness: Yes  Joint stiffness: No  Bone fracture: No  Trouble with coordination: No  Dizziness or trouble with balance: No  Fainting or black-out spells: No  Memory loss: Yes  Headache: Yes  Seizures: No  Speech problems: No  Tingling: No  Tremor: Yes  Weakness: Yes  Difficulty walking: No  Paralysis: No  Numbness: No  Nervous or Anxious: Yes  Depression: Yes  Trouble sleeping: Yes  Trouble thinking or concentrating: Yes  Mood changes: Yes  Panic attacks: No        EXAM:  Ht 1.575 m (5' 2\")   Wt 77.1 kg (170 lb)   BMI 31.09 kg/m    GENERAL: Alert and no distress  EYES: Eyes grossly normal to inspection.  No discharge or erythema, or obvious scleral/conjunctival abnormalities.  RESP: No audible wheeze, cough, or visible cyanosis.  No visible retractions or increased work of breathing.    SKIN: Visible skin clear. No significant rash, abnormal pigmentation or lesions.  NEURO: Cranial nerves grossly intact.  Mentation and speech appropriate for age.  PSYCH: Mentation appears normal, affect normal, judgement and insight intact, normal speech and appearance well-groomed.         ASSESSMENT:  36-year-old female with difficulty initiating and maintaining sleep temporarily associated with thyroiditis, weight gain and Covid infection.  She has some mild snoring and wakes up with frequent headaches.  She is overall lower risk for " obstructive sleep apnea.  Mood disorder could also be contributing to some of her symptoms.  However, identifying and treating significant sleep disordered breathing as well as specifically treating insomnia would be beneficial she may benefit from more specific treatment of mood disorder as well.    PLAN:  We reviewed the pathophysiology of obstructive sleep apnea.  Recommended home sleep apnea testing.  We reviewed treatment options.  Patient is agreeable to reviewing results of the home sleep apnea test via [employer].  We also reviewed cognitive behavioral therapy for insomnia.  Patient is interested in proceeding with this.  Patient was provided information about our online CBT-I program and a referral for CBT-I consultation.  Consider further evaluation and treatment of mood disorder with primary care.  Continue efforts at weight management.    45 minutes spent on the date of the encounter doing chart review, history and exam, documentation and further activities per the note    Bethany Coronel M.D.  Pulmonary/Critical Care/Sleep Medicine    [facility]   Floor 1, [address]   [address] [place] [address]. [place] [address]   Appointments: [phone]    The above note was dictated using voice recognition software and may include typographical errors. Please contact the author for any clarifications.                Again, thank you for allowing me to participate in the care of your patient.        Sincerely,        Bethany Coronel MD

## 2021-11-18 NOTE — PROGRESS NOTES
Vascular Cardiology Consultation      HPI:     This is a 36 year old female with no PMH here for evaluation and followup of Rolando Kaufman. She had COVID in April and here to discuss her cardiovascular symptoms. Describes lightheadedness, and heart rates shoot up to 160s with exertion. No chest pain or shortness of breath. Was hospitalized for about a week requiring oxygen. CT negative for PE, bilateral opacities present. Also diagnosed with thyroiditis likely triggerered by COVID as well.      ROS + diarrhea, abdominal cramping, no SOB, LE edema. No syncope.     We obtained echocardiogram, inflammatory markers, DDimer and ziopatch which were unremarkable. She had symptoms correlating to her elevated heart rates (sinus) but no arrhythmia. Echo negative for LV dysfunction or findings suggesting myocarditis.      ASSESSMENT/PLAN:     1. Long-haul COVID: discussed risks for postural orthostatic tachycardia syndrome (POTS), inappropriate sinus tachycardia, increased risk for VTE,  Amari/pericarditis for cardiovascular complications. I believe mainly she has inappropriate ST and cardiac deconditioning which is well described from COVID. Also she may be at increased risk for thromboembolism but luckily her DDimer was normal on initial evaluation.   -Echocardiogram reviewed  -Ziopatch reviewed  -inflammatory markers normal  -DDimer in 6 months   -Propanolol 20-40 mg tid prn - if elevated BP can try Toprol XL 25 mg daily in the future instead of propanolol as needed  -moderate intensity exercise encouraged   -stress echocardiogram in 6 months to evaluate conditioning and elevated exercise induced HR  -follow up 6 months with me     Lalita Birmingham MD MSC  [facility]          PAST MEDICAL HISTORY  Past Medical History:   Diagnosis Date     Abnormal Pap smear of cervix 2009, 2012, 2014    see problem list     Cervical high risk HPV (human papillomavirus) test positive 2009, 2012    see problem list      Chickenpox      Depressive disorder      Gestational HTN 01/01/2015     History of colposcopy with cervical biopsy 03/2012    ANN 1     Seasonal allergies     Was on clariten uses occasional sudafed     Urinary tract bacterial infections     as teenager       CURRENT MEDICATIONS  Current Outpatient Medications   Medication Sig Dispense Refill     amitriptyline (ELAVIL) 25 MG tablet Take 1 tablet (25 mg) by mouth At Bedtime 30 tablet 1     levothyroxine (SYNTHROID/LEVOTHROID) 88 MCG tablet Take 1 tablet (88 mcg) by mouth daily 30 tablet 11       PAST SURGICAL HISTORY:  Past Surgical History:   Procedure Laterality Date     BIOPSY       COSMETIC SURGERY       HC ENLARGE BREAST WITH IMPLANT       MOUTH SURGERY  age 18    wisdom teeth       ALLERGIES   No Known Allergies    FAMILY HISTORY  Family History   Adopted: Yes   Problem Relation Age of Onset     Unknown/Adopted Mother      Unknown/Adopted Father          SOCIAL HISTORY  Social History     Socioeconomic History     Marital status:      Spouse name: Not on file     Number of children: 0     Years of education: Not on file     Highest education level: Not on file   Occupational History     Not on file   Tobacco Use     Smoking status: Never Smoker     Smokeless tobacco: Never Used   Vaping Use     Vaping Use: Never used   Substance and Sexual Activity     Alcohol use: No     Drug use: No     Sexual activity: Yes     Partners: Male     Birth control/protection: None   Other Topics Concern     Parent/sibling w/ CABG, MI or angioplasty before 65F 55M? No   Social History Narrative     Not on file     Social Determinants of Health     Financial Resource Strain: Not on file   Food Insecurity: Not on file   Transportation Needs: Not on file   Physical Activity: Not on file   Stress: Not on file   Social Connections: Not on file   Intimate Partner Violence: Not on file   Housing Stability: Not on file         EXAM:  BP (!) 141/97   Pulse 79   Wt 79.5 kg (175 lb  3.2 oz)   SpO2 98%   BMI 32.04 kg/m    In general, the patient is a pleasant female in no apparent distress.    HEENT: NC/AT.  PERRLA.  EOMI.  Sclerae white, not injected.  Nares clear.  Pharynx without erythema or exudate.  Dentition intact.   Neck: No adenopathy.  No thyromegaly. Carotids +2/2 bilaterally without bruits.  No jugular venous distension.   Heart: RRR. Normal S1, S2 splits physiologically. No murmur, rub, click, or gallop.   Lungs: CTA.  No ronchi, wheezes, rales.   Abdomen: Soft, nontender, nondistended. No organomegaly. No AAA.  No bruits.   Extremities: No clubbing, cyanosis, or edema.  No wounds.   Vascular: No bruits are noted.    Labs:  LIPID RESULTS:  Lab Results   Component Value Date    CHOL 173 03/22/2011    HDL 43 (L) 03/22/2011     03/22/2011    TRIG 107 03/22/2011    CHOLHDLRATIO 4.0 03/22/2011       LIVER ENZYME RESULTS:  Lab Results   Component Value Date    AST 37 06/14/2021    ALT 58 (H) 07/06/2021       CBC RESULTS:  Lab Results   Component Value Date    WBC 6.6 06/14/2021    RBC 4.52 06/14/2021    HGB 14.1 06/14/2021    HCT 40.5 06/14/2021    MCV 90 06/14/2021    MCH 31.2 06/14/2021    MCHC 34.8 06/14/2021    RDW 12.3 06/14/2021     06/14/2021       BMP RESULTS:  Lab Results   Component Value Date     06/14/2021    POTASSIUM 3.8 06/14/2021    CHLORIDE 108 06/14/2021    CO2 24 06/14/2021    ANIONGAP 8 06/14/2021    GLC 92 06/14/2021    BUN 10 06/14/2021    CR 0.52 06/14/2021    GFRESTIMATED >90 06/14/2021    GFRESTBLACK >90 06/14/2021    DIANA 9.2 06/14/2021        A1C RESULTS:  No results found for: A1C

## 2021-11-19 ENCOUNTER — TELEPHONE (OUTPATIENT)
Dept: SLEEP MEDICINE | Facility: CLINIC | Age: 36
End: 2021-11-19
Payer: COMMERCIAL

## 2021-11-19 NOTE — TELEPHONE ENCOUNTER
Attempted to reach Kassy today to schedule HST.  Dr. Coronel will communicate via [employer] for test results. No answer. [facility]      Stacey CRAIG CMA, [facility], 11/19/2021 10:31 AM

## 2021-11-19 NOTE — TELEPHONE ENCOUNTER
----- Message from Jayne Gutiérrez CMA sent at 11/18/2021  3:53 PM CST -----  Regarding: schedule HST, no return visit: results through [facility]  Insurance: [employer]

## 2021-12-03 ENCOUNTER — MYC MEDICAL ADVICE (OUTPATIENT)
Dept: FAMILY MEDICINE | Facility: CLINIC | Age: 36
End: 2021-12-03
Payer: COMMERCIAL

## 2021-12-03 DIAGNOSIS — G43.109 MIGRAINE WITH AURA AND WITHOUT STATUS MIGRAINOSUS, NOT INTRACTABLE: ICD-10-CM

## 2021-12-03 DIAGNOSIS — F41.9 ANXIETY: ICD-10-CM

## 2021-12-03 NOTE — TELEPHONE ENCOUNTER
"Requested Prescriptions   Pending Prescriptions Disp Refills     amitriptyline (ELAVIL) 25 MG tablet 30 tablet 1     Sig: Take 1 tablet (25 mg) by mouth At Bedtime       Tricyclic Agents ( Annual appt and no PHQ9) Failed - 12/3/2021 10:29 AM        Failed - Blood Pressure under 140/90 in past 12 mos     BP Readings from Last 3 Encounters:   11/18/21 (!) 134/98   10/25/21 (!) 144/95   10/20/21 128/88                 Passed - Recent (12 mo) or future (30 days) visit within authorizing provider's specialty     Patient has had an office visit with the authorizing provider or a provider within the authorizing providers department within the previous 12 mos or has a future within next 30 days. See \"Patient Info\" tab in inbasket, or \"Choose Columns\" in Meds & Orders section of the refill encounter.              Passed - Medication is active on med list        Passed - Patient is age 18 or older        Passed - Patient is not pregnant        Passed - No positive pregnancy test on record in past 12 mos             "

## 2021-12-03 NOTE — TELEPHONE ENCOUNTER
,    Routing refill request to provider for review/approval because:  Patient's last 2 bp's elevated. Judy BECERRIL RN

## 2021-12-07 ENCOUNTER — LAB (OUTPATIENT)
Dept: LAB | Facility: CLINIC | Age: 36
End: 2021-12-07
Payer: COMMERCIAL

## 2021-12-07 DIAGNOSIS — E03.9 HYPOTHYROIDISM, UNSPECIFIED TYPE: ICD-10-CM

## 2021-12-07 LAB — TSH SERPL DL<=0.005 MIU/L-ACNC: 1.22 MU/L (ref 0.4–4)

## 2021-12-07 PROCEDURE — 36415 COLL VENOUS BLD VENIPUNCTURE: CPT

## 2021-12-07 PROCEDURE — 84443 ASSAY THYROID STIM HORMONE: CPT

## 2021-12-07 SDOH — SOCIAL STABILITY: SOCIAL NETWORK

## 2021-12-07 SDOH — SOCIAL STABILITY: SOCIAL NETWORK: I HAVE TROUBLE DOING ALL OF MY USUAL WORK (INCLUDE WORK AT HOME): SOMETIMES

## 2021-12-07 SDOH — SOCIAL STABILITY: SOCIAL NETWORK: I HAVE TROUBLE DOING ALL OF THE FAMILY ACTIVITIES THAT I WANT TO DO: USUALLY

## 2021-12-07 SDOH — SOCIAL STABILITY: SOCIAL NETWORK: PROMIS ABILITY TO PARTICIPATE IN SOCIAL ROLES & ACTIVITIES T-SCORE: 41.7

## 2021-12-07 SDOH — SOCIAL STABILITY: SOCIAL NETWORK: I HAVE TROUBLE DOING ALL OF MY REGULAR LEISURE ACTIVITIES WITH OTHERS: SOMETIMES

## 2021-12-07 SDOH — SOCIAL STABILITY: SOCIAL NETWORK: I HAVE TROUBLE DOING ALL OF THE ACTIVITIES WITH FRIENDS THAT I WANT TO DO: USUALLY

## 2021-12-07 ASSESSMENT — ENCOUNTER SYMPTOMS
NUMBNESS: 0
DECREASED LIBIDO: 1
SMELL DISTURBANCE: 0
PALPITATIONS: 1
FATIGUE: 1
BACK PAIN: 1
HOARSE VOICE: 1
NECK MASS: 0
TINGLING: 0
SEIZURES: 0
POLYDIPSIA: 1
SPUTUM PRODUCTION: 1
FEVER: 0
SINUS CONGESTION: 1
COUGH DISTURBING SLEEP: 0
HALLUCINATIONS: 0
PANIC: 0
POLYPHAGIA: 0
VOMITING: 0
BLOATING: 1
BLOOD IN STOOL: 0
WEAKNESS: 1
TROUBLE SWALLOWING: 0
ABDOMINAL PAIN: 1
NERVOUS/ANXIOUS: 1
SLEEP DISTURBANCES DUE TO BREATHING: 0
SPEECH CHANGE: 0
SKIN CHANGES: 0
ABDOMINAL PAIN: 1
HEADACHES: 1
EXERCISE INTOLERANCE: 1
WHEEZING: 0
DECREASED LIBIDO: 1
RECTAL PAIN: 0
ALTERED TEMPERATURE REGULATION: 1
HYPOTENSION: 0
INSOMNIA: 1
STIFFNESS: 0
SPEECH CHANGE: 0
FEVER: 0
COUGH DISTURBING SLEEP: 0
BOWEL INCONTINENCE: 0
WHEEZING: 0
HOARSE VOICE: 1
BLOATING: 1
DYSPNEA ON EXERTION: 1
TASTE DISTURBANCE: 0
ARTHRALGIAS: 1
SLEEP DISTURBANCES DUE TO BREATHING: 0
LOSS OF CONSCIOUSNESS: 0
HEARTBURN: 1
RECTAL PAIN: 0
TASTE DISTURBANCE: 0
NECK PAIN: 1
CHILLS: 0
DYSPNEA ON EXERTION: 1
DEPRESSION: 1
NECK MASS: 0
MUSCLE WEAKNESS: 0
JAUNDICE: 0
MUSCLE CRAMPS: 1
DIARRHEA: 1
JAUNDICE: 0
MUSCLE CRAMPS: 1
HEMOPTYSIS: 0
NUMBNESS: 0
TREMORS: 0
PANIC: 0
SPUTUM PRODUCTION: 1
SYNCOPE: 0
STIFFNESS: 0
POSTURAL DYSPNEA: 0
WEIGHT GAIN: 1
DEPRESSION: 1
ORTHOPNEA: 0
JOINT SWELLING: 0
POLYPHAGIA: 0
SORE THROAT: 1
SHORTNESS OF BREATH: 1
ORTHOPNEA: 0
MEMORY LOSS: 0
WEIGHT LOSS: 0
HOT FLASHES: 0
NAIL CHANGES: 0
LIGHT-HEADEDNESS: 1
INCREASED ENERGY: 1
PARALYSIS: 0
WEIGHT LOSS: 0
INCREASED ENERGY: 1
DECREASED APPETITE: 0
HEMOPTYSIS: 0
NIGHT SWEATS: 0
NECK PAIN: 1
LOSS OF CONSCIOUSNESS: 0
MUSCLE WEAKNESS: 0
BLOOD IN STOOL: 0
DISTURBANCES IN COORDINATION: 0
DIZZINESS: 0
EXERCISE INTOLERANCE: 1
DIARRHEA: 1
MYALGIAS: 1
TROUBLE SWALLOWING: 0
JOINT SWELLING: 0
SINUS PAIN: 1
ARTHRALGIAS: 1
SYNCOPE: 0
MYALGIAS: 1
BACK PAIN: 1
CONSTIPATION: 1
WEAKNESS: 1
COUGH: 1
LEG PAIN: 0
HYPOTENSION: 0
POLYDIPSIA: 1
BOWEL INCONTINENCE: 0
CONSTIPATION: 1
POOR WOUND HEALING: 1
DISTURBANCES IN COORDINATION: 0
LEG PAIN: 0
POOR WOUND HEALING: 1
SKIN CHANGES: 0
DIZZINESS: 0
HEADACHES: 1
SEIZURES: 0
TINGLING: 0
SMELL DISTURBANCE: 0
FATIGUE: 1
SORE THROAT: 1
MEMORY LOSS: 0
DECREASED CONCENTRATION: 1
PARALYSIS: 0
WEIGHT GAIN: 1
NAUSEA: 0
SNORES LOUDLY: 1
ALTERED TEMPERATURE REGULATION: 1
NAUSEA: 0
INSOMNIA: 1
LIGHT-HEADEDNESS: 1
SINUS CONGESTION: 1
SHORTNESS OF BREATH: 1
DECREASED APPETITE: 0
NIGHT SWEATS: 0
SINUS PAIN: 1
HOT FLASHES: 0
HYPERTENSION: 1
NERVOUS/ANXIOUS: 1
COUGH: 1
HEARTBURN: 1
POSTURAL DYSPNEA: 0
SNORES LOUDLY: 1
DECREASED CONCENTRATION: 1
NAIL CHANGES: 0
VOMITING: 0
CHILLS: 0
HYPERTENSION: 1
TREMORS: 0
PALPITATIONS: 1
HALLUCINATIONS: 0

## 2021-12-07 ASSESSMENT — ANXIETY QUESTIONNAIRES
7. FEELING AFRAID AS IF SOMETHING AWFUL MIGHT HAPPEN: NOT AT ALL
GAD7 TOTAL SCORE: 7
6. BECOMING EASILY ANNOYED OR IRRITABLE: NEARLY EVERY DAY
4. TROUBLE RELAXING: SEVERAL DAYS
7. FEELING AFRAID AS IF SOMETHING AWFUL MIGHT HAPPEN: NOT AT ALL
5. BEING SO RESTLESS THAT IT IS HARD TO SIT STILL: NOT AT ALL
1. FEELING NERVOUS, ANXIOUS, OR ON EDGE: SEVERAL DAYS
3. WORRYING TOO MUCH ABOUT DIFFERENT THINGS: SEVERAL DAYS
8. IF YOU CHECKED OFF ANY PROBLEMS, HOW DIFFICULT HAVE THESE MADE IT FOR YOU TO DO YOUR WORK, TAKE CARE OF THINGS AT HOME, OR GET ALONG WITH OTHER PEOPLE?: VERY DIFFICULT
GAD7 TOTAL SCORE: 7
GAD7 TOTAL SCORE: 7
2. NOT BEING ABLE TO STOP OR CONTROL WORRYING: SEVERAL DAYS

## 2021-12-07 ASSESSMENT — PATIENT HEALTH QUESTIONNAIRE - PHQ9
SUM OF ALL RESPONSES TO PHQ QUESTIONS 1-9: 11
SUM OF ALL RESPONSES TO PHQ QUESTIONS 1-9: 11
10. IF YOU CHECKED OFF ANY PROBLEMS, HOW DIFFICULT HAVE THESE PROBLEMS MADE IT FOR YOU TO DO YOUR WORK, TAKE CARE OF THINGS AT HOME, OR GET ALONG WITH OTHER PEOPLE: VERY DIFFICULT

## 2021-12-08 ENCOUNTER — VIRTUAL VISIT (OUTPATIENT)
Dept: PHYSICAL MEDICINE AND REHAB | Facility: CLINIC | Age: 36
End: 2021-12-08
Attending: FAMILY MEDICINE
Payer: COMMERCIAL

## 2021-12-08 ENCOUNTER — VIRTUAL VISIT (OUTPATIENT)
Dept: GASTROENTEROLOGY | Facility: CLINIC | Age: 36
End: 2021-12-08
Attending: FAMILY MEDICINE
Payer: COMMERCIAL

## 2021-12-08 DIAGNOSIS — F41.9 POST-COVID CHRONIC ANXIETY: ICD-10-CM

## 2021-12-08 DIAGNOSIS — F32.A DEPRESSION, UNSPECIFIED DEPRESSION TYPE: ICD-10-CM

## 2021-12-08 DIAGNOSIS — E03.9 HYPOTHYROIDISM, UNSPECIFIED TYPE: Primary | ICD-10-CM

## 2021-12-08 DIAGNOSIS — R41.89 BRAIN FOG: ICD-10-CM

## 2021-12-08 DIAGNOSIS — G43.109 MIGRAINE WITH AURA AND WITHOUT STATUS MIGRAINOSUS, NOT INTRACTABLE: ICD-10-CM

## 2021-12-08 DIAGNOSIS — U09.9 POST-COVID CHRONIC ANXIETY: ICD-10-CM

## 2021-12-08 DIAGNOSIS — R00.2 PALPITATIONS: ICD-10-CM

## 2021-12-08 DIAGNOSIS — R42 LIGHTHEADEDNESS: ICD-10-CM

## 2021-12-08 DIAGNOSIS — K52.9 CHRONIC DIARRHEA OF UNKNOWN ORIGIN: Primary | ICD-10-CM

## 2021-12-08 DIAGNOSIS — G93.32 POST-COVID CHRONIC FATIGUE: ICD-10-CM

## 2021-12-08 DIAGNOSIS — U09.9 POST-COVID-19 SYNDROME: ICD-10-CM

## 2021-12-08 DIAGNOSIS — R06.02 SOB (SHORTNESS OF BREATH): Primary | ICD-10-CM

## 2021-12-08 DIAGNOSIS — R19.7 DIARRHEA, UNSPECIFIED TYPE: ICD-10-CM

## 2021-12-08 DIAGNOSIS — F41.9 ANXIETY: ICD-10-CM

## 2021-12-08 DIAGNOSIS — U09.9 POST-COVID CHRONIC FATIGUE: ICD-10-CM

## 2021-12-08 DIAGNOSIS — M79.10 MYALGIA: ICD-10-CM

## 2021-12-08 PROCEDURE — 99205 OFFICE O/P NEW HI 60 MIN: CPT | Mod: 95 | Performed by: STUDENT IN AN ORGANIZED HEALTH CARE EDUCATION/TRAINING PROGRAM

## 2021-12-08 PROCEDURE — 99203 OFFICE O/P NEW LOW 30 MIN: CPT | Mod: 95 | Performed by: INTERNAL MEDICINE

## 2021-12-08 RX ORDER — DICYCLOMINE HYDROCHLORIDE 10 MG/1
10 CAPSULE ORAL 4 TIMES DAILY PRN
Qty: 90 CAPSULE | Refills: 3 | Status: SHIPPED | OUTPATIENT
Start: 2021-12-08 | End: 2022-09-21

## 2021-12-08 ASSESSMENT — ENCOUNTER SYMPTOMS
NAUSEA: 0
SPEECH CHANGE: 0
SORE THROAT: 1
NERVOUS/ANXIOUS: 1
HEARTBURN: 1
WEIGHT LOSS: 0
SEIZURES: 0
WHEEZING: 0
HALLUCINATIONS: 0
CHILLS: 0
BLOOD IN STOOL: 0
DEPRESSION: 1
MYALGIAS: 1
SPUTUM PRODUCTION: 1
DIZZINESS: 0
NECK PAIN: 1
SHORTNESS OF BREATH: 1
WEAKNESS: 1
HEMOPTYSIS: 0
TINGLING: 0
SINUS PAIN: 1
COUGH: 1
POLYDIPSIA: 1
ORTHOPNEA: 0
BACK PAIN: 1
PALPITATIONS: 1
HEADACHES: 1
INSOMNIA: 1
FEVER: 0
CONSTIPATION: 1
ABDOMINAL PAIN: 1
LOSS OF CONSCIOUSNESS: 0
DIARRHEA: 1
TREMORS: 0
VOMITING: 0
MEMORY LOSS: 0

## 2021-12-08 ASSESSMENT — ANXIETY QUESTIONNAIRES: GAD7 TOTAL SCORE: 7

## 2021-12-08 ASSESSMENT — PATIENT HEALTH QUESTIONNAIRE - PHQ9: SUM OF ALL RESPONSES TO PHQ QUESTIONS 1-9: 11

## 2021-12-08 NOTE — PROGRESS NOTES
Kassy is a 36 year old who is being evaluated via a billable video visit.      How would you like to obtain your AVS? MyChart  If the video visit is dropped, the invitation should be resent by: Text to cell phone: [phone]  Will anyone else be joining your video visit? No      Video Start Time: 2.05 pm   Video-Visit Details    Type of service:  Video Visit    Video End Time:2:46 PM    Originating Location (pt. Location): Home    Distant Location (provider location):  [facility] [place]     Platform used for Video Visit: Quotefish     Total time including chart review, care-coordination and documentation time on the date of encounter - 60 mins          [facility]  Post COVID Clinic    Today's Date: 12/08/2021    Recommendations:  PFT, CXR   Mental health referral  PT referral    Follow up with neurologist, gastro  Meditation 30 mins daily   Relaxing and joyful activities daily   Distracting the mind from symptoms/illness    COVID vaccination - will need booster in March 2022    Return visit - March 9 at 3 pm video visit     Thank you for involving me in the care of this patient.     Parisa Yuan MD      Assessment:  Lady Saavedra is a 36 year old with PMH of headaches, diarrhea before covid but had not pursued workup, h/o depression (and post partum depression) and anxiety.     Post COVID symptoms: Diagnosed with COVID Mar 2021. Current symptoms include palpitations, SOB, headaches, diarrhea (worse than before), brain fog, anxiety and depression worse after covid. Work up by cardiology was ok. Seeing neurology and gastro.     Will work up for lung causes and take care of mental health and PT for fatigue and pulm rehab.     -------------------------------------------------------------------------------------------------------------------    Reason for Consult / Chief complaint:   Consulted by Dr. Farooq for post COVID symptoms    History of  Presenting Illness:  Lady Saavedra is a 36 year old with PMH of headaches, diarrhea before covid but had not pursued workup, h/o depression (and post partum depression) and anxiety     History of COVID-19 infection:  COVID test and Date: March 31, 2021 in [place], saliva test   Symptoms: headaches, cough, diarrhea, very tired, body aches, low grade fever, sob   Hospitalization: admitted April 5 for SOB, till the 9th   Treatment:   Oxygen: yes  Intubation: no   Drug: dexa, remdesivir and prophylactic anticoagulation    Enrolled in clinical trial: no     Current symptoms:  dced on no oxygen, post discharge was super tired and had headaches everyday - took NSAIDS everyday but headaches would recur when it wore off. Was off of work till June, tried to go back but could not due to symptoms. HR would go very high with walking with SOB and lightheadededness. Slept for 14 hours (would wake up a lot though) and take a nap again 3 hours later.   Was diagnosed with Hashimoto thyroiditis in June - started on thyroxine and dose is being adjusted.   Thes symptoms are slightly better with thyroid medicine and time. Fatigue is better. Still has significant SOB, heart racing although better than before. Has been placed on Anti-depressant by PCP- headaches are better, sleep is more consistent and restful. Headaches not everyday anymore but still gets them 2-3 times a week. Has brain fog. Diarrhea is intermittent but worse than before when she gets it. Anxiety and depression are worse after covid. See ROS for a complete list of symptoms.    Current symptoms in order of significance per patient - Heart racing, SOB, headaches, diarrhea, brain fog, anxiety and depression worse after covid    Has an appt with neurology soon, had an appt today with gastroenterology today - tests have been ordered, has seen a cardiologist - had echo which was ok, had a holter monitor by PCP which was ok, she does not have a mental health therapist      Work situation:   Lost job as was out for so long   Previously worked as a      Provider   Link to Work Productivity and Activity Impairment Questionnaire :[id number]}    Assessment tools:    PHQ Assesment Total Score(s) 12/7/2021   PHQ-9 Score 11   Some recent data might be hidden     JOSÉ MIGUEL-7 Results 12/7/2021   JOSÉ MIGUEL 7 TOTAL SCORE 7 (mild anxiety)   Some recent data might be hidden     PTSD Screen Score 12/7/2021   Have you ever experienced this kind of event? No   Some recent data might be hidden     PROMIS-29 12/7/2021   PROMIS Physical Function T-Score 39.1   PROMIS Anxiety T-Score 61.4 (moderate)   PROMIS Depression T-Score 58.9 (mild)   PROMIS Fatigue T-Score 69 (moderate)   PROMIS Sleep Disturbance T-Score 52.4 (within normal limits)   PROMIS Ability to Participate in Social Roles & Activities T-Score 41.7 (mild dysfunction)   PROMIS Pain Interference T-Score 62.5 (moderate)   PROMIS Pain Intensity 6     Work Productivity and Activity Impairment Questionnaire: General Health V2.0 12/8/2021   Are you currently employed (working for pay)? No       Current Concerns:  Health - yes   Other - financial as she lost her job       Social Hx:  Social History     Tobacco Use     Smoking status: Never Smoker     Smokeless tobacco: Never Used   Vaping Use     Vaping Use: Never used   Substance Use Topics     Alcohol use: No     Drug use: No     Lives with parents,  and son.  is the only earning member currently as parents are retired.     Review of Systems:  Review of Systems   Constitutional: Negative for chills, fever and weight loss.   HENT: Positive for sinus pain and sore throat. Negative for ear discharge, ear pain, hearing loss, nosebleeds and tinnitus.    Respiratory: Positive for cough, sputum production and shortness of breath. Negative for hemoptysis and wheezing.    Cardiovascular: Positive for palpitations. Negative for chest pain and orthopnea.   Gastrointestinal: Positive for  abdominal pain, constipation, diarrhea and heartburn. Negative for blood in stool, melena, nausea and vomiting.   Musculoskeletal: Positive for back pain, myalgias and neck pain.   Skin: Negative for itching and rash.   Neurological: Positive for weakness and headaches. Negative for dizziness, tingling, tremors, speech change, seizures and loss of consciousness.   Endo/Heme/Allergies: Positive for polydipsia.   Psychiatric/Behavioral: Positive for depression. Negative for hallucinations and memory loss. The patient is nervous/anxious and has insomnia.         Immunizations:  Immunization History   Administered Date(s) Administered     COVID-19,PF,Pfizer (12+ Yrs) 09/13/2021, 10/04/2021     HPV 10/06/2009, 10/20/2010, 03/12/2012     Influenza (IIV3) PF 10/20/2010     Influenza Vaccine IM > 6 months Valent IIV4 (Alfuria,Fluzone) 09/24/2014, 12/07/2020, 09/20/2021     TDAP Vaccine (Adacel) 10/06/2009, 10/22/2014       Allergies:   No Known Allergies      Medications:  Current Outpatient Medications   Medication Sig Dispense Refill     amitriptyline (ELAVIL) 25 MG tablet Take 1 tablet (25 mg) by mouth At Bedtime 90 tablet 3     dicyclomine (BENTYL) 10 MG capsule Take 1 capsule (10 mg) by mouth 4 times daily as needed (abdominal cramping) 90 capsule 3     levothyroxine (SYNTHROID/LEVOTHROID) 88 MCG tablet Take 1 tablet (88 mcg) by mouth daily 30 tablet 11         Past Medical Hx:  Past Medical History:   Diagnosis Date     Abnormal Pap smear of cervix 2009, 2012, 2014    see problem list     Cervical high risk HPV (human papillomavirus) test positive 2009, 2012    see problem list     Chickenpox      Depressive disorder      Gestational HTN 01/01/2015     History of colposcopy with cervical biopsy 03/2012    ANN 1     Seasonal allergies     Was on clariten uses occasional sudafed     Urinary tract bacterial infections     as teenager       Family History:  Family History   Adopted: Yes   Problem Relation Age of Onset      Unknown/Adopted Mother      Unknown/Adopted Father          Examination:  Unable to fully examine due to virtual visit     Laboratory:  Hematology:  Recent Labs   Lab Test 06/14/21 1955 06/09/21 1753 04/09/21 0412 04/08/21  0519 04/07/21  0531 04/06/21  0530 04/05/21  1316 04/16/18  1325   WBC 6.6 6.8 8.4 6.9 5.5 3.5* 5.1 11.9*   ANEU 4.1 4.4  --   --   --   --  4.3 9.6*   ALYM 2.0 1.8  --   --   --   --  0.7* 1.7   ANGEL 0.4 0.5  --   --   --   --  0.2 0.5   AEOS 0.1 0.1  --   --   --   --  0.0 0.1   HGB 14.1 13.9 13.6 13.5 14.0 13.7 14.2 13.7   HCT 40.5 40.9 40.6 40.0 41.3 39.9 43.2 40.9    287 442 405 356 270 213 332       Chemistry:  Recent Labs   Lab Test 06/14/21 1955 06/09/21 1753 05/05/21  1306 04/09/21 0412 04/08/21  0519 04/07/21  0531    142 136 137 135 138   POTASSIUM 3.8 3.6 3.7 3.6 3.6 3.8   CHLORIDE 108 111* 107 104 104 106   CO2 24 24 22 28 26 25   ANIONGAP 8 7 7 5 5 7   BUN 10 10 8 15 16 16   CR 0.52 0.58 0.54 0.54 0.50* 0.51*   GFRESTIMATED >90 >90 >90 >90 >90 >90   GLC 92 101* 81 121* 119* 121*   DIANA 9.2 9.2 8.8 8.7 9.0 9.0   PHOS 3.8  --   --   --   --   --    MAG 2.3  --   --   --   --   --        Liver Function Studies:  Recent Labs   Lab Test 07/06/21  1117 06/14/21 1955 06/09/21 1753 04/09/21 0412 04/08/21  0519 04/07/21  0531 04/05/21  1316   BILITOTAL  --  0.2 0.4 0.3 0.3 0.4 0.4   ALKPHOS  --  58 60 52 51 60 62   ALBUMIN  --  4.4 4.3 3.2* 3.2* 3.4 3.4   AST  --  37 32 40 73* 108* 84*   ALT 58* 76* 67* 125* 155* 162* 91*     Results for BRENTON BOURGEOIS (MRN [id number]) as of 12/8/2021 14:08   Ref. Range 6/14/2021 19:55 7/6/2021 11:17 7/29/2021 09:11 8/31/2021 14:29 9/13/2021 13:39 10/15/2021 09:11 12/7/2021 13:12   TSH Latest Ref Range: 0.40 - 4.00 mU/L 0.13 (L) 7.81 (H) 70.91 (H) 8.22 (H) 1.72 0.11 (L) 1.22     COVID-19 PCR Results    COVID-19 PCR Results 6/27/20 3/25/21   COVID-19 Virus by PCR (External Result) Not Detected    SARS-CoV-2 Virus Specimen Source   Nasopharyngeal   SARS-CoV-2 PCR Result  NEGATIVE      Comments are available for some flowsheets but are not being displayed.         COVID-19 Antibody Results, Testing for Immunity    COVID-19 Antibody Results, Testing for Immunity   No data to display.              Imaging:  CT chest 4/5/21  1. No evidence of pulmonary embolism.  2. Bilateral patchy consolidative pulmonary opacities, worrisome for  infection including atypical infection like COVID-19.    Echo 8/31/21  1. Normal biventricular size and function.  2. Normal biatrial size.  3. Mild pulmonic regurgitation. No other significant valve disease within the  limitations of the study. Aortic valve morphology cannot be assessed.  4. No obvious pericardial effusion  5. RAP and RVSP cannot be estimated.      Answers for HPI/ROS submitted by the patient on 12/7/2021  If you checked off any problems, how difficult have these problems made it for you to do your work, take care of things at home, or get along with other people?: Very difficult  PHQ9 TOTAL SCORE: 11  JOSÉ MIGUEL 7 TOTAL SCORE: 7  General Symptoms: Yes  Skin Symptoms: Yes  HENT Symptoms: Yes  EYE SYMPTOMS: No  HEART SYMPTOMS: Yes  LUNG SYMPTOMS: Yes  INTESTINAL SYMPTOMS: Yes  URINARY SYMPTOMS: No  GYNECOLOGIC SYMPTOMS: Yes  BREAST SYMPTOMS: No  SKELETAL SYMPTOMS: Yes  BLOOD SYMPTOMS: No  NERVOUS SYSTEM SYMPTOMS: Yes  MENTAL HEALTH SYMPTOMS: Yes  Ear pain: No  Ear discharge: No  Hearing loss: No  Tinnitus: No  Nosebleeds: No  Congestion: Yes  Sinus pain: Yes  Trouble swallowing: No   Voice hoarseness: Yes  Mouth sores: No  Sore throat: Yes  Tooth pain: No  Gum tenderness: No  Bleeding gums: No  Change in taste: No  Change in sense of smell: No  Dry mouth: Yes  Hearing aid used: No  Neck lump: No  Fever: No  Loss of appetite: No  Weight loss: No  Weight gain: Yes  Fatigue: Yes  Night sweats: No  Chills: No  Increased stress: Yes  Excessive hunger: No  Excessive thirst: Yes  Feeling hot or cold when others  believe the temperature is normal: Yes  Loss of height: No  Post-operative complications: No  Surgical site pain: No  Hallucinations: No  Change in or Loss of Energy: Yes  Hyperactivity: No  Confusion: No  Changes in hair: No  Changes in moles/birth marks: No  Itching: No  Rashes: No  Changes in nails: No  Acne: No  Hair in places you don't want it: No  Change in facial hair: No  Warts: No  Non-healing sores: Yes  Scarring: No  Flaking of skin: Yes  Color changes of hands/feet in cold : No  Sun sensitivity: No  Skin thickening: No  Chest pain or pressure: No  Fast or irregular heartbeat: Yes  Pain in legs with walking: No  Trouble breathing while lying down: No  Fingers or toes appear blue: No  High blood pressure: Yes  Low blood pressure: No  Fainting: No  Murmurs: No  Pacemaker: No  Varicose veins: No  Edema or swelling: No  Wake up at night with shortness of breath: No  Light-headedness: Yes  Exercise intolerance: Yes  Cough: Yes  Sputum or phlegm: Yes  Coughing up blood: No  Difficulty breating or shortness of breath: Yes  Snoring: Yes  Wheezing: No  Difficulty breathing on exertion: Yes  Nighttime Cough: No  Difficulty breathing when lying flat: No  Heart burn or indigestion: Yes  Nausea: No  Vomiting: No  Abdominal pain: Yes  Bloating: Yes  Constipation: Yes  Diarrhea: Yes  Blood in stool: No  Black stools: No  Rectal or Anal pain: No  Fecal incontinence: No  Yellowing of skin or eyes: No  Vomit with blood: No  Change in stools: Yes  Bleeding or spotting between periods: No  Heavy or painful periods: Yes  Irregular periods: Yes  Vaginal discharge: No  Hot flashes: No  Vaginal dryness: No  Genital ulcers: No  Reduced libido: Yes  Painful intercourse: No  Difficulty with sexual arousal: Yes  Post-menopausal bleeding: No  Back pain: Yes  Muscle aches: Yes  Neck pain: Yes  Swollen joints: No  Joint pain: Yes  Bone pain: No  Muscle cramps: Yes  Muscle weakness: No  Joint stiffness: No  Bone fracture: No  Trouble  with coordination: No  Dizziness or trouble with balance: No  Fainting or black-out spells: No  Memory loss: No  Headache: Yes  Seizures: No  Speech problems: No  Tingling: No  Tremor: No  Weakness: Yes  Difficulty walking: No  Paralysis: No  Numbness: No  Nervous or Anxious: Yes  Depression: Yes  Trouble sleeping: Yes  Trouble thinking or concentrating: Yes  Mood changes: Yes  Panic attacks: No

## 2021-12-08 NOTE — NURSING NOTE
Patient verified meds and allergies are correct via patients echeck-in.    Desire Contreras, Virtual Facilitator

## 2021-12-08 NOTE — LETTER
12/8/2021       RE: Lady Saavedra  [id number] [address] [place] [id number]     Dear Colleague,    Thank you for referring your patient, Lady Saavedra, to the [facility] PHYSICAL MEDICINE AND REHABILITATION CLINIC [place] at [facility]. Please see a copy of my visit note below.    [facility]  Post COVID Clinic    Today's Date: 12/08/2021    Recommendations:  PFT, CXR   Mental health referral  PT referral    Follow up with neurologist, gastro  Meditation 30 mins daily   Relaxing and joyful activities daily   Distracting the mind from symptoms/illness    COVID vaccination - will need booster in March 2022    Return visit - March 9 at 3 pm video visit     Thank you for involving me in the care of this patient.     Parisa Yuan MD      Assessment:  Lady Saavedra is a 36 year old with PMH of headaches, diarrhea before covid but had not pursued workup, h/o depression (and post partum depression) and anxiety.     Post COVID symptoms: Diagnosed with COVID Mar 2021. Current symptoms include palpitations, SOB, headaches, diarrhea (worse than before), brain fog, anxiety and depression worse after covid. Work up by cardiology was ok. Seeing neurology and gastro.     Will work up for lung causes and take care of mental health and PT for fatigue and pulm rehab.     -------------------------------------------------------------------------------------------------------------------    Reason for Consult / Chief complaint:   Consulted by Dr. Farooq for post COVID symptoms    History of Presenting Illness:  Lady Saavedra is a 36 year old with PMH of headaches, diarrhea before covid but had not pursued workup, h/o depression (and post partum depression) and anxiety     History of COVID-19 infection:  COVID test and Date: March 31, 2021 in [place], saliva test   Symptoms: headaches, cough, diarrhea, very tired, body aches, low grade fever, sob    Hospitalization: admitted April 5 for SOB, till the 9th   Treatment:   Oxygen: yes  Intubation: no   Drug: dexa, remdesivir and prophylactic anticoagulation    Enrolled in clinical trial: no     Current symptoms:  dced on no oxygen, post discharge was super tired and had headaches everyday - took NSAIDS everyday but headaches would recur when it wore off. Was off of work till June, tried to go back but could not due to symptoms. HR would go very high with walking with SOB and lightheadededness. Slept for 14 hours (would wake up a lot though) and take a nap again 3 hours later.   Was diagnosed with Hashimoto thyroiditis in June - started on thyroxine and dose is being adjusted.   Thes symptoms are slightly better with thyroid medicine and time. Fatigue is better. Still has significant SOB, heart racing although better than before. Has been placed on Anti-depressant by PCP- headaches are better, sleep is more consistent and restful. Headaches not everyday anymore but still gets them 2-3 times a week. Has brain fog. Diarrhea is intermittent but worse than before when she gets it. Anxiety and depression are worse after covid. See ROS for a complete list of symptoms.    Current symptoms in order of significance per patient - Heart racing, SOB, headaches, diarrhea, brain fog, anxiety and depression worse after covid    Has an appt with neurology soon, had an appt today with gastroenterology today - tests have been ordered, has seen a cardiologist - had echo which was ok, had a holter monitor by PCP which was ok, she does not have a mental health therapist     Work situation:   Lost job as was out for so long   Previously worked as a      Provider   Link to Work Productivity and Activity Impairment Questionnaire :632146}    Assessment tools:    PHQ Assesment Total Score(s) 12/7/2021   PHQ-9 Score 11   Some recent data might be hidden     JOSÉ MIGUEL-7 Results 12/7/2021   JOSÉ MIGUEL 7 TOTAL SCORE 7 (mild anxiety)   Some  recent data might be hidden     PTSD Screen Score 12/7/2021   Have you ever experienced this kind of event? No   Some recent data might be hidden     PROMIS-29 12/7/2021   PROMIS Physical Function T-Score 39.1   PROMIS Anxiety T-Score 61.4 (moderate)   PROMIS Depression T-Score 58.9 (mild)   PROMIS Fatigue T-Score 69 (moderate)   PROMIS Sleep Disturbance T-Score 52.4 (within normal limits)   PROMIS Ability to Participate in Social Roles & Activities T-Score 41.7 (mild dysfunction)   PROMIS Pain Interference T-Score 62.5 (moderate)   PROMIS Pain Intensity 6     Work Productivity and Activity Impairment Questionnaire: General Health V2.0 12/8/2021   Are you currently employed (working for pay)? No       Current Concerns:  Health - yes   Other - financial as she lost her job       Social Hx:  Social History     Tobacco Use     Smoking status: Never Smoker     Smokeless tobacco: Never Used   Vaping Use     Vaping Use: Never used   Substance Use Topics     Alcohol use: No     Drug use: No     Lives with parents,  and son.  is the only earning member currently as parents are retired.     Review of Systems:  Review of Systems   Constitutional: Negative for chills, fever and weight loss.   HENT: Positive for sinus pain and sore throat. Negative for ear discharge, ear pain, hearing loss, nosebleeds and tinnitus.    Respiratory: Positive for cough, sputum production and shortness of breath. Negative for hemoptysis and wheezing.    Cardiovascular: Positive for palpitations. Negative for chest pain and orthopnea.   Gastrointestinal: Positive for abdominal pain, constipation, diarrhea and heartburn. Negative for blood in stool, melena, nausea and vomiting.   Musculoskeletal: Positive for back pain, myalgias and neck pain.   Skin: Negative for itching and rash.   Neurological: Positive for weakness and headaches. Negative for dizziness, tingling, tremors, speech change, seizures and loss of consciousness.    Endo/Heme/Allergies: Positive for polydipsia.   Psychiatric/Behavioral: Positive for depression. Negative for hallucinations and memory loss. The patient is nervous/anxious and has insomnia.         Immunizations:  Immunization History   Administered Date(s) Administered     COVID-19,ANTONELLA,Pfizer (12+ Yrs) 09/13/2021, 10/04/2021     HPV 10/06/2009, 10/20/2010, 03/12/2012     Influenza (IIV3) PF 10/20/2010     Influenza Vaccine IM > 6 months Valent IIV4 (Alfuria,Fluzone) 09/24/2014, 12/07/2020, 09/20/2021     TDAP Vaccine (Adacel) 10/06/2009, 10/22/2014       Allergies:   No Known Allergies      Medications:  Current Outpatient Medications   Medication Sig Dispense Refill     amitriptyline (ELAVIL) 25 MG tablet Take 1 tablet (25 mg) by mouth At Bedtime 90 tablet 3     dicyclomine (BENTYL) 10 MG capsule Take 1 capsule (10 mg) by mouth 4 times daily as needed (abdominal cramping) 90 capsule 3     levothyroxine (SYNTHROID/LEVOTHROID) 88 MCG tablet Take 1 tablet (88 mcg) by mouth daily 30 tablet 11         Past Medical Hx:  Past Medical History:   Diagnosis Date     Abnormal Pap smear of cervix 2009, 2012, 2014    see problem list     Cervical high risk HPV (human papillomavirus) test positive 2009, 2012    see problem list     Chickenpox      Depressive disorder      Gestational HTN 01/01/2015     History of colposcopy with cervical biopsy 03/2012    ANN 1     Seasonal allergies     Was on clariten uses occasional sudafed     Urinary tract bacterial infections     as teenager       Family History:  Family History   Adopted: Yes   Problem Relation Age of Onset     Unknown/Adopted Mother      Unknown/Adopted Father          Examination:  Unable to fully examine due to virtual visit     Laboratory:  Hematology:  Recent Labs   Lab Test 06/14/21  1955 06/09/21  1753 04/09/21  0412 04/08/21  0519 04/07/21  0531 04/06/21  0530 04/05/21  1316 04/16/18  1325   WBC 6.6 6.8 8.4 6.9 5.5 3.5* 5.1 11.9*   ANEU 4.1 4.4  --   --   --    --  4.3 9.6*   ALYM 2.0 1.8  --   --   --   --  0.7* 1.7   ANGEL 0.4 0.5  --   --   --   --  0.2 0.5   AEOS 0.1 0.1  --   --   --   --  0.0 0.1   HGB 14.1 13.9 13.6 13.5 14.0 13.7 14.2 13.7   HCT 40.5 40.9 40.6 40.0 41.3 39.9 43.2 40.9    287 442 405 356 270 213 332       Chemistry:  Recent Labs   Lab Test 06/14/21 1955 06/09/21 1753 05/05/21  1306 04/09/21 0412 04/08/21  0519 04/07/21  0531    142 136 137 135 138   POTASSIUM 3.8 3.6 3.7 3.6 3.6 3.8   CHLORIDE 108 111* 107 104 104 106   CO2 24 24 22 28 26 25   ANIONGAP 8 7 7 5 5 7   BUN 10 10 8 15 16 16   CR 0.52 0.58 0.54 0.54 0.50* 0.51*   GFRESTIMATED >90 >90 >90 >90 >90 >90   GLC 92 101* 81 121* 119* 121*   DIANA 9.2 9.2 8.8 8.7 9.0 9.0   PHOS 3.8  --   --   --   --   --    MAG 2.3  --   --   --   --   --        Liver Function Studies:  Recent Labs   Lab Test 07/06/21 1117 06/14/21 1955 06/09/21 1753 04/09/21 0412 04/08/21  0519 04/07/21  0531 04/05/21  1316   BILITOTAL  --  0.2 0.4 0.3 0.3 0.4 0.4   ALKPHOS  --  58 60 52 51 60 62   ALBUMIN  --  4.4 4.3 3.2* 3.2* 3.4 3.4   AST  --  37 32 40 73* 108* 84*   ALT 58* 76* 67* 125* 155* 162* 91*     Results for BRENTON BOURGEOIS (MRN [id number]) as of 12/8/2021 14:08   Ref. Range 6/14/2021 19:55 7/6/2021 11:17 7/29/2021 09:11 8/31/2021 14:29 9/13/2021 13:39 10/15/2021 09:11 12/7/2021 13:12   TSH Latest Ref Range: 0.40 - 4.00 mU/L 0.13 (L) 7.81 (H) 70.91 (H) 8.22 (H) 1.72 0.11 (L) 1.22     COVID-19 PCR Results    COVID-19 PCR Results 6/27/20 3/25/21   COVID-19 Virus by PCR (External Result) Not Detected    SARS-CoV-2 Virus Specimen Source  Nasopharyngeal   SARS-CoV-2 PCR Result  NEGATIVE      Comments are available for some flowsheets but are not being displayed.         COVID-19 Antibody Results, Testing for Immunity    COVID-19 Antibody Results, Testing for Immunity   No data to display.              Imaging:  CT chest 4/5/21  1. No evidence of pulmonary embolism.  2. Bilateral patchy consolidative  pulmonary opacities, worrisome for  infection including atypical infection like COVID-19.    Echo 8/31/21  1. Normal biventricular size and function.  2. Normal biatrial size.  3. Mild pulmonic regurgitation. No other significant valve disease within the  limitations of the study. Aortic valve morphology cannot be assessed.  4. No obvious pericardial effusion  5. RAP and RVSP cannot be estimated.      Answers for HPI/ROS submitted by the patient on 12/7/2021  If you checked off any problems, how difficult have these problems made it for you to do your work, take care of things at home, or get along with other people?: Very difficult  PHQ9 TOTAL SCORE: 11  JOSÉ MIGUEL 7 TOTAL SCORE: 7  General Symptoms: Yes  Skin Symptoms: Yes  HENT Symptoms: Yes  EYE SYMPTOMS: No  HEART SYMPTOMS: Yes  LUNG SYMPTOMS: Yes  INTESTINAL SYMPTOMS: Yes  URINARY SYMPTOMS: No  GYNECOLOGIC SYMPTOMS: Yes  BREAST SYMPTOMS: No  SKELETAL SYMPTOMS: Yes  BLOOD SYMPTOMS: No  NERVOUS SYSTEM SYMPTOMS: Yes  MENTAL HEALTH SYMPTOMS: Yes  Ear pain: No  Ear discharge: No  Hearing loss: No  Tinnitus: No  Nosebleeds: No  Congestion: Yes  Sinus pain: Yes  Trouble swallowing: No   Voice hoarseness: Yes  Mouth sores: No  Sore throat: Yes  Tooth pain: No  Gum tenderness: No  Bleeding gums: No  Change in taste: No  Change in sense of smell: No  Dry mouth: Yes  Hearing aid used: No  Neck lump: No  Fever: No  Loss of appetite: No  Weight loss: No  Weight gain: Yes  Fatigue: Yes  Night sweats: No  Chills: No  Increased stress: Yes  Excessive hunger: No  Excessive thirst: Yes  Feeling hot or cold when others believe the temperature is normal: Yes  Loss of height: No  Post-operative complications: No  Surgical site pain: No  Hallucinations: No  Change in or Loss of Energy: Yes  Hyperactivity: No  Confusion: No  Changes in hair: No  Changes in moles/birth marks: No  Itching: No  Rashes: No  Changes in nails: No  Acne: No  Hair in places you don't want it: No  Change in facial  hair: No  Warts: No  Non-healing sores: Yes  Scarring: No  Flaking of skin: Yes  Color changes of hands/feet in cold : No  Sun sensitivity: No  Skin thickening: No  Chest pain or pressure: No  Fast or irregular heartbeat: Yes  Pain in legs with walking: No  Trouble breathing while lying down: No  Fingers or toes appear blue: No  High blood pressure: Yes  Low blood pressure: No  Fainting: No  Murmurs: No  Pacemaker: No  Varicose veins: No  Edema or swelling: No  Wake up at night with shortness of breath: No  Light-headedness: Yes  Exercise intolerance: Yes  Cough: Yes  Sputum or phlegm: Yes  Coughing up blood: No  Difficulty breating or shortness of breath: Yes  Snoring: Yes  Wheezing: No  Difficulty breathing on exertion: Yes  Nighttime Cough: No  Difficulty breathing when lying flat: No  Heart burn or indigestion: Yes  Nausea: No  Vomiting: No  Abdominal pain: Yes  Bloating: Yes  Constipation: Yes  Diarrhea: Yes  Blood in stool: No  Black stools: No  Rectal or Anal pain: No  Fecal incontinence: No  Yellowing of skin or eyes: No  Vomit with blood: No  Change in stools: Yes  Bleeding or spotting between periods: No  Heavy or painful periods: Yes  Irregular periods: Yes  Vaginal discharge: No  Hot flashes: No  Vaginal dryness: No  Genital ulcers: No  Reduced libido: Yes  Painful intercourse: No  Difficulty with sexual arousal: Yes  Post-menopausal bleeding: No  Back pain: Yes  Muscle aches: Yes  Neck pain: Yes  Swollen joints: No  Joint pain: Yes  Bone pain: No  Muscle cramps: Yes  Muscle weakness: No  Joint stiffness: No  Bone fracture: No  Trouble with coordination: No  Dizziness or trouble with balance: No  Fainting or black-out spells: No  Memory loss: No  Headache: Yes  Seizures: No  Speech problems: No  Tingling: No  Tremor: No  Weakness: Yes  Difficulty walking: No  Paralysis: No  Numbness: No  Nervous or Anxious: Yes  Depression: Yes  Trouble sleeping: Yes  Trouble thinking or concentrating: Yes  Mood changes:  Yes  Panic attacks: No          Again, thank you for allowing me to participate in the care of your patient.      Sincerely,    Parisa Yuan MD

## 2021-12-08 NOTE — PATIENT INSTRUCTIONS
Please start a fiber supplement such as citrucel or metamucil - start with once a day and gradually increase until you are taking it 3 times a day.      I am going to start you on an anti-spasmodic you can use for abdominal cramping - start with once daily - you can increase it to up to 4 times daily if needed.  It generally works well taken 20-30 minutes before a meal.  Just be aware it can cause constipation.    Please have blood work and stool studies done.

## 2021-12-08 NOTE — PROGRESS NOTES
HPI:    Kassy presents today for a video visit to discuss diarrhea and abdominal cramping which has been going on for at least the last year, although worse lately since she had covid.  Was initially alternating between diarrhea and constipation - now seems to be mostly diarrhea.  Emerson has at least 2 loose stools a day.  The past few days things have been worse than normal and has had 5 BMs already this AM. Sometimes wakes up at night to have a BM.  No blood in the stool.  No weight loss.  No nausea or vomiting although does have occasional heartburn.      Of note - did have thyroiditis - symptoms were worse at that time.  Went on an elimination diet which seemed to help but now off of it so she is wondering if that is contributing.  Not currently limiting her diet in any way (although does try to avoid artificial sweeteners as they exacerbate her migraines)    Occasional NSAID use.      Past Medical History:   Diagnosis Date     Abnormal Pap smear of cervix 2009, 2012, 2014    see problem list     Cervical high risk HPV (human papillomavirus) test positive 2009, 2012    see problem list     Chickenpox      Depressive disorder      Gestational HTN 01/01/2015     History of colposcopy with cervical biopsy 03/2012    ANN 1     Seasonal allergies     Was on clariten uses occasional sudafed     Urinary tract bacterial infections     as teenager       Past Surgical History:   Procedure Laterality Date     BIOPSY       COSMETIC SURGERY       HC ENLARGE BREAST WITH IMPLANT       MOUTH SURGERY  age 18    wisdom teeth       Family History   Adopted: Yes   Problem Relation Age of Onset     Unknown/Adopted Mother      Unknown/Adopted Father        Social History     Tobacco Use     Smoking status: Never Smoker     Smokeless tobacco: Never Used   Substance Use Topics     Alcohol use: No        O:    Gen: no acute distress  HEENT: NCAT  Neck: normal ROM  Resp: nonlabored breathing  Neuro: no gross deficits  Psych:  appropriate mood and affect    Assessment and Plan:    # diarrhea - longstanding - ddx includes infectious (less likely given prolonged course), inflammatory, malabsorption, functional, etc.  Could be related to thyroid issues although haven't improved as her levels have stabilized.  Will check stool studies including a fecal calprotectin.  Will also check celiac serologies.  For now - will give a trial of an antispasmodic and start a fiber supplement.  Further recommendations pending results of above work-up    RTC 3 months    Penny Jones, DO     Video-Visit Details     Type of service:  Video Visit     Video Start Time: 11:00 AM  Video End Time (time video stopped): 11:10 AM    Originating Location (pt. Location): Home     Distant Location (provider location):  [facility]      Mode of Communication:  Video Conference via [employer]    Answers for HPI/ROS submitted by the patient on 12/7/2021  General Symptoms: Yes  Skin Symptoms: Yes  HENT Symptoms: Yes  EYE SYMPTOMS: No  HEART SYMPTOMS: Yes  LUNG SYMPTOMS: Yes  INTESTINAL SYMPTOMS: Yes  URINARY SYMPTOMS: No  GYNECOLOGIC SYMPTOMS: Yes  BREAST SYMPTOMS: No  SKELETAL SYMPTOMS: Yes  BLOOD SYMPTOMS: No  NERVOUS SYSTEM SYMPTOMS: Yes  MENTAL HEALTH SYMPTOMS: Yes  Ear pain: No  Ear discharge: No  Hearing loss: No  Tinnitus: No  Nosebleeds: No  Congestion: Yes  Sinus pain: Yes  Trouble swallowing: No   Voice hoarseness: Yes  Mouth sores: No  Sore throat: Yes  Tooth pain: No  Gum tenderness: No  Bleeding gums: No  Change in taste: No  Change in sense of smell: No  Dry mouth: Yes  Hearing aid used: No  Neck lump: No  Fever: No  Loss of appetite: No  Weight loss: No  Weight gain: Yes  Fatigue: Yes  Night sweats: No  Chills: No  Increased stress: Yes  Excessive hunger: No  Excessive thirst: Yes  Feeling hot or cold when others believe the temperature is normal: Yes  Loss of height: No  Post-operative complications: No  Surgical site pain:  No  Hallucinations: No  Change in or Loss of Energy: Yes  Hyperactivity: No  Confusion: No  Changes in hair: No  Changes in moles/birth marks: No  Itching: No  Rashes: No  Changes in nails: No  Acne: No  Hair in places you don't want it: No  Change in facial hair: No  Warts: No  Non-healing sores: Yes  Scarring: No  Flaking of skin: Yes  Color changes of hands/feet in cold : No  Sun sensitivity: No  Skin thickening: No  Chest pain or pressure: No  Fast or irregular heartbeat: Yes  Pain in legs with walking: No  Trouble breathing while lying down: No  Fingers or toes appear blue: No  High blood pressure: Yes  Low blood pressure: No  Fainting: No  Murmurs: No  Pacemaker: No  Varicose veins: No  Edema or swelling: No  Wake up at night with shortness of breath: No  Light-headedness: Yes  Exercise intolerance: Yes  Cough: Yes  Sputum or phlegm: Yes  Coughing up blood: No  Difficulty breating or shortness of breath: Yes  Snoring: Yes  Wheezing: No  Difficulty breathing on exertion: Yes  Nighttime Cough: No  Difficulty breathing when lying flat: No  Heart burn or indigestion: Yes  Nausea: No  Vomiting: No  Abdominal pain: Yes  Bloating: Yes  Constipation: Yes  Diarrhea: Yes  Blood in stool: No  Black stools: No  Rectal or Anal pain: No  Fecal incontinence: No  Yellowing of skin or eyes: No  Vomit with blood: No  Change in stools: Yes  Bleeding or spotting between periods: No  Heavy or painful periods: Yes  Irregular periods: Yes  Vaginal discharge: No  Hot flashes: No  Vaginal dryness: No  Genital ulcers: No  Reduced libido: Yes  Painful intercourse: No  Difficulty with sexual arousal: Yes  Post-menopausal bleeding: No  Back pain: Yes  Muscle aches: Yes  Neck pain: Yes  Swollen joints: No  Joint pain: Yes  Bone pain: No  Muscle cramps: Yes  Muscle weakness: No  Joint stiffness: No  Bone fracture: No  Trouble with coordination: No  Dizziness or trouble with balance: No  Fainting or black-out spells: No  Memory loss:  No  Headache: Yes  Seizures: No  Speech problems: No  Tingling: No  Tremor: No  Weakness: Yes  Difficulty walking: No  Paralysis: No  Numbness: No  Nervous or Anxious: Yes  Depression: Yes  Trouble sleeping: Yes  Trouble thinking or concentrating: Yes  Mood changes: Yes  Panic attacks: No

## 2021-12-08 NOTE — PROGRESS NOTES
Kassy is a 36 year old who is being evaluated via a billable video visit.      How would you like to obtain your AVS? [employer]  If the video visit is dropped, the invitation should be resent by: Text to cell phone: [phone]  Will anyone else be joining your video visit? No

## 2021-12-10 ENCOUNTER — LAB (OUTPATIENT)
Dept: LAB | Facility: CLINIC | Age: 36
End: 2021-12-10
Payer: COMMERCIAL

## 2021-12-10 DIAGNOSIS — K52.9 CHRONIC DIARRHEA OF UNKNOWN ORIGIN: ICD-10-CM

## 2021-12-10 PROCEDURE — 82784 ASSAY IGA/IGD/IGG/IGM EACH: CPT

## 2021-12-10 PROCEDURE — 83516 IMMUNOASSAY NONANTIBODY: CPT | Mod: 59

## 2021-12-10 PROCEDURE — 36415 COLL VENOUS BLD VENIPUNCTURE: CPT

## 2021-12-13 LAB
IGA SERPL-MCNC: 157 MG/DL (ref 84–499)
TTG IGA SER-ACNC: 0.8 U/ML
TTG IGG SER-ACNC: 1.1 U/ML

## 2021-12-14 ENCOUNTER — OFFICE VISIT (OUTPATIENT)
Dept: NEUROLOGY | Facility: CLINIC | Age: 36
End: 2021-12-14
Attending: INTERNAL MEDICINE
Payer: COMMERCIAL

## 2021-12-14 ENCOUNTER — LAB (OUTPATIENT)
Dept: LAB | Facility: HOSPITAL | Age: 36
End: 2021-12-14
Payer: COMMERCIAL

## 2021-12-14 VITALS
DIASTOLIC BLOOD PRESSURE: 93 MMHG | SYSTOLIC BLOOD PRESSURE: 156 MMHG | HEART RATE: 75 BPM | HEIGHT: 62 IN | WEIGHT: 176 LBS | BODY MASS INDEX: 32.39 KG/M2

## 2021-12-14 DIAGNOSIS — R41.89 BRAIN FOG: ICD-10-CM

## 2021-12-14 DIAGNOSIS — R76.8 ANTI-TPO ANTIBODIES PRESENT: Primary | ICD-10-CM

## 2021-12-14 DIAGNOSIS — G47.00 INSOMNIA, UNSPECIFIED TYPE: ICD-10-CM

## 2021-12-14 DIAGNOSIS — Z86.16 HISTORY OF 2019 NOVEL CORONAVIRUS DISEASE (COVID-19): ICD-10-CM

## 2021-12-14 DIAGNOSIS — G43.709 CHRONIC MIGRAINE WITHOUT AURA WITHOUT STATUS MIGRAINOSUS, NOT INTRACTABLE: ICD-10-CM

## 2021-12-14 LAB
FERRITIN SERPL-MCNC: 192 NG/ML (ref 10–130)
VIT B12 SERPL-MCNC: 413 PG/ML (ref 213–816)

## 2021-12-14 PROCEDURE — 99205 OFFICE O/P NEW HI 60 MIN: CPT | Performed by: PSYCHIATRY & NEUROLOGY

## 2021-12-14 PROCEDURE — 82607 VITAMIN B-12: CPT

## 2021-12-14 PROCEDURE — 36415 COLL VENOUS BLD VENIPUNCTURE: CPT

## 2021-12-14 PROCEDURE — 84425 ASSAY OF VITAMIN B-1: CPT

## 2021-12-14 PROCEDURE — 83921 ORGANIC ACID SINGLE QUANT: CPT

## 2021-12-14 PROCEDURE — 82728 ASSAY OF FERRITIN: CPT

## 2021-12-14 RX ORDER — RIZATRIPTAN BENZOATE 10 MG/1
10 TABLET ORAL
Qty: 18 TABLET | Refills: 1 | Status: SHIPPED | OUTPATIENT
Start: 2021-12-14 | End: 2024-02-12

## 2021-12-14 RX ORDER — PROPRANOLOL HYDROCHLORIDE 20 MG/1
TABLET ORAL
Qty: 180 TABLET | Refills: 1 | Status: SHIPPED | OUTPATIENT
Start: 2021-12-14 | End: 2022-01-05

## 2021-12-14 RX ORDER — PREDNISONE 20 MG/1
60 TABLET ORAL DAILY
Qty: 18 TABLET | Refills: 0 | Status: SHIPPED | OUTPATIENT
Start: 2021-12-14 | End: 2021-12-20

## 2021-12-14 ASSESSMENT — MONTREAL COGNITIVE ASSESSMENT (MOCA)
6. READ LIST OF DIGITS [FORWARD/BACKWARD]: 2
9. REPEAT EACH SENTENCE: 2
4. NAME EACH OF THE THREE ANIMALS SHOWN: 3
8. SERIAL SUBTRACTION OF 7S: 3
WHAT LEVEL OF EDUCATION WAS ATTAINED: 0
VISUOSPATIAL/EXECUTIVE SUBSCORE: 5
11. FOR EACH PAIR OF WORDS, WHAT CATEGORY DO THEY BELONG TO (OUT OF 2): 2
WHAT IS THE TOTAL SCORE (OUT OF 30): 26
13. ORIENTATION SUBSCORE: 6
10. [FLUENCY] NAME WORDS STARTING WITH DESIGNATED LETTER: 1
7. [VIGILENCE] TAP WHEN HEARING DESIGNATED LETTER: 1
12. MEMORY INDEX SCORE: 1

## 2021-12-14 ASSESSMENT — MIFFLIN-ST. JEOR: SCORE: 1451.58

## 2021-12-14 NOTE — LETTER
12/14/2021         RE: Lady Saavedra  [id number] [address] [place] [id number]        Dear Colleague,    Thank you for referring your patient, Lady Saavedra, to the [facility]UROLOG[facility] [place]. Please see a copy of my visit note below.    NEUROLOGY OUTPATIENT CONSULT NOTE   Dec 14, 2021     CHIEF COMPLAINT/REASON FOR VISIT/REASON FOR CONSULT  Patient presents with:  New Patient: brain fog    REASON FOR CONSULTATION-headaches and brain fog.    REFERRAL SOURCE  Dr. Lalita Birmingham  CC Dr. Lalita Birmingham    HISTORY OF PRESENT ILLNESS  Lady Saavedra is a 36 year old female seen today for evaluation of headaches and brain fog.  She reports that she has a history of headache but then in March she got Covid.  She was admitted for Covid related pneumonia.  Was in the hospital for 1 week.  Since then the headaches have been much more frequent.  She is having them every day.  Reports significant photophobia and phonophobia with the headaches.  Headaches can be on any side in different places.  They can be behind the eyes and in the back of the head.  They are throbbing in nature.  Reports no visual aura with these.  Has been put on amitriptyline which has helped her sleep as well as with the headaches but nothing significant to get rid of the headaches completely.  Is using Advil and Tylenol for the headache still would last the whole day.  Is not using them around-the-clock.  Denies any other provoking factors.    Is also been diagnosed with thyroiditis since the Covid infection.  Is working with endocrinology on this.    Reports brain fog has been going on since the Covid infection.  Has not had any improvement.  Was let go of her job.  Has difficulty with memory, staying focused and doing tasks that she could previously do easily.  Does get good sleep at night with the amitriptyline.    Patient has recently been in contact with the Covid rehabilitation clinic.  Has not started the  program yet.    Previous history is reviewed and this is unchanged.    PAST MEDICAL/SURGICAL HISTORY  Past Medical History:   Diagnosis Date     Abnormal Pap smear of cervix 2009, 2012, 2014    see problem list     Cervical high risk HPV (human papillomavirus) test positive 2009, 2012    see problem list     Chickenpox      Depressive disorder      Gestational HTN 01/01/2015     History of colposcopy with cervical biopsy 03/2012    ANN 1     Seasonal allergies     Was on clariten uses occasional sudafed     Urinary tract bacterial infections     as teenager     Patient Active Problem List   Diagnosis     CARDIOVASCULAR SCREENING; LDL GOAL LESS THAN 160     Pneumonia due to 2019 novel coronavirus   Significant of migraines, mental illness, suicide attempt, depression.  Feels the depression is under good control with EMR    FAMILY HISTORY  Family History   Adopted: Yes   Problem Relation Age of Onset     Unknown/Adopted Mother      Unknown/Adopted Father    Patient is adopted and she does not know her family.    SOCIAL HISTORY  Social History     Tobacco Use     Smoking status: Never Smoker     Smokeless tobacco: Never Used   Vaping Use     Vaping Use: Never used   Substance Use Topics     Alcohol use: No     Drug use: No       SYSTEMS REVIEW  Twelve-system ROS was done and other than the HPI this was negative except for neck pain, back pain, arm and leg pain, joint pain, numbness and tingling, sleepiness during the day, sleeping problems, headaches, anxiety, depression, palpitations, bloating, stomach pain, bowel problems, respiratory problems, skin changes, weight gain.    MEDICATIONS  amitriptyline (ELAVIL) 25 MG tablet, Take 1 tablet (25 mg) by mouth At Bedtime  dicyclomine (BENTYL) 10 MG capsule, Take 1 capsule (10 mg) by mouth 4 times daily as needed (abdominal cramping)  levothyroxine (SYNTHROID/LEVOTHROID) 88 MCG tablet, Take 1 tablet (88 mcg) by mouth daily    No current facility-administered medications on  "file prior to visit.       PHYSICAL EXAMINATION  VITALS: BP (!) 156/93 (BP Location: Right arm, Patient Position: Sitting)   Pulse 75   Ht 1.575 m (5' 2\")   Wt 79.8 kg (176 lb)   BMI 32.19 kg/m    GENERAL: Healthy appearing, alert, no acute distress, normal habitus.  CARDIOVASCULAR: Extremities warm and well perfused. Pulses present.   EYES: Funduscopic exam limited.  NEUROLOGICAL:  Patient is awake and oriented to self, place and time.  Attention span is normal.  Memory is grossly intact; MoCA 26.  Language is fluent and follows commands appropriately.  Appropriate fund of knowledge. Cranial nerves 2-12 are intact. There is no pronator drift.  Motor exam shows 5/5 strength in all extremities.  Tone is symmetric bilaterally in upper and lower extremities.  Reflexes are symmetric and 2+ in upper extremities and lower extremities. Sensory exam is grossly intact to light touch, pin prick and vibration.  Finger to nose and heel to shin is without dysmetria.  Romberg is negative.  Gait is normal and the patient is able to do tandem walk and walk on toes and heels.    DIAGNOSTICS  MRI-images reviewed.  No major structural lesions noted.  Some T2 hyperintensities.  IMPRESSION:  1.  No acute intracranial process.  2.  Nonspecific scattered T2 hyperintensities within the cerebral white matter. These can be seen in association with migraine headaches.    Labs  Component      Latest Ref Rng & Units 8/31/2021 9/13/2021 10/15/2021 12/7/2021   Thyroid Peroxidase Antibody      <35 IU/mL >5,000 (H)      TSH      0.40 - 4.00 mU/L  1.72 0.11 (L) 1.22   T4 Free      0.76 - 1.46 ng/dL   1.32        OUTSIDE RECORDS  Outside referral notes and chart notes were reviewed and pertinent information has been summarized (in addition to the HPI):-Patient has been diagnosed with long-haul Covid. Multiple testing has been ordered. Has been seen with cardiology. Also seeing endocrinology. Has been having brain fog for which she was referred to " neurology. Does complain of easy fatigability as well.    IMPRESSION/REPORT/PLAN  Anti-TPO antibodies present-rule out Hashimoto's encephalopathy  History of 2019 novel coronavirus disease (COVID-19)  Brain fog/cognitive difficulty  Chronic migraine without aura without status migrainosus, not intractable  Insomnia, unspecified type    This is a 36 year old female with history of migraines with worsening migraines after COVID-19 infection and cognitive decline after COVID-19 infection.    In terms of her headaches these suggestive of chronic migraines.  Amitriptyline is helping and I will continue that.  We will add propanolol since her blood pressure is slightly on the higher end today to see if that further helps.  Could use Topamax that there is risk of making the brain fog worse.  We will start her on Maxalt for abortive therapy.  We will check blood work to look for causes of headaches.    In terms of her brain fog most likely this is related to the COVID-19 infection.  Discussed prognosis.  She does have high anti-TPO antibody titers and we will put her on steroids to see if she gets better suspecting Hashimoto's encephalopathy.  She is sleeping well at night with the amitriptyline should continue that.  We will check blood work to look for cause of memory problems.  [place] today was within normal range 26.     For the insomnia can continue amitriptyline since that is helpful.  Discussed the importance of getting good sleep to recover from the Covid.    I can see her back in 2 months.  Should keep a log of her headaches.    -     Vitamin B12; Future  -     Vitamin B1 whole blood; Future  -     Ferritin; Future  -     Methylmalonic Acid; Future  -     rizatriptan (MAXALT) 10 MG tablet; Take 1 tablet (10 mg) by mouth at onset of headache for migraine May repeat in 2 hours.  -     propranolol (INDERAL) 20 MG tablet; Take 1 tab BID and increase by 1 tab BID every week to a max of 3 tabs BID as needed and  tolerated.  -     predniSONE (DELTASONE) 20 MG tablet; Take 3 tablets (60 mg) by mouth daily for 6 days Take in AM with food.    Return in about 2 months (around 2/14/2022) for In-Clinic Visit, After testing.    Over 63 minutes were spent coordinating the care for the patient on the day of the encounter.  This includes previsit, during visit and post visit activities as documented above.  Multiple problems addressed with reviewing of outside chart and multiple labs reviewed.  MRI reviewed.  Multiple medications prescribed.  (Activities include but not inclusive of reviewing chart, reviewing outside records, reviewing labs and imaging study results as well as the images, patient visit time including getting history and exam,  use if applicable, review of test results with the patient and coming up with a plan in a shared model, counseling patient and family, education and answering patient questions, EMR , EMR diagnosis entry and problem list management, medication reconciliation and prescription management if applicable, paperwork if applicable, printing documents and documentation of the visit activities.)  Billing:-4 data points, 4 problem points, 4 risk points      Ranjan Parisi MD  Neurologist  [facility]  Tel:- [phone]    This note was dictated using voice recognition software.  Any grammatical or context distortions are unintentional and inherent to the software.        Again, thank you for allowing me to participate in the care of your patient.        Sincerely,        Ranjan Parisi MD

## 2021-12-14 NOTE — NURSING NOTE
Chief Complaint   Patient presents with     New Patient     brain fog     Rosalia Mari MA on 12/14/2021 at 9:35 AM

## 2021-12-14 NOTE — NURSING NOTE
YUNIER COGNITIVE ASSESSMENT (MOCA)  Version 7.1 Original Version  VISUOSPATIAL/EXECUTIVE               COPY CUBE      [ 1   ]                                [1    ] DRAW CLOCK (Ten past eleven)  (3 points)    [ 1   ]                    [1    ]               [ 1   ]       Contour            Numbers     Hands POINTS                5   / 5   NAMING    [  1 ]                                                                        [ 1   ]                                             [  1  ]  Liniraj Owen                                Camel                   3  / 3   MEMORY Read list of words, subject must repeat them. Do 2 trials, even if 1st trial is successful. Do a recall after 5 minutes  FACE VELVET [employer]ab[employer]ist PARISA RED No Points    1st 1 1 1 1 1     2nd 1 1 1 1 1    ATTENTION Read list of digits (1 digit/sec) Subject has to repeat in the forward order       [ 1   ]   2  1  8  5  4                                [ 1   ] 7 4 2                         2 /2   Read list of letters. The subject must tap with his hand at each letter A. No points if > 2 errors.  [ 1   ] F B A C M N A A J K L B A F A K D E A A A J A M O F A A B            1  /1   Serial 7 subtraction starting at 100          [  1  ] 93         [   1 ] 86          [   1 ] 79          [  1  ] 72         [   1 ] 65   4 or 5 correct subtractions: 3 points,  2 or 3 correct: 2 points,  1correct: 1 point,   0 correct: 0 points           3 /3   LANGUAGE Repeat: I only know that Norris is the one to help today. [   1  ]                                      The cat always hid under the couch when dogs were in the room. [  1 ]             2  /2   Fluency: Name maximum number of words in one minute that begin with the letter F                                                                                                                    [  1  ] __19_ (N > 11 words)             1 /1   ABSTRACTION  Similarity between e.g. banana-orange=fruit                                                                   [   1 ] train-bicycle                      [   1 ] watch-ruler             2 /2   DELAYED  RECALL Has to recall words  WITH NO CUE FACE  [    ] VELVET  [   1 ] Evangelical  [    ]  PARISA  [    ] RED  [    ] Points for UNCUED recall only           1 /5           OPTIONAL Category cue           Multiple choice cue          ORIENTATION  [  1  ] Date     [   1 ] Month       [  1  ] Year      [   1 ] Day      [  1  ] Place        [   1 ] City         6 /6   TOTAL  Normal > 26/30 Add 1 point if < 12 years education     26 /30

## 2021-12-14 NOTE — PROGRESS NOTES
NEUROLOGY OUTPATIENT CONSULT NOTE   Dec 14, 2021     CHIEF COMPLAINT/REASON FOR VISIT/REASON FOR CONSULT  Patient presents with:  New Patient: brain fog    REASON FOR CONSULTATION-headaches and brain fog.    REFERRAL SOURCE  Dr. Lalita Birmingham   Dr. Lalita Birmingham    HISTORY OF PRESENT ILLNESS  Lady Saavedra is a 36 year old female seen today for evaluation of headaches and brain fog.  She reports that she has a history of headache but then in March she got Covid.  She was admitted for Covid related pneumonia.  Was in the hospital for 1 week.  Since then the headaches have been much more frequent.  She is having them every day.  Reports significant photophobia and phonophobia with the headaches.  Headaches can be on any side in different places.  They can be behind the eyes and in the back of the head.  They are throbbing in nature.  Reports no visual aura with these.  Has been put on amitriptyline which has helped her sleep as well as with the headaches but nothing significant to get rid of the headaches completely.  Is using Advil and Tylenol for the headache still would last the whole day.  Is not using them around-the-clock.  Denies any other provoking factors.    Is also been diagnosed with thyroiditis since the Covid infection.  Is working with endocrinology on this.    Reports brain fog has been going on since the Covid infection.  Has not had any improvement.  Was let go of her job.  Has difficulty with memory, staying focused and doing tasks that she could previously do easily.  Does get good sleep at night with the amitriptyline.    Patient has recently been in contact with the Covid rehabilitation clinic.  Has not started the program yet.    Previous history is reviewed and this is unchanged.    PAST MEDICAL/SURGICAL HISTORY  Past Medical History:   Diagnosis Date     Abnormal Pap smear of cervix 2009, 2012, 2014    see problem list     Cervical high risk HPV (human papillomavirus) test positive  "2009, 2012    see problem list     Chickenpox      Depressive disorder      Gestational HTN 01/01/2015     History of colposcopy with cervical biopsy 03/2012    ANN 1     Seasonal allergies     Was on clariten uses occasional sudafed     Urinary tract bacterial infections     as teenager     Patient Active Problem List   Diagnosis     CARDIOVASCULAR SCREENING; LDL GOAL LESS THAN 160     Pneumonia due to 2019 novel coronavirus   Significant of migraines, mental illness, suicide attempt, depression.  Feels the depression is under good control with EMR    FAMILY HISTORY  Family History   Adopted: Yes   Problem Relation Age of Onset     Unknown/Adopted Mother      Unknown/Adopted Father    Patient is adopted and she does not know her family.    SOCIAL HISTORY  Social History     Tobacco Use     Smoking status: Never Smoker     Smokeless tobacco: Never Used   Vaping Use     Vaping Use: Never used   Substance Use Topics     Alcohol use: No     Drug use: No       SYSTEMS REVIEW  Twelve-system ROS was done and other than the HPI this was negative except for neck pain, back pain, arm and leg pain, joint pain, numbness and tingling, sleepiness during the day, sleeping problems, headaches, anxiety, depression, palpitations, bloating, stomach pain, bowel problems, respiratory problems, skin changes, weight gain.    MEDICATIONS  amitriptyline (ELAVIL) 25 MG tablet, Take 1 tablet (25 mg) by mouth At Bedtime  dicyclomine (BENTYL) 10 MG capsule, Take 1 capsule (10 mg) by mouth 4 times daily as needed (abdominal cramping)  levothyroxine (SYNTHROID/LEVOTHROID) 88 MCG tablet, Take 1 tablet (88 mcg) by mouth daily    No current facility-administered medications on file prior to visit.       PHYSICAL EXAMINATION  VITALS: BP (!) 156/93 (BP Location: Right arm, Patient Position: Sitting)   Pulse 75   Ht 1.575 m (5' 2\")   Wt 79.8 kg (176 lb)   BMI 32.19 kg/m    GENERAL: Healthy appearing, alert, no acute distress, normal " habitus.  CARDIOVASCULAR: Extremities warm and well perfused. Pulses present.   EYES: Funduscopic exam limited.  NEUROLOGICAL:  Patient is awake and oriented to self, place and time.  Attention span is normal.  Memory is grossly intact; MoCA 26.  Language is fluent and follows commands appropriately.  Appropriate fund of knowledge. Cranial nerves 2-12 are intact. There is no pronator drift.  Motor exam shows 5/5 strength in all extremities.  Tone is symmetric bilaterally in upper and lower extremities.  Reflexes are symmetric and 2+ in upper extremities and lower extremities. Sensory exam is grossly intact to light touch, pin prick and vibration.  Finger to nose and heel to shin is without dysmetria.  Romberg is negative.  Gait is normal and the patient is able to do tandem walk and walk on toes and heels.    DIAGNOSTICS  MRI-images reviewed.  No major structural lesions noted.  Some T2 hyperintensities.  IMPRESSION:  1.  No acute intracranial process.  2.  Nonspecific scattered T2 hyperintensities within the cerebral white matter. These can be seen in association with migraine headaches.    Labs  Component      Latest Ref Rng & Units 8/31/2021 9/13/2021 10/15/2021 12/7/2021   Thyroid Peroxidase Antibody      <35 IU/mL >5,000 (H)      TSH      0.40 - 4.00 mU/L  1.72 0.11 (L) 1.22   T4 Free      0.76 - 1.46 ng/dL   1.32        OUTSIDE RECORDS  Outside referral notes and chart notes were reviewed and pertinent information has been summarized (in addition to the HPI):-Patient has been diagnosed with long-haul Covid. Multiple testing has been ordered. Has been seen with cardiology. Also seeing endocrinology. Has been having brain fog for which she was referred to neurology. Does complain of easy fatigability as well.    IMPRESSION/REPORT/PLAN  Anti-TPO antibodies present-rule out Hashimoto's encephalopathy  History of 2019 novel coronavirus disease (COVID-19)  Brain fog/cognitive difficulty  Chronic migraine without  aura without status migrainosus, not intractable  Insomnia, unspecified type    This is a 36 year old female with history of migraines with worsening migraines after COVID-19 infection and cognitive decline after COVID-19 infection.    In terms of her headaches these suggestive of chronic migraines.  Amitriptyline is helping and I will continue that.  We will add propanolol since her blood pressure is slightly on the higher end today to see if that further helps.  Could use Topamax that there is risk of making the brain fog worse.  We will start her on Maxalt for abortive therapy.  We will check blood work to look for causes of headaches.    In terms of her brain fog most likely this is related to the COVID-19 infection.  Discussed prognosis.  She does have high anti-TPO antibody titers and we will put her on steroids to see if she gets better suspecting Hashimoto's encephalopathy.  She is sleeping well at night with the amitriptyline should continue that.  We will check blood work to look for cause of memory problems.  [place] today was within normal range 26.     For the insomnia can continue amitriptyline since that is helpful.  Discussed the importance of getting good sleep to recover from the Covid.    I can see her back in 2 months.  Should keep a log of her headaches.    -     Vitamin B12; Future  -     Vitamin B1 whole blood; Future  -     Ferritin; Future  -     Methylmalonic Acid; Future  -     rizatriptan (MAXALT) 10 MG tablet; Take 1 tablet (10 mg) by mouth at onset of headache for migraine May repeat in 2 hours.  -     propranolol (INDERAL) 20 MG tablet; Take 1 tab BID and increase by 1 tab BID every week to a max of 3 tabs BID as needed and tolerated.  -     predniSONE (DELTASONE) 20 MG tablet; Take 3 tablets (60 mg) by mouth daily for 6 days Take in AM with food.    Return in about 2 months (around 2/14/2022) for In-Clinic Visit, After testing.    Over 63 minutes were spent coordinating the care for the  patient on the day of the encounter.  This includes previsit, during visit and post visit activities as documented above.  Multiple problems addressed with reviewing of outside chart and multiple labs reviewed.  MRI reviewed.  Multiple medications prescribed.  (Activities include but not inclusive of reviewing chart, reviewing outside records, reviewing labs and imaging study results as well as the images, patient visit time including getting history and exam,  use if applicable, review of test results with the patient and coming up with a plan in a shared model, counseling patient and family, education and answering patient questions, EMR , EMR diagnosis entry and problem list management, medication reconciliation and prescription management if applicable, paperwork if applicable, printing documents and documentation of the visit activities.)  Billing:-4 data points, 4 problem points, 4 risk points      Ranjan Parisi MD  Neurologist  [facility]rology [facility]  Tel:- [phone]    This note was dictated using voice recognition software.  Any grammatical or context distortions are unintentional and inherent to the software.

## 2021-12-15 DIAGNOSIS — R42 LIGHTHEADEDNESS: ICD-10-CM

## 2021-12-15 DIAGNOSIS — U09.9 POST-COVID-19 SYNDROME: ICD-10-CM

## 2021-12-15 DIAGNOSIS — R00.2 PALPITATIONS: ICD-10-CM

## 2021-12-15 DIAGNOSIS — R06.02 SOB (SHORTNESS OF BREATH): ICD-10-CM

## 2021-12-15 LAB
DLCOUNC-%PRED-PRE: 104 %
DLCOUNC-PRE: 21.74 ML/MIN/MMHG
DLCOUNC-PRED: 20.77 ML/MIN/MMHG
ERV-%PRED-PRE: 72 %
ERV-PRE: 0.58 L
ERV-PRED: 0.79 L
EXPTIME-PRE: 5.72 SEC
FEF2575-%PRED-PRE: 130 %
FEF2575-PRE: 4.04 L/SEC
FEF2575-PRED: 3.11 L/SEC
FEFMAX-%PRED-PRE: 103 %
FEFMAX-PRE: 6.95 L/SEC
FEFMAX-PRED: 6.71 L/SEC
FEV1-%PRED-PRE: 97 %
FEV1-PRE: 2.67 L
FEV1FEV6-PRE: 90 %
FEV1FEV6-PRED: 85 %
FEV1FVC-PRE: 90 %
FEV1FVC-PRED: 85 %
FEV1SVC-PRE: 88 %
FEV1SVC-PRED: 77 %
FIFMAX-PRE: 5.82 L/SEC
FRCPLETH-%PRED-PRE: 70 %
FRCPLETH-PRE: 1.8 L
FRCPLETH-PRED: 2.56 L
FVC-%PRED-PRE: 92 %
FVC-PRE: 2.97 L
FVC-PRED: 3.23 L
IC-%PRED-PRE: 89 %
IC-PRE: 2.47 L
IC-PRED: 2.77 L
RVPLETH-%PRED-PRE: 87 %
RVPLETH-PRE: 1.22 L
RVPLETH-PRED: 1.39 L
TLCPLETH-%PRED-PRE: 92 %
TLCPLETH-PRE: 4.27 L
TLCPLETH-PRED: 4.6 L
VA-%PRED-PRE: 84 %
VA-PRE: 3.82 L
VC-%PRED-PRE: 85 %
VC-PRE: 3.05 L
VC-PRED: 3.56 L

## 2021-12-15 PROCEDURE — 94726 PLETHYSMOGRAPHY LUNG VOLUMES: CPT | Performed by: INTERNAL MEDICINE

## 2021-12-15 PROCEDURE — 94729 DIFFUSING CAPACITY: CPT | Performed by: INTERNAL MEDICINE

## 2021-12-15 PROCEDURE — 94375 RESPIRATORY FLOW VOLUME LOOP: CPT | Performed by: INTERNAL MEDICINE

## 2021-12-16 ENCOUNTER — DOCUMENTATION ONLY (OUTPATIENT)
Dept: PHYSICAL MEDICINE AND REHAB | Facility: CLINIC | Age: 36
End: 2021-12-16
Payer: COMMERCIAL

## 2021-12-16 NOTE — PROGRESS NOTES
Initial Visit Date: 12/8/21   Provider:  Lissy   Followup Requested STATUS DETAILS   Original Provider     LAB/Imaging PFT, CXR, Spirometry    Refferals STATUS    Mental Health Ordered Patient was called, LM to schedule   PT Ordered Patient was called twice, LM to schedule                         Patient to follow-up with Neurologist and Gastro.    Follow-up with Dr. Yuan scheduled 3/9/22.    Zane Gallo

## 2021-12-17 ENCOUNTER — LAB (OUTPATIENT)
Dept: LAB | Facility: CLINIC | Age: 36
End: 2021-12-17
Payer: COMMERCIAL

## 2021-12-17 DIAGNOSIS — K52.9 CHRONIC DIARRHEA OF UNKNOWN ORIGIN: ICD-10-CM

## 2021-12-17 LAB — VIT B1 PYROPHOSHATE BLD-SCNC: 104 NMOL/L

## 2021-12-17 PROCEDURE — 87177 OVA AND PARASITES SMEARS: CPT

## 2021-12-17 PROCEDURE — 83993 ASSAY FOR CALPROTECTIN FECAL: CPT

## 2021-12-17 PROCEDURE — 87493 C DIFF AMPLIFIED PROBE: CPT

## 2021-12-17 PROCEDURE — 87209 SMEAR COMPLEX STAIN: CPT

## 2021-12-18 LAB — C DIFF TOX B STL QL: NEGATIVE

## 2021-12-20 LAB
CALPROTECTIN STL-MCNT: 36.5 MG/KG (ref 0–49.9)
O+P STL MICRO: NEGATIVE

## 2021-12-21 LAB — METHYLMALONATE SERPL-SCNC: 0.21 UMOL/L (ref 0–0.4)

## 2022-01-05 DIAGNOSIS — G43.709 CHRONIC MIGRAINE WITHOUT AURA WITHOUT STATUS MIGRAINOSUS, NOT INTRACTABLE: ICD-10-CM

## 2022-01-05 RX ORDER — PROPRANOLOL HYDROCHLORIDE 20 MG/1
TABLET ORAL
Qty: 180 TABLET | Refills: 1 | Status: SHIPPED | OUTPATIENT
Start: 2022-01-05 | End: 2022-01-06

## 2022-01-05 NOTE — TELEPHONE ENCOUNTER
Refill request for Propranolol. Pt last seen 12/14/21 and has follow up scheduled for 2/14/22. Will send in enough refills to get pt through to the appt.     Annelise Petty RN on 1/5/2022 at 1:46 PM

## 2022-01-06 ENCOUNTER — MYC MEDICAL ADVICE (OUTPATIENT)
Dept: NEUROLOGY | Facility: CLINIC | Age: 37
End: 2022-01-06
Payer: COMMERCIAL

## 2022-01-06 DIAGNOSIS — G43.709 CHRONIC MIGRAINE WITHOUT AURA WITHOUT STATUS MIGRAINOSUS, NOT INTRACTABLE: ICD-10-CM

## 2022-01-06 RX ORDER — PROPRANOLOL HYDROCHLORIDE 20 MG/1
TABLET ORAL
Qty: 180 TABLET | Refills: 1 | Status: SHIPPED | OUTPATIENT
Start: 2022-01-06 | End: 2022-02-23 | Stop reason: DRUGHIGH

## 2022-01-06 NOTE — TELEPHONE ENCOUNTER
Pt requesting a refill of her propranolol be sent to [facility] in [place] as the [facility] in [place] no longer takes her insurance. Script sent to [facility] and pt notified.     Annelise Petty RN on 1/6/2022 at 2:02 PM

## 2022-01-10 ENCOUNTER — MYC MEDICAL ADVICE (OUTPATIENT)
Dept: ENDOCRINOLOGY | Facility: CLINIC | Age: 37
End: 2022-01-10
Payer: COMMERCIAL

## 2022-01-10 ENCOUNTER — MYC MEDICAL ADVICE (OUTPATIENT)
Dept: FAMILY MEDICINE | Facility: CLINIC | Age: 37
End: 2022-01-10
Payer: COMMERCIAL

## 2022-01-10 DIAGNOSIS — E03.9 HYPOTHYROIDISM, UNSPECIFIED TYPE: ICD-10-CM

## 2022-01-17 RX ORDER — LEVOTHYROXINE SODIUM 88 UG/1
88 TABLET ORAL DAILY
Qty: 30 TABLET | Refills: 1 | Status: SHIPPED | OUTPATIENT
Start: 2022-01-17 | End: 2022-03-16

## 2022-01-17 NOTE — TELEPHONE ENCOUNTER
Signed Prescriptions:                        Disp   Refills    levothyroxine (SYNTHROID/LEVOTHROID) 88 MC*30 tab*1        Sig: Take 1 tablet (88 mcg) by mouth daily  Authorizing Provider: CHANDU BLACKBURN  Ordering User: ZONIA GALEANO RN refilled medication per [facility] Refill Protocol.     Zonia Galeano RN

## 2022-02-12 NOTE — PROGRESS NOTES
COVID-19 PCR test completed. Patient handout For Patients Who Have Been Tested for Covid-19 (Coronavirus) was given to the patient, which includes test result notification process.  
supportive care

## 2022-02-14 ENCOUNTER — OFFICE VISIT (OUTPATIENT)
Dept: NEUROLOGY | Facility: CLINIC | Age: 37
End: 2022-02-14
Payer: COMMERCIAL

## 2022-02-14 VITALS
HEIGHT: 62 IN | DIASTOLIC BLOOD PRESSURE: 93 MMHG | SYSTOLIC BLOOD PRESSURE: 144 MMHG | BODY MASS INDEX: 34.41 KG/M2 | HEART RATE: 63 BPM | WEIGHT: 187 LBS

## 2022-02-14 DIAGNOSIS — F41.9 ANXIETY: ICD-10-CM

## 2022-02-14 DIAGNOSIS — G43.109 MIGRAINE WITH AURA AND WITHOUT STATUS MIGRAINOSUS, NOT INTRACTABLE: ICD-10-CM

## 2022-02-14 PROCEDURE — 99214 OFFICE O/P EST MOD 30 MIN: CPT | Performed by: PSYCHIATRY & NEUROLOGY

## 2022-02-14 RX ORDER — PROPRANOLOL HYDROCHLORIDE 60 MG/1
60 TABLET ORAL 2 TIMES DAILY
Qty: 180 TABLET | Refills: 1 | Status: SHIPPED | OUTPATIENT
Start: 2022-02-14 | End: 2022-06-08

## 2022-02-14 ASSESSMENT — MIFFLIN-ST. JEOR: SCORE: 1501.48

## 2022-02-14 NOTE — NURSING NOTE
Chief Complaint   Patient presents with     Headache     2 month follow up. Pt states headahces have improved. Pt states brain fog is unchanged.     Brooklyn Urbina LPN on 2/14/2022 at 2:15 PM

## 2022-02-14 NOTE — LETTER
2/14/2022         RE: Lady Saavedra  [address] [address] [place] [id number]        Dear Colleague,    Thank you for referring your patient, Lady Saavedra, to the [facility] [place]. Please see a copy of my visit note below.    NEUROLOGY OUTPATIENT PROGRESS NOTE   Feb 14, 2022     CHIEF COMPLAINT/REASON FOR VISIT/REASON FOR CONSULT  Patient presents with:  Headache: 2 month follow up. Pt states headahces have improved. Pt states brain fog is unchanged.    REASON FOR CONSULTATION-headaches and brain fog.    REFERRAL SOURCE  Dr. Lalita Birmingham  CC Dr. Lalita Birmingham    HISTORY OF PRESENT ILLNESS  Lady Saavedra is a 36 year old female seen today for evaluation of headaches and brain fog.  She reports that she has a history of headache but then in March she got Covid.  She was admitted for Covid related pneumonia.  Was in the hospital for 1 week.  Since then the headaches have been much more frequent.  She is having them every day.  Reports significant photophobia and phonophobia with the headaches.  Headaches can be on any side in different places.  They can be behind the eyes and in the back of the head.  They are throbbing in nature.  Reports no visual aura with these.  Has been put on amitriptyline which has helped her sleep as well as with the headaches but nothing significant to get rid of the headaches completely.  Is using Advil and Tylenol for the headache still would last the whole day.  Is not using them around-the-clock.  Denies any other provoking factors.    Is also been diagnosed with thyroiditis since the Covid infection.  Is working with endocrinology on this.    Reports brain fog has been going on since the Covid infection.  Has not had any improvement.  Was let go of her job.  Has difficulty with memory, staying focused and doing tasks that she could previously do easily.  Does get good sleep at night with the amitriptyline.    Patient has recently been  in contact with the [facility] rehabilitation clinic.  Has not started the program yet.    2/14/22  Patient returns today  1.  Patient reports that her headaches have significantly improved with the propanolol.  Is taking 60 mg twice a day.  Denies any side effects.  Headaches of 4-5 times a month.  Headaches are much less severe compared to before and she does not need to take abortive medication.  Did try the Maxalt once without any side effects and it did not work for her.  2.  Continues to have the brain fog.  Has been seen in the [facility] rehabilitation clinic and has not been able to do the therapies due to lack of insurance.  Steroids did not help with the brain fog.  3.  Reports that the amitriptyline is no longer working to help her sleep.  She is up till 2 AM every night.  Discussed about increasing the dose and she is interested in that.  Denies any side effects with the amitriptyline.    Previous history is reviewed and this is unchanged.    PAST MEDICAL/SURGICAL HISTORY  Past Medical History:   Diagnosis Date     Abnormal Pap smear of cervix 2009, 2012, 2014    see problem list     Cervical high risk HPV (human papillomavirus) test positive 2009, 2012    see problem list     Chickenpox      Depressive disorder      Gestational HTN 01/01/2015     History of colposcopy with cervical biopsy 03/2012    ANN 1     Seasonal allergies     Was on clariten uses occasional sudafed     Urinary tract bacterial infections     as teenager     Patient Active Problem List   Diagnosis     CARDIOVASCULAR SCREENING; LDL GOAL LESS THAN 160     Pneumonia due to 2019 novel coronavirus   Significant of migraines, mental illness, suicide attempt, depression.  Feels the depression is under good control with EMR    FAMILY HISTORY  Family History   Adopted: Yes   Problem Relation Age of Onset     Unknown/Adopted Mother      Unknown/Adopted Father    Patient is adopted and she does not know her family.    SOCIAL HISTORY  Social History  "    Tobacco Use     Smoking status: Never Smoker     Smokeless tobacco: Never Used   Vaping Use     Vaping Use: Never used   Substance Use Topics     Alcohol use: No     Drug use: No       SYSTEMS REVIEW  Twelve-system ROS was done and other than the HPI this was negative except for neck pain, back pain, arm and leg pain, joint pain, numbness and tingling, sleepiness during the day, sleeping problems, headaches, anxiety, depression, palpitations, bloating, stomach pain, bowel problems, respiratory problems, skin changes, weight gain.  No other new concerns/issues today.    MEDICATIONS  dicyclomine (BENTYL) 10 MG capsule, Take 1 capsule (10 mg) by mouth 4 times daily as needed (abdominal cramping)  levothyroxine (SYNTHROID/LEVOTHROID) 88 MCG tablet, Take 1 tablet (88 mcg) by mouth daily  propranolol (INDERAL) 20 MG tablet, TAKE 1 TAB 2X DAILY AND INCREASE BY 1 TAB 2X DAILY EVERY WEEK TO A MAX OF 3 TABS 2X DAILY AS NEEDED AND TOLERATED (Patient taking differently: Take 60 mg by mouth 2 times daily )  rizatriptan (MAXALT) 10 MG tablet, Take 1 tablet (10 mg) by mouth at onset of headache for migraine May repeat in 2 hours.    No current facility-administered medications on file prior to visit.       PHYSICAL EXAMINATION  VITALS: BP (!) 144/93 (BP Location: Right arm, Patient Position: Sitting)   Pulse 63   Ht 1.575 m (5' 2\")   Wt 84.8 kg (187 lb)   BMI 34.20 kg/m    GENERAL: Healthy appearing, alert, no acute distress, normal habitus.  CARDIOVASCULAR: Extremities warm and well perfused. Pulses present.   NEUROLOGICAL:  Patient is awake and oriented to self, place and time.  Attention span is normal.  Memory is grossly intact; previously MoCA 26.  Language is fluent and follows commands appropriately.  Appropriate fund of knowledge. Cranial nerves 2-12 are intact. There is no pronator drift.  Motor exam shows 5/5 strength in all extremities.  Tone is symmetric bilaterally in upper and lower extremities.  (Previously " reflexes are symmetric and 2+ in upper extremities and lower extremities. Sensory exam is grossly intact to light touch, pin prick and vibration.)  Finger to nose and heel to shin is without dysmetria.  Romberg is negative.  Gait is normal and the patient is able to do tandem walk and walk on toes and heels.  Exam unchanged compared to before.    DIAGNOSTICS  MRI-images reviewed.  No major structural lesions noted.  Some T2 hyperintensities.  IMPRESSION:  1.  No acute intracranial process.  2.  Nonspecific scattered T2 hyperintensities within the cerebral white matter. These can be seen in association with migraine headaches.    Labs  Component      Latest Ref Rng & Units 8/31/2021 9/13/2021 10/15/2021 12/7/2021   Thyroid Peroxidase Antibody      <35 IU/mL >5,000 (H)      TSH      0.40 - 4.00 mU/L  1.72 0.11 (L) 1.22   T4 Free      0.76 - 1.46 ng/dL   1.32        OUTSIDE RECORDS  Outside referral notes and chart notes were reviewed and pertinent information has been summarized (in addition to the HPI):-Patient has been diagnosed with long-haul Covid. Multiple testing has been ordered. Has been seen with cardiology. Also seeing endocrinology. Has been having brain fog for which she was referred to neurology. Does complain of easy fatigability as well.    LABS  Component      Latest Ref Rng & Units 12/14/2021   Vitamin B12      213 - 816 pg/mL 413   Vitamin B1 Whole Blood Level      70 - 180 nmol/L 104   Ferritin      10 - 130 ng/mL 192 (H)   Methylmalonic Acid      0.00 - 0.40 umol/L 0.21       IMPRESSION/REPORT/PLAN  Anti-TPO antibodies present-rule out Hashimoto's encephalopathy  History of 2019 novel coronavirus disease (COVID-19)  Brain fog/cognitive difficulty  Chronic migraine without aura without status migrainosus, not intractable  Insomnia, unspecified type    This is a 36 year old female with history of migraines with worsening migraines after COVID-19 infection and cognitive decline after COVID-19  infection.    In terms of her headaches these suggestive of chronic migraines.  Amitriptyline is helping and I will continue that.  Addition of propanolol has significantly help with the headaches.  We will continue with the amitriptyline and propanolol.  Previously the hope was to reduce her blood pressure with use of propanolol the blood pressure remains still high.  Could use Topamax that there is risk of making the brain fog worse.  Maxalt is helping and she can continue that.  Blood work for causes of headaches was noncontributory.  Ferritin was elevated though that is unrelated to the headaches.  Could follow-up with primary care doctor.  Discussed about increased inflammation with the patient.    In terms of her brain fog most likely this is related to the COVID-19 infection.  Discussed prognosis.  She does have high anti-TPO antibody titers and we will put her on steroids to see if she gets better suspecting Hashimoto's encephalopathy.  Steroids did not help.  She is working with the COVID rehabilitation clinic and encouraged her to do the therapies.  We will check an EEG to look for other causes. We will check blood work to look for cause of memory problems.  Lee previously was within normal range 26.     She is sleeping well at night with the amitriptyline should continue that.  We will increase the dose since its not enough for her.  Discussed the importance of getting good sleep at night to deal with Covid.    I can see her back in 2 months.  Should keep a log of her headaches.    -     rizatriptan (MAXALT) 10 MG tablet; Take 1 tablet (10 mg) by mouth at onset of headache for migraine May repeat in 2 hours.  -     EEG Routine; Future  -     amitriptyline (ELAVIL) 25 MG tablet; Take 2 tablets (50 mg) by mouth At Bedtime  -     propranolol (INDERAL) 60 MG tablet; Take 1 tablet (60 mg) by mouth 2 times daily  -     amitriptyline (ELAVIL) 25 MG tablet; Take 2 tablets (50 mg) by mouth At Bedtime    Return in  about 2 months (around 4/14/2022) for In-Clinic Visit (must), After testing.    Over 39 minutes were spent coordinating the care for the patient on the day of the encounter.  This includes previsit, during visit and post visit activities as documented above.  Multiple problems addressed with prescription management.  Review of blood work.  Counseling patient.  (Activities include but not inclusive of reviewing chart, reviewing outside records, reviewing labs and imaging study results as well as the images, patient visit time including getting history and exam,  use if applicable, review of test results with the patient and coming up with a plan in a shared model, counseling patient and family, education and answering patient questions, EMR , EMR diagnosis entry and problem list management, medication reconciliation and prescription management if applicable, paperwork if applicable, printing documents and documentation of the visit activities.)      Ranjan Parisi MD  Neurologist  [facility] Neurology [facility]  Tel:- [phone]    This note was dictated using voice recognition software.  Any grammatical or context distortions are unintentional and inherent to the software.        Again, thank you for allowing me to participate in the care of your patient.        Sincerely,        Ranjan Parisi MD

## 2022-02-14 NOTE — PROGRESS NOTES
NEUROLOGY OUTPATIENT PROGRESS NOTE   Feb 14, 2022     CHIEF COMPLAINT/REASON FOR VISIT/REASON FOR CONSULT  Patient presents with:  Headache: 2 month follow up. Pt states headahces have improved. Pt states brain fog is unchanged.    REASON FOR CONSULTATION-headaches and brain fog.    REFERRAL SOURCE  Dr. Lalita Birmingham   Dr. Lalita Birmingham    HISTORY OF PRESENT ILLNESS  Lady Saavedra is a 36 year old female seen today for evaluation of headaches and brain fog.  She reports that she has a history of headache but then in March she got Covid.  She was admitted for Covid related pneumonia.  Was in the hospital for 1 week.  Since then the headaches have been much more frequent.  She is having them every day.  Reports significant photophobia and phonophobia with the headaches.  Headaches can be on any side in different places.  They can be behind the eyes and in the back of the head.  They are throbbing in nature.  Reports no visual aura with these.  Has been put on amitriptyline which has helped her sleep as well as with the headaches but nothing significant to get rid of the headaches completely.  Is using Advil and Tylenol for the headache still would last the whole day.  Is not using them around-the-clock.  Denies any other provoking factors.    Is also been diagnosed with thyroiditis since the Covid infection.  Is working with endocrinology on this.    Reports brain fog has been going on since the Covid infection.  Has not had any improvement.  Was let go of her job.  Has difficulty with memory, staying focused and doing tasks that she could previously do easily.  Does get good sleep at night with the amitriptyline.    Patient has recently been in contact with the Covid rehabilitation clinic.  Has not started the program yet.    2/14/22  Patient returns today  1.  Patient reports that her headaches have significantly improved with the propanolol.  Is taking 60 mg twice a day.  Denies any side effects.   Headaches of 4-5 times a month.  Headaches are much less severe compared to before and she does not need to take abortive medication.  Did try the Maxalt once without any side effects and it did not work for her.  2.  Continues to have the brain fog.  Has been seen in the COVID rehabilitation clinic and has not been able to do the therapies due to lack of insurance.  Steroids did not help with the brain fog.  3.  Reports that the amitriptyline is no longer working to help her sleep.  She is up till 2 AM every night.  Discussed about increasing the dose and she is interested in that.  Denies any side effects with the amitriptyline.    Previous history is reviewed and this is unchanged.    PAST MEDICAL/SURGICAL HISTORY  Past Medical History:   Diagnosis Date     Abnormal Pap smear of cervix 2009, 2012, 2014    see problem list     Cervical high risk HPV (human papillomavirus) test positive 2009, 2012    see problem list     Chickenpox      Depressive disorder      Gestational HTN 01/01/2015     History of colposcopy with cervical biopsy 03/2012    ANN 1     Seasonal allergies     Was on clariten uses occasional sudafed     Urinary tract bacterial infections     as teenager     Patient Active Problem List   Diagnosis     CARDIOVASCULAR SCREENING; LDL GOAL LESS THAN 160     Pneumonia due to 2019 novel coronavirus   Significant of migraines, mental illness, suicide attempt, depression.  Feels the depression is under good control with EMR    FAMILY HISTORY  Family History   Adopted: Yes   Problem Relation Age of Onset     Unknown/Adopted Mother      Unknown/Adopted Father    Patient is adopted and she does not know her family.    SOCIAL HISTORY  Social History     Tobacco Use     Smoking status: Never Smoker     Smokeless tobacco: Never Used   Vaping Use     Vaping Use: Never used   Substance Use Topics     Alcohol use: No     Drug use: No       SYSTEMS REVIEW  Twelve-system ROS was done and other than the HPI this was  "negative except for neck pain, back pain, arm and leg pain, joint pain, numbness and tingling, sleepiness during the day, sleeping problems, headaches, anxiety, depression, palpitations, bloating, stomach pain, bowel problems, respiratory problems, skin changes, weight gain.  No other new concerns/issues today.    MEDICATIONS  dicyclomine (BENTYL) 10 MG capsule, Take 1 capsule (10 mg) by mouth 4 times daily as needed (abdominal cramping)  levothyroxine (SYNTHROID/LEVOTHROID) 88 MCG tablet, Take 1 tablet (88 mcg) by mouth daily  propranolol (INDERAL) 20 MG tablet, TAKE 1 TAB 2X DAILY AND INCREASE BY 1 TAB 2X DAILY EVERY WEEK TO A MAX OF 3 TABS 2X DAILY AS NEEDED AND TOLERATED (Patient taking differently: Take 60 mg by mouth 2 times daily )  rizatriptan (MAXALT) 10 MG tablet, Take 1 tablet (10 mg) by mouth at onset of headache for migraine May repeat in 2 hours.    No current facility-administered medications on file prior to visit.       PHYSICAL EXAMINATION  VITALS: BP (!) 144/93 (BP Location: Right arm, Patient Position: Sitting)   Pulse 63   Ht 1.575 m (5' 2\")   Wt 84.8 kg (187 lb)   BMI 34.20 kg/m    GENERAL: Healthy appearing, alert, no acute distress, normal habitus.  CARDIOVASCULAR: Extremities warm and well perfused. Pulses present.   NEUROLOGICAL:  Patient is awake and oriented to self, place and time.  Attention span is normal.  Memory is grossly intact; previously MoCA 26.  Language is fluent and follows commands appropriately.  Appropriate fund of knowledge. Cranial nerves 2-12 are intact. There is no pronator drift.  Motor exam shows 5/5 strength in all extremities.  Tone is symmetric bilaterally in upper and lower extremities.  (Previously reflexes are symmetric and 2+ in upper extremities and lower extremities. Sensory exam is grossly intact to light touch, pin prick and vibration.)  Finger to nose and heel to shin is without dysmetria.  Romberg is negative.  Gait is normal and the patient is able " to do tandem walk and walk on toes and heels.  Exam unchanged compared to before.    DIAGNOSTICS  MRI-images reviewed.  No major structural lesions noted.  Some T2 hyperintensities.  IMPRESSION:  1.  No acute intracranial process.  2.  Nonspecific scattered T2 hyperintensities within the cerebral white matter. These can be seen in association with migraine headaches.    Labs  Component      Latest Ref Rng & Units 8/31/2021 9/13/2021 10/15/2021 12/7/2021   Thyroid Peroxidase Antibody      <35 IU/mL >5,000 (H)      TSH      0.40 - 4.00 mU/L  1.72 0.11 (L) 1.22   T4 Free      0.76 - 1.46 ng/dL   1.32        OUTSIDE RECORDS  Outside referral notes and chart notes were reviewed and pertinent information has been summarized (in addition to the HPI):-Patient has been diagnosed with long-haul Covid. Multiple testing has been ordered. Has been seen with cardiology. Also seeing endocrinology. Has been having brain fog for which she was referred to neurology. Does complain of easy fatigability as well.    LABS  Component      Latest Ref Rng & Units 12/14/2021   Vitamin B12      213 - 816 pg/mL 413   Vitamin B1 Whole Blood Level      70 - 180 nmol/L 104   Ferritin      10 - 130 ng/mL 192 (H)   Methylmalonic Acid      0.00 - 0.40 umol/L 0.21       IMPRESSION/REPORT/PLAN  Anti-TPO antibodies present-rule out Hashimoto's encephalopathy  History of 2019 novel coronavirus disease (COVID-19)  Brain fog/cognitive difficulty  Chronic migraine without aura without status migrainosus, not intractable  Insomnia, unspecified type    This is a 36 year old female with history of migraines with worsening migraines after COVID-19 infection and cognitive decline after COVID-19 infection.    In terms of her headaches these suggestive of chronic migraines.  Amitriptyline is helping and I will continue that.  Addition of propanolol has significantly help with the headaches.  We will continue with the amitriptyline and propanolol.  Previously the  hope was to reduce her blood pressure with use of propanolol the blood pressure remains still high.  Could use Topamax that there is risk of making the brain fog worse.  Maxalt is helping and she can continue that.  Blood work for causes of headaches was noncontributory.  Ferritin was elevated though that is unrelated to the headaches.  Could follow-up with primary care doctor.  Discussed about increased inflammation with the patient.    In terms of her brain fog most likely this is related to the COVID-19 infection.  Discussed prognosis.  She does have high anti-TPO antibody titers and we will put her on steroids to see if she gets better suspecting Hashimoto's encephalopathy.  Steroids did not help.  She is working with the COVID rehabilitation clinic and encouraged her to do the therapies.  We will check an EEG to look for other causes. We will check blood work to look for cause of memory problems.  Dixie previously was within normal range 26.     She is sleeping well at night with the amitriptyline should continue that.  We will increase the dose since its not enough for her.  Discussed the importance of getting good sleep at night to deal with Covid.    I can see her back in 2 months.  Should keep a log of her headaches.    -     rizatriptan (MAXALT) 10 MG tablet; Take 1 tablet (10 mg) by mouth at onset of headache for migraine May repeat in 2 hours.  -     EEG Routine; Future  -     amitriptyline (ELAVIL) 25 MG tablet; Take 2 tablets (50 mg) by mouth At Bedtime  -     propranolol (INDERAL) 60 MG tablet; Take 1 tablet (60 mg) by mouth 2 times daily  -     amitriptyline (ELAVIL) 25 MG tablet; Take 2 tablets (50 mg) by mouth At Bedtime    Return in about 2 months (around 4/14/2022) for In-Clinic Visit (must), After testing.    Over 39 minutes were spent coordinating the care for the patient on the day of the encounter.  This includes previsit, during visit and post visit activities as documented above.  Multiple  problems addressed with prescription management.  Review of blood work.  Counseling patient.  (Activities include but not inclusive of reviewing chart, reviewing outside records, reviewing labs and imaging study results as well as the images, patient visit time including getting history and exam,  use if applicable, review of test results with the patient and coming up with a plan in a shared model, counseling patient and family, education and answering patient questions, EMR , EMR diagnosis entry and problem list management, medication reconciliation and prescription management if applicable, paperwork if applicable, printing documents and documentation of the visit activities.)      Ranjan Parisi MD  Neurologist  [facility] Neurology [facility]  Tel:- [phone]    This note was dictated using voice recognition software.  Any grammatical or context distortions are unintentional and inherent to the software.

## 2022-02-23 ENCOUNTER — MYC MEDICAL ADVICE (OUTPATIENT)
Dept: FAMILY MEDICINE | Facility: CLINIC | Age: 37
End: 2022-02-23
Payer: COMMERCIAL

## 2022-02-23 NOTE — TELEPHONE ENCOUNTER
Routing to Provider to review and advise.     Refer to patients mychart message.     Elva Pressley RN BSN

## 2022-03-07 ENCOUNTER — LAB (OUTPATIENT)
Dept: LAB | Facility: CLINIC | Age: 37
End: 2022-03-07
Payer: COMMERCIAL

## 2022-03-07 DIAGNOSIS — E03.9 HYPOTHYROIDISM, UNSPECIFIED TYPE: ICD-10-CM

## 2022-03-07 LAB — TSH SERPL DL<=0.005 MIU/L-ACNC: 0.8 MU/L (ref 0.4–4)

## 2022-03-07 PROCEDURE — 84443 ASSAY THYROID STIM HORMONE: CPT

## 2022-03-07 PROCEDURE — 36415 COLL VENOUS BLD VENIPUNCTURE: CPT

## 2022-03-09 ENCOUNTER — VIRTUAL VISIT (OUTPATIENT)
Dept: GASTROENTEROLOGY | Facility: CLINIC | Age: 37
End: 2022-03-09
Payer: COMMERCIAL

## 2022-03-09 DIAGNOSIS — K21.9 GASTROESOPHAGEAL REFLUX DISEASE WITHOUT ESOPHAGITIS: Primary | ICD-10-CM

## 2022-03-09 PROCEDURE — 99213 OFFICE O/P EST LOW 20 MIN: CPT | Mod: 95 | Performed by: INTERNAL MEDICINE

## 2022-03-09 NOTE — PATIENT INSTRUCTIONS
Try omeprazole daily - take on an empty stomach and eat 30 to 60 minutes later. If after 2-4 weeks there's no improvement, let me know and we will discuss next steps.    I'm happy your diarrhea and cramping are better.  Continue the fiber supplements.  I have also included a list of common triggers for people - it maybe helpful to use as a reference to see if any of these are contributing to your symptoms.    Have a good birthday tomorrow!

## 2022-03-09 NOTE — PROGRESS NOTES
"       HPI:    Kassy  presents today for a video visit to for follow-up.  Cramping and diarrhea have improved since starting fiber supplements - now only using the bentyl as needed for cramping which does seem to help. Has also noticed some foods like gluten seem to trigger her symptoms.  Has been struggling with \"throwing up in her mouth\" and pain in the epigastric/chest region.  Said it has been going on since her GI issues started but didn't notice it as much as her diarrhea and cramping were so much worse but now is becoming more bothersome.  No dysphagia.  Has been gaining weight.      Past Medical History:   Diagnosis Date     Abnormal Pap smear of cervix 2009, 2012, 2014    see problem list     Cervical high risk HPV (human papillomavirus) test positive 2009, 2012    see problem list     Chickenpox      Depressive disorder      Gestational HTN 01/01/2015     History of colposcopy with cervical biopsy 03/2012    ANN 1     Seasonal allergies     Was on clariten uses occasional sudafed     Urinary tract bacterial infections     as teenager       Past Surgical History:   Procedure Laterality Date     BIOPSY       COSMETIC SURGERY       HC ENLARGE BREAST WITH IMPLANT       MOUTH SURGERY  age 18    wisdom teeth       Family History   Adopted: Yes   Problem Relation Age of Onset     Unknown/Adopted Mother      Unknown/Adopted Father        Social History     Tobacco Use     Smoking status: Never Smoker     Smokeless tobacco: Never Used   Substance Use Topics     Alcohol use: No        O:    Gen: no acute distress  HEENT: NCAT  Neck: normal ROM  Resp: nonlabored breathing  Neuro: no gross deficits  Psych: appropriate mood and affect    Assessment and Plan:    # diarrhea, abdominal pain  - likely IBS -D.  Improving with fiber and bentyl.  Will give information on low fodmap diet as there does seem to be a dietary component as well.    # bloating, heartburn, reflux - will give a trial of omeprazole and monitor.  "       RTC 6 months or sooner if needed    Penny Jones, DO     Video-Visit Details     Type of service:  Video Visit     Video Start Time: 12:30 PM  Video End Time (time video stopped): 12:35 PM    Originating Location (pt. Location): home     Distant Location (provider location):  [facility]      Mode of Communication:  Video Conference via [employer]

## 2022-03-09 NOTE — PROGRESS NOTES
Kassy is a 36 year old who is being evaluated via a billable video visit.      How would you like to obtain your AVS? [employer]  If the video visit is dropped, the invitation should be resent by: Text to cell phone: [phone]  Will anyone else be joining your video visit? No

## 2022-03-14 ENCOUNTER — TELEPHONE (OUTPATIENT)
Dept: BEHAVIORAL HEALTH | Facility: CLINIC | Age: 37
End: 2022-03-14
Payer: COMMERCIAL

## 2022-03-16 ENCOUNTER — HOSPITAL ENCOUNTER (OUTPATIENT)
Dept: BEHAVIORAL HEALTH | Facility: CLINIC | Age: 37
Discharge: HOME | End: 2022-03-16
Attending: FAMILY MEDICINE | Admitting: FAMILY MEDICINE
Payer: COMMERCIAL

## 2022-03-16 DIAGNOSIS — F33.9 RECURRENT MAJOR DEPRESSIVE DISORDER, REMISSION STATUS UNSPECIFIED (H): ICD-10-CM

## 2022-03-16 DIAGNOSIS — F41.1 GAD (GENERALIZED ANXIETY DISORDER): Primary | ICD-10-CM

## 2022-03-16 PROCEDURE — 90791 PSYCH DIAGNOSTIC EVALUATION: CPT | Mod: GT,95 | Performed by: COUNSELOR

## 2022-03-16 ASSESSMENT — COLUMBIA-SUICIDE SEVERITY RATING SCALE - C-SSRS
ATTEMPT LIFETIME: YES
TOTAL  NUMBER OF ACTUAL ATTEMPTS LIFETIME: 1
TOTAL  NUMBER OF ABORTED OR SELF INTERRUPTED ATTEMPTS LIFETIME: NO
1. HAVE YOU WISHED YOU WERE DEAD OR WISHED YOU COULD GO TO SLEEP AND NOT WAKE UP?: YES
ATTEMPT PAST THREE MONTHS: NO
6. HAVE YOU EVER DONE ANYTHING, STARTED TO DO ANYTHING, OR PREPARED TO DO ANYTHING TO END YOUR LIFE?: NO
2. HAVE YOU ACTUALLY HAD ANY THOUGHTS OF KILLING YOURSELF?: NO
1. IN THE PAST MONTH, HAVE YOU WISHED YOU WERE DEAD OR WISHED YOU COULD GO TO SLEEP AND NOT WAKE UP?: NO
TOTAL  NUMBER OF INTERRUPTED ATTEMPTS LIFETIME: NO

## 2022-03-16 ASSESSMENT — ANXIETY QUESTIONNAIRES
GAD7 TOTAL SCORE: 9
6. BECOMING EASILY ANNOYED OR IRRITABLE: NEARLY EVERY DAY
7. FEELING AFRAID AS IF SOMETHING AWFUL MIGHT HAPPEN: NOT AT ALL
5. BEING SO RESTLESS THAT IT IS HARD TO SIT STILL: SEVERAL DAYS
1. FEELING NERVOUS, ANXIOUS, OR ON EDGE: SEVERAL DAYS
2. NOT BEING ABLE TO STOP OR CONTROL WORRYING: SEVERAL DAYS
3. WORRYING TOO MUCH ABOUT DIFFERENT THINGS: SEVERAL DAYS
IF YOU CHECKED OFF ANY PROBLEMS ON THIS QUESTIONNAIRE, HOW DIFFICULT HAVE THESE PROBLEMS MADE IT FOR YOU TO DO YOUR WORK, TAKE CARE OF THINGS AT HOME, OR GET ALONG WITH OTHER PEOPLE: VERY DIFFICULT

## 2022-03-16 ASSESSMENT — PATIENT HEALTH QUESTIONNAIRE - PHQ9
5. POOR APPETITE OR OVEREATING: MORE THAN HALF THE DAYS
SUM OF ALL RESPONSES TO PHQ QUESTIONS 1-9: 13

## 2022-03-16 NOTE — PROGRESS NOTES
"[facility]  Provider Name:  Namita Carter     Credentials:  [facility]    PATIENT'S NAME: Lady Saavedra  PREFERRED NAME: Kassy  PRONOUNS: she/her/hers     MRN: [id number]  : 1985  ADDRESS: [address] [place] [id number]  ACCT. NUMBER:  [id number]  DATE OF SERVICE: 3/16/22  START TIME: 1300  END TIME: 1400  PREFERRED PHONE: [phone]  May we leave a program related message: Yes  SERVICE MODALITY:  Video Visit:      Provider verified identity through the following two step process.  Patient provided:  Patient  and Patient address    Telemedicine Visit: The patient's condition can be safely assessed and treated via synchronous audio and visual telemedicine encounter.      Reason for Telemedicine Visit: Services only offered telehealth    Originating Site (Patient Location): Patient's home    Distant Site (Provider Location): Provider Remote Setting- Home Office    Consent:  The patient/guardian has verbally consented to: the potential risks and benefits of telemedicine (video visit) versus in person care; bill my insurance or make self-payment for services provided; and responsibility for payment of non-covered services.     Patient would like the video invitation sent by:  My Chart    Mode of Communication:  Video Conference via [employer]    As the provider I attest to compliance with applicable laws and regulations related to telemedicine.    UNIVERSAL ADULT Mental Health DIAGNOSTIC ASSESSMENT    Identifying Information:  Patient is a 37 year old, Hebrew.  The pronoun use throughout this assessment reflects the patient's chosen pronoun.  Patient was referred for an assessment byself.  Patient attended the session alone.    Chief Complaint:   The reason for seeking services at this time is: \"Depression anf Anxiety\".  The problem(s) began 05/15/21.    Patient has attempted to resolve these concerns in the past through " "medications.    Social/Family History:  Patient reported they grew up in [place], [place].  They were raised by adopted parents  .  Parents were always together.  Patient reported that their childhood was \"happy\".  Patient described their current relationships with family of origin as \"great relationship with both parents\".     The patient describes their cultural background as [place].  Cultural influences and impact on patient's life structure, values, norms, and healthcare: None.  Contextual influences on patient's health include: Individual Factors job changes.    These factors will be addressed in the Preliminary Treatment plan. Patient identified their preferred language to be English. Patient reported they does not need the assistance of an  or other support involved in therapy.     Patient reported had no significant delays in developmental tasks.   Patient's highest education level was associate degree / vocational certificate  .  Patient identified the following learning problems: none reported.  Modifications will not be used to assist communication in therapy.  Patient reports they are  able to understand written materials.    Patient reported the following relationship history:  Patient is recently .  Patient's current relationship status is  for October 2021.   Patient identified their sexual orientation as heterosexual.  Patient reported having 1 child(marichuy). Patient identified parents; siblings; pets; friends; spouse as part of their support system.  Patient identified the quality of these relationships as stable and meaningful,  .      Patient's current living/housing situation involves staying with someone.  The immediate members of family and household include Spencer, 69,Father and , child, mother and they report that housing is stable.    Patient is currently unemployed.  Patient reports their finances are obtained through parents; spouse. Patient does identify " finances as a current stressor.      Patient reported that they have not been involved with the legal system. Patient does not report being under probation/ parole/ jurisdiction. They are not under any current court jurisdiction. .    Patient's Strengths and Limitations:  Patient identified the following strengths or resources that will help them succeed in treatment: commitment to health and well being. Things that may interfere with the patient's success in treatment include: none identified.     Assessments:  The following assessments were completed by patient for this visit:  PHQ9:   PHQ-9 SCORE 10/20/2010 11/16/2010 2/10/2015 12/7/2021 3/7/2022 3/16/2022   PHQ-9 Total Score 15 3 10 - - -   PHQ-9 Total Score MyChart - - - 11 (Moderate depression) 11 (Moderate depression) -   PHQ-9 Total Score - - - 11 11 13     GAD7:   JOSÉ MIGUEL-7 SCORE 12/7/2021 3/7/2022 3/16/2022   Total Score 7 (mild anxiety) 5 (mild anxiety) -   Total Score 7 5 9     CAGE-AID:   CAGE-AID Total Score 3/15/2022   Total Score 0   Total Score MyChart 0 (A total score of 2 or greater is considered clinically significant)     [place] Suicide Severity Rating Scale (Lifetime/Recent)  [place] Suicide Severity Rating (Lifetime/Recent) 1/13/2019 3/16/2022   Q1 Wished to be Dead (Past Month) no -   Q2 Suicidal Thoughts (Past Month) no -   Q6 Suicide Behavior (Lifetime) no -   1. Wish to be Dead (Lifetime) - 1   1. Wish to be Dead (Past 1 Month) - 0   2. Non-Specific Active Suicidal Thoughts (Lifetime) - 0   Actual Attempt (Lifetime) - 1   Total Number of Actual Attempts (Lifetime) - 1   Actual Attempt Description (Lifetime) - overdose   Actual Attempt (Past 3 Months) - 0   Interrupted Attempts (Lifetime) - 0   Aborted or Self-Interrupted Attempt (Lifetime) - 0   Preparatory Acts or Behavior (Lifetime) - 0   Calculated C-SSRS Risk Score (Lifetime/Recent) - Moderate Risk     WHODAS 2.0 Total Score 3/15/2022   Total Score 29   Total Score MyChart 29      Personal and Family Medical History:  Patient does report a family history of mental health concerns.  Patient reports family history includes Unknown/Adopted in her father and mother. She was adopted..     Patient does report Mental Health Diagnosis and/or Treatment.  Patient reported the following previous diagnoses which include(s): depression .  Patient reported symptoms began in early 20's.  Patient has received mental health services in the past:  psychiatry; partial hospitalization program  .  Psychiatric Hospitalizations: [facility] when  I can t remember, but around 2010.,  ,  ,  ,  ,  ,  ,  ,  ,  ,  .  Patient denies a history of civil commitment.  Currently, patient is not receiving other mental health services.  These include none.         Patient has had a physical exam to rule out medical causes for current symptoms.  Date of last physical exam was within the past year. Client was encouraged to follow up with PCP if symptoms were to develop. The patient has a [place] Primary Care Provider, who is named Mandie Farooq.  Patient reports the following current medical concerns: Post-covid, GI.  Patient reports pain concerns including muscle and joint pain.  Patient does not want help addressing pain concerns. Patient is scheduling appointment for this.   There are not significant appetite / nutritional concerns / weight changes.   Patient does not report a history of head injury / trauma / cognitive impairment.      Patient reports current meds as:   Outpatient Medications Marked as Taking for the 3/16/22 encounter (Hospital Encounter) with Namita [facility]   Medication Sig     amitriptyline (ELAVIL) 25 MG tablet Take 2 tablets (50 mg) by mouth At Bedtime     dicyclomine (BENTYL) 10 MG capsule Take 1 capsule (10 mg) by mouth 4 times daily as needed (abdominal cramping)     omeprazole (PRILOSEC) 20 MG DR capsule Take 1 capsule (20 mg) by mouth daily     propranolol  (INDERAL) 60 MG tablet Take 1 tablet (60 mg) by mouth 2 times daily     rizatriptan (MAXALT) 10 MG tablet Take 1 tablet (10 mg) by mouth at onset of headache for migraine May repeat in 2 hours.     [DISCONTINUED] levothyroxine (SYNTHROID/LEVOTHROID) 88 MCG tablet Take 1 tablet (88 mcg) by mouth daily       Medication Adherence:  Patient reports taking.  taking prescribed medications as prescribed.    Patient Allergies:  No Known Allergies    Medical History:    Past Medical History:   Diagnosis Date     Abnormal Pap smear of cervix 2009, 2012, 2014    see problem list     Cervical high risk HPV (human papillomavirus) test positive 2009, 2012    see problem list     Chickenpox      Depressive disorder      Gestational HTN 01/01/2015     History of colposcopy with cervical biopsy 03/2012    ANN 1     Seasonal allergies     Was on clariten uses occasional sudafed     Urinary tract bacterial infections     as teenager         Current Mental Status Exam:   Appearance:  Appropriate    Eye Contact:  Good   Psychomotor:  Normal       Gait / station:  no problem  Attitude / Demeanor: Cooperative  Interested  Speech      Rate / Production: Normal/ Responsive      Volume:  Normal  volume      Language:  intact  Mood:   Normal  Affect:   Appropriate    Thought Content: Clear   Thought Process: Logical       Associations: No loosening of associations  Insight:   Good   Judgment:  Intact   Orientation:  All  Attention/concentration: Good      Substance Use:  Patient did not report a family history of substance use concerns; see medical history section for details.  Patient has not received chemical dependency treatment in the past.  Patient has not ever been to detox.      Patient is not currently receiving any chemical dependency treatment.           Substance History of use Age of first use Date of last use     Pattern and duration of use (include amounts and frequency)   Alcohol used in the past   20 03/15/20 REPORTS SUBSTANCE  USE: N/A   Cannabis   never used     REPORTS SUBSTANCE USE: N/A     Amphetamines   never used     REPORTS SUBSTANCE USE: N/A   Cocaine/crack    never used       REPORTS SUBSTANCE USE: N/A   Hallucinogens never used         REPORTS SUBSTANCE USE: N/A   Inhalants never used         REPORTS SUBSTANCE USE: N/A   Heroin never used         REPORTS SUBSTANCE USE: N/A   Other Opiates never used     REPORTS SUBSTANCE USE: N/A   Benzodiazepine   never used     REPORTS SUBSTANCE USE: N/A   Barbiturates never used     REPORTS SUBSTANCE USE: N/A   Over the counter meds never used     REPORTS SUBSTANCE USE: N/A   Caffeine currently use Young, probably 8 or 9  3/9/22 REPORTS SUBSTANCE USE: reports using substance 1 times per month and has 1 caffeinated drink at a time at a time.   Patient reports heaviest use is current use.   Nicotine  never used     REPORTS SUBSTANCE USE: N/A   Other substances not listed above:  Identify:  never used     REPORTS SUBSTANCE USE: N/A     Patient reported the following problems as a result of their substance use: no problems, not applicable.    Substance Use: No symptoms    Based on the negative CAGE score and clinical interview there  are not indications of drug or alcohol abuse.      Significant Losses / Trauma / Abuse / Neglect Issues:   Patient did not  serve in the .  There are indications or report of significant loss, trauma, abuse or neglect issues related to: are no indications and client denies any losses, trauma, abuse, or neglect concerns.  Concerns for possible neglect are not present.     Safety Assessment:   Patient denies current homicidal ideation and behaviors.  Patient denies current self-injurious ideation and behaviors.    Patient denied risk behaviors associated with substance use.  Patient denies any high risk behaviors associated with mental health symptoms.  Patient reports the following current concerns for their personal safety: None.  Patient reports there are  firearms in the house.     yes, they are secured. The firearms are secured in a locked space.    History of Safety Concerns:  Patient denied a history of homicidal ideation.     Patient denied a history of personal safety concerns.    Patient denied a history of assaultive behaviors.    Patient denied a history of sexual assault behaviors.     Patient denied a history of risk behaviors associated with substance use.  Patient denies any history of high risk behaviors associated with mental health symptoms.  Patient reports the following protective factors: forward or future oriented thinking; dedication to family or friends; safe and stable environment; regular sleep; help seeking behaviors when distressed; abstinence from substances; adherence with prescribed medication; living with other people; daily obligations; effective problem solving skills    Risk Plan:  See Recommendations for Safety and Risk Management Plan    Review of Symptoms per patient report:  Depression: Change in sleep, Lack of interest, Change in energy level, Difficulties concentrating, Change in appetite, Ruminations, Irritability, Feeling sad, down, or depressed and Withdrawn  [place]:  Elevated mood and Irritability  Psychosis: No Symptoms  Anxiety: Excessive worry, Nervousness, Physical complaints, such as headaches, stomachaches, muscle tension, Sleep disturbance, Ruminations, Poor concentration, Irritability and Anger outbursts  Panic:  No symptoms  Post Traumatic Stress Disorder:  No Symptoms   Eating Disorder: No Symptoms  ADD / ADHD:  No symptoms  Conduct Disorder: No symptoms  Autism Spectrum Disorder: No symptoms  Obsessive Compulsive Disorder: No Symptoms    Patient reports the following compulsive behaviors and treatment history: Patient denies.      Diagnostic Criteria:   Generalized Anxiety Disorder  A. Excessive anxiety and worry about a number of events or activities (such as work or school performance).   B. The person finds it  difficult to control the worry.  C. Select 3 or more symptoms (required for diagnosis). Only one item is required in children.   - Restlessness or feeling keyed up or on edge.    - Being easily fatigued.    - Difficulty concentrating or mind going blank.    - Irritability.    - Muscle tension.    - Sleep disturbance (difficulty falling or staying asleep, or restless unsatisfying sleep).   D. The focus of the anxiety and worry is not confined to features of an Axis I disorder.  E. The anxiety, worry, or physical symptoms cause clinically significant distress or impairment in social, occupational, or other important areas of functioning.   F. The disturbance is not due to the direct physiological effects of a substance (e.g., a drug of abuse, a medication) or a general medical condition (e.g., hyperthyroidism) and does not occur exclusively during a Mood Disorder, a Psychotic Disorder, or a Pervasive Developmental Disorder. Major Depressive Disorder  CRITERIA (A-C) REPRESENT A MAJOR DEPRESSIVE EPISODE - SELECT THESE CRITERIA  A) Recurrent episode(s) - symptoms have been present during the same 2-week period and represent a change from previous functioning 5 or more symptoms (required for diagnosis)   - Depressed mood. Note: In children and adolescents, can be irritable mood.     - Diminished interest or pleasure in all, or almost all, activities.    - Fatigue or loss of energy.    - Feelings of worthlessness or inappropriate guilt.    - Diminished ability to think or concentrate, or indecisiveness.   B) The symptoms cause clinically significant distress or impairment in social, occupational, or other important areas of functioning  C) The episode is not attributable to the physiological effects of a substance or to another medical condition  D) The occurence of major depressive episode is not better explained by other thought / psychotic disorders  E) There has never been a manic episode or hypomanic  episode    Functional Status:  Patient reports the following functional impairments:  childcare / parenting, management of the household and or completion of tasks and work / vocational responsibilities.     Nonprogrammatic care:  Patient is requesting basic services to address current mental health concerns.    Clinical Summary:  1. Reason for assessment: to determine level of care  .  2. Psychosocial, Cultural and Contextual Factors: job changes  .  3. Principal DSM5 Diagnoses  (Sustained by DSM5 Criteria Listed Above):   300.02 (F41.1) Generalized Anxiety Disorder.  4. Other Diagnoses that is relevant to services:   296.32 (F33.1) Major Depressive Disorder, Recurrent Episode, Moderate _ and With anxious distress.  5. Provisional Diagnosis: Further diagnosis clarification may be beneficial.  6. Prognosis: Expect Improvement.  7. Likely consequences of symptoms if not treated: higher level of care.  8. Client strengths include:  insightful, intelligent, motivated, responsible parent, supportive and wants to learn .     Recommendations:     1. Plan for Safety and Risk Management:   Recommended that patient call 911 or go to the local ED should there be a change in any of these risk factors..          Report to child / adult protection services was NA.     2. Patient's identified none identified.     3. Initial Treatment will focus on:    Depressed Mood   Anxiety     4. Resources/Service Plan:    services are not indicated.   Modifications to assist communication are not indicated.   Additional disability accommodations are not indicated.      5. Collaboration:   Collaboration / coordination of treatment will be initiated with the following  support professionals: primary care physician.      6.  Referrals:   The following referral(s) will be initiated:Transition Clinic,  Outpatient [facility]. Next Scheduled Appointment: Referrals placed.     A Release of Information has been obtained for the  following: Emergency Contact.    7. SAMMI:    SAMMI:  Discussed the general effects of drugs and alcohol on health and well-being and the impact of drugs and alcohol when used during pregnancy. Provider gave patient printed information about the effects of chemical use on their health and well being. Recommendations:  To abstain from all mood altering substances .     8. Records:   These were reviewed at time of assessment.   Information in this assessment was obtained from the medical record and  provided by patient who is a good historian.    Patient will have open access to their mental health medical record.    Clinical substantiation:  Patient would like to engage in individual therapy with referrals placed and would like referral to transition clinic to bridge services.  Patient denied current/recent safety concerns and reports ability to keep self safe.    Provider Name/ Credentials:  Namita Carter [facility]  March 16, 2022

## 2022-03-17 ENCOUNTER — TELEPHONE (OUTPATIENT)
Dept: BEHAVIORAL HEALTH | Facility: CLINIC | Age: 37
End: 2022-03-17
Payer: COMMERCIAL

## 2022-03-17 ASSESSMENT — ANXIETY QUESTIONNAIRES: GAD7 TOTAL SCORE: 9

## 2022-03-17 NOTE — TELEPHONE ENCOUNTER
First attempt to contact pt. Prernar left a VM with TC contact info and encouraged a phone call back to schedule initial therapy appointment.     Renetta Orozco  Transition Clinic Coordinator  Date and Time: 03/17/22 9:50 AM

## 2022-03-17 NOTE — TELEPHONE ENCOUNTER
----- Message from DEBORAH Garvey sent at 3/17/2022  7:40 AM CDT -----  Regarding: referral  Transition Clinic Referral   [place] Only   Limited [place] Availability    Type of Referral:      __X___Therapy    _____Therapy & Medication (Psychiatry next level of care appointment needs to be scheduled)  _____Medication Only (Psychiatry next level of care appointment needs to be scheduled)  _____Diagnostic Assessment Only      Referring Provider Name: DEBORAH Garvey    Clinician completing the assessment.     Referring Provider: [facility] PROVIDER    If known, referring provider contact name: Namita Carter; Phone Number: [phone]    Reason for Transition Clinic Referral: bridge services    Next Level of Care Patient Will Be Transitioned To: therapy    Start Date for Next Level of Care Therapy (Required): referral placed  Provider[facility]CC  Location TBD    Start Date for Next Level of Care Medication (Required): NA   Provider  Location  [facility] Psychiatry cannot see patients who do not have active medical insurance    What Would Be Helpful from the Transition Clinic: bridge services     Needs: no    Does Patient Have Access to Technology: yes    Patient E-mail Address: [email or web address]    Current Patient Phone Number: [phone];     Clinician Gender Preference (if applicable): NO    DEBORAH Garvey

## 2022-03-18 ENCOUNTER — TELEPHONE (OUTPATIENT)
Dept: BEHAVIORAL HEALTH | Facility: CLINIC | Age: 37
End: 2022-03-18
Payer: COMMERCIAL

## 2022-03-18 NOTE — TELEPHONE ENCOUNTER
Writer spoke with pt and scheduled initial TC therapy appointment on 03/22/22 @ 11:30 am. Writer has sent documents via my chart and appointment reminder. Writer will reply to referral source.    Renee Mirandarera  03/18/22  [id number]  ----- Message from DEBORAH Garvey sent at 3/17/2022  7:40 AM CDT -----  Regarding: referral  Transition Clinic Referral   [place] Only   Limited [place] Availability    Type of Referral:      __X___Therapy    _____Therapy & Medication (Psychiatry next level of care appointment needs to be scheduled)  _____Medication Only (Psychiatry next level of care appointment needs to be scheduled)  _____Diagnostic Assessment Only      Referring Provider Name: DEBORAH Garvey    Clinician completing the assessment.     Referring Provider: [facility] PROVIDER    If known, referring provider contact name: Namita Carter; Phone Number: [phone]    Reason for Transition Clinic Referral: bridge services    Next Level of Care Patient Will Be Transitioned To: therapy    Start Date for Next Level of Care Therapy (Required): referral placed  ProviderFCC  Location TBD    Start Date for Next Level of Care Medication (Required): NA   Provider  Location  TC Psychiatry cannot see patients who do not have active medical insurance    What Would Be Helpful from the Transition Clinic: bridge services     Needs: no    Does Patient Have Access to Technology: yes    Patient E-mail Address: [email or web address]    Current Patient Phone Number: [phone];     Clinician Gender Preference (if applicable): NO    DEBORAH Garvey

## 2022-03-22 ENCOUNTER — VIRTUAL VISIT (OUTPATIENT)
Dept: BEHAVIORAL HEALTH | Facility: CLINIC | Age: 37
End: 2022-03-22
Payer: COMMERCIAL

## 2022-03-22 DIAGNOSIS — F33.1 MODERATE EPISODE OF RECURRENT MAJOR DEPRESSIVE DISORDER (H): ICD-10-CM

## 2022-03-22 DIAGNOSIS — F41.1 GENERALIZED ANXIETY DISORDER: Primary | ICD-10-CM

## 2022-03-22 NOTE — PROGRESS NOTES
"    Transition Clinic                                    Progress Note    Patient Name: Lady Saavedra  Date: 22          Service Type: Phone Visit      Session Start Time: 11:35 AM  Session End Time: 12:20 PM     Session Length: 45    Session #: 1    Attendees: Client attended alone    Service Modality:  Phone Visit:      Provider verified identity through the following two step process.  Patient provided:  Patient  and Patient address    The patient has been notified of the following:      \"We have found that certain health care needs can be provided without the need for a face to face visit.  This service lets us provide the care you need with a phone conversation.       I will have full access to your [facility] medical record during this entire phone call.   I will be taking notes for your medical record.      Since this is like an office visit, we will bill your insurance company for this service.       There are potential benefits and risks of telephone visits (e.g. limits to patient confidentiality) that differ from in-person visits.?Confidentiality still applies for telephone services, and nobody will record the visit.  It is important to be in a quiet, private space that is free of distractions (including cell phone or other devices) during the visit.??      If during the course of the call I believe a telephone visit is not appropriate, you will not be charged for this service\"     Consent has been obtained for this service by care team member: Yes     DATA  Interactive Complexity: No  Crisis: No        Progress Since Last Session (Related to Symptoms / Goals / Homework):   Symptoms: anxiety and depression sxs    Homework: N/A      Episode of Care Goals: new     Current / Ongoing Stressors and Concerns:   Kassy is a 37-year-old  mother of one 7-year-old son.  Pt lives with , son and parents. One therapy session in the past (10 years) and did not feel ready to engage, the " "therapist may not have been a good fot for her hat the time. Working as a  and has been out of work since having COVID last year. Dealing with long covid sxs SOB, heart, cognitive and thyroid(hashimotos) are impacted. Significant weight gain, fatigued and irritable everyday. Pt searching for nutrition advice and attending appointments with multiple specialists.  GI and autoimmune sxs are worse with anxiety. Working on positive thinking, bolstering self for the day each day with \"pep talk\" and still experiencing emotional flooding and difficulty managing day-to-day tasks. Pt managing nutrition and studying how diet may help, getting some treadmill time, connecting with friends but this could be more robust. Pt is spending some time on self care and household chores.  Experiencing some brief hopelessness and is able to reorient to future. Pt had one suicide attempt in early 20s and notes this experience to be protective in that patient is able to acknowledge suicide is not the answer for her hopelessness is quickly replaced by affection for family and hopes for better functioning.     Treatment Objective(s) Addressed in This Session:   Writer provided empathy, validation and positive regard, interpersonal processing, and strengthening of coping strategies.  Orienting to therapy process.   DBT: identified mindfulness practices, coached with self care and self compassion  Psychodynamic: explored core beliefs, processed reactions to stressors        Intervention:   Writer provided empathy, validation and positive regard, interpersonal processing, and strengthening of coping strategies.  Orienting to therapy process.   DBT: identified mindfulness practices, coached with self care and self compassion  Psychodynamic: explored core beliefs, processed reactions to stressors       Assessments completed prior to visit:  The following assessments were completed by patient for this visit:  PHQ9:   PHQ-9 SCORE " 10/20/2010 11/16/2010 2/10/2015 12/7/2021 3/7/2022 3/16/2022 3/18/2022   PHQ-9 Total Score 15 3 10 - - - -   PHQ-9 Total Score MyChart - - - 11 (Moderate depression) 11 (Moderate depression) - 10 (Moderate depression)   PHQ-9 Total Score - - - 11 11 13 10     GAD7:   JOSÉ MIGUEL-7 SCORE 12/7/2021 3/7/2022 3/16/2022 3/18/2022   Total Score 7 (mild anxiety) 5 (mild anxiety) - 5 (mild anxiety)   Total Score 7 5 9 5         ASSESSMENT: Current Emotional / Mental Status (status of significant symptoms):   Risk status (Self / Other harm or suicidal ideation)   Patient denies current fears or concerns for personal safety.   Patient denies current or recent suicidal ideation or behaviors.   Patient denies current or recent homicidal ideation or behaviors.   Patient denies current or recent self injurious behavior or ideation.   Patient denies other safety concerns.   No safety plan indicated patient is voluntarily seeking services     Appearance:   Appropriate    Eye Contact:   Good Brief video contact   Psychomotor Behavior: Not assessed   Attitude:   Cooperative  Interested Attentive   Orientation:   All   Speech    Rate / Production: Normal/ Responsive    Volume:  Normal    Mood:    Depressed  Dysphoric Anhedonia   Affect:    Blunted    Thought Content:  Clear    Thought Form:  Coherent  Goal Directed  Logical    Insight:    Good      Medication Review:   Not addressed     Medication Compliance:        Changes in Health Issues:   Yes: Sleep disturbance, Associated Psychological Distress  Chronic disease management, Psychological distress related to alcohol COVID  Weight / dietary issues, Associated Psychological Distress  Thyroid deficiency     Chemical Use Review:   Substance Use: Not addressed not addressed     Tobacco Use:     Diagnosis:  1. Generalized anxiety disorder    2. Moderate episode of recurrent major depressive disorder (H)        Collateral Reports Completed:   Not Applicable    PLAN: (Patient Tasks / Therapist  Tasks / Other)  1. Consider calling to schedule intake with ongoing therapist  2. Start with 2 small and manageable goals daily on your planner  3. Track activities, mood and energy level on planner daily  4. Start learning about mindfulness and building into your day      MADHAV Vargas                                                         ______________________________________________________________________    Individual Treatment Plan    Patient's Name: Lady Savaedra  YOB: 1985    Date of Creation: 03/22/22   Date Treatment Plan Last Reviewed/Revised: n/a    DSM5 Diagnoses: 296.32 (F33.1) Major Depressive Disorder, Recurrent Episode, Moderate _ and With anxious distress or 300.02 (F41.1) Generalized Anxiety Disorder  Psychosocial / Contextual Factors: long haul covid, lob loss, pandemic stressors  PROMIS (reviewed every 90 days):   WHODAS 2.0 Total Score 3/15/2022 3/18/2022   Total Score 29 29   Total Score MyChart 29 29      Referral / Collaboration:  Was/were discussed and patient will pursue.    Anticipated number of session for this episode of care: 5-10  Anticipation frequency of session: Weekly  Anticipated Duration of each session: 38-52 minutes  Treatment plan will be reviewed in 90 days or when goals have been changed.       MeasurableTreatment Goal(s) related to diagnosis / functional impairment(s)  Goal 1: Patient will identify and practice 2 mindfulness skills to help themselves with anxiety.        Objective #A     Patient will learn about mindfulness strategies.       Intervention(s)    LMHP will provide resources and guidance to engage in mindfulness practices.      Objective B    Patient will learn DBT skills for distress tolerance.       Intervention(s)    LMHP will provide resources and coaching with DBT skills    Goal 2: Patient will improve reported daily energy level and functioning with ADLs, evidenced by WHODAS score.     Objective #A (Patient Action)                           Patient will increase engagement with wellness activities including body work, movement and stretching    Intervention(s)  Therapist will  with mindfulness and self care, help pt set SMART goals, provide interpersonal processing of emotional reactions.     Objective B                  Patient will increase positive mood and emotions.       Intervention(s)  Therapist will  with mindfulness and self care. Therapist will teach self compassion and gratitude practices. Therapist will provide interpersonal processing of emotional reactions. Teach/model CBT and DBT skills for improving self esteem and interpersonal functioning.     Patient in agreement with initial goal setting and is provided electronic copy.      Vero Wiggins, Cary Medical CenterSW  March 22, 2022

## 2022-03-29 ENCOUNTER — VIRTUAL VISIT (OUTPATIENT)
Dept: BEHAVIORAL HEALTH | Facility: CLINIC | Age: 37
End: 2022-03-29
Payer: COMMERCIAL

## 2022-03-29 DIAGNOSIS — F33.1 MODERATE EPISODE OF RECURRENT MAJOR DEPRESSIVE DISORDER (H): ICD-10-CM

## 2022-03-29 DIAGNOSIS — F41.1 GENERALIZED ANXIETY DISORDER: Primary | ICD-10-CM

## 2022-03-29 NOTE — PROGRESS NOTES
Transition Clinic                                    Progress Note    Patient Name: Lady Saavedra  Date: 03/29/22          Service Type: Individual      Session Start Time: 11:00 AM  Session End Time: 11:34 AM     Session Length: 32 minutes, brief interruption with video failure    Session #: 2    Attendees: Client attended alone    Service Modality:  Video Visit:      Provider verified identity through the following two step process.  Patient provided:  Patient is known previously to provider    Telemedicine Visit: The patient's condition can be safely assessed and treated via synchronous audio and visual telemedicine encounter.      Reason for Telemedicine Visit: Services only offered telehealth    Originating Site (Patient Location): Patient's home    Distant Site (Provider Location): [facility]DI[facility]    Consent:  The patient/guardian has verbally consented to: the potential risks and benefits of telemedicine (video visit) versus in person care; bill my insurance or make self-payment for services provided; and responsibility for payment of non-covered services.     Patient would like the video invitation sent by:  My Chart    Mode of Communication:  Video Conference via [employer]well    As the provider I attest to compliance with applicable laws and regulations related to telemedicine.    DATA  Interactive Complexity: No  Crisis: No        Progress Since Last Session (Related to Symptoms / Goals / Homework):   Symptoms: Improving less stressed, fewer emotional ups and downs, feeling more in control of worries   Sleep quality is stable, structuring time for physical activities and tasks. Pt finding she struggles to get out of bed at least 2 times per week, self care being more difficult. Using tools to increase self awareness.     Homework: Partially completed      Episode of Care Goals: Satisfactory progress - ACTION (Actively working towards change); Intervened by  reinforcing change plan / affirming steps taken     Current / Ongoing Stressors and Concerns:   Pt provides updates on Improvements, worrying less, planning and structuring days to be more effective, monitoring mood. Pt reports having more patience with her son. Exploring career futures versus disability case,  has been working with patient, she is at the review stage. Planning to spring clean and helping grandparents      Treatment Objective(s) Addressed in This Session:   identify day to day fears fears / thoughts that contribute to feeling anxious  Increase interest, engagement, and pleasure in doing things  Decrease frequency and intensity of feeling down, depressed, hopeless  Feel less tired and more energy during the day   Improve diet, appetite, mindful eating, and / or meal planning  Improve concentration, focus, and mindfulness in daily activities        Intervention:   DBT: mindfulness coaching   Psychodynamic: examining how depression and anxiety sxs impact behavior  Solution Focused: reviewing actions steps, maintaining focus on structure and wellness    Assessments completed prior to visit:  The following assessments were completed by patient for this visit:  PHQ9:   PHQ-9 SCORE 10/20/2010 11/16/2010 2/10/2015 12/7/2021 3/7/2022 3/16/2022 3/18/2022   PHQ-9 Total Score 15 3 10 - - - -   PHQ-9 Total Score MyChart - - - 11 (Moderate depression) 11 (Moderate depression) - 10 (Moderate depression)   PHQ-9 Total Score - - - 11 11 13 10     GAD7:   JOSÉ MIGUEL-7 SCORE 12/7/2021 3/7/2022 3/16/2022 3/18/2022   Total Score 7 (mild anxiety) 5 (mild anxiety) - 5 (mild anxiety)   Total Score 7 5 9 5      Rating mood and energy level at 7/10 this week with 10 being best mood and 3/10 having been the lowest of ratings over the past 1-2 months.      ASSESSMENT: Current Emotional / Mental Status (status of significant symptoms):   Risk status (Self / Other harm or suicidal ideation)   Patient denies current fears or  concerns for personal safety.   Patient denies current or recent suicidal ideation or behaviors.   Patient denies current or recent homicidal ideation or behaviors.   Patient denies current or recent self injurious behavior or ideation.   Patient denies other safety concerns.   Patient reports there has been no change in risk factors since their last session.     Patient reports there has been no change in protective factors since their last session.     Recommended that patient call 911 or go to the local ED should there be a change in any of these risk factors.     Appearance:   Appropriate    Eye Contact:   Good    Psychomotor Behavior: Normal    Attitude:   Cooperative  Friendly Attentive   Orientation:   All   Speech    Rate / Production: Normal/ Responsive Normal     Volume:  Normal    Mood:    Euthymic   Affect:    Appropriate and hopeful   Thought Content:  Clear    Thought Form:  Coherent  Goal Directed  Logical    Insight:    Good      Medication Review:   No changes to current psychiatric medication(s)     Medication Compliance:   Yes     Changes in Health Issues:   None reported     Chemical Use Review:   Substance Use: Chemical use reviewed, no active concerns identified      Tobacco Use: not addressed    Diagnosis:  1. Generalized anxiety disorder    2. Moderate episode of recurrent major depressive disorder (H)        Collateral Reports Completed:   Not Applicable    PLAN: (Patient Tasks / Therapist Tasks / Other)  1. Referral to schedule intake with ongoing therapist  2. Continue with 2 small and manageable goals daily on your planner  3. Track activities, mood and energy level on planner daily  4. Continue to build mindfulness practices into your day        MADHAV Vargas

## 2022-04-01 ENCOUNTER — ANCILLARY PROCEDURE (OUTPATIENT)
Dept: NEUROLOGY | Facility: CLINIC | Age: 37
End: 2022-04-01
Attending: PSYCHIATRY & NEUROLOGY
Payer: COMMERCIAL

## 2022-04-01 DIAGNOSIS — G43.109 MIGRAINE WITH AURA AND WITHOUT STATUS MIGRAINOSUS, NOT INTRACTABLE: ICD-10-CM

## 2022-04-01 PROCEDURE — 95816 EEG AWAKE AND DROWSY: CPT | Performed by: PSYCHIATRY & NEUROLOGY

## 2022-04-05 ENCOUNTER — VIRTUAL VISIT (OUTPATIENT)
Dept: BEHAVIORAL HEALTH | Facility: CLINIC | Age: 37
End: 2022-04-05
Payer: COMMERCIAL

## 2022-04-05 DIAGNOSIS — F33.1 MODERATE EPISODE OF RECURRENT MAJOR DEPRESSIVE DISORDER (H): Primary | ICD-10-CM

## 2022-04-05 DIAGNOSIS — F41.1 GENERALIZED ANXIETY DISORDER: ICD-10-CM

## 2022-04-05 NOTE — PROGRESS NOTES
Transition Clinic                                    Progress Note    Patient Name: Lady Saavedra  Date: 04/05/22          Service Type: Individual      Session Start Time: 11:00 AM  Session End Time: 11:35     Session Length: 35    Session #: 3    Attendees: Client attended alone    Service Modality:  Video Visit:      Provider verified identity through the following two step process.  Patient provided:  Patient is known previously to provider    Telemedicine Visit: The patient's condition can be safely assessed and treated via synchronous audio and visual telemedicine encounter.      Reason for Telemedicine Visit: Services only offered telehealth    Originating Site (Patient Location): Patient's home    Distant Site (Provider Location): [facility] & [facility]DI[facility]    Consent:  The patient/guardian has verbally consented to: the potential risks and benefits of telemedicine (video visit) versus in person care; bill my insurance or make self-payment for services provided; and responsibility for payment of non-covered services.     Patient would like the video invitation sent by:  My Chart    Mode of Communication:  Video Conference via Amwell    As the provider I attest to compliance with applicable laws and regulations related to telemedicine.    DATA  Interactive Complexity: No  Crisis: No        Progress Since Last Session (Related to Symptoms / Goals / Homework):   Symptoms: No change depressive sxs increased due to increased stressors, sleeping more, difficult headaches, fatigued    Homework: in progress, using iphone reminders for mindfulness, getting walks      Episode of Care Goals: Minimal progress - PREPARATION (Decided to change - considering how); Intervened by negotiating a change plan and determining options / strategies for behavior change, identifying triggers, exploring social supports, and working towards setting a date to begin behavior  change     Current / Ongoing Stressors and Concerns:   Co-parenting stressors with the father of her son, pt grandfather had a fall and the family was forced to move the grandparents to assited living setting, grieving her grandfather health decline     Treatment Objective(s) Addressed in This Session:   use relaxation strategies several times per day to reduce the physical symptoms of anxiety  Increase interest, engagement, and pleasure in doing things  Decrease frequency and intensity of feeling down, depressed, hopeless  Improve diet, appetite, mindful eating, and / or meal planning  Use gratitude practice 3 times per week for shifting mindset     Intervention:   Writer provided empathy, validation and positive regard, interpersonal processing, and strengthening of coping strategies. Worked to build cognitive flexibility. DBT: identified mindfulness practices, coached with self care and self compassion  Psychodynamic:  processed reactions to stressors       Assessments completed prior to visit:  The following assessments were completed by patient for this visit:  PHQ9:   PHQ-9 SCORE 10/20/2010 11/16/2010 2/10/2015 12/7/2021 3/7/2022 3/16/2022 3/18/2022   PHQ-9 Total Score 15 3 10 - - - -   PHQ-9 Total Score MyChart - - - 11 (Moderate depression) 11 (Moderate depression) - 10 (Moderate depression)   PHQ-9 Total Score - - - 11 11 13 10     GAD7:   JOSÉ MIGUEL-7 SCORE 12/7/2021 3/7/2022 3/16/2022 3/18/2022   Total Score 7 (mild anxiety) 5 (mild anxiety) - 5 (mild anxiety)   Total Score 7 5 9 5         ASSESSMENT: Current Emotional / Mental Status (status of significant symptoms):   Risk status (Self / Other harm or suicidal ideation)   Patient denies current fears or concerns for personal safety.   Patient denies current or recent suicidal ideation or behaviors.   Patient denies current or recent homicidal ideation or behaviors.   Patient denies current or recent self injurious behavior or ideation.   Patient denies other  safety concerns.   Patient reports there has been no change in risk factors since their last session.     Patient reports there has been no change in protective factors since their last session.     Recommended that patient call 911 or go to the local ED should there be a change in any of these risk factors.     Appearance:   Appropriate    Eye Contact:   Good    Psychomotor Behavior: calm    Attitude:   Cooperative  Interested Attentive   Orientation:   All   Speech    Rate / Production: Normal/ Responsive    Volume:  Normal    Mood:    Anxious  Depressed    Affect:    congruent to stated    Thought Content:  Clear    Thought Form:  Coherent  Goal Directed  Logical    Insight:    Good      Medication Review:   No changes to current psychiatric medication(s)     Medication Compliance:   No     Changes in Health Issues:   None reported     Chemical Use Review:   Substance Use: not addressed     Tobacco Use: none reported    Diagnosis:  1. Moderate episode of recurrent major depressive disorder (H)    2. Generalized anxiety disorder        Collateral Reports Completed:   Not Applicable    PLAN: (Patient Tasks / Therapist Tasks / Other)  1. Referral to schedule intake with ongoing therapist  2. Continue with 2 small and manageable goals daily on your planner  3. Track activities, mood and energy level on planner daily  4. Continue to build mindfulness practices into your day  5. Gratitude practice        MADHAV Vargas

## 2022-04-12 ENCOUNTER — OFFICE VISIT (OUTPATIENT)
Dept: NEUROLOGY | Facility: CLINIC | Age: 37
End: 2022-04-12
Payer: COMMERCIAL

## 2022-04-12 VITALS
SYSTOLIC BLOOD PRESSURE: 128 MMHG | HEIGHT: 62 IN | HEART RATE: 66 BPM | DIASTOLIC BLOOD PRESSURE: 91 MMHG | WEIGHT: 190 LBS | BODY MASS INDEX: 34.96 KG/M2

## 2022-04-12 DIAGNOSIS — R41.89 BRAIN FOG: ICD-10-CM

## 2022-04-12 DIAGNOSIS — G47.00 INSOMNIA, UNSPECIFIED TYPE: ICD-10-CM

## 2022-04-12 DIAGNOSIS — Z86.16 HISTORY OF 2019 NOVEL CORONAVIRUS DISEASE (COVID-19): ICD-10-CM

## 2022-04-12 DIAGNOSIS — R76.8 ANTI-TPO ANTIBODIES PRESENT: ICD-10-CM

## 2022-04-12 DIAGNOSIS — G43.709 CHRONIC MIGRAINE WITHOUT AURA WITHOUT STATUS MIGRAINOSUS, NOT INTRACTABLE: Primary | ICD-10-CM

## 2022-04-12 PROCEDURE — 99214 OFFICE O/P EST MOD 30 MIN: CPT | Performed by: PSYCHIATRY & NEUROLOGY

## 2022-04-12 NOTE — PROGRESS NOTES
NEUROLOGY OUTPATIENT PROGRESS NOTE   Apr 12, 2022     CHIEF COMPLAINT/REASON FOR VISIT/REASON FOR CONSULT  Patient presents with:  RECHECK: Migraine      REASON FOR CONSULTATION-headaches and brain fog.    REFERRAL SOURCE  Dr. Lalita Birmingham   Dr. Lalita Birmingham    HISTORY OF PRESENT ILLNESS  Lady Saavedra is a 37 year old female seen today for evaluation of headaches and brain fog.  She reports that she has a history of headache but then in March she got Covid.  She was admitted for Covid related pneumonia.  Was in the hospital for 1 week.  Since then the headaches have been much more frequent.  She is having them every day.  Reports significant photophobia and phonophobia with the headaches.  Headaches can be on any side in different places.  They can be behind the eyes and in the back of the head.  They are throbbing in nature.  Reports no visual aura with these.  Has been put on amitriptyline which has helped her sleep as well as with the headaches but nothing significant to get rid of the headaches completely.  Is using Advil and Tylenol for the headache still would last the whole day.  Is not using them around-the-clock.  Denies any other provoking factors.    Is also been diagnosed with thyroiditis since the Covid infection.  Is working with endocrinology on this.    Reports brain fog has been going on since the Covid infection.  Has not had any improvement.  Was let go of her job.  Has difficulty with memory, staying focused and doing tasks that she could previously do easily.  Does get good sleep at night with the amitriptyline.    Patient has recently been in contact with the Covid rehabilitation clinic.  Has not started the program yet.    2/14/22  Patient returns today  1.  Patient reports that her headaches have significantly improved with the propanolol.  Is taking 60 mg twice a day.  Denies any side effects.  Headaches of 4-5 times a month.  Headaches are much less severe compared to before  and she does not need to take abortive medication.  Did try the Maxalt once without any side effects and it did not work for her.  2.  Continues to have the brain fog.  Has been seen in the COVID rehabilitation clinic and has not been able to do the therapies due to lack of insurance.  Steroids did not help with the brain fog.  3.  Reports that the amitriptyline is no longer working to help her sleep.  She is up till 2 AM every night.  Discussed about increasing the dose and she is interested in that.  Denies any side effects with the amitriptyline.    4/12/22  Patient returns today  1.  She reports that the headaches are twice a month.  Maxalt does work as abortive therapy though she has to eat something with it otherwise feels nauseous with the medication.  Propanolol amitriptyline is helping.  Some somnolence with the amitriptyline which is getting better.  Discussed about decreasing the amitriptyline and she wants to stay on the higher dose.  It was increased last time because the propanolol was not working by itself.  2.  Continues to have the brain fog.  Has insurance now and has gone to the Covid rehabilitation clinic though has not been able to see the therapist yet.  Is only seeing her counselor for her mental illness.    3.  Is sleeping better with the amitriptyline at this point though does have some somnolence.  No insomnia.    Previous history is reviewed and this is unchanged.    PAST MEDICAL/SURGICAL HISTORY  Past Medical History:   Diagnosis Date     Abnormal Pap smear of cervix 2009, 2012, 2014    see problem list     Cervical high risk HPV (human papillomavirus) test positive 2009, 2012    see problem list     Chickenpox      Depressive disorder      Gestational HTN 01/01/2015     History of colposcopy with cervical biopsy 03/2012    ANN 1     Seasonal allergies     Was on clariten uses occasional sudafed     Urinary tract bacterial infections     as teenager     Patient Active Problem List  "  Diagnosis     CARDIOVASCULAR SCREENING; LDL GOAL LESS THAN 160     Pneumonia due to 2019 novel coronavirus   Significant of migraines, mental illness, suicide attempt, depression.  Feels the depression is under good control with EMR    FAMILY HISTORY  Family History   Adopted: Yes   Problem Relation Age of Onset     Unknown/Adopted Mother      Unknown/Adopted Father    Patient is adopted and she does not know her family.    SOCIAL HISTORY  Social History     Tobacco Use     Smoking status: Never Smoker     Smokeless tobacco: Never Used   Vaping Use     Vaping Use: Never used   Substance Use Topics     Alcohol use: No     Drug use: No       SYSTEMS REVIEW  Twelve-system ROS was done and other than the HPI this was negative except for neck pain, back pain, arm and leg pain, joint pain, numbness and tingling, sleepiness during the day, sleeping problems, headaches, anxiety, depression, palpitations, bloating, stomach pain, bowel problems, respiratory problems, skin changes, weight gain.  No new issues/concerns today.    MEDICATIONS  amitriptyline (ELAVIL) 25 MG tablet, Take 2 tablets (50 mg) by mouth At Bedtime  dicyclomine (BENTYL) 10 MG capsule, Take 1 capsule (10 mg) by mouth 4 times daily as needed (abdominal cramping)  omeprazole (PRILOSEC) 20 MG DR capsule, Take 1 capsule (20 mg) by mouth daily  propranolol (INDERAL) 60 MG tablet, Take 1 tablet (60 mg) by mouth 2 times daily  rizatriptan (MAXALT) 10 MG tablet, Take 1 tablet (10 mg) by mouth at onset of headache for migraine May repeat in 2 hours.  SYNTHROID 88 MCG tablet, Take 1 tablet (88 mcg) by mouth daily    No current facility-administered medications on file prior to visit.       PHYSICAL EXAMINATION  VITALS: BP (!) 128/91 (BP Location: Right arm, Patient Position: Sitting)   Pulse 66   Ht 1.575 m (5' 2\")   Wt 86.2 kg (190 lb)   BMI 34.75 kg/m    GENERAL: Healthy appearing, alert, no acute distress, normal habitus.  CARDIOVASCULAR: Extremities warm " and well perfused. Pulses present.   NEUROLOGICAL:  Patient is awake and oriented to self, place and time.  Attention span is normal.  Memory is grossly intact; previously MoCA 26.  Language is fluent and follows commands appropriately.  Appropriate fund of knowledge. Cranial nerves 2-12 are intact. There is no pronator drift.  Motor exam shows 5/5 strength in all extremities.  Tone is symmetric bilaterally in upper and lower extremities.  (Previously reflexes are symmetric and 2+ in upper extremities and lower extremities. Sensory exam is grossly intact to light touch, pin prick and vibration.)  Finger to nose and heel to shin is without dysmetria.  Romberg is negative.  Gait is normal and the patient is able to do tandem walk and walk on toes and heels.  Exam unchanged from last visit.    DIAGNOSTICS  MRI-images reviewed.  No major structural lesions noted.  Some T2 hyperintensities.  IMPRESSION:  1.  No acute intracranial process.  2.  Nonspecific scattered T2 hyperintensities within the cerebral white matter. These can be seen in association with migraine headaches.    Labs  Component      Latest Ref Rng & Units 8/31/2021 9/13/2021 10/15/2021 12/7/2021   Thyroid Peroxidase Antibody      <35 IU/mL >5,000 (H)      TSH      0.40 - 4.00 mU/L  1.72 0.11 (L) 1.22   T4 Free      0.76 - 1.46 ng/dL   1.32        OUTSIDE RECORDS  Outside referral notes and chart notes were reviewed and pertinent information has been summarized (in addition to the HPI):-Patient has been diagnosed with long-haul Covid. Multiple testing has been ordered. Has been seen with cardiology. Also seeing endocrinology. Has been having brain fog for which she was referred to neurology. Does complain of easy fatigability as well.    LABS  Component      Latest Ref Rng & Units 12/14/2021   Vitamin B12      213 - 816 pg/mL 413   Vitamin B1 Whole Blood Level      70 - 180 nmol/L 104   Ferritin      10 - 130 ng/mL 192 (H)   Methylmalonic Acid      0.00 -  0.40 umol/L 0.21     EEG  IMPRESSION/REPORT/PLAN  This is a normal EEG during wakefulness and drowsiness except for mild generalized background dysrhythmia. Further clinical correlation is needed.     Please note that the absence of epileptiform abnormalities does not exclude the possibility of epilepsy in any patient.        IMPRESSION/REPORT/PLAN  Anti-TPO antibodies present-rule out Hashimoto's encephalopathy  History of 2019 novel coronavirus disease (COVID-19)  Brain fog/cognitive difficulty  Chronic migraine without aura without status migrainosus, not intractable  Insomnia, unspecified type    This is a 37 year old female with history of migraines with worsening migraines after COVID-19 infection and cognitive decline after COVID-19 infection.    In terms of her headaches these suggestive of chronic migraines.  Amitriptyline is helping and I will continue that.  Addition of propanolol has significantly help with the headaches.  We will continue with the amitriptyline and propanolol.  Previously the hope was to reduce her blood pressure with use of propanolol the blood pressure remains still high (today also).  Could use Topamax that there is risk of making the brain fog worse.  Maxalt is helping and she can continue that.  Blood work for causes of headaches was noncontributory.  We will continue to monitor.  Can go down on the amitriptyline since she does complain of some and somnolence.    Previously ferritin was elevated though that is unrelated to the headaches.  Could follow-up with primary care doctor.  Discussed about increased inflammation with the patient.    In terms of her brain fog most likely this is related to the COVID-19 infection.  Discussed prognosis.  She does have high anti-TPO antibody titers and we will put her on steroids to see if she gets better suspecting Hashimoto's encephalopathy.  Steroids did not help.  She is working with the COVID rehabilitation clinic and encouraged her to do  the therapies.  Has not had an appointment so far.  EEG was negative for epilepsy/encephalopathy.  Blood work for cause of memory problems have been negative.  [place] previously was within normal range 26.  She is interested in neuropsychology evaluation to establish baseline we will set her up for that.    She is sleeping well at night with the amitriptyline should continue that.  Dose was increased last visit since the lower dose was not enough.  Does complain of some somnolence that is getting better and will monitor.  Overall discussed the importance of getting good sleep at night to deal with Covid.    I can see her back in 2 months.  Should keep a log of her headaches.    -     rizatriptan (MAXALT) 10 MG tablet; Take 1 tablet (10 mg) by mouth at onset of headache for migraine May repeat in 2 hours.  -     amitriptyline (ELAVIL) 25 MG tablet; Take 2 tablets (50 mg) by mouth At Bedtime  -     propranolol (INDERAL) 60 MG tablet; Take 1 tablet (60 mg) by mouth 2 times daily  -     Neuropsychology Referral; Future    Return in about 6 months (around 10/12/2022) for In-Clinic Visit (must), After testing.    Over 33 minutes were spent coordinating the care for the patient on the day of the encounter.  This includes previsit, during visit and post visit activities as documented above.  Counseling patient.  Multiple problems reviewed/addressed.  Testing reviewed.  Prescription management.  (Activities include but not inclusive of reviewing chart, reviewing outside records, reviewing labs and imaging study results as well as the images, patient visit time including getting history and exam,  use if applicable, review of test results with the patient and coming up with a plan in a shared model, counseling patient and family, education and answering patient questions, EMR , EMR diagnosis entry and problem list management, medication reconciliation and prescription management if applicable, paperwork if  applicable, printing documents and documentation of the visit activities.)      Ranjan Parisi MD  Neurologist  [facility]  Tel:- [phone]    This note was dictated using voice recognition software.  Any grammatical or context distortions are unintentional and inherent to the software.

## 2022-04-12 NOTE — NURSING NOTE
Chief Complaint   Patient presents with     RECHECK     Migraine       Rosalia Mari MA on 4/12/2022 at 9:52 AM

## 2022-04-12 NOTE — LETTER
4/12/2022         RE: Lady Saavedra  [id number] [address] [place] [id number]        Dear Colleague,    Thank you for referring your patient, Lady Saavedra, to the [facility] NEUROLOGY [facility]IC [place]. Please see a copy of my visit note below.    NEUROLOGY OUTPATIENT PROGRESS NOTE   Apr 12, 2022     CHIEF COMPLAINT/REASON FOR VISIT/REASON FOR CONSULT  Patient presents with:  RECHECK: Migraine      REASON FOR CONSULTATION-headaches and brain fog.    REFERRAL SOURCE  Dr. Lalita Birmingham  CC Dr. Lalita Birmingham    HISTORY OF PRESENT ILLNESS  Lady Saavedra is a 37 year old female seen today for evaluation of headaches and brain fog.  She reports that she has a history of headache but then in March she got Covid.  She was admitted for Covid related pneumonia.  Was in the hospital for 1 week.  Since then the headaches have been much more frequent.  She is having them every day.  Reports significant photophobia and phonophobia with the headaches.  Headaches can be on any side in different places.  They can be behind the eyes and in the back of the head.  They are throbbing in nature.  Reports no visual aura with these.  Has been put on amitriptyline which has helped her sleep as well as with the headaches but nothing significant to get rid of the headaches completely.  Is using Advil and Tylenol for the headache still would last the whole day.  Is not using them around-the-clock.  Denies any other provoking factors.    Is also been diagnosed with thyroiditis since the Covid infection.  Is working with endocrinology on this.    Reports brain fog has been going on since the Covid infection.  Has not had any improvement.  Was let go of her job.  Has difficulty with memory, staying focused and doing tasks that she could previously do easily.  Does get good sleep at night with the amitriptyline.    Patient has recently been in contact with the Covid rehabilitation clinic.  Has not started the program  yet.    2/14/22  Patient returns today  1.  Patient reports that her headaches have significantly improved with the propanolol.  Is taking 60 mg twice a day.  Denies any side effects.  Headaches of 4-5 times a month.  Headaches are much less severe compared to before and she does not need to take abortive medication.  Did try the Maxalt once without any side effects and it did not work for her.  2.  Continues to have the brain fog.  Has been seen in the [facility] rehabilitation clinic and has not been able to do the therapies due to lack of insurance.  Steroids did not help with the brain fog.  3.  Reports that the amitriptyline is no longer working to help her sleep.  She is up till 2 AM every night.  Discussed about increasing the dose and she is interested in that.  Denies any side effects with the amitriptyline.    4/12/22  Patient returns today  1.  She reports that the headaches are twice a month.  Maxalt does work as abortive therapy though she has to eat something with it otherwise feels nauseous with the medication.  Propanolol amitriptyline is helping.  Some somnolence with the amitriptyline which is getting better.  Discussed about decreasing the amitriptyline and she wants to stay on the higher dose.  It was increased last time because the propanolol was not working by itself.  2.  Continues to have the brain fog.  Has insurance now and has gone to the [facility] rehabilitation clinic though has not been able to see the therapist yet.  Is only seeing her counselor for her mental illness.    3.  Is sleeping better with the amitriptyline at this point though does have some somnolence.  No insomnia.    Previous history is reviewed and this is unchanged.    PAST MEDICAL/SURGICAL HISTORY  Past Medical History:   Diagnosis Date     Abnormal Pap smear of cervix 2009, 2012, 2014    see problem list     Cervical high risk HPV (human papillomavirus) test positive 2009, 2012    see problem list     Chickenpox       Depressive disorder      Gestational HTN 01/01/2015     History of colposcopy with cervical biopsy 03/2012    ANN 1     Seasonal allergies     Was on clariten uses occasional sudafed     Urinary tract bacterial infections     as teenager     Patient Active Problem List   Diagnosis     CARDIOVASCULAR SCREENING; LDL GOAL LESS THAN 160     Pneumonia due to 2019 novel coronavirus   Significant of migraines, mental illness, suicide attempt, depression.  Feels the depression is under good control with EMR    FAMILY HISTORY  Family History   Adopted: Yes   Problem Relation Age of Onset     Unknown/Adopted Mother      Unknown/Adopted Father    Patient is adopted and she does not know her family.    SOCIAL HISTORY  Social History     Tobacco Use     Smoking status: Never Smoker     Smokeless tobacco: Never Used   Vaping Use     Vaping Use: Never used   Substance Use Topics     Alcohol use: No     Drug use: No       SYSTEMS REVIEW  Twelve-system ROS was done and other than the HPI this was negative except for neck pain, back pain, arm and leg pain, joint pain, numbness and tingling, sleepiness during the day, sleeping problems, headaches, anxiety, depression, palpitations, bloating, stomach pain, bowel problems, respiratory problems, skin changes, weight gain.  No new issues/concerns today.    MEDICATIONS  amitriptyline (ELAVIL) 25 MG tablet, Take 2 tablets (50 mg) by mouth At Bedtime  dicyclomine (BENTYL) 10 MG capsule, Take 1 capsule (10 mg) by mouth 4 times daily as needed (abdominal cramping)  omeprazole (PRILOSEC) 20 MG DR capsule, Take 1 capsule (20 mg) by mouth daily  propranolol (INDERAL) 60 MG tablet, Take 1 tablet (60 mg) by mouth 2 times daily  rizatriptan (MAXALT) 10 MG tablet, Take 1 tablet (10 mg) by mouth at onset of headache for migraine May repeat in 2 hours.  SYNTHROID 88 MCG tablet, Take 1 tablet (88 mcg) by mouth daily    No current facility-administered medications on file prior to visit.       PHYSICAL  "EXAMINATION  VITALS: BP (!) 128/91 (BP Location: Right arm, Patient Position: Sitting)   Pulse 66   Ht 1.575 m (5' 2\")   Wt 86.2 kg (190 lb)   BMI 34.75 kg/m    GENERAL: Healthy appearing, alert, no acute distress, normal habitus.  CARDIOVASCULAR: Extremities warm and well perfused. Pulses present.   NEUROLOGICAL:  Patient is awake and oriented to self, place and time.  Attention span is normal.  Memory is grossly intact; previously MoCA 26.  Language is fluent and follows commands appropriately.  Appropriate fund of knowledge. Cranial nerves 2-12 are intact. There is no pronator drift.  Motor exam shows 5/5 strength in all extremities.  Tone is symmetric bilaterally in upper and lower extremities.  (Previously reflexes are symmetric and 2+ in upper extremities and lower extremities. Sensory exam is grossly intact to light touch, pin prick and vibration.)  Finger to nose and heel to shin is without dysmetria.  Romberg is negative.  Gait is normal and the patient is able to do tandem walk and walk on toes and heels.  Exam unchanged from last visit.    DIAGNOSTICS  MRI-images reviewed.  No major structural lesions noted.  Some T2 hyperintensities.  IMPRESSION:  1.  No acute intracranial process.  2.  Nonspecific scattered T2 hyperintensities within the cerebral white matter. These can be seen in association with migraine headaches.    Labs  Component      Latest Ref Rng & Units 8/31/2021 9/13/2021 10/15/2021 12/7/2021   Thyroid Peroxidase Antibody      <35 IU/mL >5,000 (H)      TSH      0.40 - 4.00 mU/L  1.72 0.11 (L) 1.22   T4 Free      0.76 - 1.46 ng/dL   1.32        OUTSIDE RECORDS  Outside referral notes and chart notes were reviewed and pertinent information has been summarized (in addition to the HPI):-Patient has been diagnosed with long-haul Covid. Multiple testing has been ordered. Has been seen with cardiology. Also seeing endocrinology. Has been having brain fog for which she was referred to neurology. " Does complain of easy fatigability as well.    LABS  Component      Latest Ref Rng & Units 12/14/2021   Vitamin B12      213 - 816 pg/mL 413   Vitamin B1 Whole Blood Level      70 - 180 nmol/L 104   Ferritin      10 - 130 ng/mL 192 (H)   Methylmalonic Acid      0.00 - 0.40 umol/L 0.21     EEG  IMPRESSION/REPORT/PLAN  This is a normal EEG during wakefulness and drowsiness except for mild generalized background dysrhythmia. Further clinical correlation is needed.     Please note that the absence of epileptiform abnormalities does not exclude the possibility of epilepsy in any patient.        IMPRESSION/REPORT/PLAN  Anti-TPO antibodies present-rule out Hashimoto's encephalopathy  History of 2019 novel coronavirus disease (COVID-19)  Brain fog/cognitive difficulty  Chronic migraine without aura without status migrainosus, not intractable  Insomnia, unspecified type    This is a 37 year old female with history of migraines with worsening migraines after COVID-19 infection and cognitive decline after COVID-19 infection.    In terms of her headaches these suggestive of chronic migraines.  Amitriptyline is helping and I will continue that.  Addition of propanolol has significantly help with the headaches.  We will continue with the amitriptyline and propanolol.  Previously the hope was to reduce her blood pressure with use of propanolol the blood pressure remains still high (today also).  Could use Topamax that there is risk of making the brain fog worse.  Maxalt is helping and she can continue that.  Blood work for causes of headaches was noncontributory.  We will continue to monitor.  Can go down on the amitriptyline since she does complain of some and somnolence.    Previously ferritin was elevated though that is unrelated to the headaches.  Could follow-up with primary care doctor.  Discussed about increased inflammation with the patient.    In terms of her brain fog most likely this is related to the COVID-19  infection.  Discussed prognosis.  She does have high anti-TPO antibody titers and we will put her on steroids to see if she gets better suspecting Hashimoto's encephalopathy.  Steroids did not help.  She is working with the COVID rehabilitation clinic and encouraged her to do the therapies.  Has not had an appointment so far.  EEG was negative for epilepsy/encephalopathy.  Blood work for cause of memory problems have been negative.  [place] previously was within normal range 26.  She is interested in neuropsychology evaluation to establish baseline we will set her up for that.    She is sleeping well at night with the amitriptyline should continue that.  Dose was increased last visit since the lower dose was not enough.  Does complain of some somnolence that is getting better and will monitor.  Overall discussed the importance of getting good sleep at night to deal with Covid.    I can see her back in 2 months.  Should keep a log of her headaches.    -     rizatriptan (MAXALT) 10 MG tablet; Take 1 tablet (10 mg) by mouth at onset of headache for migraine May repeat in 2 hours.  -     amitriptyline (ELAVIL) 25 MG tablet; Take 2 tablets (50 mg) by mouth At Bedtime  -     propranolol (INDERAL) 60 MG tablet; Take 1 tablet (60 mg) by mouth 2 times daily  -     Neuropsychology Referral; Future    Return in about 6 months (around 10/12/2022) for In-Clinic Visit (must), After testing.    Over 33 minutes were spent coordinating the care for the patient on the day of the encounter.  This includes previsit, during visit and post visit activities as documented above.  Counseling patient.  Multiple problems reviewed/addressed.  Testing reviewed.  Prescription management.  (Activities include but not inclusive of reviewing chart, reviewing outside records, reviewing labs and imaging study results as well as the images, patient visit time including getting history and exam,  use if applicable, review of test results with  the patient and coming up with a plan in a shared model, counseling patient and family, education and answering patient questions, EMR , EMR diagnosis entry and problem list management, medication reconciliation and prescription management if applicable, paperwork if applicable, printing documents and documentation of the visit activities.)      Ranjan Parisi MD  Neurologist  [facility]  Tel:- [phone]    This note was dictated using voice recognition software.  Any grammatical or context distortions are unintentional and inherent to the software.        Again, thank you for allowing me to participate in the care of your patient.        Sincerely,        Ranjan Parisi MD

## 2022-04-19 ENCOUNTER — VIRTUAL VISIT (OUTPATIENT)
Dept: BEHAVIORAL HEALTH | Facility: CLINIC | Age: 37
End: 2022-04-19
Payer: COMMERCIAL

## 2022-04-19 DIAGNOSIS — F33.1 MODERATE EPISODE OF RECURRENT MAJOR DEPRESSIVE DISORDER (H): Primary | ICD-10-CM

## 2022-04-19 DIAGNOSIS — F41.1 GENERALIZED ANXIETY DISORDER: ICD-10-CM

## 2022-04-19 NOTE — PROGRESS NOTES
Transition Clinic                                     Progress Note    Patient Name: Lady Saavedra  Date: 04/19/22           Service Type: Individual      Session Start Time: 11:00  AM  Session End Time: 11:39     Session Length: 39    Session #: 4    Attendees: Client    Service Modality:  Video Visit:      Provider verified identity through the following two step process.  Patient provided:  Patient is known previously to provider    Telemedicine Visit: The patient's condition can be safely assessed and treated via synchronous audio and visual telemedicine encounter.      Reason for Telemedicine Visit: Services only offered telehealth    Originating Site (Patient Location): Patient's home    Distant Site (Provider Location): [facility] & ADDI[facility]    Consent:  The patient/guardian has verbally consented to: the potential risks and benefits of telemedicine (video visit) versus in person care; bill my insurance or make self-payment for services provided; and responsibility for payment of non-covered services.     Patient would like the video invitation sent by:  My Chart    Mode of Communication:  Video Conference via Amwell    As the provider I attest to compliance with applicable laws and regulations related to telemedicine.    DATA  Interactive Complexity: No  Crisis: No        Progress Since Last Session (Related to Symptoms / Goals / Homework):   Symptoms: No change pt experiencing headaches, fatigue, brain fog, and low mood ongoing, irritability and poor distress tolerance    Homework: partially completed      Episode of Care Goals: Satisfactory progress - PREPARATION (Decided to change - considering how); Intervened by negotiating a change plan and determining options / strategies for behavior change, identifying triggers, exploring social supports, and working towards setting a date to begin behavior change     Current / Ongoing Stressors and Concerns:   With  long covid sana laura pt is struggling with increased fatigue, making efforts not to nap, sleeping more than usual, and waking mid sleep     Treatment Objective(s) Addressed in This Session:   practice deep breathing at least several a day  identify mindfulness and wellness strategies for managing pain  Increase interest, engagement, and pleasure in doing things  Decrease frequency and intensity of feeling down, depressed, hopeless     Intervention:   CBT: identified cognitive distortions and address ANTs  DBT: mindfulness and self compassion  Psychodynamic: process stressors   psychoeducation regarding Emotional Freedom Technique tapping    Assessments completed prior to visit:  The following assessments were completed by patient for this visit:  PHQ9:   PHQ-9 SCORE 10/20/2010 11/16/2010 2/10/2015 12/7/2021 3/7/2022 3/16/2022 3/18/2022   PHQ-9 Total Score 15 3 10 - - - -   PHQ-9 Total Score MyChart - - - 11 (Moderate depression) 11 (Moderate depression) - 10 (Moderate depression)   PHQ-9 Total Score - - - 11 11 13 10     GAD7:   JOSÉ MIGUEL-7 SCORE 12/7/2021 3/7/2022 3/16/2022 3/18/2022   Total Score 7 (mild anxiety) 5 (mild anxiety) - 5 (mild anxiety)   Total Score 7 5 9 5         ASSESSMENT: Current Emotional / Mental Status (status of significant symptoms):   Risk status (Self / Other harm or suicidal ideation)   Patient denies current fears or concerns for personal safety.   Patient denies current or recent suicidal ideation or behaviors.   Patient denies current or recent homicidal ideation or behaviors.   Patient denies current or recent self injurious behavior or ideation.   Patient denies other safety concerns.   Patient reports there has been no change in risk factors since their last session.     Patient reports there has been no change in protective factors since their last session.     Recommended that patient call 911 or go to the local ED should there be a change in any of these risk  factors.     Appearance:   Appropriate    Eye Contact:   Good    Psychomotor Behavior: calm Cooperative  Interested Friendly Pleasant Attentive   Orientation:   All   Speech    Rate / Production: Normal/ Responsive    Volume:  Normal    Mood:    Euthymic   Affect:    Appropriate  Bright    Thought Content:  Clear    Thought Form:  Coherent  Goal Directed  Logical    Insight:    Good      Medication Review:   Amatryptaline and not taking antidepressant. Propanolol for elevated HR, gi meds and migraine meds.      Medication Compliance:   Yes     Changes in Health Issues:   Yes: increased headaches, fatigue     Chemical Use Review:   Substance Use: Chemical use reviewed, no active concerns identified      Tobacco Use: not reviewed    Diagnosis:  1. Moderate episode of recurrent major depressive disorder (H)    2. Generalized anxiety disorder        Collateral Reports Completed:   Not Applicable    PLAN: (Patient Tasks / Therapist Tasks / Other)  Continue to use journaling activity to track mood and energy, develop insights. Continue to improve physical activity level, adding yoga. Learn more about EFT with cy resource provided.       MADHAV Vargas

## 2022-05-04 ENCOUNTER — TELEPHONE (OUTPATIENT)
Dept: BEHAVIORAL HEALTH | Facility: CLINIC | Age: 37
End: 2022-05-04
Payer: COMMERCIAL

## 2022-05-04 NOTE — TELEPHONE ENCOUNTER
Transition Clinic Note    Called pt to rescheduled based on mychart request. Pt did not answer but was scheduled for the following week - same naz and time as was indicated to be her preference for consistency. Pt is advised to call TC for any schedule changes needed.     MADHAV Vargas on 5/4/2022 at 2:52 PM

## 2022-05-08 ENCOUNTER — HEALTH MAINTENANCE LETTER (OUTPATIENT)
Age: 37
End: 2022-05-08

## 2022-05-10 ENCOUNTER — VIRTUAL VISIT (OUTPATIENT)
Dept: BEHAVIORAL HEALTH | Facility: CLINIC | Age: 37
End: 2022-05-10
Attending: PSYCHIATRY & NEUROLOGY
Payer: COMMERCIAL

## 2022-05-10 DIAGNOSIS — F33.1 MODERATE EPISODE OF RECURRENT MAJOR DEPRESSIVE DISORDER (H): Primary | ICD-10-CM

## 2022-05-10 NOTE — PROGRESS NOTES
Transition Clinic                                    Progress Note    Patient Name: Lady Saavedra  Date: 05/10/22          Service Type: Individual      Session Start Time: 11:30 AM  Session End Time: 12:07 PM     Session Length: 37    Session #: 5    Attendees: Client attended alone    Service Modality:  Video Visit:      Provider verified identity through the following two step process.  Patient provided:  Patient is known previously to provider    Telemedicine Visit: The patient's condition can be safely assessed and treated via synchronous audio and visual telemedicine encounter.      Reason for Telemedicine Visit: Services only offered telehealth    Originating Site (Patient Location): Patient's home    Distant Site (Provider Location): [facility] & ADDI[facility] SERVICES    Consent:  The patient/guardian has verbally consented to: the potential risks and benefits of telemedicine (video visit) versus in person care; bill my insurance or make self-payment for services provided; and responsibility for payment of non-covered services.     Patient would like the video invitation sent by:  My Chart    Mode of Communication:  Video Conference via Amwell    As the provider I attest to compliance with applicable laws and regulations related to telemedicine.    DATA  Interactive Complexity: No  Crisis: No        Progress Since Last Session (Related to Symptoms / Goals / Homework):   Symptoms: Improving depression sxs, emotional regulation is improved, pt using excellent coping strategies   Sleep is poor, increased headaches, fatigue and IBS sxs are increased    Homework: Partially completed      Episode of Care Goals: Satisfactory progress - ACTION (Actively working towards change); Intervened by reinforcing change plan / affirming steps taken     Current / Ongoing Stressors and Concerns:   No changes, family dynamics and coparenting, ongoing physical health challenges with long  covid     Treatment Objective(s) Addressed in This Session:   Increase interest, engagement, and pleasure in doing things  Improve quantity and quality of night time sleep / decrease daytime naps  Improve concentration, focus, and mindfulness in daily activities   Structure time for wellness activities     Intervention:   DBT: self care, communication strategies, mindfulness  Solution Focused: identify interventions to improve sleep    Assessments completed prior to visit:  The following assessments were completed by patient for this visit:  PHQ9:   PHQ-9 SCORE 10/20/2010 11/16/2010 2/10/2015 12/7/2021 3/7/2022 3/16/2022 3/18/2022   PHQ-9 Total Score 15 3 10 - - - -   PHQ-9 Total Score MyChart - - - 11 (Moderate depression) 11 (Moderate depression) - 10 (Moderate depression)   PHQ-9 Total Score - - - 11 11 13 10     GAD7:   JOSÉ MIGUEL-7 SCORE 12/7/2021 3/7/2022 3/16/2022 3/18/2022   Total Score 7 (mild anxiety) 5 (mild anxiety) - 5 (mild anxiety)   Total Score 7 5 9 5     PROMIS 10-Global Health (only subscores and total score):   PROMIS-10 Scores Only 3/27/2022 4/3/2022 4/18/2022   Global Mental Health Score 10 10 10   Global Physical Health Score 9 10 8   PROMIS TOTAL - SUBSCORES 19 20 18         ASSESSMENT: Current Emotional / Mental Status (status of significant symptoms):   Risk status (Self / Other harm or suicidal ideation)   Patient denies current fears or concerns for personal safety.   Patient denies current or recent suicidal ideation or behaviors.   Patient denies current or recent homicidal ideation or behaviors.   Patient denies current or recent self injurious behavior or ideation.   Patient denies other safety concerns.   Patient reports there has been no change in risk factors since their last session.     Patient reports there has been no change in protective factors since their last session.     Recommended that patient call 911 or go to the local ED should there be a change in any of these risk  factors.     Appearance:   Appropriate    Eye Contact:   Good    Psychomotor Behavior: calm    Attitude:   Cooperative  Interested Attentive   Orientation:   All   Speech    Rate / Production: Normal/ Responsive    Volume:  Normal    Mood:    Euthymic   Affect:    Appropriate and bright   Thought Content:  Clear    Thought Form:  Coherent  Logical    Insight:    Good      Medication Review:   No changes to current psychiatric medication(s)     Medication Compliance:   Yes     Changes in Health Issues:   None reported     Chemical Use Review:   Substance Use: Chemical use reviewed, no active concerns identified      Tobacco Use: No current tobacco use.      Diagnosis:  1. Moderate episode of recurrent major depressive disorder (H)        Collateral Reports Completed:   Not Applicable    PLAN: (Patient Tasks / Therapist Tasks / Other)  Continue with wellness and mindfulness activities, twice monthly sessions for processing and building motivation        Vero Wiggins, [email or web address]

## 2022-05-19 ENCOUNTER — HOSPITAL ENCOUNTER (OUTPATIENT)
Dept: CARDIOLOGY | Facility: CLINIC | Age: 37
Discharge: HOME | End: 2022-05-19
Attending: INTERNAL MEDICINE | Admitting: INTERNAL MEDICINE
Payer: COMMERCIAL

## 2022-05-19 DIAGNOSIS — R00.2 PALPITATIONS: ICD-10-CM

## 2022-05-19 DIAGNOSIS — Z86.16 HISTORY OF 2019 NOVEL CORONAVIRUS DISEASE (COVID-19): ICD-10-CM

## 2022-05-19 PROCEDURE — 93350 STRESS TTE ONLY: CPT | Mod: 26 | Performed by: INTERNAL MEDICINE

## 2022-05-19 PROCEDURE — 93016 CV STRESS TEST SUPVJ ONLY: CPT | Performed by: INTERNAL MEDICINE

## 2022-05-19 PROCEDURE — 93321 DOPPLER ECHO F-UP/LMTD STD: CPT | Mod: 26 | Performed by: INTERNAL MEDICINE

## 2022-05-19 PROCEDURE — 93325 DOPPLER ECHO COLOR FLOW MAPG: CPT | Mod: 26 | Performed by: INTERNAL MEDICINE

## 2022-05-19 PROCEDURE — 93321 DOPPLER ECHO F-UP/LMTD STD: CPT | Mod: TC

## 2022-05-19 PROCEDURE — 93018 CV STRESS TEST I&R ONLY: CPT | Performed by: INTERNAL MEDICINE

## 2022-05-19 PROCEDURE — 255N000002 HC RX 255 OP 636: Performed by: INTERNAL MEDICINE

## 2022-05-19 RX ADMIN — HUMAN ALBUMIN MICROSPHERES AND PERFLUTREN 2 ML: 10; .22 INJECTION, SOLUTION INTRAVENOUS at 10:30

## 2022-05-20 NOTE — RESULT ENCOUNTER NOTE
No evidence of stress induced ischemia. Follow up with Dr Birmingham on 6/17/22. Pt notified of results via her Mobile [employer] account

## 2022-05-23 ENCOUNTER — MYC MEDICAL ADVICE (OUTPATIENT)
Dept: FAMILY MEDICINE | Facility: CLINIC | Age: 37
End: 2022-05-23
Payer: COMMERCIAL

## 2022-05-23 NOTE — TELEPHONE ENCOUNTER
Routing to Provider to review and advise.     Refer to patients mychart message. Primary vs. OB/GYN??    Elva Pressley RN BSN PHN

## 2022-05-24 NOTE — TELEPHONE ENCOUNTER
Writer sent info to patient in Promethean.  Please see Promethean message.    Javad BOSCH [facility]

## 2022-05-24 NOTE — TELEPHONE ENCOUNTER
OK to start with primary care for this issue and do some workup before deciding if obgyn is needed, but patient is allowed to schedule with obgyn if she prefers - people should not need a referral to see Gyn  Mandie Farooq MD

## 2022-05-26 ASSESSMENT — PATIENT HEALTH QUESTIONNAIRE - PHQ9: SUM OF ALL RESPONSES TO PHQ QUESTIONS 1-9: 7

## 2022-06-01 ASSESSMENT — PATIENT HEALTH QUESTIONNAIRE - PHQ9
10. IF YOU CHECKED OFF ANY PROBLEMS, HOW DIFFICULT HAVE THESE PROBLEMS MADE IT FOR YOU TO DO YOUR WORK, TAKE CARE OF THINGS AT HOME, OR GET ALONG WITH OTHER PEOPLE: SOMEWHAT DIFFICULT
SUM OF ALL RESPONSES TO PHQ QUESTIONS 1-9: 7

## 2022-06-08 ENCOUNTER — OFFICE VISIT (OUTPATIENT)
Dept: FAMILY MEDICINE | Facility: CLINIC | Age: 37
End: 2022-06-08
Payer: COMMERCIAL

## 2022-06-08 VITALS
BODY MASS INDEX: 35.51 KG/M2 | TEMPERATURE: 98.1 F | DIASTOLIC BLOOD PRESSURE: 64 MMHG | RESPIRATION RATE: 18 BRPM | WEIGHT: 193 LBS | HEIGHT: 62 IN | SYSTOLIC BLOOD PRESSURE: 106 MMHG | HEART RATE: 74 BPM | OXYGEN SATURATION: 98 %

## 2022-06-08 DIAGNOSIS — Z00.00 ROUTINE GENERAL MEDICAL EXAMINATION AT A HEALTH CARE FACILITY: Primary | ICD-10-CM

## 2022-06-08 DIAGNOSIS — K21.9 GASTROESOPHAGEAL REFLUX DISEASE WITHOUT ESOPHAGITIS: ICD-10-CM

## 2022-06-08 DIAGNOSIS — G43.109 MIGRAINE WITH AURA AND WITHOUT STATUS MIGRAINOSUS, NOT INTRACTABLE: ICD-10-CM

## 2022-06-08 DIAGNOSIS — N91.2 AMENORRHEA: ICD-10-CM

## 2022-06-08 DIAGNOSIS — R79.89 ELEVATED FERRITIN LEVEL: ICD-10-CM

## 2022-06-08 DIAGNOSIS — E03.9 HYPOTHYROIDISM, UNSPECIFIED TYPE: ICD-10-CM

## 2022-06-08 DIAGNOSIS — F41.9 ANXIETY: ICD-10-CM

## 2022-06-08 DIAGNOSIS — Z11.59 NEED FOR HEPATITIS C SCREENING TEST: ICD-10-CM

## 2022-06-08 LAB
ERYTHROCYTE [DISTWIDTH] IN BLOOD BY AUTOMATED COUNT: 11.7 % (ref 10–15)
FERRITIN SERPL-MCNC: 484 NG/ML (ref 12–150)
HCG SERPL QL: NEGATIVE
HCT VFR BLD AUTO: 42.8 % (ref 35–47)
HGB BLD-MCNC: 14.4 G/DL (ref 11.7–15.7)
IRON SATN MFR SERPL: 30 % (ref 15–46)
IRON SERPL-MCNC: 128 UG/DL (ref 35–180)
MCH RBC QN AUTO: 30.4 PG (ref 26.5–33)
MCHC RBC AUTO-ENTMCNC: 33.6 G/DL (ref 31.5–36.5)
MCV RBC AUTO: 90 FL (ref 78–100)
PLATELET # BLD AUTO: 277 10E3/UL (ref 150–450)
RBC # BLD AUTO: 4.74 10E6/UL (ref 3.8–5.2)
TIBC SERPL-MCNC: 431 UG/DL (ref 240–430)
TSH SERPL DL<=0.005 MIU/L-ACNC: 1.42 MU/L (ref 0.4–4)
WBC # BLD AUTO: 7.5 10E3/UL (ref 4–11)

## 2022-06-08 PROCEDURE — 99214 OFFICE O/P EST MOD 30 MIN: CPT | Mod: 25 | Performed by: FAMILY MEDICINE

## 2022-06-08 PROCEDURE — 84443 ASSAY THYROID STIM HORMONE: CPT | Performed by: FAMILY MEDICINE

## 2022-06-08 PROCEDURE — 85027 COMPLETE CBC AUTOMATED: CPT | Performed by: FAMILY MEDICINE

## 2022-06-08 PROCEDURE — 99395 PREV VISIT EST AGE 18-39: CPT | Mod: 25 | Performed by: FAMILY MEDICINE

## 2022-06-08 PROCEDURE — 84703 CHORIONIC GONADOTROPIN ASSAY: CPT | Performed by: FAMILY MEDICINE

## 2022-06-08 PROCEDURE — 91305 COVID-19,PF,PFIZER (12+ YRS): CPT | Performed by: FAMILY MEDICINE

## 2022-06-08 PROCEDURE — 86803 HEPATITIS C AB TEST: CPT | Performed by: FAMILY MEDICINE

## 2022-06-08 PROCEDURE — 84146 ASSAY OF PROLACTIN: CPT | Performed by: FAMILY MEDICINE

## 2022-06-08 PROCEDURE — 83001 ASSAY OF GONADOTROPIN (FSH): CPT | Performed by: FAMILY MEDICINE

## 2022-06-08 PROCEDURE — 83550 IRON BINDING TEST: CPT | Performed by: FAMILY MEDICINE

## 2022-06-08 PROCEDURE — 82728 ASSAY OF FERRITIN: CPT | Performed by: FAMILY MEDICINE

## 2022-06-08 PROCEDURE — 82670 ASSAY OF TOTAL ESTRADIOL: CPT | Performed by: FAMILY MEDICINE

## 2022-06-08 PROCEDURE — 36415 COLL VENOUS BLD VENIPUNCTURE: CPT | Performed by: FAMILY MEDICINE

## 2022-06-08 PROCEDURE — 0054A COVID-19,PF,PFIZER (12+ YRS): CPT | Performed by: FAMILY MEDICINE

## 2022-06-08 RX ORDER — LEVOTHYROXINE SODIUM 88 UG/1
88 TABLET ORAL DAILY
Qty: 30 TABLET | Refills: 2 | Status: SHIPPED | OUTPATIENT
Start: 2022-06-08 | End: 2022-09-21

## 2022-06-08 RX ORDER — AMITRIPTYLINE HYDROCHLORIDE 50 MG/1
50 TABLET ORAL AT BEDTIME
Qty: 90 TABLET | Refills: 4 | Status: SHIPPED | OUTPATIENT
Start: 2022-06-08 | End: 2023-06-01

## 2022-06-08 RX ORDER — PROPRANOLOL HYDROCHLORIDE 60 MG/1
60 TABLET ORAL 2 TIMES DAILY
Qty: 180 TABLET | Refills: 3 | Status: SHIPPED | OUTPATIENT
Start: 2022-06-08 | End: 2023-07-19

## 2022-06-08 ASSESSMENT — PAIN SCALES - GENERAL: PAINLEVEL: NO PAIN (0)

## 2022-06-08 NOTE — PROGRESS NOTES
SUBJECTIVE:   CC: Lady Saavedra is an 37 year old woman who presents for preventive health visit.     Patient has been advised of split billing requirements and indicates understanding: Yes  Healthy Habits:     Getting at least 3 servings of Calcium per day:  Yes    Bi-annual eye exam:  NO    Dental care twice a year:  Yes    Sleep apnea or symptoms of sleep apnea:  None    Diet:  Regular (no restrictions)    Frequency of exercise:  2-3 days/week    Duration of exercise:  30-45 minutes    Taking medications regularly:  Yes    Barriers to taking medications:  None    Medication side effects:  Other    PHQ-2 Total Score: 1    Additional concerns today:  Yes (weight gain and irr cycle )  History of Present Illness       Reason for visit:  Irregular menstrual cycle  Symptom onset:  More than a month  Symptoms include:  Lack of menstrual cycle  Symptom intensity:  Moderate  Symptom progression:  Staying the same  Had these symptoms before:  No    She eats 2-3 servings of fruits and vegetables daily.She consumes 0 sweetened beverage(s) daily. She exercises with enough effort to increase her heart rate 3 or less days per week.   She is not taking prescribed medications regularly due to None.    Today's PHQ-9         PHQ-9 Total Score: 7    PHQ-9 Q9 Thoughts of better off dead/self-harm past 2 weeks :   Not at all    How difficult have these problems made it for you to do your work, take care of things at home, or get along with other people: Somewhat difficult    Seemed like normal period in Feb  In March had very light spotting  Then end of April and end of May, very light spotting    Answers for HPI/ROS submitted by the patient on 6/1/2022  If you checked off any problems, how difficult have these problems made it for you to do your work, take care of things at home, or get along with other people?: Somewhat difficult    PHQ9 TOTAL SCORE: 7  How many servings of fruits and vegetables do you eat daily?: 2-3  On  average, how many sweetened beverages do you drink each day (Examples: soda, juice, sweet tea, etc.  Do NOT count diet or artificially sweetened beverages)?: 0  How many days a week do you exercise enough to make your heart beat faster?: 3 or less  How many days per week do you miss taking your medication?: 0  What is the reason for your visit today?: Irregular menstrual cycle  When did your symptoms begin?: More than a month  What are your symptoms?: Lack of menstrual cycle  How would you describe these symptoms?: Moderate  Are your symptoms:: Staying the same  Have you had these symptoms before?: No    Concern - irr cycle last normal cycle was 2-2022 she has only spotted from time to time   Onset: 2-2022 was a normal cycle     Description:   Patient is not getting her cycle reg is very light when it come or will not come at all     Intensity: moderate cramping     Progression of Symptoms:  Worsening she is not getting her cycle monthly    Accompanying Signs & Symptoms:  Patient has had some weight gain    Previous history of similar problem:   none    Precipitating factors:   Worsened by: possible with her weight gain     Alleviating factors:  Improved by: none     Therapies Tried and outcome: Patient has tried to lose weight but has not been successful    Patient would like to talk about her weight gain she did start synthroid medication and ?s if she is at the right dose    Today's PHQ-2 Score:   PHQ-2 ( 1999 [employer]) 3/7/2022   Q1: Little interest or pleasure in doing things 2   Q2: Feeling down, depressed or hopeless 2   PHQ-2 Score 4   PHQ-2 Total Score (12-17 Years)- Positive if 3 or more points; Administer PHQ-A if positive -   Q1: Little interest or pleasure in doing things More than half the days   Q2: Feeling down, depressed or hopeless More than half the days   PHQ-2 Score 4     Abuse: Current or Past (Physical, Sexual or Emotional) - No  Do you feel safe in your environment? Yes    Have you ever done  Advance Care Planning? (For example, a Health Directive, POLST, or a discussion with a medical provider or your loved ones about your wishes): No, advance care planning information given to patient to review.  Patient plans to discuss their wishes with loved ones or provider.      Social History     Tobacco Use     Smoking status: Never Smoker     Smokeless tobacco: Never Used   Substance Use Topics     Alcohol use: No     If you drink alcohol do you typically have >3 drinks per day or >7 drinks per week? No    Alcohol Use 6/8/2022   Prescreen: >3 drinks/day or >7 drinks/week? No     Reviewed orders with patient.  Reviewed health maintenance and updated orders accordingly - Yes    Breast Cancer Screening:    FHS-7: No flowsheet data found.    Patient under 40 years of age: Routine Mammogram Screening not recommended.   Pertinent mammograms are reviewed under the imaging tab.    History of abnormal Pap smear: NO - age 30-65 PAP every 5 years with negative HPV co-testing recommended  PAP / HPV Latest Ref Rng & Units 11/8/2019 2/10/2015 6/9/2014   PAP (Historical) - NIL NIL ASC-US(A)   HPV16 NEG:Negative Negative - -   HPV18 NEG:Negative Negative - -   HRHPV NEG:Negative Negative - -     Reviewed and updated as needed this visit by clinical staff   Tobacco  Allergies  Meds  Problems  Med Hx  Surg Hx  Fam Hx  Soc   Hx          Reviewed and updated as needed this visit by Provider   Tobacco  Allergies  Meds  Problems  Med Hx  Surg Hx  Fam Hx             Review of Systems   Constitutional: Positive for fatigue and unexpected weight change. Negative for activity change.   HENT: Negative.    Eyes: Negative.    Respiratory: Negative.    Cardiovascular: Negative.    Gastrointestinal: Negative.    Endocrine: Negative.    Breasts:  negative.    Genitourinary: Negative.    Psychiatric/Behavioral: Positive for dysphoric mood.          OBJECTIVE:   /64   Pulse 74   Temp 98.1  F (36.7  C)   Resp 18   Ht  "1.575 m (5' 2\")   Wt 87.5 kg (193 lb)   LMP 05/27/2022 (Exact Date)   SpO2 98%   BMI 35.30 kg/m    Physical Exam  GENERAL: healthy, alert and no distress  EYES: Eyes grossly normal to inspection, PERRL and conjunctivae and sclerae normal  HENT: ear canals and TM's normal, nose and mouth without ulcers or lesions  NECK: no adenopathy, no asymmetry, masses, or scars and thyroid normal to palpation  RESP: lungs clear to auscultation - no rales, rhonchi or wheezes  BREAST: deferred  CV: regular rate and rhythm, normal S1 S2, no S3 or S4, no murmur, click or rub, no peripheral edema and peripheral pulses strong  ABDOMEN: soft, nontender, no hepatosplenomegaly, no masses and bowel sounds normal  MS: no gross musculoskeletal defects noted, no edema  SKIN: no suspicious lesions or rashes  NEURO: Normal strength and tone, mentation intact and speech normal  PSYCH: mentation appears normal, affect normal/bright        ASSESSMENT/PLAN:   Lady was seen today for physical and weight problem.    Diagnoses and all orders for this visit:    Routine general medical examination at a health care facility  -     REVIEW OF HEALTH MAINTENANCE PROTOCOL ORDERS    Amenorrhea  -     CBC with platelets; Future  -     Prolactin; Future  -     Estradiol; Future  -     TSH with free T4 reflex; Future  -     Follicle stimulating hormone; Future  -     HCG qualitative; Future  -     US Pelvic Complete with Transvaginal; Future  -     Ob/Gyn Referral; Future  -     CBC with platelets  -     Prolactin  -     Estradiol  -     TSH with free T4 reflex  -     Follicle stimulating hormone  -     HCG qualitative    Elevated ferritin level  -     Iron and iron binding capacity; Future  -     Ferritin; Future  -     Iron and iron binding capacity  -     Ferritin    Need for hepatitis C screening test  -     Hepatitis C Screen Reflex to HCV RNA Quant and Genotype; Future  -     Hepatitis C Screen Reflex to HCV RNA Quant and Genotype    Migraine with " aura and without status migrainosus, not intractable  -     amitriptyline (ELAVIL) 50 MG tablet; Take 1 tablet (50 mg) by mouth At Bedtime  -     propranolol (INDERAL) 60 MG tablet; Take 1 tablet (60 mg) by mouth 2 times daily    Anxiety  -     amitriptyline (ELAVIL) 50 MG tablet; Take 1 tablet (50 mg) by mouth At Bedtime    Hypothyroidism, unspecified type  -     levothyroxine (SYNTHROID) 88 MCG tablet; Take 1 tablet (88 mcg) by mouth daily    Gastroesophageal reflux disease without esophagitis  -     omeprazole (PRILOSEC) 20 MG DR capsule; Take 1 capsule (20 mg) by mouth daily    Other orders  -     COVID-19,,PFIZER (12+ YRS)        Patient has been advised of split billing requirements and indicates understanding: Yes    COUNSELING:  Reviewed preventive health counseling, as reflected in patient instructions    She reports that she has never smoked. She has never used smokeless tobacco.      Mandie Farooq MD  [facility]

## 2022-06-08 NOTE — PROGRESS NOTES
Answers for HPI/ROS submitted by the patient on 6/1/2022  If you checked off any problems, how difficult have these problems made it for you to do your work, take care of things at home, or get along with other people?: Somewhat difficult  PHQ9 TOTAL SCORE: 7  How many servings of fruits and vegetables do you eat daily?: 2-3  On average, how many sweetened beverages do you drink each day (Examples: soda, juice, sweet tea, etc.  Do NOT count diet or artificially sweetened beverages)?: 0  How many days a week do you exercise enough to make your heart beat faster?: 3 or less  How many days per week do you miss taking your medication?: 0  What is the reason for your visit today?: Irregular menstrual cycle  When did your symptoms begin?: More than a month  What are your symptoms?: Lack of menstrual cycle  How would you describe these symptoms?: Moderate  Are your symptoms:: Staying the same  Have you had these symptoms before?: No    Conflicting answers have been found for some questions. Please document the patient's answers manually. ***

## 2022-06-08 NOTE — LETTER
My Depression Action Plan  Name: Lady Saavedra   Date of Birth 1985  Date: 6/8/2022    My doctor: Mandie Farooq   My clinic: [facility]  [address] [facility] [address]  [phone]          GREEN    ZONE   Good Control    What it looks like:     Things are going generally well. You have normal ups and downs. You may even feel depressed from time to time, but bad moods usually last less than a day.   What you need to do:  1. Continue to care for yourself (see self care plan)  2. Check your depression survival kit and update it as needed  3. Follow your physician s recommendations including any medication.  4. Do not stop taking medication unless you consult with your physician first.           YELLOW         ZONE Getting Worse    What it looks like:     Depression is starting to interfere with your life.     It may be hard to get out of bed; you may be starting to isolate yourself from others.    Symptoms of depression are starting to last most all day and this has happened for several days.     You may have suicidal thoughts but they are not constant.   What you need to do:     1. Call your care team. Your response to treatment will improve if you keep your care team informed of your progress. Yellow periods are signs an adjustment may need to be made.     2. Continue your self-care.  Just get dressed and ready for the day.  Don't give yourself time to talk yourself out of it.    3. Talk to someone in your support network.    4. Open up your Depression Self-Care Plan/Wellness Kit.           RED    ZONE Medical Alert - Get Help    What it looks like:     Depression is seriously interfering with your life.     You may experience these or other symptoms: You can t get out of bed most days, can t work or engage in other necessary activities, you have trouble taking care of basic hygiene, or basic responsibilities, thoughts of suicide or death that will  not go away, self-injurious behavior.     What you need to do:  1. Call your care team and request a same-day appointment. If they are not available (weekends or after hours) call your local crisis line, emergency room or 911.          Depression Self-Care Plan / Wellness Kit    Many people find that medication and therapy are helpful treatments for managing depression. In addition, making small changes to your everyday life can help to boost your mood and improve your wellbeing. Below are some tips for you to consider. Be sure to talk with your medical provider and/or behavioral health consultant if your symptoms are worsening or not improving.     Sleep   Sleep hygiene  means all of the habits that support good, restful sleep. It includes maintaining a consistent bedtime and wake time, using your bedroom only for sleeping or sex, and keeping the bedroom dark and free of distractions like a computer, smartphone, or television.     Develop a Healthy Routine  Maintain good hygiene. Get out of bed in the morning, make your bed, brush your teeth, take a shower, and get dressed. Don t spend too much time viewing media that makes you feel stressed. Find time to relax each day.    Exercise  Get some form of exercise every day. This will help reduce pain and release endorphins, the  feel good  chemicals in your brain. It can be as simple as just going for a walk or doing some gardening, anything that will get you moving.      Diet  Strive to eat healthy foods, including fruits and vegetables. Drink plenty of water. Avoid excessive sugar, caffeine, alcohol, and other mood-altering substances.     Stay Connected with Others  Stay in touch with friends and family members.    Manage Your Mood  Try deep breathing, massage therapy, biofeedback, or meditation. Take part in fun activities when you can. Try to find something to smile about each day.     Psychotherapy  Be open to working with a therapist if your provider recommends  it.     Medication  Be sure to take your medication as prescribed. Most anti-depressants need to be taken every day. It usually takes several weeks for medications to work. Not all medicines work for all people. It is important to follow-up with your provider to make sure you have a treatment plan that is working for you. Do not stop your medication abruptly without first discussing it with your provider.    Crisis Resources   These hotlines are for both adults and children. They and are open 24 hours a day, 7 days a week unless noted otherwise.      National Suicide Prevention Lifeline   7-624-374-TALK (2068)      Crisis Text Line    www.crisistextline.org  Text HOME to 693750 from anywhere in the [place], anytime, about any type of crisis. A live, trained crisis counselor will receive the text and respond quickly.      [facility] Lifeline for LGBTQ Youth  A national crisis intervention and suicide lifeline for LGBTQ youth under 25. Provides a safe place to talk without judgement. Call 1-239.755.1627; text START to 644315 or visit www.thetrevorproject.org to talk to a trained counselor.      For [facility] crisis numbers, visit the [facility] website at:  https://[place].gov/dhs/people-we-serve/adults/health-care/mental-health/resources/crisis-contacts.jsp

## 2022-06-09 LAB
ESTRADIOL SERPL-MCNC: 45 PG/ML
FSH SERPL-ACNC: 6.5 IU/L
HCV AB SERPL QL IA: NONREACTIVE
PROLACTIN SERPL-MCNC: 4 UG/L (ref 3–27)

## 2022-06-10 ASSESSMENT — ENCOUNTER SYMPTOMS
ACTIVITY CHANGE: 0
GASTROINTESTINAL NEGATIVE: 1
UNEXPECTED WEIGHT CHANGE: 1
EYES NEGATIVE: 1
RESPIRATORY NEGATIVE: 1
ENDOCRINE NEGATIVE: 1
FATIGUE: 1
CARDIOVASCULAR NEGATIVE: 1
DYSPHORIC MOOD: 1

## 2022-06-17 ENCOUNTER — OFFICE VISIT (OUTPATIENT)
Dept: CARDIOLOGY | Facility: CLINIC | Age: 37
End: 2022-06-17
Payer: COMMERCIAL

## 2022-06-17 ENCOUNTER — LAB (OUTPATIENT)
Dept: LAB | Facility: CLINIC | Age: 37
End: 2022-06-17
Payer: COMMERCIAL

## 2022-06-17 VITALS
HEART RATE: 69 BPM | HEIGHT: 62 IN | DIASTOLIC BLOOD PRESSURE: 84 MMHG | WEIGHT: 197 LBS | OXYGEN SATURATION: 97 % | BODY MASS INDEX: 36.25 KG/M2 | SYSTOLIC BLOOD PRESSURE: 123 MMHG

## 2022-06-17 DIAGNOSIS — R00.2 PALPITATIONS: ICD-10-CM

## 2022-06-17 DIAGNOSIS — Z86.16 HISTORY OF 2019 NOVEL CORONAVIRUS DISEASE (COVID-19): ICD-10-CM

## 2022-06-17 LAB — D DIMER PPP FEU-MCNC: 0.37 UG/ML FEU (ref 0–0.5)

## 2022-06-17 PROCEDURE — 85379 FIBRIN DEGRADATION QUANT: CPT | Performed by: INTERNAL MEDICINE

## 2022-06-17 PROCEDURE — 99213 OFFICE O/P EST LOW 20 MIN: CPT | Performed by: INTERNAL MEDICINE

## 2022-06-17 PROCEDURE — 36415 COLL VENOUS BLD VENIPUNCTURE: CPT | Performed by: INTERNAL MEDICINE

## 2022-06-17 NOTE — LETTER
6/17/2022    Mandie Farooq MD  [address]  [facility] [address]    RE: Lady Alatorremike       Dear Colleague,     I had the pleasure of seeing Lady Thompsonleta in the [facility].        HPI:     This is a 37 year old female here for follow up of cardiac Long Marcia Collinsid. She had COVID in April and here to discuss her cardiovascular symptoms. Described lightheadedness, and heart rates shoot up to 160s with exertion. No chest pain or shortness of breath. Was hospitalized for about a week requiring oxygen. CT negative for PE, bilateral opacities present. Also diagnosed with thyroiditis likely triggerered by COVID as well.      ROS at the time + diarrhea, abdominal cramping, no SOB, LE edema. No syncope.      We obtained echocardiogram, inflammatory markers, DDimer and ziopatch which were unremarkable. She had symptoms correlating to her elevated heart rates (sinus) but no arrhythmia. Echo negative for LV dysfunction or findings suggesting myocarditis. She underwent stress test that showed mild deconditioning but no exercise induced arrhythmias or signs of myocarditis or ischemia.     ASSESSMENT/PLAN:     1. Long-haul COVID: discussed risks for postural orthostatic tachycardia syndrome (POTS), inappropriate sinus tachycardia, increased risk for VTE,  Amari/pericarditis for cardiovascular complications. I believe mainly she has inappropriate ST and cardiac deconditioning which is well described from COVID. Also she may be at increased risk for thromboembolism but luckily her DDimer was normal on initial evaluation and follow up. We encouraged her to continue exercise for the deconditioning. Her exercise test was reassuring. She can continue propanolol as needed for palpitations and follow up with me PRN.      Lalita Birmingham MD MSC  [facility]          PAST MEDICAL HISTORY  Past Medical History:   Diagnosis Date     Abnormal Pap smear of cervix 2009, 2012, 2014    see problem list      Cervical high risk HPV (human papillomavirus) test positive 2009, 2012    see problem list     Chickenpox      Depressive disorder      Gestational HTN 01/01/2015     History of colposcopy with cervical biopsy 03/2012    ANN 1     Seasonal allergies     Was on clariten uses occasional sudafed     Urinary tract bacterial infections     as teenager       CURRENT MEDICATIONS  Current Outpatient Medications   Medication Sig Dispense Refill     amitriptyline (ELAVIL) 50 MG tablet Take 1 tablet (50 mg) by mouth At Bedtime 90 tablet 4     dicyclomine (BENTYL) 10 MG capsule Take 1 capsule (10 mg) by mouth 4 times daily as needed (abdominal cramping) 90 capsule 3     levothyroxine (SYNTHROID) 88 MCG tablet Take 1 tablet (88 mcg) by mouth daily 30 tablet 2     omeprazole (PRILOSEC) 20 MG DR capsule Take 1 capsule (20 mg) by mouth daily 90 capsule 4     propranolol (INDERAL) 60 MG tablet Take 1 tablet (60 mg) by mouth 2 times daily 180 tablet 3     rizatriptan (MAXALT) 10 MG tablet Take 1 tablet (10 mg) by mouth at onset of headache for migraine May repeat in 2 hours. 18 tablet 1       PAST SURGICAL HISTORY:  Past Surgical History:   Procedure Laterality Date     BIOPSY       COSMETIC SURGERY       HC ENLARGE BREAST WITH IMPLANT       MOUTH SURGERY  age 18    wisdom teeth       ALLERGIES   No Known Allergies    FAMILY HISTORY  Family History   Adopted: Yes   Problem Relation Age of Onset     Unknown/Adopted Mother      Unknown/Adopted Father            SOCIAL HISTORY  Social History     Socioeconomic History     Marital status:      Spouse name: Not on file     Number of children: 0     Years of education: Not on file     Highest education level: Not on file   Occupational History     Not on file   Tobacco Use     Smoking status: Never Smoker     Smokeless tobacco: Never Used   Vaping Use     Vaping Use: Never used   Substance and Sexual Activity     Alcohol use: No     Drug use: No     Sexual activity: Yes      "Partners: Male     Birth control/protection: None   Other Topics Concern     Parent/sibling w/ CABG, MI or angioplasty before 65F 55M? No   Social History Narrative     Not on file     Social Determinants of Health     Financial Resource Strain: Not on file   Food Insecurity: Not on file   Transportation Needs: Not on file   Physical Activity: Not on file   Stress: Not on file   Social Connections: Not on file   Intimate Partner Violence: Not on file   Housing Stability: Not on file       ROS:   Constitutional: No fever, chills, or sweats. No weight gain/loss   ENT: No visual disturbance, ear ache, epistaxis, sore throat  Allergies/Immunologic: Negative  Respiratory: No cough, hemoptysia  Cardiovascular: As per HPI  GI: No nausea, vomiting, hematemesis, melena, or hematochezia  : No urinary frequency, dysuria, or hematuria  Integument: Negative  Psychiatric: Negative  Neuro: Negative  Endocrinology: Negative   Musculoskeletal: Negative  Vascular: No walking impairment, claudication, ischemic rest pain or nonhealing wounds    EXAM:  /84 (BP Location: Right arm, Patient Position: Sitting)   Pulse 69   Ht 1.575 m (5' 2\")   Wt 89.4 kg (197 lb)   LMP 05/27/2022 (Exact Date)   SpO2 97%   BMI 36.03 kg/m    In general, the patient is a pleasant female in no apparent distress.    HEENT: NC/AT.  PERRLA.  EOMI.  Sclerae white, not injected.  Nares clear.  Pharynx without erythema or exudate.  Dentition intact.    Neck: No adenopathy.  No thyromegaly. Carotids +2/2 bilaterally without bruits.  No jugular venous distension.   Heart: RRR. Normal S1, S2 splits physiologically. No murmur, rub, click, or gallop.   Lungs: CTA.  No ronchi, wheezes, rales.    Abdomen: Soft, nontender, nondistended. No organomegaly. No AAA.  No bruits.   Extremities: No clubbing, cyanosis, or edema.     Vascular: No bruits are noted.    Labs:  LIPID RESULTS:  Lab Results   Component Value Date    CHOL 173 03/22/2011    HDL 43 (L) " 03/22/2011     03/22/2011    TRIG 107 03/22/2011    CHOLHDLRATIO 4.0 03/22/2011       LIVER ENZYME RESULTS:  Lab Results   Component Value Date    AST 37 06/14/2021    ALT 58 (H) 07/06/2021       CBC RESULTS:  Lab Results   Component Value Date    WBC 7.5 06/08/2022    WBC 6.6 06/14/2021    RBC 4.74 06/08/2022    RBC 4.52 06/14/2021    HGB 14.4 06/08/2022    HGB 14.1 06/14/2021    HCT 42.8 06/08/2022    HCT 40.5 06/14/2021    MCV 90 06/08/2022    MCV 90 06/14/2021    MCH 30.4 06/08/2022    MCH 31.2 06/14/2021    MCHC 33.6 06/08/2022    MCHC 34.8 06/14/2021    RDW 11.7 06/08/2022    RDW 12.3 06/14/2021     06/08/2022     06/14/2021       BMP RESULTS:  Lab Results   Component Value Date     06/14/2021    POTASSIUM 3.8 06/14/2021    CHLORIDE 108 06/14/2021    CO2 24 06/14/2021    ANIONGAP 8 06/14/2021    GLC 92 06/14/2021    BUN 10 06/14/2021    CR 0.52 06/14/2021    GFRESTIMATED >90 06/14/2021    GFRESTBLACK >90 06/14/2021    DIANA 9.2 06/14/2021        A1C RESULTS:  No results found for: A1C    Thank you for allowing me to participate in the care of your patient.      Sincerely,   Lalita Birmingham MD   [facility]  cc:   Lalita Birmingham MD  [address] [place] [id number]

## 2022-06-17 NOTE — NURSING NOTE
"Initial /84 (BP Location: Right arm, Patient Position: Sitting)   Pulse 69   Ht 1.575 m (5' 2\")   Wt 89.4 kg (197 lb)   LMP 05/27/2022 (Exact Date)   SpO2 97%   BMI 36.03 kg/m   Estimated body mass index is 36.03 kg/m  as calculated from the following:    Height as of this encounter: 1.575 m (5' 2\").    Weight as of this encounter: 89.4 kg (197 lb). .      "

## 2022-06-17 NOTE — PROGRESS NOTES
HPI:     This is a 37 year old female here for follow up of cardiac Long Haul Covid. She had COVID in April and here to discuss her cardiovascular symptoms. Described lightheadedness, and heart rates shoot up to 160s with exertion. No chest pain or shortness of breath. Was hospitalized for about a week requiring oxygen. CT negative for PE, bilateral opacities present. Also diagnosed with thyroiditis likely triggerered by COVID as well.      ROS at the time + diarrhea, abdominal cramping, no SOB, LE edema. No syncope.      We obtained echocardiogram, inflammatory markers, DDimer and ziopatch which were unremarkable. She had symptoms correlating to her elevated heart rates (sinus) but no arrhythmia. Echo negative for LV dysfunction or findings suggesting myocarditis. She underwent stress test that showed mild deconditioning but no exercise induced arrhythmias or signs of myocarditis or ischemia.     ASSESSMENT/PLAN:     1. Long-haul COVID: discussed risks for postural orthostatic tachycardia syndrome (POTS), inappropriate sinus tachycardia, increased risk for VTE,  Amari/pericarditis for cardiovascular complications. I believe mainly she has inappropriate ST and cardiac deconditioning which is well described from COVID. Also she may be at increased risk for thromboembolism but luckily her DDimer was normal on initial evaluation and follow up. We encouraged her to continue exercise for the deconditioning. Her exercise test was reassuring. She can continue propanolol as needed for palpitations and follow up with me PRN.      Lalita Birmingham MD MSC  [facility]          PAST MEDICAL HISTORY  Past Medical History:   Diagnosis Date     Abnormal Pap smear of cervix 2009, 2012, 2014    see problem list     Cervical high risk HPV (human papillomavirus) test positive 2009, 2012    see problem list     Chickenpox      Depressive disorder      Gestational HTN 01/01/2015     History of colposcopy with cervical biopsy  03/2012    ANN 1     Seasonal allergies     Was on clariten uses occasional sudafed     Urinary tract bacterial infections     as teenager       CURRENT MEDICATIONS  Current Outpatient Medications   Medication Sig Dispense Refill     amitriptyline (ELAVIL) 50 MG tablet Take 1 tablet (50 mg) by mouth At Bedtime 90 tablet 4     dicyclomine (BENTYL) 10 MG capsule Take 1 capsule (10 mg) by mouth 4 times daily as needed (abdominal cramping) 90 capsule 3     levothyroxine (SYNTHROID) 88 MCG tablet Take 1 tablet (88 mcg) by mouth daily 30 tablet 2     omeprazole (PRILOSEC) 20 MG DR capsule Take 1 capsule (20 mg) by mouth daily 90 capsule 4     propranolol (INDERAL) 60 MG tablet Take 1 tablet (60 mg) by mouth 2 times daily 180 tablet 3     rizatriptan (MAXALT) 10 MG tablet Take 1 tablet (10 mg) by mouth at onset of headache for migraine May repeat in 2 hours. 18 tablet 1       PAST SURGICAL HISTORY:  Past Surgical History:   Procedure Laterality Date     BIOPSY       COSMETIC SURGERY       HC ENLARGE BREAST WITH IMPLANT       MOUTH SURGERY  age 18    wisdom teeth       ALLERGIES   No Known Allergies    FAMILY HISTORY  Family History   Adopted: Yes   Problem Relation Age of Onset     Unknown/Adopted Mother      Unknown/Adopted Father            SOCIAL HISTORY  Social History     Socioeconomic History     Marital status:      Spouse name: Not on file     Number of children: 0     Years of education: Not on file     Highest education level: Not on file   Occupational History     Not on file   Tobacco Use     Smoking status: Never Smoker     Smokeless tobacco: Never Used   Vaping Use     Vaping Use: Never used   Substance and Sexual Activity     Alcohol use: No     Drug use: No     Sexual activity: Yes     Partners: Male     Birth control/protection: None   Other Topics Concern     Parent/sibling w/ CABG, MI or angioplasty before 65F 55M? No   Social History Narrative     Not on file     Social Determinants of Health  "    Financial Resource Strain: Not on file   Food Insecurity: Not on file   Transportation Needs: Not on file   Physical Activity: Not on file   Stress: Not on file   Social Connections: Not on file   Intimate Partner Violence: Not on file   Housing Stability: Not on file       ROS:   Constitutional: No fever, chills, or sweats. No weight gain/loss   ENT: No visual disturbance, ear ache, epistaxis, sore throat  Allergies/Immunologic: Negative  Respiratory: No cough, hemoptysia  Cardiovascular: As per HPI  GI: No nausea, vomiting, hematemesis, melena, or hematochezia  : No urinary frequency, dysuria, or hematuria  Integument: Negative  Psychiatric: Negative  Neuro: Negative  Endocrinology: Negative   Musculoskeletal: Negative  Vascular: No walking impairment, claudication, ischemic rest pain or nonhealing wounds    EXAM:  /84 (BP Location: Right arm, Patient Position: Sitting)   Pulse 69   Ht 1.575 m (5' 2\")   Wt 89.4 kg (197 lb)   LMP 05/27/2022 (Exact Date)   SpO2 97%   BMI 36.03 kg/m    In general, the patient is a pleasant female in no apparent distress.    HEENT: NC/AT.  PERRLA.  EOMI.  Sclerae white, not injected.  Nares clear.  Pharynx without erythema or exudate.  Dentition intact.    Neck: No adenopathy.  No thyromegaly. Carotids +2/2 bilaterally without bruits.  No jugular venous distension.   Heart: RRR. Normal S1, S2 splits physiologically. No murmur, rub, click, or gallop.   Lungs: CTA.  No ronchi, wheezes, rales.    Abdomen: Soft, nontender, nondistended. No organomegaly. No AAA.  No bruits.   Extremities: No clubbing, cyanosis, or edema.     Vascular: No bruits are noted.    Labs:  LIPID RESULTS:  Lab Results   Component Value Date    CHOL 173 03/22/2011    HDL 43 (L) 03/22/2011     03/22/2011    TRIG 107 03/22/2011    CHOLHDLRATIO 4.0 03/22/2011       LIVER ENZYME RESULTS:  Lab Results   Component Value Date    AST 37 06/14/2021    ALT 58 (H) 07/06/2021       CBC RESULTS:  Lab " Results   Component Value Date    WBC 7.5 06/08/2022    WBC 6.6 06/14/2021    RBC 4.74 06/08/2022    RBC 4.52 06/14/2021    HGB 14.4 06/08/2022    HGB 14.1 06/14/2021    HCT 42.8 06/08/2022    HCT 40.5 06/14/2021    MCV 90 06/08/2022    MCV 90 06/14/2021    MCH 30.4 06/08/2022    MCH 31.2 06/14/2021    MCHC 33.6 06/08/2022    MCHC 34.8 06/14/2021    RDW 11.7 06/08/2022    RDW 12.3 06/14/2021     06/08/2022     06/14/2021       BMP RESULTS:  Lab Results   Component Value Date     06/14/2021    POTASSIUM 3.8 06/14/2021    CHLORIDE 108 06/14/2021    CO2 24 06/14/2021    ANIONGAP 8 06/14/2021    GLC 92 06/14/2021    BUN 10 06/14/2021    CR 0.52 06/14/2021    GFRESTIMATED >90 06/14/2021    GFRESTBLACK >90 06/14/2021    DIANA 9.2 06/14/2021        A1C RESULTS:  No results found for: A1C

## 2022-07-11 ENCOUNTER — OFFICE VISIT (OUTPATIENT)
Dept: OBGYN | Facility: CLINIC | Age: 37
End: 2022-07-11
Payer: COMMERCIAL

## 2022-07-11 VITALS
BODY MASS INDEX: 37.13 KG/M2 | WEIGHT: 201.8 LBS | TEMPERATURE: 97.9 F | HEART RATE: 77 BPM | SYSTOLIC BLOOD PRESSURE: 125 MMHG | HEIGHT: 62 IN | RESPIRATION RATE: 18 BRPM | DIASTOLIC BLOOD PRESSURE: 85 MMHG

## 2022-07-11 DIAGNOSIS — N92.6 IRREGULAR MENSTRUAL CYCLE: Primary | ICD-10-CM

## 2022-07-11 DIAGNOSIS — U09.9 COVID-19 LONG HAULER: ICD-10-CM

## 2022-07-11 LAB
HBA1C MFR BLD: 6 % (ref 0–5.6)
MIS SERPL-MCNC: 1.2 NG/ML (ref 0.15–7.5)
PROGEST SERPL-MCNC: 0.1 NG/ML

## 2022-07-11 PROCEDURE — 99213 OFFICE O/P EST LOW 20 MIN: CPT | Performed by: OBSTETRICS & GYNECOLOGY

## 2022-07-11 PROCEDURE — 84403 ASSAY OF TOTAL TESTOSTERONE: CPT | Performed by: OBSTETRICS & GYNECOLOGY

## 2022-07-11 PROCEDURE — 83036 HEMOGLOBIN GLYCOSYLATED A1C: CPT | Performed by: OBSTETRICS & GYNECOLOGY

## 2022-07-11 PROCEDURE — 36415 COLL VENOUS BLD VENIPUNCTURE: CPT | Performed by: OBSTETRICS & GYNECOLOGY

## 2022-07-11 PROCEDURE — 84144 ASSAY OF PROGESTERONE: CPT | Performed by: OBSTETRICS & GYNECOLOGY

## 2022-07-11 PROCEDURE — 83520 IMMUNOASSAY QUANT NOS NONAB: CPT | Performed by: OBSTETRICS & GYNECOLOGY

## 2022-07-11 PROCEDURE — 84270 ASSAY OF SEX HORMONE GLOBUL: CPT | Performed by: OBSTETRICS & GYNECOLOGY

## 2022-07-11 NOTE — NURSING NOTE
"Initial /85 (BP Location: Right arm, Patient Position: Chair, Cuff Size: Adult Regular)   Pulse 77   Temp 97.9  F (36.6  C) (Tympanic)   Resp 18   Ht 1.575 m (5' 2\")   Wt 91.5 kg (201 lb 12.8 oz)   LMP 07/07/2022   Breastfeeding No   BMI 36.91 kg/m   Estimated body mass index is 36.91 kg/m  as calculated from the following:    Height as of this encounter: 1.575 m (5' 2\").    Weight as of this encounter: 91.5 kg (201 lb 12.8 oz). .    Fior Fields, ABIEL    "

## 2022-07-11 NOTE — PROGRESS NOTES
Lady is a 37 year old   female who presents for advice regarding irregular menses and infertility since had Covid 3/21; she identifies as long haul Covid, has seen Cardiologist, neurologist etc.  Her menses used to be regular, but since Covid, she has gained weight, skipped up to 2 cycles, had them be abnornally light, then abnormally heavy. She has 1 prior pregnancy, 7 years ago, no problem with conception    Work up to date: normal TSH, PL, E2 and FSH  She has ultrasound scheduled upcoming.    Patient Active Problem List    Diagnosis Date Noted     COVID-19 long hauler 2022     Priority: Medium     Pneumonia due to 2019 novel coronavirus 2021     Priority: Medium     CARDIOVASCULAR SCREENING; LDL GOAL LESS THAN 160 10/31/2010     Priority: Medium       All systems were reviewed and pertinent information in noted in subjective/HPI.    Past Medical History:   Diagnosis Date     Abnormal Pap smear of cervix , ,     see problem list     Cervical high risk HPV (human papillomavirus) test positive ,     see problem list     Chickenpox      Depressive disorder      Gestational HTN 2015     History of colposcopy with cervical biopsy 2012    ANN 1     Seasonal allergies     Was on clariten uses occasional sudafed     Urinary tract bacterial infections     as teenager       Past Surgical History:   Procedure Laterality Date     BIOPSY       COSMETIC SURGERY       HC ENLARGE BREAST WITH IMPLANT       MOUTH SURGERY  age 18    wisdom teeth         Current Outpatient Medications:      amitriptyline (ELAVIL) 50 MG tablet, Take 1 tablet (50 mg) by mouth At Bedtime, Disp: 90 tablet, Rfl: 4     dicyclomine (BENTYL) 10 MG capsule, Take 1 capsule (10 mg) by mouth 4 times daily as needed (abdominal cramping), Disp: 90 capsule, Rfl: 3     levothyroxine (SYNTHROID) 88 MCG tablet, Take 1 tablet (88 mcg) by mouth daily, Disp: 30 tablet, Rfl: 2     omeprazole (PRILOSEC) 20 MG DR capsule,  "Take 1 capsule (20 mg) by mouth daily, Disp: 90 capsule, Rfl: 4     propranolol (INDERAL) 60 MG tablet, Take 1 tablet (60 mg) by mouth 2 times daily, Disp: 180 tablet, Rfl: 3     rizatriptan (MAXALT) 10 MG tablet, Take 1 tablet (10 mg) by mouth at onset of headache for migraine May repeat in 2 hours. (Patient not taking: Reported on 7/11/2022), Disp: 18 tablet, Rfl: 1    ALLERGIES:  Patient has no known allergies.    Social History     Socioeconomic History     Marital status:      Spouse name: None     Number of children: 0     Years of education: None     Highest education level: None   Tobacco Use     Smoking status: Never Smoker     Smokeless tobacco: Never Used   Vaping Use     Vaping Use: Never used   Substance and Sexual Activity     Alcohol use: No     Drug use: No     Sexual activity: Yes     Partners: Male     Birth control/protection: None   Other Topics Concern     Parent/sibling w/ CABG, MI or angioplasty before 65F 55M? No       Family History   Adopted: Yes   Problem Relation Age of Onset     Unknown/Adopted Mother      Unknown/Adopted Father        OBJECTIVE:  Vitals: /85 (BP Location: Right arm, Patient Position: Chair, Cuff Size: Adult Regular)   Pulse 77   Temp 97.9  F (36.6  C) (Tympanic)   Resp 18   Ht 1.575 m (5' 2\")   Wt 91.5 kg (201 lb 12.8 oz)   LMP 07/07/2022   Breastfeeding No   BMI 36.91 kg/m   BMI= Body mass index is 36.91 kg/m .   Patient's last menstrual period was 07/07/2022.     GENERAL APPEARANCE: healthy, alert and no distress    ASSESSMENT:      ICD-10-CM    1. Irregular menstrual cycle  N92.6 Mullerian Hormone Antibody     Hemoglobin A1c     Testosterone Free and Total     Progesterone     Mullerian Hormone Antibody     Hemoglobin A1c     Testosterone Free and Total     Progesterone   2. COVID-19 long hauler  U09.9        PLAN:  I discussed with Kassy that she is likely anovulatory as body is trying to prevent pregnancy when it perceives she is not well.  I " recommend we check AMH, Hgb A1c, testosterone and progesterone level, and correlate with the ultrasound  If appears to be anovulation, could consider Clomid or Letrazole OI.  Explained that with advanced age (37), if this strategy does not work, would want to refer to ADE Christianson MD  [facility]      Sydnie Christianson MD

## 2022-07-12 LAB — SHBG SERPL-SCNC: 10 NMOL/L (ref 30–135)

## 2022-07-15 DIAGNOSIS — N97.0 ANOVULATION: Primary | ICD-10-CM

## 2022-07-15 LAB
TESTOST FREE SERPL-MCNC: 0.24 NG/DL
TESTOST SERPL-MCNC: 8 NG/DL (ref 8–60)

## 2022-07-15 RX ORDER — CLOMIPHENE CITRATE 50 MG/1
50 TABLET ORAL DAILY
Qty: 5 TABLET | Refills: 4 | Status: SHIPPED | OUTPATIENT
Start: 2022-07-15 | End: 2023-07-06

## 2022-07-18 ENCOUNTER — HOSPITAL ENCOUNTER (OUTPATIENT)
Dept: ULTRASOUND IMAGING | Facility: CLINIC | Age: 37
Discharge: HOME | End: 2022-07-18
Attending: FAMILY MEDICINE | Admitting: FAMILY MEDICINE
Payer: COMMERCIAL

## 2022-07-18 DIAGNOSIS — N91.2 AMENORRHEA: ICD-10-CM

## 2022-07-18 PROCEDURE — 76856 US EXAM PELVIC COMPLETE: CPT

## 2022-09-21 ENCOUNTER — MYC MEDICAL ADVICE (OUTPATIENT)
Dept: FAMILY MEDICINE | Facility: CLINIC | Age: 37
End: 2022-09-21

## 2022-09-21 ENCOUNTER — VIRTUAL VISIT (OUTPATIENT)
Dept: GASTROENTEROLOGY | Facility: CLINIC | Age: 37
End: 2022-09-21
Payer: COMMERCIAL

## 2022-09-21 VITALS — WEIGHT: 198 LBS | BODY MASS INDEX: 36.44 KG/M2 | HEIGHT: 62 IN

## 2022-09-21 DIAGNOSIS — K52.9 CHRONIC DIARRHEA OF UNKNOWN ORIGIN: ICD-10-CM

## 2022-09-21 DIAGNOSIS — E03.9 HYPOTHYROIDISM, UNSPECIFIED TYPE: ICD-10-CM

## 2022-09-21 DIAGNOSIS — K21.9 GASTROESOPHAGEAL REFLUX DISEASE WITHOUT ESOPHAGITIS: ICD-10-CM

## 2022-09-21 PROCEDURE — 99213 OFFICE O/P EST LOW 20 MIN: CPT | Mod: 95 | Performed by: INTERNAL MEDICINE

## 2022-09-21 RX ORDER — LEVOTHYROXINE SODIUM 88 UG/1
88 TABLET ORAL DAILY
Qty: 90 TABLET | Refills: 0 | Status: SHIPPED | OUTPATIENT
Start: 2022-09-21 | End: 2022-12-16

## 2022-09-21 RX ORDER — DICYCLOMINE HYDROCHLORIDE 10 MG/1
10 CAPSULE ORAL 4 TIMES DAILY PRN
Qty: 90 CAPSULE | Refills: 11 | Status: SHIPPED | OUTPATIENT
Start: 2022-09-21

## 2022-09-21 ASSESSMENT — PAIN SCALES - GENERAL: PAINLEVEL: NO PAIN (0)

## 2022-09-21 NOTE — PROGRESS NOTES
Kassy is a 37 year old who is being evaluated via a billable video visit.      How would you like to obtain your AVS? [employer]  If the video visit is dropped, the invitation should be resent by: Text to cell phone: [phone]  Will anyone else be joining your video visit? No   No BP taken

## 2022-09-21 NOTE — TELEPHONE ENCOUNTER
"Prescription approved per [facility] Refill Protocol.    Pending Prescriptions:                       Disp   Refills    levothyroxine (SYNTHROID) 88 MCG tablet   30 tab*2            Sig: Take 1 tablet (88 mcg) by mouth daily      Requested Prescriptions   Pending Prescriptions Disp Refills     levothyroxine (SYNTHROID) 88 MCG tablet 30 tablet 2     Sig: Take 1 tablet (88 mcg) by mouth daily       Thyroid Protocol Passed - 9/21/2022  1:28 PM        Passed - Patient is 12 years or older        Passed - Recent (12 mo) or future (30 days) visit within the authorizing provider's specialty     Patient has had an office visit with the authorizing provider or a provider within the authorizing providers department within the previous 12 mos or has a future within next 30 days. See \"Patient Info\" tab in inbasket, or \"Choose Columns\" in Meds & Orders section of the refill encounter.              Passed - Medication is active on med list        Passed - Normal TSH on file in past 12 months     Recent Labs   Lab Test 06/08/22  1620   TSH 1.42              Passed - No active pregnancy on record     If patient is pregnant or has had a positive pregnancy test, please check TSH.          Passed - No positive pregnancy test in past 12 months     If patient is pregnant or has had a positive pregnancy test, please check TSH.             Susana Choi RN on 9/21/2022 at 2:59 PM    "

## 2022-09-21 NOTE — PATIENT INSTRUCTIONS
I'm happy you are doing so well!    Continue the fiber.  You can continue to use bentyl (dicyclomine) as needed for cramping    Try tapering off the omeprazole - go to every other day for 2 weeks and then try stopping it - you can use things like pepcid (famotidine) and antacids (gaviscon, tums, maalox, etc) as needed - if your symptoms return - you can resume the omeprazole

## 2022-11-01 ENCOUNTER — OFFICE VISIT (OUTPATIENT)
Dept: FAMILY MEDICINE | Facility: CLINIC | Age: 37
End: 2022-11-01
Payer: COMMERCIAL

## 2022-11-01 VITALS
DIASTOLIC BLOOD PRESSURE: 70 MMHG | HEIGHT: 62 IN | BODY MASS INDEX: 37.25 KG/M2 | SYSTOLIC BLOOD PRESSURE: 110 MMHG | TEMPERATURE: 98.1 F | WEIGHT: 202.4 LBS | RESPIRATION RATE: 20 BRPM | OXYGEN SATURATION: 98 % | HEART RATE: 70 BPM

## 2022-11-01 DIAGNOSIS — E03.9 HYPOTHYROIDISM, UNSPECIFIED TYPE: Primary | ICD-10-CM

## 2022-11-01 LAB
T4 FREE SERPL-MCNC: 1.29 NG/DL (ref 0.9–1.7)
TSH SERPL DL<=0.005 MIU/L-ACNC: 1.01 UIU/ML (ref 0.3–4.2)

## 2022-11-01 PROCEDURE — 0124A COVID-19,PF,PFIZER BOOSTER BIVALENT: CPT | Performed by: FAMILY MEDICINE

## 2022-11-01 PROCEDURE — 84439 ASSAY OF FREE THYROXINE: CPT | Performed by: FAMILY MEDICINE

## 2022-11-01 PROCEDURE — 99213 OFFICE O/P EST LOW 20 MIN: CPT | Performed by: FAMILY MEDICINE

## 2022-11-01 PROCEDURE — 36415 COLL VENOUS BLD VENIPUNCTURE: CPT | Performed by: FAMILY MEDICINE

## 2022-11-01 PROCEDURE — 91312 COVID-19,PF,PFIZER BOOSTER BIVALENT: CPT | Performed by: FAMILY MEDICINE

## 2022-11-01 PROCEDURE — 84443 ASSAY THYROID STIM HORMONE: CPT | Performed by: FAMILY MEDICINE

## 2022-11-01 ASSESSMENT — PAIN SCALES - GENERAL: PAINLEVEL: NO PAIN (0)

## 2022-11-01 ASSESSMENT — PATIENT HEALTH QUESTIONNAIRE - PHQ9
10. IF YOU CHECKED OFF ANY PROBLEMS, HOW DIFFICULT HAVE THESE PROBLEMS MADE IT FOR YOU TO DO YOUR WORK, TAKE CARE OF THINGS AT HOME, OR GET ALONG WITH OTHER PEOPLE: SOMEWHAT DIFFICULT
SUM OF ALL RESPONSES TO PHQ QUESTIONS 1-9: 1
SUM OF ALL RESPONSES TO PHQ QUESTIONS 1-9: 1

## 2022-11-01 NOTE — PROGRESS NOTES
Assessment & Plan     Hypothyroidism, unspecified type  Morbid Obesity  Ongoing fatigue and unexplained weight gain. Almost 5 months since last check so I feel that it's very reasonable to check again, and will include t4 non-reflex since this could be worth tweaking based on her sx  I talked about HAES principles today, including benefits of healthy diet and exercise regardless of body size. Patient's situation is heavily complicated by long covid  Reviewed meds that could be contributing to weight gain but importantly, her meds are related to long covid and are more benefit than harm   - TSH  - T4, free  - TSH  - T4, free    Patient Instructions   Over the counter iron supplements with vitamin C for absorption. Can cause constipation therefore OK to take several times a week instead of daily, which ironically can improve absorption. Example brands are vitron-C and naturelo    Return in about 3 months (around 2/1/2023) for Follow up.    Mandie Farooq MD  [facility]          Jayant Elena is a 37 year old accompanied by her self, presenting for the following health issues:    Recheck Medication, weight management, and Health Maintenance (Advised patient of . Will get "[email or web address]" booster today.)    Patient states that she is taking medications as prescribed, no side effects, and medications are helping her symptoms    History of Present Illness       Hypothyroidism:     Since last visit, patient describes the following symptoms::  Weight gain    Weight gain::  11-15 lbs.    She eats 2-3 servings of fruits and vegetables daily.She consumes 1 sweetened beverage(s) daily.She exercises with enough effort to increase her heart rate 10 to 19 minutes per day.  She exercises with enough effort to increase her heart rate 5 days per week. She is missing 1 dose(s) of medications per week.  She is not taking prescribed medications regularly due to remembering to take.    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      Objective    /70 (BP Location: Right arm, Patient Position: Sitting, Cuff Size: Adult Regular)   Pulse 70   Temp 98.1  F (36.7  C) (Tympanic)   Resp 20   Ht 1.575 m (5' 2\")   Wt 91.8 kg (202 lb 6.4 oz)   LMP 10/25/2022 (Exact Date)   SpO2 98%   BMI 37.02 kg/m    Body mass index is 37.02 kg/m .  Physical Exam   GENERAL: healthy, alert and no distress  RESP: normal respiratory effort, speaking in complete sentences  MS: no gross musculoskeletal defects noted, no edema  PSYCH: mentation appears normal, affect normal/bright                "

## 2022-11-11 ENCOUNTER — LAB (OUTPATIENT)
Dept: LAB | Facility: CLINIC | Age: 37
End: 2022-11-11
Payer: COMMERCIAL

## 2022-11-11 DIAGNOSIS — N97.0 ANOVULATION: ICD-10-CM

## 2022-11-11 LAB — PROGEST SERPL-MCNC: 15.9 NG/ML

## 2022-11-11 PROCEDURE — 36415 COLL VENOUS BLD VENIPUNCTURE: CPT

## 2022-11-11 PROCEDURE — 84144 ASSAY OF PROGESTERONE: CPT

## 2022-12-16 ENCOUNTER — MYC REFILL (OUTPATIENT)
Dept: FAMILY MEDICINE | Facility: CLINIC | Age: 37
End: 2022-12-16

## 2022-12-16 DIAGNOSIS — E03.9 HYPOTHYROIDISM, UNSPECIFIED TYPE: ICD-10-CM

## 2022-12-16 RX ORDER — LEVOTHYROXINE SODIUM 88 UG/1
88 TABLET ORAL DAILY
Qty: 90 TABLET | Refills: 0 | Status: SHIPPED | OUTPATIENT
Start: 2022-12-16 | End: 2023-02-10

## 2023-01-11 NOTE — PATIENT INSTRUCTIONS
Over the counter iron supplements with vitamin C for absorption. Can cause constipation therefore OK to take several times a week instead of daily, which ironically can improve absorption. Example brands are vitron-C and naturelo      
within defined limits

## 2023-02-01 ENCOUNTER — OFFICE VISIT (OUTPATIENT)
Dept: NEUROLOGY | Facility: CLINIC | Age: 38
End: 2023-02-01
Payer: COMMERCIAL

## 2023-02-01 VITALS
RESPIRATION RATE: 18 BRPM | BODY MASS INDEX: 37.13 KG/M2 | SYSTOLIC BLOOD PRESSURE: 124 MMHG | HEART RATE: 68 BPM | WEIGHT: 203 LBS | DIASTOLIC BLOOD PRESSURE: 74 MMHG

## 2023-02-01 DIAGNOSIS — G47.00 INSOMNIA, UNSPECIFIED TYPE: ICD-10-CM

## 2023-02-01 DIAGNOSIS — G43.709 CHRONIC MIGRAINE WITHOUT AURA WITHOUT STATUS MIGRAINOSUS, NOT INTRACTABLE: Primary | ICD-10-CM

## 2023-02-01 DIAGNOSIS — R41.89 BRAIN FOG: ICD-10-CM

## 2023-02-01 PROCEDURE — 99214 OFFICE O/P EST MOD 30 MIN: CPT | Performed by: PSYCHIATRY & NEUROLOGY

## 2023-02-01 RX ORDER — TOPIRAMATE 25 MG/1
TABLET, FILM COATED ORAL
Qty: 360 TABLET | Refills: 1 | Status: SHIPPED | OUTPATIENT
Start: 2023-02-01 | End: 2023-08-02

## 2023-02-01 NOTE — LETTER
2/1/2023         RE: Lady Saavedra  [address] [address] [place] [id number]        Dear Colleague,    Thank you for referring your patient, Lady Saavedra, to the [facility] [place]. Please see a copy of my visit note below.    NEUROLOGY OUTPATIENT PROGRESS NOTE   Feb 1, 2023     CHIEF COMPLAINT/REASON FOR VISIT/REASON FOR CONSULT  Patient presents with:  Follow Up    REASON FOR CONSULTATION-headaches and brain fog.    REFERRAL SOURCE  Dr. Lalita Birmingham  CC Dr. Lalita Birmingham    HISTORY OF PRESENT ILLNESS  Lady Saavedra is a 37 year old female seen today for evaluation of headaches and brain fog.  She reports that she has a history of headache but then in March she got Covid.  She was admitted for Covid related pneumonia.  Was in the hospital for 1 week.  Since then the headaches have been much more frequent.  She is having them every day.  Reports significant photophobia and phonophobia with the headaches.  Headaches can be on any side in different places.  They can be behind the eyes and in the back of the head.  They are throbbing in nature.  Reports no visual aura with these.  Has been put on amitriptyline which has helped her sleep as well as with the headaches but nothing significant to get rid of the headaches completely.  Is using Advil and Tylenol for the headache still would last the whole day.  Is not using them around-the-clock.  Denies any other provoking factors.    Is also been diagnosed with thyroiditis since the Covid infection.  Is working with endocrinology on this.    Reports brain fog has been going on since the Covid infection.  Has not had any improvement.  Was let go of her job.  Has difficulty with memory, staying focused and doing tasks that she could previously do easily.  Does get good sleep at night with the amitriptyline.    Patient has recently been in contact with the Covid rehabilitation clinic.  Has not started the program  yet.    2/14/22  Patient returns today  1.  Patient reports that her headaches have significantly improved with the propanolol.  Is taking 60 mg twice a day.  Denies any side effects.  Headaches of 4-5 times a month.  Headaches are much less severe compared to before and she does not need to take abortive medication.  Did try the Maxalt once without any side effects and it did not work for her.  2.  Continues to have the brain fog.  Has been seen in the [facility] rehabilitation clinic and has not been able to do the therapies due to lack of insurance.  Steroids did not help with the brain fog.  3.  Reports that the amitriptyline is no longer working to help her sleep.  She is up till 2 AM every night.  Discussed about increasing the dose and she is interested in that.  Denies any side effects with the amitriptyline.    4/12/22  Patient returns today  1.  She reports that the headaches are twice a month.  Maxalt does work as abortive therapy though she has to eat something with it otherwise feels nauseous with the medication.  Propanolol amitriptyline is helping.  Some somnolence with the amitriptyline which is getting better.  Discussed about decreasing the amitriptyline and she wants to stay on the higher dose.  It was increased last time because the propanolol was not working by itself.  2.  Continues to have the brain fog.  Has insurance now and has gone to the [facility] rehabilitation clinic though has not been able to see the therapist yet.  Is only seeing her counselor for her mental illness.    3.  Is sleeping better with the amitriptyline at this point though does have some somnolence.  No insomnia.    2/1/23  Patient returns today  1.  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  Headaches have since then gotten worse and she is having 2-3 headaches a week.  About 15 headache days a month.  Remains on the amitriptyline and propanolol.   This daytime somnolence with the amitriptyline has improved.  No side effects with propanolol.  Blood pressure is better at this point as well.  Maxalt does work as abortive therapy.  She does need to use it with food to avoid nausea.  2.  Brain fog is also improved with time.  3.  Sleeping better with the amitriptyline.  No issues.  No other new symptoms    Previous history is reviewed and this is unchanged.    PAST MEDICAL/SURGICAL HISTORY  Past Medical History:   Diagnosis Date     Abnormal Pap smear of cervix 2009, 2012, 2014    see problem list     Cervical high risk HPV (human papillomavirus) test positive 2009, 2012    see problem list     Chickenpox      Depressive disorder      Gestational HTN 01/01/2015     History of colposcopy with cervical biopsy 03/2012    ANN 1     Seasonal allergies     Was on clariten uses occasional sudafed     Urinary tract bacterial infections     as teenager     Patient Active Problem List   Diagnosis     CARDIOVASCULAR SCREENING; LDL GOAL LESS THAN 160     Pneumonia due to 2019 novel coronavirus     COVID-19 long hauler     Anovulation   Significant of migraines, mental illness, suicide attempt, depression.  Feels the depression is under good control with EMR    FAMILY HISTORY  Family History   Adopted: Yes   Problem Relation Age of Onset     Unknown/Adopted Mother      Unknown/Adopted Father    Patient is adopted and she does not know her family.    SOCIAL HISTORY  Social History     Tobacco Use     Smoking status: Never     Smokeless tobacco: Never   Vaping Use     Vaping Use: Never used   Substance Use Topics     Alcohol use: No     Drug use: No       SYSTEMS REVIEW  Twelve-system ROS was done and other than the HPI this was negative except for neck pain, back pain, arm and leg pain, joint pain, numbness and tingling, sleepiness during the day, sleeping problems, headaches, anxiety, depression, palpitations, bloating, stomach pain, bowel problems, respiratory problems, skin  changes, weight gain.  No other new concerns.    MEDICATIONS  amitriptyline (ELAVIL) 50 MG tablet, Take 1 tablet (50 mg) by mouth At Bedtime  clomiPHENE (CLOMID) 50 MG tablet, Take 1 tablet (50 mg) by mouth daily Cycle day 3-7  dicyclomine (BENTYL) 10 MG capsule, Take 1 capsule (10 mg) by mouth 4 times daily as needed (abdominal cramping)  levothyroxine (SYNTHROID) 88 MCG tablet, Take 1 tablet (88 mcg) by mouth daily  omeprazole (PRILOSEC) 20 MG DR capsule, Take 1 capsule (20 mg) by mouth daily  propranolol (INDERAL) 60 MG tablet, Take 1 tablet (60 mg) by mouth 2 times daily  rizatriptan (MAXALT) 10 MG tablet, Take 1 tablet (10 mg) by mouth at onset of headache for migraine May repeat in 2 hours.    No current facility-administered medications on file prior to visit.       PHYSICAL EXAMINATION  VITALS: /74   Pulse 68   Resp 18   Wt 92.1 kg (203 lb)   BMI 37.13 kg/m    GENERAL: Healthy appearing, alert, no acute distress, normal habitus.  CARDIOVASCULAR: Extremities warm and well perfused. Pulses present.   NEUROLOGICAL:  Patient is awake and oriented to self, place and time.  Attention span is normal.  Memory is grossly intact; previously MoCA 26.  Language is fluent and follows commands appropriately.  Appropriate fund of knowledge. Cranial nerves 2-12 are intact. There is no pronator drift.  Motor exam shows 5/5 strength in all extremities.  Tone is symmetric bilaterally in upper and lower extremities.  (Previously reflexes are symmetric and 2+ in upper extremities and lower extremities. Sensory exam is grossly intact to light touch, pin prick and vibration.)  Finger to nose and heel to shin is without dysmetria.  Romberg is negative.  Gait is normal and the patient is able to do tandem walk and walk on toes and heels.  No change in exam today    DIAGNOSTICS  MRI-images reviewed.  No major structural lesions noted.  Some T2 hyperintensities.  IMPRESSION:  1.  No acute intracranial process.  2.   Nonspecific scattered T2 hyperintensities within the cerebral white matter. These can be seen in association with migraine headaches.    Labs  Component      Latest Ref Rng & Units 8/31/2021 9/13/2021 10/15/2021 12/7/2021   Thyroid Peroxidase Antibody      <35 IU/mL >5,000 (H)      TSH      0.40 - 4.00 mU/L  1.72 0.11 (L) 1.22   T4 Free      0.76 - 1.46 ng/dL   1.32        OUTSIDE RECORDS  Outside referral notes and chart notes were reviewed and pertinent information has been summarized (in addition to the HPI):-Patient has been diagnosed with long-haul Covid. Multiple testing has been ordered. Has been seen with cardiology. Also seeing endocrinology. Has been having brain fog for which she was referred to neurology. Does complain of easy fatigability as well.    LABS  Component      Latest Ref Rng & Units 12/14/2021   Vitamin B12      213 - 816 pg/mL 413   Vitamin B1 Whole Blood Level      70 - 180 nmol/L 104   Ferritin      10 - 130 ng/mL 192 (H)   Methylmalonic Acid      0.00 - 0.40 umol/L 0.21     EEG  IMPRESSION/REPORT/PLAN  This is a normal EEG during wakefulness and drowsiness except for mild generalized background dysrhythmia. Further clinical correlation is needed.     Please note that the absence of epileptiform abnormalities does not exclude the possibility of epilepsy in any patient.      Component      Latest Ref Rng & Units 6/8/2022 11/1/2022   Ferritin      12 - 150 ng/mL 484 (H)    TSH      0.30 - 4.20 uIU/mL  1.01   T4 Free      0.90 - 1.70 ng/dL  1.29       IMPRESSION/REPORT/PLAN  History of anti-TPO antibodies  History of 2019 novel coronavirus disease (COVID-19)  Brain fog/cognitive difficulty-improved  Chronic migraine without aura without status migrainosus, not intractable  Insomnia, unspecified type    This is a 37 year old female with history of migraines with worsening migraines after COVID-19 infection and cognitive decline after COVID-19 infection.    Previously amitriptyline and  propanolol were helping.  Blood pressure was slightly elevated and amitriptyline was causing daytime somnolence.  These side effects have not improved.  We will add Topamax with worsening of headaches.    Continue Maxalt for abortive therapy which is helping.    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  Anti-TPO antibodies were positive and she was treated with steroids with no benefit.  MRI and EEG were noncontributory.  She does have a history of thyroid disease and is on levothyroxine.  Recent TSH levels were normal.  Continue to monitor.  She was previously referred to neuropsychology but this was never completed.  We will hold off for right now since the brain fog is better    For insomnia amitriptyline is working we will continue.    Return in 2 months.  Keep a log of the headaches.    -     rizatriptan (MAXALT) 10 MG tablet; Take 1 tablet (10 mg) by mouth at onset of headache for migraine May repeat in 2 hours.  -     amitriptyline (ELAVIL) 25 MG tablet; Take 2 tablets (50 mg) by mouth At Bedtime  -     propranolol (INDERAL) 60 MG tablet; Take 1 tablet (60 mg) by mouth 2 times daily  -     topiramate (TOPAMAX) 25 MG tablet; Start with 1 tab/night and increase by 1 tablet/week as needed and tolerated to a max of 4 tabs/night.      Return in about 2 months (around 4/1/2023) for In-Clinic Visit (must), After testing.    Over 30 minutes were spent coordinating the care for the patient on the day of the encounter.  This includes previsit, during visit and post visit activities as documented above.  Counseling patient.  Multiple problems reviewed/addressed.  Refractory problem.  Prescription management.  (Activities include but not inclusive of reviewing chart, reviewing outside records, reviewing labs and imaging study results as well as the images, patient visit time including getting history and exam,  use if applicable, review of test results with the patient  and coming up with a plan in a shared model, counseling patient and family, education and answering patient questions, EMR , EMR diagnosis entry and problem list management, medication reconciliation and prescription management if applicable, paperwork if applicable, printing documents and documentation of the visit activities.)      Ranjan Parisi MD  Neurologist  [facility] Neurology [facility]  Tel:- [phone]    This note was dictated using voice recognition software.  Any grammatical or context distortions are unintentional and inherent to the software.        Again, thank you for allowing me to participate in the care of your patient.        Sincerely,        Ranjan Parisi MD

## 2023-02-01 NOTE — PROGRESS NOTES
NEUROLOGY OUTPATIENT PROGRESS NOTE   Feb 1, 2023     CHIEF COMPLAINT/REASON FOR VISIT/REASON FOR CONSULT  Patient presents with:  Follow Up    REASON FOR CONSULTATION-headaches and brain fog.    REFERRAL SOURCE  Dr. Lalita Birmingham   Dr. Lalita Birmingham    HISTORY OF PRESENT ILLNESS  Lady Saavedra is a 37 year old female seen today for evaluation of headaches and brain fog.  She reports that she has a history of headache but then in March she got Covid.  She was admitted for Covid related pneumonia.  Was in the hospital for 1 week.  Since then the headaches have been much more frequent.  She is having them every day.  Reports significant photophobia and phonophobia with the headaches.  Headaches can be on any side in different places.  They can be behind the eyes and in the back of the head.  They are throbbing in nature.  Reports no visual aura with these.  Has been put on amitriptyline which has helped her sleep as well as with the headaches but nothing significant to get rid of the headaches completely.  Is using Advil and Tylenol for the headache still would last the whole day.  Is not using them around-the-clock.  Denies any other provoking factors.    Is also been diagnosed with thyroiditis since the Covid infection.  Is working with endocrinology on this.    Reports brain fog has been going on since the Covid infection.  Has not had any improvement.  Was let go of her job.  Has difficulty with memory, staying focused and doing tasks that she could previously do easily.  Does get good sleep at night with the amitriptyline.    Patient has recently been in contact with the Covid rehabilitation clinic.  Has not started the program yet.    2/14/22  Patient returns today  1.  Patient reports that her headaches have significantly improved with the propanolol.  Is taking 60 mg twice a day.  Denies any side effects.  Headaches of 4-5 times a month.  Headaches are much less severe compared to before and she does  not need to take abortive medication.  Did try the Maxalt once without any side effects and it did not work for her.  2.  Continues to have the brain fog.  Has been seen in the [facility] rehabilitation clinic and has not been able to do the therapies due to lack of insurance.  Steroids did not help with the brain fog.  3.  Reports that the amitriptyline is no longer working to help her sleep.  She is up till 2 AM every night.  Discussed about increasing the dose and she is interested in that.  Denies any side effects with the amitriptyline.    4/12/22  Patient returns today  1.  She reports that the headaches are twice a month.  Maxalt does work as abortive therapy though she has to eat something with it otherwise feels nauseous with the medication.  Propanolol amitriptyline is helping.  Some somnolence with the amitriptyline which is getting better.  Discussed about decreasing the amitriptyline and she wants to stay on the higher dose.  It was increased last time because the propanolol was not working by itself.  2.  Continues to have the brain fog.  Has insurance now and has gone to the [facility] rehabilitation clinic though has not been able to see the therapist yet.  Is only seeing her counselor for her mental illness.    3.  Is sleeping better with the amitriptyline at this point though does have some somnolence.  No insomnia.    2/1/23  Patient returns today  1.  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  Headaches have since then gotten worse and she is having 2-3 headaches a week.  About 15 headache days a month.  Remains on the amitriptyline and propanolol.  This daytime somnolence with the amitriptyline has improved.  No side effects with propanolol.  Blood pressure is better at this point as well.  Maxalt does work as abortive therapy.  She does need to use it with food to avoid nausea.  2.  Brain fog is also improved with  time.  3.  Sleeping better with the amitriptyline.  No issues.  No other new symptoms    Previous history is reviewed and this is unchanged.    PAST MEDICAL/SURGICAL HISTORY  Past Medical History:   Diagnosis Date     Abnormal Pap smear of cervix 2009, 2012, 2014    see problem list     Cervical high risk HPV (human papillomavirus) test positive 2009, 2012    see problem list     Chickenpox      Depressive disorder      Gestational HTN 01/01/2015     History of colposcopy with cervical biopsy 03/2012    ANN 1     Seasonal allergies     Was on clariten uses occasional sudafed     Urinary tract bacterial infections     as teenager     Patient Active Problem List   Diagnosis     CARDIOVASCULAR SCREENING; LDL GOAL LESS THAN 160     Pneumonia due to 2019 novel coronavirus     COVID-19 long hauler     Anovulation   Significant of migraines, mental illness, suicide attempt, depression.  Feels the depression is under good control with EMR    FAMILY HISTORY  Family History   Adopted: Yes   Problem Relation Age of Onset     Unknown/Adopted Mother      Unknown/Adopted Father    Patient is adopted and she does not know her family.    SOCIAL HISTORY  Social History     Tobacco Use     Smoking status: Never     Smokeless tobacco: Never   Vaping Use     Vaping Use: Never used   Substance Use Topics     Alcohol use: No     Drug use: No       SYSTEMS REVIEW  Twelve-system ROS was done and other than the HPI this was negative except for neck pain, back pain, arm and leg pain, joint pain, numbness and tingling, sleepiness during the day, sleeping problems, headaches, anxiety, depression, palpitations, bloating, stomach pain, bowel problems, respiratory problems, skin changes, weight gain.  No other new concerns.    MEDICATIONS  amitriptyline (ELAVIL) 50 MG tablet, Take 1 tablet (50 mg) by mouth At Bedtime  clomiPHENE (CLOMID) 50 MG tablet, Take 1 tablet (50 mg) by mouth daily Cycle day 3-7  dicyclomine (BENTYL) 10 MG capsule, Take 1  capsule (10 mg) by mouth 4 times daily as needed (abdominal cramping)  levothyroxine (SYNTHROID) 88 MCG tablet, Take 1 tablet (88 mcg) by mouth daily  omeprazole (PRILOSEC) 20 MG DR capsule, Take 1 capsule (20 mg) by mouth daily  propranolol (INDERAL) 60 MG tablet, Take 1 tablet (60 mg) by mouth 2 times daily  rizatriptan (MAXALT) 10 MG tablet, Take 1 tablet (10 mg) by mouth at onset of headache for migraine May repeat in 2 hours.    No current facility-administered medications on file prior to visit.       PHYSICAL EXAMINATION  VITALS: /74   Pulse 68   Resp 18   Wt 92.1 kg (203 lb)   BMI 37.13 kg/m    GENERAL: Healthy appearing, alert, no acute distress, normal habitus.  CARDIOVASCULAR: Extremities warm and well perfused. Pulses present.   NEUROLOGICAL:  Patient is awake and oriented to self, place and time.  Attention span is normal.  Memory is grossly intact; previously MoCA 26.  Language is fluent and follows commands appropriately.  Appropriate fund of knowledge. Cranial nerves 2-12 are intact. There is no pronator drift.  Motor exam shows 5/5 strength in all extremities.  Tone is symmetric bilaterally in upper and lower extremities.  (Previously reflexes are symmetric and 2+ in upper extremities and lower extremities. Sensory exam is grossly intact to light touch, pin prick and vibration.)  Finger to nose and heel to shin is without dysmetria.  Romberg is negative.  Gait is normal and the patient is able to do tandem walk and walk on toes and heels.  No change in exam today    DIAGNOSTICS  MRI-images reviewed.  No major structural lesions noted.  Some T2 hyperintensities.  IMPRESSION:  1.  No acute intracranial process.  2.  Nonspecific scattered T2 hyperintensities within the cerebral white matter. These can be seen in association with migraine headaches.    Labs  Component      Latest Ref Rng & Units 8/31/2021 9/13/2021 10/15/2021 12/7/2021   Thyroid Peroxidase Antibody      <35 IU/mL >5,000 (H)       TSH      0.40 - 4.00 mU/L  1.72 0.11 (L) 1.22   T4 Free      0.76 - 1.46 ng/dL   1.32        OUTSIDE RECORDS  Outside referral notes and chart notes were reviewed and pertinent information has been summarized (in addition to the HPI):-Patient has been diagnosed with long-haul Covid. Multiple testing has been ordered. Has been seen with cardiology. Also seeing endocrinology. Has been having brain fog for which she was referred to neurology. Does complain of easy fatigability as well.    LABS  Component      Latest Ref Rng & Units 12/14/2021   Vitamin B12      213 - 816 pg/mL 413   Vitamin B1 Whole Blood Level      70 - 180 nmol/L 104   Ferritin      10 - 130 ng/mL 192 (H)   Methylmalonic Acid      0.00 - 0.40 umol/L 0.21     EEG  IMPRESSION/REPORT/PLAN  This is a normal EEG during wakefulness and drowsiness except for mild generalized background dysrhythmia. Further clinical correlation is needed.     Please note that the absence of epileptiform abnormalities does not exclude the possibility of epilepsy in any patient.      Component      Latest Ref Rng & Units 6/8/2022 11/1/2022   Ferritin      12 - 150 ng/mL 484 (H)    TSH      0.30 - 4.20 uIU/mL  1.01   T4 Free      0.90 - 1.70 ng/dL  1.29       IMPRESSION/REPORT/PLAN  History of anti-TPO antibodies  History of 2019 novel coronavirus disease (COVID-19)  Brain fog/cognitive difficulty-improved  Chronic migraine without aura without status migrainosus, not intractable  Insomnia, unspecified type    This is a 37 year old female with history of migraines with worsening migraines after COVID-19 infection and cognitive decline after COVID-19 infection.    Previously amitriptyline and propanolol were helping.  Blood pressure was slightly elevated and amitriptyline was causing daytime somnolence.  These side effects have not improved.  We will add Topamax with worsening of headaches.    Continue Maxalt for abortive therapy which is helping.    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  Anti-TPO antibodies were positive and she was treated with steroids with no benefit.  MRI and EEG were noncontributory.  She does have a history of thyroid disease and is on levothyroxine.  Recent TSH levels were normal.  Continue to monitor.  She was previously referred to neuropsychology but this was never completed.  We will hold off for right now since the brain fog is better    For insomnia amitriptyline is working we will continue.    Return in 2 months.  Keep a log of the headaches.    -     rizatriptan (MAXALT) 10 MG tablet; Take 1 tablet (10 mg) by mouth at onset of headache for migraine May repeat in 2 hours.  -     amitriptyline (ELAVIL) 25 MG tablet; Take 2 tablets (50 mg) by mouth At Bedtime  -     propranolol (INDERAL) 60 MG tablet; Take 1 tablet (60 mg) by mouth 2 times daily  -     topiramate (TOPAMAX) 25 MG tablet; Start with 1 tab/night and increase by 1 tablet/week as needed and tolerated to a max of 4 tabs/night.      Return in about 2 months (around 4/1/2023) for In-Clinic Visit (must), After testing.    Over 30 minutes were spent coordinating the care for the patient on the day of the encounter.  This includes previsit, during visit and post visit activities as documented above.  Counseling patient.  Multiple problems reviewed/addressed.  Refractory problem.  Prescription management.  (Activities include but not inclusive of reviewing chart, reviewing outside records, reviewing labs and imaging study results as well as the images, patient visit time including getting history and exam,  use if applicable, review of test results with the patient and coming up with a plan in a shared model, counseling patient and family, education and answering patient questions, EMR , EMR diagnosis entry and problem list management, medication reconciliation and prescription management if applicable, paperwork if applicable, printing  documents and documentation of the visit activities.)      Ranjan Parisi MD  Neurologist  [facility]  Tel:- [phone]    This note was dictated using voice recognition software.  Any grammatical or context distortions are unintentional and inherent to the software.

## 2023-02-10 ENCOUNTER — MYC REFILL (OUTPATIENT)
Dept: FAMILY MEDICINE | Facility: CLINIC | Age: 38
End: 2023-02-10

## 2023-02-10 DIAGNOSIS — E03.9 HYPOTHYROIDISM, UNSPECIFIED TYPE: ICD-10-CM

## 2023-02-14 RX ORDER — LEVOTHYROXINE SODIUM 88 UG/1
88 TABLET ORAL DAILY
Qty: 90 TABLET | Refills: 3 | Status: SHIPPED | OUTPATIENT
Start: 2023-02-14 | End: 2023-12-11

## 2023-04-05 ENCOUNTER — OFFICE VISIT (OUTPATIENT)
Dept: NEUROLOGY | Facility: CLINIC | Age: 38
End: 2023-04-05
Payer: COMMERCIAL

## 2023-04-05 VITALS
BODY MASS INDEX: 36.03 KG/M2 | SYSTOLIC BLOOD PRESSURE: 140 MMHG | HEART RATE: 68 BPM | RESPIRATION RATE: 18 BRPM | DIASTOLIC BLOOD PRESSURE: 90 MMHG | WEIGHT: 197 LBS

## 2023-04-05 DIAGNOSIS — R41.89 BRAIN FOG: ICD-10-CM

## 2023-04-05 DIAGNOSIS — R76.8 ANTI-TPO ANTIBODIES PRESENT: ICD-10-CM

## 2023-04-05 DIAGNOSIS — G43.709 CHRONIC MIGRAINE WITHOUT AURA WITHOUT STATUS MIGRAINOSUS, NOT INTRACTABLE: Primary | ICD-10-CM

## 2023-04-05 DIAGNOSIS — G47.00 INSOMNIA, UNSPECIFIED TYPE: ICD-10-CM

## 2023-04-05 DIAGNOSIS — Z86.16 HISTORY OF 2019 NOVEL CORONAVIRUS DISEASE (COVID-19): ICD-10-CM

## 2023-04-05 PROCEDURE — 99214 OFFICE O/P EST MOD 30 MIN: CPT | Performed by: PSYCHIATRY & NEUROLOGY

## 2023-04-05 NOTE — PROGRESS NOTES
NEUROLOGY OUTPATIENT PROGRESS NOTE   Apr 5, 2023     CHIEF COMPLAINT/REASON FOR VISIT/REASON FOR CONSULT  Patient presents with:  Follow Up    REASON FOR CONSULTATION-headaches and brain fog.    REFERRAL SOURCE  Dr. Lalita Birmingham   Dr. Lalita Birmingham    HISTORY OF PRESENT ILLNESS  Lady Saavedra is a 38 year old female seen today for evaluation of headaches and brain fog.  She reports that she has a history of headache but then in March she got Covid.  She was admitted for Covid related pneumonia.  Was in the hospital for 1 week.  Since then the headaches have been much more frequent.  She is having them every day.  Reports significant photophobia and phonophobia with the headaches.  Headaches can be on any side in different places.  They can be behind the eyes and in the back of the head.  They are throbbing in nature.  Reports no visual aura with these.  Has been put on amitriptyline which has helped her sleep as well as with the headaches but nothing significant to get rid of the headaches completely.  Is using Advil and Tylenol for the headache still would last the whole day.  Is not using them around-the-clock.  Denies any other provoking factors.    Is also been diagnosed with thyroiditis since the Covid infection.  Is working with endocrinology on this.    Reports brain fog has been going on since the Covid infection.  Has not had any improvement.  Was let go of her job.  Has difficulty with memory, staying focused and doing tasks that she could previously do easily.  Does get good sleep at night with the amitriptyline.    Patient has recently been in contact with the Covid rehabilitation clinic.  Has not started the program yet.    2/14/22  Patient returns today  1.  Patient reports that her headaches have significantly improved with the propanolol.  Is taking 60 mg twice a day.  Denies any side effects.  Headaches of 4-5 times a month.  Headaches are much less severe compared to before and she does  not need to take abortive medication.  Did try the Maxalt once without any side effects and it did not work for her.  2.  Continues to have the brain fog.  Has been seen in the [facility] rehabilitation clinic and has not been able to do the therapies due to lack of insurance.  Steroids did not help with the brain fog.  3.  Reports that the amitriptyline is no longer working to help her sleep.  She is up till 2 AM every night.  Discussed about increasing the dose and she is interested in that.  Denies any side effects with the amitriptyline.    4/12/22  Patient returns today  1.  She reports that the headaches are twice a month.  Maxalt does work as abortive therapy though she has to eat something with it otherwise feels nauseous with the medication.  Propanolol amitriptyline is helping.  Some somnolence with the amitriptyline which is getting better.  Discussed about decreasing the amitriptyline and she wants to stay on the higher dose.  It was increased last time because the propanolol was not working by itself.  2.  Continues to have the brain fog.  Has insurance now and has gone to the [facility] rehabilitation clinic though has not been able to see the therapist yet.  Is only seeing her counselor for her mental illness.    3.  Is sleeping better with the amitriptyline at this point though does have some somnolence.  No insomnia.    2/1/23  Patient returns today  1.  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  Headaches have since then gotten worse and she is having 2-3 headaches a week.  About 15 headache days a month.  Remains on the amitriptyline and propanolol.  This daytime somnolence with the amitriptyline has improved.  No side effects with propanolol.  Blood pressure is better at this point as well.  Maxalt does work as abortive therapy.  She does need to use it with food to avoid nausea.  2.  Brain fog is also improved with  time.  3.  Sleeping better with the amitriptyline.  No issues.  No other new symptoms    4/5/23  Patient returns today  1.  She is currently on 100 mg of Topamax.  She takes it in the morning because it prevents her from sleeping at night.  She reports that she had complete resolution of the headaches.  Has not had any headaches in the last month.  2.  Occasionally if she does have a headache she will use Maxalt which is still working  3.  She remains on propanolol.  She feels that is also being helping with the tachycardia.  Remains on amitriptyline though does complain that she occasionally cannot sleep.  She feels that she is not exercising enough to tire herself out to fall asleep  4.  Brain fog symptoms have now improved.  No new issues/concerns.    Previous history is reviewed and this is unchanged.    PAST MEDICAL/SURGICAL HISTORY  Past Medical History:   Diagnosis Date     Abnormal Pap smear of cervix 2009, 2012, 2014    see problem list     Cervical high risk HPV (human papillomavirus) test positive 2009, 2012    see problem list     Chickenpox      Depressive disorder      Gestational HTN 01/01/2015     History of colposcopy with cervical biopsy 03/2012    ANN 1     Seasonal allergies     Was on clariten uses occasional sudafed     Urinary tract bacterial infections     as teenager     Patient Active Problem List   Diagnosis     CARDIOVASCULAR SCREENING; LDL GOAL LESS THAN 160     Pneumonia due to 2019 novel coronavirus     COVID-19 long hauler     Anovulation   Significant of migraines, mental illness, suicide attempt, depression.  Feels the depression is under good control with EMR    FAMILY HISTORY  Family History   Adopted: Yes   Problem Relation Age of Onset     Unknown/Adopted Mother      Unknown/Adopted Father    Patient is adopted and she does not know her family.    SOCIAL HISTORY  Social History     Tobacco Use     Smoking status: Never     Smokeless tobacco: Never   Vaping Use     Vaping status:  Never Used   Substance Use Topics     Alcohol use: No     Drug use: No       SYSTEMS REVIEW  Twelve-system ROS was done and other than the HPI this was negative except for neck pain, back pain, arm and leg pain, joint pain, numbness and tingling, sleepiness during the day, sleeping problems, headaches, anxiety, depression, palpitations, bloating, stomach pain, bowel problems, respiratory problems, skin changes, weight gain.  No new issues/concerns.    MEDICATIONS  amitriptyline (ELAVIL) 50 MG tablet, Take 1 tablet (50 mg) by mouth At Bedtime  clomiPHENE (CLOMID) 50 MG tablet, Take 1 tablet (50 mg) by mouth daily Cycle day 3-7  dicyclomine (BENTYL) 10 MG capsule, Take 1 capsule (10 mg) by mouth 4 times daily as needed (abdominal cramping)  levothyroxine (SYNTHROID) 88 MCG tablet, Take 1 tablet (88 mcg) by mouth daily  omeprazole (PRILOSEC) 20 MG DR capsule, Take 1 capsule (20 mg) by mouth daily  propranolol (INDERAL) 60 MG tablet, Take 1 tablet (60 mg) by mouth 2 times daily  rizatriptan (MAXALT) 10 MG tablet, Take 1 tablet (10 mg) by mouth at onset of headache for migraine May repeat in 2 hours.  topiramate (TOPAMAX) 25 MG tablet, Start with 1 tab/night and increase by 1 tablet/week as needed and tolerated to a max of 4 tabs/night.    No current facility-administered medications on file prior to visit.       PHYSICAL EXAMINATION  VITALS: BP (!) 140/90   Pulse 68   Resp 18   Wt 89.4 kg (197 lb)   BMI 36.03 kg/m    GENERAL: Healthy appearing, alert, no acute distress, normal habitus.  CARDIOVASCULAR: Extremities warm and well perfused. Pulses present.   NEUROLOGICAL:  Patient is awake and oriented to self, place and time.  Attention span is normal.  Memory is grossly intact; previously MoCA 26.  Language is fluent and follows commands appropriately.  Appropriate fund of knowledge. Cranial nerves 2-12 are intact. There is no pronator drift.  Motor exam shows 5/5 strength in all extremities.  Tone is symmetric  bilaterally in upper and lower extremities.  (Previously reflexes are symmetric and 2+ in upper extremities and lower extremities. Sensory exam is grossly intact to light touch, pin prick and vibration.)  Finger to nose and heel to shin is without dysmetria.  Romberg is negative.  Gait is normal and the patient is able to do tandem walk and walk on toes and heels.  Exam similar to before.    DIAGNOSTICS  MRI-images reviewed.  No major structural lesions noted.  Some T2 hyperintensities.  IMPRESSION:  1.  No acute intracranial process.  2.  Nonspecific scattered T2 hyperintensities within the cerebral white matter. These can be seen in association with migraine headaches.    Labs  Component      Latest Ref Rng & Units 8/31/2021 9/13/2021 10/15/2021 12/7/2021   Thyroid Peroxidase Antibody      <35 IU/mL >5,000 (H)      TSH      0.40 - 4.00 mU/L  1.72 0.11 (L) 1.22   T4 Free      0.76 - 1.46 ng/dL   1.32        OUTSIDE RECORDS  Outside referral notes and chart notes were reviewed and pertinent information has been summarized (in addition to the HPI):-Patient has been diagnosed with long-haul Covid. Multiple testing has been ordered. Has been seen with cardiology. Also seeing endocrinology. Has been having brain fog for which she was referred to neurology. Does complain of easy fatigability as well.    LABS  Component      Latest Ref Rng & Units 12/14/2021   Vitamin B12      213 - 816 pg/mL 413   Vitamin B1 Whole Blood Level      70 - 180 nmol/L 104   Ferritin      10 - 130 ng/mL 192 (H)   Methylmalonic Acid      0.00 - 0.40 umol/L 0.21     EEG  IMPRESSION/REPORT/PLAN  This is a normal EEG during wakefulness and drowsiness except for mild generalized background dysrhythmia. Further clinical correlation is needed.     Please note that the absence of epileptiform abnormalities does not exclude the possibility of epilepsy in any patient.      Component      Latest Ref Rng & Units 6/8/2022 11/1/2022   Ferritin      12 - 150  ng/mL 484 (H)    TSH      0.30 - 4.20 uIU/mL  1.01   T4 Free      0.90 - 1.70 ng/dL  1.29       IMPRESSION/REPORT/PLAN  History of anti-TPO antibodies  History of 2019 novel coronavirus disease (COVID-19)  Brain fog/cognitive difficulty-improved  Chronic migraine without aura without status migrainosus, not intractable  Insomnia, unspecified type    This is a 38 year old female with history of migraines with worsening migraines after COVID-19 infection and cognitive decline after COVID-19 infection.    Previously amitriptyline and propanolol were helping.  Blood pressure was slightly elevated and amitriptyline was causing daytime somnolence.  Propanolol was also helping with some tachycardia.  These side effects have now improved.  With addition of Topamax headaches have significantly improved.    Maxalt is helping with abortive therapy and will continue.    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  Anti-TPO antibodies were positive and she was treated with steroids with no benefit.  MRI and EEG were noncontributory.  She does have a history of thyroid disease and is on levothyroxine.  Recent TSH levels were normal.  Continue to monitor.  She was previously referred to neuropsychology but this was never completed.  We will hold off for right now since the brain fog is better.  Stable.    For insomnia amitriptyline is helping but not completely.  She plans to exercise more to see if that would further help with her insomnia.    Headaches have improved eventually I would like to get her off some of these medications.  Currently she needs the amitriptyline for sleep and the propanolol for the tachycardia.  Topamax is being used for the headaches.    We will check back in 6 months to see if he can wean her off some of these medications.    -     rizatriptan (MAXALT) 10 MG tablet; Take 1 tablet (10 mg) by mouth at onset of headache for migraine May repeat in 2 hours.  -      amitriptyline (ELAVIL) 25 MG tablet; Take 2 tablets (50 mg) by mouth At Bedtime  -     propranolol (INDERAL) 60 MG tablet; Take 1 tablet (60 mg) by mouth 2 times daily  -     topiramate (TOPAMAX) 25 MG tablet; Start with 1 tab/night and increase by 1 tablet/week as needed and tolerated to a max of 4 tabs/night.      Return in about 6 months (around 10/5/2023) for In-Clinic Visit (must).    Over 31 minutes were spent coordinating the care for the patient on the day of the encounter.  This includes previsit, during visit and post visit activities as documented above.  Counseled patient.  Multiple problems reviewed/addressed.  Prescription management.  (Activities include but not inclusive of reviewing chart, reviewing outside records, reviewing labs and imaging study results as well as the images, patient visit time including getting history and exam,  use if applicable, review of test results with the patient and coming up with a plan in a shared model, counseling patient and family, education and answering patient questions, EMR , EMR diagnosis entry and problem list management, medication reconciliation and prescription management if applicable, paperwork if applicable, printing documents and documentation of the visit activities.)      Ranjan Parisi MD  Neurologist  [facility]  Tel:- [phone]    This note was dictated using voice recognition software.  Any grammatical or context distortions are unintentional and inherent to the software.

## 2023-04-17 ENCOUNTER — OFFICE VISIT (OUTPATIENT)
Dept: FAMILY MEDICINE | Facility: CLINIC | Age: 38
End: 2023-04-17
Payer: COMMERCIAL

## 2023-04-17 VITALS
BODY MASS INDEX: 36.62 KG/M2 | RESPIRATION RATE: 16 BRPM | SYSTOLIC BLOOD PRESSURE: 116 MMHG | OXYGEN SATURATION: 96 % | DIASTOLIC BLOOD PRESSURE: 72 MMHG | WEIGHT: 199 LBS | HEART RATE: 70 BPM | TEMPERATURE: 97.8 F | HEIGHT: 62 IN

## 2023-04-17 DIAGNOSIS — R63.5 WEIGHT GAIN: ICD-10-CM

## 2023-04-17 DIAGNOSIS — E66.812 CLASS 2 SEVERE OBESITY DUE TO EXCESS CALORIES WITH SERIOUS COMORBIDITY AND BODY MASS INDEX (BMI) OF 36.0 TO 36.9 IN ADULT (H): ICD-10-CM

## 2023-04-17 DIAGNOSIS — R74.01 ELEVATED ALT MEASUREMENT: ICD-10-CM

## 2023-04-17 DIAGNOSIS — R73.03 PREDIABETES: ICD-10-CM

## 2023-04-17 DIAGNOSIS — Z76.89 ESTABLISHING CARE WITH NEW DOCTOR, ENCOUNTER FOR: Primary | ICD-10-CM

## 2023-04-17 DIAGNOSIS — G43.109 MIGRAINE WITH AURA AND WITHOUT STATUS MIGRAINOSUS, NOT INTRACTABLE: ICD-10-CM

## 2023-04-17 DIAGNOSIS — E03.9 HYPOTHYROIDISM, UNSPECIFIED TYPE: ICD-10-CM

## 2023-04-17 DIAGNOSIS — U09.9 COVID-19 LONG HAULER: ICD-10-CM

## 2023-04-17 DIAGNOSIS — E66.01 CLASS 2 SEVERE OBESITY DUE TO EXCESS CALORIES WITH SERIOUS COMORBIDITY AND BODY MASS INDEX (BMI) OF 36.0 TO 36.9 IN ADULT (H): ICD-10-CM

## 2023-04-17 DIAGNOSIS — R74.01 ELEVATED AST (SGOT): ICD-10-CM

## 2023-04-17 LAB
ALBUMIN SERPL BCG-MCNC: 4.6 G/DL (ref 3.5–5.2)
ALP SERPL-CCNC: 86 U/L (ref 35–104)
ALT SERPL W P-5'-P-CCNC: 97 U/L (ref 10–35)
ANION GAP SERPL CALCULATED.3IONS-SCNC: 14 MMOL/L (ref 7–15)
AST SERPL W P-5'-P-CCNC: 58 U/L (ref 10–35)
BILIRUB SERPL-MCNC: 0.2 MG/DL
BUN SERPL-MCNC: 4.2 MG/DL (ref 6–20)
CALCIUM SERPL-MCNC: 9.5 MG/DL (ref 8.6–10)
CHLORIDE SERPL-SCNC: 108 MMOL/L (ref 98–107)
CHOLEST SERPL-MCNC: 281 MG/DL
CREAT SERPL-MCNC: 0.7 MG/DL (ref 0.51–0.95)
DEPRECATED HCO3 PLAS-SCNC: 19 MMOL/L (ref 22–29)
GFR SERPL CREATININE-BSD FRML MDRD: >90 ML/MIN/1.73M2
GLUCOSE SERPL-MCNC: 114 MG/DL (ref 70–99)
HBA1C MFR BLD: 5.7 % (ref 0–5.6)
HDLC SERPL-MCNC: 38 MG/DL
LDLC SERPL CALC-MCNC: 198 MG/DL
NONHDLC SERPL-MCNC: 243 MG/DL
POTASSIUM SERPL-SCNC: 4.3 MMOL/L (ref 3.4–5.3)
PROT SERPL-MCNC: 8 G/DL (ref 6.4–8.3)
SODIUM SERPL-SCNC: 141 MMOL/L (ref 136–145)
TRIGL SERPL-MCNC: 227 MG/DL
TSH SERPL DL<=0.005 MIU/L-ACNC: 0.36 UIU/ML (ref 0.3–4.2)

## 2023-04-17 PROCEDURE — 80061 LIPID PANEL: CPT | Performed by: STUDENT IN AN ORGANIZED HEALTH CARE EDUCATION/TRAINING PROGRAM

## 2023-04-17 PROCEDURE — 99214 OFFICE O/P EST MOD 30 MIN: CPT | Performed by: STUDENT IN AN ORGANIZED HEALTH CARE EDUCATION/TRAINING PROGRAM

## 2023-04-17 PROCEDURE — 36415 COLL VENOUS BLD VENIPUNCTURE: CPT | Performed by: STUDENT IN AN ORGANIZED HEALTH CARE EDUCATION/TRAINING PROGRAM

## 2023-04-17 PROCEDURE — 83036 HEMOGLOBIN GLYCOSYLATED A1C: CPT | Performed by: STUDENT IN AN ORGANIZED HEALTH CARE EDUCATION/TRAINING PROGRAM

## 2023-04-17 PROCEDURE — 84443 ASSAY THYROID STIM HORMONE: CPT | Performed by: STUDENT IN AN ORGANIZED HEALTH CARE EDUCATION/TRAINING PROGRAM

## 2023-04-17 PROCEDURE — 80053 COMPREHEN METABOLIC PANEL: CPT | Performed by: STUDENT IN AN ORGANIZED HEALTH CARE EDUCATION/TRAINING PROGRAM

## 2023-04-17 ASSESSMENT — ANXIETY QUESTIONNAIRES
1. FEELING NERVOUS, ANXIOUS, OR ON EDGE: NOT AT ALL
GAD7 TOTAL SCORE: 1
GAD7 TOTAL SCORE: 1
3. WORRYING TOO MUCH ABOUT DIFFERENT THINGS: NOT AT ALL
7. FEELING AFRAID AS IF SOMETHING AWFUL MIGHT HAPPEN: NOT AT ALL
2. NOT BEING ABLE TO STOP OR CONTROL WORRYING: NOT AT ALL
5. BEING SO RESTLESS THAT IT IS HARD TO SIT STILL: NOT AT ALL
6. BECOMING EASILY ANNOYED OR IRRITABLE: SEVERAL DAYS

## 2023-04-17 ASSESSMENT — PAIN SCALES - GENERAL: PAINLEVEL: NO PAIN (1)

## 2023-04-17 ASSESSMENT — PATIENT HEALTH QUESTIONNAIRE - PHQ9
SUM OF ALL RESPONSES TO PHQ QUESTIONS 1-9: 4
5. POOR APPETITE OR OVEREATING: NOT AT ALL

## 2023-04-17 NOTE — PROGRESS NOTES
Assessment & Plan     Establishing care with new doctor, encounter for  Patient is a very pleasant 38 year old with past medical history of COVID long-haul that has contributed to multiple other medical problems including.     COVID-19 long hauler  Has seen a lot of specialists for this but doesn't feel like she's getting anywhere in general. Sees neurology, they wanted to take her off propranalol and amitryptyline but she's on that for more than migraines (heart palpitations and mental health respectively) so she has been hesitant.     Hypothyroidism, unspecified type  Developed hypothyroidism post-covid. Last TSH was in November and was WNL. However, with ongoing symptoms, will recheck today. Did also have some elevated pressures last week at neurology, which is now back within normal limits.   - TSH with free T4 reflex  - TSH with free T4 reflex    Prediabetes  History of prediabetes. Has had increased thirst and, with the weight gain as well, is worried this may has progressed. Will recheck A1c today. We did briefly discuss addition of metformin for history of prediabetes alone.   - Hemoglobin A1c  - Hemoglobin A1c    Weight gain  Class 2 severe obesity due to excess calories with serious comorbidity and body mass index (BMI) of 36.0 to 36.9 in adult (H)  Patient's weight in 2019 of 150 lbs. And she states she actually lost some weight at the beginning of the pandemic because she was more active. Since having covid, she has had progressive increase in weight, though it has been stable since about June of last year right around 200 lbs. We did complete the below workup today. She has been working on increasing exercise, improving dietary changes. For migraines, she is already on topamax and has been on 4 tabs daily for the past 1.5 month. We discussed continuing at the current dose for the next 1.5 months prior to determining if it is a treatment failure regarding weight loss (has been helpful with migraines).  "Consider metformin. Could also try phentermine, contrave, or any of the GLP1s. Return in about 1.5-2 months for discussion of weight.   - Hemoglobin A1c  - Lipid panel reflex to direct LDL Fasting  - Comprehensive metabolic panel  - Comprehensive metabolic panel  - Lipid panel reflex to direct LDL Fasting  - Hemoglobin A1c    Migraine with aura and without status migrainosus, not intractable  Weight gain has been within the time frame of amitriptyline being started we discussed trying to get her off of this. She was interested in pursuing this. Will slowly taper over the course of many weeks. At any time, if she is feeling worse or not tolerating, will go back up to the most recent tolerated dose.   - amitriptyline (ELAVIL) 25 MG tablet  Dispense: 46 tablet; Refill: 0    I spent a total of 37 minutes on the day of the visit.   Time spent by me doing chart review, history and exam, documentation and further activities per the note     BMI:   Estimated body mass index is 36.4 kg/m  as calculated from the following:    Height as of this encounter: 1.575 m (5' 2\").    Weight as of this encounter: 90.3 kg (199 lb).         Brandy Zimmerman MD  [facility]    Jayant Elena is a 38 year old, presenting for the following health issues:  Hypertension, Establish Care, Headache, and Thyroid Problem      History of Present Illness       Diabetes:   She presents for follow up of diabetes.  She is not checking blood glucose. She is concerned about other. She is having excessive thirst and weight gain.         Hypertension: She presents for follow up of hypertension.  She does not check blood pressure  regularly outside of the clinic. Outside blood pressures have been over 140/90. She does not follow a low salt diet.     Hypothyroidism:     Since last visit, patient describes the following symptoms::  Anxiety and Depression    Headaches:   Since the patient's last clinic visit, headaches are: " "improved  The patient is getting headaches:  Few times a month  She is not able to do normal daily activities when she has a migraine.  The patient is taking the following rescue/relief medications:  Ibuprofen (Advil, Motrin), Tylenol and Maxalt   Patient states \"I get total relief\" from the rescue/relief medications.   The patient is taking the following medications to prevent migraines:  Topomax and Amitriptyline  In the past 4 weeks, the patient has gone to an Urgent Care or Emergency Room 0 times times due to headaches.    Reason for visit:  Long haul covid    She eats 2-3 servings of fruits and vegetables daily.She consumes 1 sweetened beverage(s) daily.She exercises with enough effort to increase her heart rate 20 to 29 minutes per day.  She exercises with enough effort to increase her heart rate 4 days per week.   She is taking medications regularly.         5/26/2022     9:56 AM 11/1/2022     9:29 AM 4/17/2023     7:02 AM   PHQ   PHQ-9 Total Score 7    7 1 4   Q9: Thoughts of better off dead/self-harm past 2 weeks Not at all    Not at all Not at all Not at all         3/16/2022     1:12 PM 3/18/2022     8:43 AM 4/17/2023     7:02 AM   JOSÉ MIGUEL-7 SCORE   Total Score  5 (mild anxiety)    Total Score 9 5 1     Has changed jobs since the pandemic -   Gained 60 lbs in a few years.   Trying to get back on track with exercising     Also feeling down mentally, especially associated with weight     Prediabetes - having bouts with lightheadedness and so hungry felt like she was going to pass out.      has been trying to help her with the lifestyle changes.     topamax helping with  Migraines but she also wants to get pregnant.     GI issues have been okay. Was struggling with reflux. Taking omeprazole daily. Tried to cut back. Take it as needed now.   Fiber daily, but noticed lately that she's taking double the amount. Still manageable. (IBS). If anxiety is bad, it is worse.     Thyroid issues -     topamax started " "about 2.5 months ago.     COVID again 3 months ago. Just regular colds since then.   This triggered migraines, racing heart, thyroid disease.     Not on clomid right now.     lowestt 150 in 2019  Even lost some weight in 2020 in quarantine because walking twice daily/workout routine.             Review of Systems         Objective    /72 (BP Location: Right arm, Patient Position: Sitting, Cuff Size: Adult Regular)   Pulse 70   Temp 97.8  F (36.6  C) (Tympanic)   Resp 16   Ht 1.575 m (5' 2\")   Wt 90.3 kg (199 lb)   LMP 03/27/2023   SpO2 96%   BMI 36.40 kg/m    Body mass index is 36.4 kg/m .  Physical Exam                       "

## 2023-05-03 ENCOUNTER — ANCILLARY PROCEDURE (OUTPATIENT)
Dept: ULTRASOUND IMAGING | Facility: CLINIC | Age: 38
End: 2023-05-03
Attending: STUDENT IN AN ORGANIZED HEALTH CARE EDUCATION/TRAINING PROGRAM
Payer: COMMERCIAL

## 2023-05-03 DIAGNOSIS — R74.01 ELEVATED AST (SGOT): ICD-10-CM

## 2023-05-03 DIAGNOSIS — R74.01 ELEVATED ALT MEASUREMENT: ICD-10-CM

## 2023-05-03 PROCEDURE — 76981 USE PARENCHYMA: CPT | Mod: GC | Performed by: RADIOLOGY

## 2023-05-04 ENCOUNTER — MYC MEDICAL ADVICE (OUTPATIENT)
Dept: FAMILY MEDICINE | Facility: CLINIC | Age: 38
End: 2023-05-04

## 2023-05-05 ENCOUNTER — OFFICE VISIT (OUTPATIENT)
Dept: OBGYN | Facility: CLINIC | Age: 38
End: 2023-05-05
Payer: COMMERCIAL

## 2023-05-05 VITALS
DIASTOLIC BLOOD PRESSURE: 87 MMHG | WEIGHT: 195 LBS | RESPIRATION RATE: 18 BRPM | HEIGHT: 62 IN | SYSTOLIC BLOOD PRESSURE: 136 MMHG | BODY MASS INDEX: 35.88 KG/M2 | TEMPERATURE: 97.8 F | HEART RATE: 75 BPM

## 2023-05-05 DIAGNOSIS — T88.7XXA MEDICATION SIDE EFFECTS: ICD-10-CM

## 2023-05-05 DIAGNOSIS — Z31.69 ENCOUNTER FOR PRECONCEPTION CONSULTATION: Primary | ICD-10-CM

## 2023-05-05 PROCEDURE — 99213 OFFICE O/P EST LOW 20 MIN: CPT | Performed by: STUDENT IN AN ORGANIZED HEALTH CARE EDUCATION/TRAINING PROGRAM

## 2023-05-05 RX ORDER — FOLIC ACID 1 MG/1
4 TABLET ORAL DAILY
Qty: 360 TABLET | Refills: 3 | Status: SHIPPED | OUTPATIENT
Start: 2023-05-05 | End: 2023-07-06

## 2023-05-05 RX ORDER — LANOLIN ALCOHOL/MO/W.PET/CERES
400 CREAM (GRAM) TOPICAL DAILY
Status: CANCELLED | OUTPATIENT
Start: 2023-05-05

## 2023-05-05 NOTE — PATIENT INSTRUCTIONS
"Search \"Thelma Tyler MD\" on youtube    Here are ADE (reproductive endocrinology infertility) clinic information within the [facility] area:  - [facility]:      [facility] address: [address] [place] [address]      [facility]: [address] [place] [address]      Phone number: [phone]      Website: [email or web address]  - [facility]      Address: [facility] [place] [address]      Phone number: [phone]       Website: [email or web address]    "

## 2023-05-05 NOTE — TELEPHONE ENCOUNTER
"Kassy,     Here are your lab results. The thyroid level was normal and the hemoglobin a1c is better than it was before (so still in the prediabetes range).      Your cholesterol is quite high. The LDL (\"bad\") cholesterol is especially high. When that number gets higher than 160, I highly recommend that we start a cholesterol medication to help lower your cholesterol and therefore decrease your risk of cardiovascular disease (like heart attacks and strokes). You're still quite young, so the risk of that right now is overall low. Elevated cholesterol (and elevated weight) can contribute to fatty liver disease. I do have some concerns that that might be going on as some of your liver tests are a little elevated (AST and ALT). I would recommend that we get a specialized ultrasound of your liver as these numbers have been higher for a few years. You have previously had hepatitis C testing that was (reassuringly) negative. We could check for a hepatitis B infection as well, if desired. The last time hep b was checked was in 2014.      The kidney function testing was normal.      Bottom line - let's get a little more information about your liver. We can hold off on the cholesterol medication at this time as they shouldn't be used in pregnancy, but should get you on it as soon as possible after pregnancy.      Brandy Zimmerman MD      LM to call care team RN- does pt want to proceed with Hep B testing? Recommendation indicated for liver US- not ordered yet. Also, cholesterol med start recommended after pregnancy.  HAYLEE Ward RN    "

## 2023-05-05 NOTE — NURSING NOTE
"Initial /87 (BP Location: Right arm, Patient Position: Chair, Cuff Size: Adult Regular)   Pulse 75   Temp 97.8  F (36.6  C) (Tympanic)   Resp 18   Ht 1.575 m (5' 2\")   Wt 88.5 kg (195 lb)   LMP 05/02/2023   BMI 35.67 kg/m   Estimated body mass index is 35.67 kg/m  as calculated from the following:    Height as of this encounter: 1.575 m (5' 2\").    Weight as of this encounter: 88.5 kg (195 lb). .      "

## 2023-06-01 ENCOUNTER — OFFICE VISIT (OUTPATIENT)
Dept: FAMILY MEDICINE | Facility: CLINIC | Age: 38
End: 2023-06-01
Payer: COMMERCIAL

## 2023-06-01 VITALS
OXYGEN SATURATION: 99 % | HEART RATE: 64 BPM | WEIGHT: 192 LBS | BODY MASS INDEX: 35.33 KG/M2 | SYSTOLIC BLOOD PRESSURE: 104 MMHG | TEMPERATURE: 99 F | DIASTOLIC BLOOD PRESSURE: 70 MMHG | RESPIRATION RATE: 18 BRPM | HEIGHT: 62 IN

## 2023-06-01 DIAGNOSIS — K76.0 NONALCOHOLIC FATTY LIVER DISEASE: ICD-10-CM

## 2023-06-01 DIAGNOSIS — Z31.9 DESIRE FOR PREGNANCY: ICD-10-CM

## 2023-06-01 DIAGNOSIS — E78.5 HYPERLIPIDEMIA LDL GOAL <100: ICD-10-CM

## 2023-06-01 DIAGNOSIS — R73.03 PREDIABETES: ICD-10-CM

## 2023-06-01 DIAGNOSIS — E66.01 CLASS 2 SEVERE OBESITY DUE TO EXCESS CALORIES WITH SERIOUS COMORBIDITY AND BODY MASS INDEX (BMI) OF 36.0 TO 36.9 IN ADULT (H): Primary | ICD-10-CM

## 2023-06-01 DIAGNOSIS — G43.109 MIGRAINE WITH AURA AND WITHOUT STATUS MIGRAINOSUS, NOT INTRACTABLE: ICD-10-CM

## 2023-06-01 DIAGNOSIS — E66.812 CLASS 2 SEVERE OBESITY DUE TO EXCESS CALORIES WITH SERIOUS COMORBIDITY AND BODY MASS INDEX (BMI) OF 36.0 TO 36.9 IN ADULT (H): Primary | ICD-10-CM

## 2023-06-01 PROCEDURE — 99214 OFFICE O/P EST MOD 30 MIN: CPT | Performed by: STUDENT IN AN ORGANIZED HEALTH CARE EDUCATION/TRAINING PROGRAM

## 2023-06-01 ASSESSMENT — PAIN SCALES - GENERAL: PAINLEVEL: NO PAIN (0)

## 2023-06-01 NOTE — PATIENT INSTRUCTIONS
Decreased by 1 tablet of topiramate every other or every 3rd week. Monitor migraines. Also, try excedrin for migraines, too, when you get them to hopefully avoid maxalt more.     You can add a fish oil supplement   Trilobed Flap Text: The defect edges were debeveled with a #15c scalpel blade.  Given the location of the defect and the proximity to free margins a trilobed flap was deemed most appropriate.  Using a sterile surgical marker, an appropriate trilobed flap drawn around the defect.    The area thus outlined was incised deep to adipose tissue with a #15 scalpel blade.  The skin margins were undermined to an appropriate distance in all directions utilizing iris scissors.

## 2023-06-01 NOTE — PROGRESS NOTES
Assessment & Plan     Patient is a very pleasant 38 year old who presents for follow up form previous visit for ongoing migraines, weight issues, follow up on lab results in the context of desire for pregnancy.     Desire for pregnancy  Has since met with OBGYN for risk management/preconception counseling. Will plan to try to adjust migraine medications as below. Because of desire for pregnancy, management of below conditions is more complicated/limited.     Class 2 severe obesity due to excess calories with serious comorbidity and body mass index (BMI) of 36.0 to 36.9 in adult (H)  Prediabetes   For weight, patient is down 5.4% weight since February (after which she was started on topiramate). Has now stopped the amitriptyline so hopefully this will help with weight loss. As above, difficulty with options for management of weight. Have previously discussed phentermine vs GLP-1 vs metformin. Because of desire for pregnancy, best option would be metformin. We will try to taper down on topiramate to reach lowest effective dose for migraines and will monitor weight over the next few months. Follow up in 1.5-2 months. Will notify me sooner if weight is increasing again and we will plan to start metformin sooner, before next visit. Of note, patient's diet is currently well balanced. She is working on improving physical activity at this time.   - PRIMARY CARE FOLLOW-UP SCHEDULING    Nonalcoholic fatty liver disease  Hyperlipidemia LDL goal <100  Had elevated cholesterol on recent labs. With , trigs 227, total cholesterol 281 fasting. Ideally would get her on statin for this, but due to desire for pregnancy, not an option currently. Also did have elevated alt to 97 and ast to 58. Hepatic ultrasound with hyperechoic liver parenchyma, likely 2/2 hepatic steatosis. Working on weight as above. Will more aggressively treat cholesterol once pregnancy is complete. Consider referral to preventive cards for more intense  "therapy options.   - PRIMARY CARE FOLLOW-UP SCHEDULING    Migraine with aura and without status migrainosus, not intractable  For migraines, as above, plan is to decrease topiramate by 1 tablet every 2-3 weeks to monitor effects on migraines. Will trial Excedrin as an option for management. Continue Maxalt prn. Migraines have been improved since job change has occurred, but this also correlated to when topiramate was started. Will revisit migraine treatment at next visit. Of note, is now off amitriptyline       I spent a total of 35 minutes on the day of the visit.   Time spent by me doing chart review, history and exam, documentation and further activities per the note     BMI:   Estimated body mass index is 35.12 kg/m  as calculated from the following:    Height as of this encounter: 1.575 m (5' 2\").    Weight as of this encounter: 87.1 kg (192 lb).     Brandy Zimmerman MD  [facility]    Jayant Elena is a 38 year old, presenting for the following health issues:  RECHECK        6/1/2023     4:42 PM   Additional Questions   Roomed by Mariposa   Accompanied by Self     History of Present Illness       Hyperlipidemia:  She presents for follow up of hyperlipidemia.  She is not taking medication to lower cholesterol. She is not having myalgia or other side effects to statin medications.    Hypertension: She presents for follow up of hypertension.  She does not check blood pressure  regularly outside of the clinic. Outside blood pressures have been over 140/90. She does not follow a low salt diet.     Headaches:   Since the patient's last clinic visit, headaches are: worsened  The patient is getting headaches:  Few times a week  She is not able to do normal daily activities when she has a migraine.  The patient is taking the following rescue/relief medications:  Ibuprofen (Advil, Motrin) and Maxalt   Patient states \"I get total relief\" from the rescue/relief medications.   The patient is " "taking the following medications to prevent migraines:  Topomax  In the past 4 weeks, the patient has gone to an Urgent Care or Emergency Room 0 times times due to headaches.    Reason for visit:  Weight loss    She eats 4 or more servings of fruits and vegetables daily.She consumes 1 sweetened beverage(s) daily.She exercises with enough effort to increase her heart rate 20 to 29 minutes per day.  She exercises with enough effort to increase her heart rate 5 days per week.   She is taking medications regularly.     Stopped amitryptyline  Tried cutting it in half. But ultimately just stopped.   Feel so much better.   Got used to them makking her drowsy to fall asleep, but also made her want to sleep forever and couldn't wake up in the morning.     Migraines - do get them more. Not super bad though.   Usually can deal with them. Try not to take maxalt. Do get them a little more.   But noticing drinking less water now.     Down 11 lbs since February.     Trying ot avoid processed foods.   Doing fresh fruits and vegetables  Eating oatmeal every morning.   Eating fruits/vegges for lunch with a few crackers, yogurt.   Dinner usually lighter vs heavier. Salad causes diarrhea. Avoiding extra red meat, etc.     Doesn't feel like she over eats.   Portion sizes are smaller.   Is losing weight but it is really slow.     Migraines: not as bad. In a week, 0-2 times a week. Lasting 1 day.   No severe in 2-3 months requiring dark room, etc.     Review of Systems   Constitutional, HEENT, cardiovascular, pulmonary, gi and gu systems are negative, except as otherwise noted.      Objective    /70 (BP Location: Right arm, Patient Position: Sitting, Cuff Size: Adult Regular)   Pulse 64   Temp 99  F (37.2  C) (Tympanic)   Resp 18   Ht 1.575 m (5' 2\")   Wt 87.1 kg (192 lb)   LMP 05/02/2023   SpO2 99%   BMI 35.12 kg/m    Body mass index is 35.12 kg/m .  Physical Exam  Constitutional:       Appearance: Normal appearance. "   HENT:      Head: Normocephalic.   Eyes:      General: No scleral icterus.     Extraocular Movements: Extraocular movements intact.      Conjunctiva/sclera: Conjunctivae normal.   Cardiovascular:      Rate and Rhythm: Normal rate.   Pulmonary:      Effort: Pulmonary effort is normal.   Musculoskeletal:         General: Normal range of motion.      Cervical back: Normal range of motion.   Neurological:      General: No focal deficit present.      Mental Status: She is alert and oriented to person, place, and time.        Results from last visit:  Office Visit on 04/17/2023   Component Date Value Ref Range Status     TSH 04/17/2023 0.36  0.30 - 4.20 uIU/mL Final     Sodium 04/17/2023 141  136 - 145 mmol/L Final     Potassium 04/17/2023 4.3  3.4 - 5.3 mmol/L Final     Chloride 04/17/2023 108 (H)  98 - 107 mmol/L Final     Carbon Dioxide (CO2) 04/17/2023 19 (L)  22 - 29 mmol/L Final     Anion Gap 04/17/2023 14  7 - 15 mmol/L Final     Urea Nitrogen 04/17/2023 4.2 (L)  6.0 - 20.0 mg/dL Final     Creatinine 04/17/2023 0.70  0.51 - 0.95 mg/dL Final     Calcium 04/17/2023 9.5  8.6 - 10.0 mg/dL Final     Glucose 04/17/2023 114 (H)  70 - 99 mg/dL Final     Alkaline Phosphatase 04/17/2023 86  35 - 104 U/L Final     AST 04/17/2023 58 (H)  10 - 35 U/L Final     ALT 04/17/2023 97 (H)  10 - 35 U/L Final     Protein Total 04/17/2023 8.0  6.4 - 8.3 g/dL Final     Albumin 04/17/2023 4.6  3.5 - 5.2 g/dL Final     Bilirubin Total 04/17/2023 0.2  <=1.2 mg/dL Final     GFR Estimate 04/17/2023 >90  >60 mL/min/1.73m2 Final    eGFR calculated using 2021 CKD-EPI equation.     Cholesterol 04/17/2023 281 (H)  <200 mg/dL Final     Triglycerides 04/17/2023 227 (H)  <150 mg/dL Final     Direct Measure HDL 04/17/2023 38 (L)  >=50 mg/dL Final     LDL Cholesterol Calculated 04/17/2023 198 (H)  <=100 mg/dL Final     Non HDL Cholesterol 04/17/2023 243 (H)  <130 mg/dL Final     Hemoglobin A1C 04/17/2023 5.7 (H)  0.0 - 5.6 % Final    Normal <5.7%    Prediabetes 5.7-6.4%    Diabetes 6.5% or higher     Note: Adopted from ADA consensus guidelines.

## 2023-07-06 ENCOUNTER — OFFICE VISIT (OUTPATIENT)
Dept: FAMILY MEDICINE | Facility: CLINIC | Age: 38
End: 2023-07-06
Payer: COMMERCIAL

## 2023-07-06 VITALS
HEIGHT: 62 IN | OXYGEN SATURATION: 99 % | SYSTOLIC BLOOD PRESSURE: 122 MMHG | WEIGHT: 190 LBS | TEMPERATURE: 98.7 F | BODY MASS INDEX: 34.96 KG/M2 | HEART RATE: 62 BPM | DIASTOLIC BLOOD PRESSURE: 70 MMHG | RESPIRATION RATE: 16 BRPM

## 2023-07-06 DIAGNOSIS — L71.0 PERIORAL DERMATITIS: ICD-10-CM

## 2023-07-06 DIAGNOSIS — G43.109 MIGRAINE WITH AURA AND WITHOUT STATUS MIGRAINOSUS, NOT INTRACTABLE: Primary | ICD-10-CM

## 2023-07-06 DIAGNOSIS — E66.812 CLASS 2 SEVERE OBESITY DUE TO EXCESS CALORIES WITH SERIOUS COMORBIDITY AND BODY MASS INDEX (BMI) OF 36.0 TO 36.9 IN ADULT (H): ICD-10-CM

## 2023-07-06 DIAGNOSIS — E03.9 HYPOTHYROIDISM, UNSPECIFIED TYPE: ICD-10-CM

## 2023-07-06 DIAGNOSIS — E66.01 CLASS 2 SEVERE OBESITY DUE TO EXCESS CALORIES WITH SERIOUS COMORBIDITY AND BODY MASS INDEX (BMI) OF 36.0 TO 36.9 IN ADULT (H): ICD-10-CM

## 2023-07-06 LAB — TSH SERPL DL<=0.005 MIU/L-ACNC: 0.59 UIU/ML (ref 0.3–4.2)

## 2023-07-06 PROCEDURE — 36415 COLL VENOUS BLD VENIPUNCTURE: CPT | Performed by: STUDENT IN AN ORGANIZED HEALTH CARE EDUCATION/TRAINING PROGRAM

## 2023-07-06 PROCEDURE — 99214 OFFICE O/P EST MOD 30 MIN: CPT | Performed by: STUDENT IN AN ORGANIZED HEALTH CARE EDUCATION/TRAINING PROGRAM

## 2023-07-06 PROCEDURE — 84443 ASSAY THYROID STIM HORMONE: CPT | Performed by: STUDENT IN AN ORGANIZED HEALTH CARE EDUCATION/TRAINING PROGRAM

## 2023-07-06 RX ORDER — DIPHENHYDRAMINE HCL 25 MG
50 CAPSULE ORAL EVERY 6 HOURS PRN
Qty: 60 CAPSULE | Refills: 0 | Status: SHIPPED | OUTPATIENT
Start: 2023-07-06 | End: 2024-04-15

## 2023-07-06 RX ORDER — METOCLOPRAMIDE 10 MG/1
10 TABLET ORAL 3 TIMES DAILY PRN
Qty: 30 TABLET | Refills: 0 | Status: SHIPPED | OUTPATIENT
Start: 2023-07-06 | End: 2024-02-12

## 2023-07-06 RX ORDER — ERYTHROMYCIN 20 MG/G
GEL TOPICAL 2 TIMES DAILY
Qty: 30 G | Refills: 3 | Status: SHIPPED | OUTPATIENT
Start: 2023-07-06 | End: 2024-08-26

## 2023-07-06 ASSESSMENT — PAIN SCALES - GENERAL: PAINLEVEL: NO PAIN (0)

## 2023-07-06 NOTE — PATIENT INSTRUCTIONS
Call neurology and schedule a follow up.     Reglan (metaclopramide) plus benadryl when you have a migraine.   Keep cutting back on topiramate  Start metformin for weight.     Cream for the face.

## 2023-07-06 NOTE — PROGRESS NOTES
Assessment & Plan     Patient is a very pleasant 38-year-old who has past history of obesity, hyperlipidemia, hepatic steatosis, hypothyroidism, migraines after long COVID who presents today for follow-up.      Migraine with aura and without status migrainosus, not intractable  We reviewed recommendations from OB/GYN today including try to get off of the topiramate.  Patient has successfully decreased down to 50 mg daily of the topiramate, continue to decrease at this time.  As we are tapering off, she may need additional as needed treatment, so Reglan and Benadryl are prescribed today as these may be safer than Excedrin especially in early pregnancy given the aspirin dose.  Recommended that she did get set up for follow-up with neurology as well.  - metoclopramide (REGLAN) 10 MG tablet  Dispense: 30 tablet; Refill: 0  - diphenhydrAMINE (BENADRYL) 25 MG capsule  Dispense: 60 capsule; Refill: 0    Class 2 severe obesity due to excess calories with serious comorbidity and body mass index (BMI) of 36.0 to 36.9 in adult (H)  Regarding obesity, patient would like to be on some sort of medication to help with weight.  Topiramate did seem to be helping, down about 6.4% since February.  Unfortunately, as above, with the desire for pregnancy, we cannot keep her on that long-term.  She was interested in trying metformin, though this is not one of the most typical medications for weight loss, this is the most safe option if she were to become pregnant anytime in treatment.  - metFORMIN (GLUCOPHAGE) 500 MG tablet  Dispense: 90 tablet; Refill: 0    Perioral dermatitis  Patient has developed perioral dermatitis.  She has been very mindful about the different products that she uses on her face.  Does not use any steroids.  It has spread from the labial angles up to the nasolabial fold.  We will trial erythromycin gel.  If not improved, could try metronidazole gel versus trying a steroid to see if that actually would improve  "symptoms.  - erythromycin with ethanol (ERYGEL) 2 % gel  Dispense: 30 g; Refill: 3    Hypothyroidism, unspecified type  Opted to recheck TSH today.  Patient is describing unusual episodes that occur at various times lasting about 5 to 20 minutes, with a sensation that heart is racing, but heart rate is actually more in the 80s.  Also has a weird sensation that she is moving slowly, but is actually moving fast.  She is conscious during these episodes.  Ideally, we would obtain cardiac testing, however these episodes happen infrequently, as infrequent as every couple weeks, so a 14-day Zio patch may not catch an episode.  She does follow with neurology, and their input may be helpful as well regarding these episodes.  - TSH with free T4 reflex  - TSH with free T4 reflex      I spent a total of 38 minutes on the day of the visit.   Time spent by me doing chart review, history and exam, documentation and further activities per the note     BMI:   Estimated body mass index is 34.75 kg/m  as calculated from the following:    Height as of this encounter: 1.575 m (5' 2\").    Weight as of this encounter: 86.2 kg (190 lb).     Brandy Zimmerman MD  [facility]    Jayant Elena is a 38 year old, presenting for the following health issues:  Rash (On face)        7/6/2023     2:02 PM   Additional Questions   Roomed by Mariposa CARY   Accompanied by Self     History of Present Illness       Reason for visit:  Perioral dermatitis  Symptom onset:  More than a month  Symptoms include:  Sores on face  Symptom intensity:  Moderate  Symptom progression:  Worsening  Had these symptoms before:  Yes  Has tried/received treatment for these symptoms:  No    She eats 2-3 servings of fruits and vegetables daily.She consumes 0 sweetened beverage(s) daily.She exercises with enough effort to increase her heart rate 20 to 29 minutes per day.  She exercises with enough effort to increase her heart rate 4 days per week. " "  She is taking medications regularly.     Down to 1/2 dose of topiramate, migraines okay, got during menses.   excedrin is helping.     Goal weight: 140.   Got back down to 150 during early covid    Highest 203  Down 6.4% since February (5 months).     Still trying to walk and use foot bike.     topiramate down to 50 mg.       Started havinig sores around the mouth corners, just thought they were zits, not popping. Then keep getting them, then go away, then come back.   Now spreading up tot notse.   Not also in inside of nose, and spread to the other side.   Bumpy, itchy, burn, dry flaky, \"gross\"   First tried moisturizer, vaseline, bee salve (that helped with the dryness, but still the redness remains).       Episodes for 5-20 minutes having a weird sensation of feeling like she is going slow, but is actually moving faster.   Heart rate increases during those episodes. High 80's, but usually sitting in the 60's.   Most often associated with showering, but not always.   Maybe for a year.     Review of Systems   Constitutional, HEENT, cardiovascular, pulmonary, GI, , musculoskeletal, neuro, skin, endocrine and psych systems are negative, except as otherwise noted.      Objective    /70 (BP Location: Right arm, Patient Position: Sitting, Cuff Size: Adult Regular)   Pulse 62   Temp 98.7  F (37.1  C) (Tympanic)   Resp 16   Ht 1.575 m (5' 2\")   Wt 86.2 kg (190 lb)   LMP 06/29/2023   SpO2 99%   BMI 34.75 kg/m    Body mass index is 34.75 kg/m .  Physical Exam  Constitutional:       Appearance: Normal appearance.   HENT:      Head: Normocephalic.   Eyes:      General: No scleral icterus.     Extraocular Movements: Extraocular movements intact.      Conjunctiva/sclera: Conjunctivae normal.   Cardiovascular:      Rate and Rhythm: Normal rate and regular rhythm.      Heart sounds: Normal heart sounds.   Pulmonary:      Effort: Pulmonary effort is normal.   Musculoskeletal:         General: Normal range of " motion.      Cervical back: Normal range of motion.   Neurological:      General: No focal deficit present.      Mental Status: She is alert and oriented to person, place, and time.         Results from this visitNo results found for any visits on 07/06/23.

## 2023-07-16 ENCOUNTER — HEALTH MAINTENANCE LETTER (OUTPATIENT)
Age: 38
End: 2023-07-16

## 2023-07-16 NOTE — TELEPHONE ENCOUNTER
It looks like Prozac was sent to [facility] in [facility]. She would like to try something different. Route to provider.  Deja Patino RN     No

## 2023-07-18 DIAGNOSIS — G43.109 MIGRAINE WITH AURA AND WITHOUT STATUS MIGRAINOSUS, NOT INTRACTABLE: ICD-10-CM

## 2023-07-19 RX ORDER — PROPRANOLOL HYDROCHLORIDE 60 MG/1
60 TABLET ORAL 2 TIMES DAILY
Qty: 180 TABLET | Refills: 1 | Status: SHIPPED | OUTPATIENT
Start: 2023-07-19 | End: 2023-12-11

## 2023-07-20 ENCOUNTER — MYC MEDICAL ADVICE (OUTPATIENT)
Dept: FAMILY MEDICINE | Facility: CLINIC | Age: 38
End: 2023-07-20

## 2023-08-02 DIAGNOSIS — G43.709 CHRONIC MIGRAINE WITHOUT AURA WITHOUT STATUS MIGRAINOSUS, NOT INTRACTABLE: ICD-10-CM

## 2023-08-02 RX ORDER — TOPIRAMATE 25 MG/1
TABLET, FILM COATED ORAL
Qty: 360 TABLET | Refills: 0 | Status: SHIPPED | OUTPATIENT
Start: 2023-08-02 | End: 2023-12-11

## 2023-08-02 NOTE — TELEPHONE ENCOUNTER
Refill request for: Topiramate    Directions: Take up to 4 tabs a night     LOV: 4/5/23  NOV: 10/16/23    90 day supply with 0 refills Medication T'd for review and signature

## 2023-09-15 ENCOUNTER — OFFICE VISIT (OUTPATIENT)
Dept: FAMILY MEDICINE | Facility: CLINIC | Age: 38
End: 2023-09-15
Attending: STUDENT IN AN ORGANIZED HEALTH CARE EDUCATION/TRAINING PROGRAM
Payer: COMMERCIAL

## 2023-09-15 VITALS
BODY MASS INDEX: 34.6 KG/M2 | SYSTOLIC BLOOD PRESSURE: 128 MMHG | OXYGEN SATURATION: 98 % | HEART RATE: 60 BPM | HEIGHT: 62 IN | DIASTOLIC BLOOD PRESSURE: 80 MMHG | RESPIRATION RATE: 18 BRPM | WEIGHT: 188 LBS

## 2023-09-15 DIAGNOSIS — F33.1 MODERATE EPISODE OF RECURRENT MAJOR DEPRESSIVE DISORDER (H): Primary | ICD-10-CM

## 2023-09-15 DIAGNOSIS — E66.01 CLASS 2 SEVERE OBESITY DUE TO EXCESS CALORIES WITH SERIOUS COMORBIDITY AND BODY MASS INDEX (BMI) OF 36.0 TO 36.9 IN ADULT (H): ICD-10-CM

## 2023-09-15 DIAGNOSIS — E66.812 CLASS 2 SEVERE OBESITY DUE TO EXCESS CALORIES WITH SERIOUS COMORBIDITY AND BODY MASS INDEX (BMI) OF 36.0 TO 36.9 IN ADULT (H): ICD-10-CM

## 2023-09-15 DIAGNOSIS — K76.0 NONALCOHOLIC FATTY LIVER DISEASE: ICD-10-CM

## 2023-09-15 DIAGNOSIS — F41.1 GAD (GENERALIZED ANXIETY DISORDER): ICD-10-CM

## 2023-09-15 PROCEDURE — 90686 IIV4 VACC NO PRSV 0.5 ML IM: CPT | Performed by: STUDENT IN AN ORGANIZED HEALTH CARE EDUCATION/TRAINING PROGRAM

## 2023-09-15 PROCEDURE — 90471 IMMUNIZATION ADMIN: CPT | Performed by: STUDENT IN AN ORGANIZED HEALTH CARE EDUCATION/TRAINING PROGRAM

## 2023-09-15 PROCEDURE — 99214 OFFICE O/P EST MOD 30 MIN: CPT | Mod: 25 | Performed by: STUDENT IN AN ORGANIZED HEALTH CARE EDUCATION/TRAINING PROGRAM

## 2023-09-15 RX ORDER — HYDROXYZINE PAMOATE 25 MG/1
25-50 CAPSULE ORAL 3 TIMES DAILY PRN
Qty: 30 CAPSULE | Refills: 0 | Status: SHIPPED | OUTPATIENT
Start: 2023-09-15 | End: 2024-10-04 | Stop reason: ALTCHOICE

## 2023-09-15 RX ORDER — BUPROPION HYDROCHLORIDE 150 MG/1
TABLET ORAL
Qty: 90 TABLET | Refills: 0 | Status: SHIPPED | OUTPATIENT
Start: 2023-09-15 | End: 2023-10-06

## 2023-09-15 ASSESSMENT — PATIENT HEALTH QUESTIONNAIRE - PHQ9
SUM OF ALL RESPONSES TO PHQ QUESTIONS 1-9: 19
10. IF YOU CHECKED OFF ANY PROBLEMS, HOW DIFFICULT HAVE THESE PROBLEMS MADE IT FOR YOU TO DO YOUR WORK, TAKE CARE OF THINGS AT HOME, OR GET ALONG WITH OTHER PEOPLE: EXTREMELY DIFFICULT
SUM OF ALL RESPONSES TO PHQ QUESTIONS 1-9: 19

## 2023-09-15 ASSESSMENT — PAIN SCALES - GENERAL: PAINLEVEL: MODERATE PAIN (4)

## 2023-09-15 ASSESSMENT — ANXIETY QUESTIONNAIRES
7. FEELING AFRAID AS IF SOMETHING AWFUL MIGHT HAPPEN: NOT AT ALL
3. WORRYING TOO MUCH ABOUT DIFFERENT THINGS: SEVERAL DAYS
IF YOU CHECKED OFF ANY PROBLEMS ON THIS QUESTIONNAIRE, HOW DIFFICULT HAVE THESE PROBLEMS MADE IT FOR YOU TO DO YOUR WORK, TAKE CARE OF THINGS AT HOME, OR GET ALONG WITH OTHER PEOPLE: VERY DIFFICULT
1. FEELING NERVOUS, ANXIOUS, OR ON EDGE: NEARLY EVERY DAY
5. BEING SO RESTLESS THAT IT IS HARD TO SIT STILL: SEVERAL DAYS
GAD7 TOTAL SCORE: 10
2. NOT BEING ABLE TO STOP OR CONTROL WORRYING: SEVERAL DAYS
4. TROUBLE RELAXING: SEVERAL DAYS
6. BECOMING EASILY ANNOYED OR IRRITABLE: NEARLY EVERY DAY
GAD7 TOTAL SCORE: 10

## 2023-09-15 NOTE — PROGRESS NOTES
Assessment & Plan     Moderate episode of recurrent major depressive disorder (H)  JOSÉ MIGUEL (generalized anxiety disorder)  Patient is a very pleasant 38-year-old female with past medical history of migraines who presents today for mental health concerns.  She has previously dealt with anxiety and depression, has been on multiple medications in the past including Wellbutrin, amitriptyline, Prozac, Celexa.  She was on the Wellbutrin the longest.  Recently, she has had increase stressors in her life, noticing that mental health is worse.  She is interested in starting some medications for this again.  We discussed options including Wellbutrin plus or minus an SSRI.  We opted to start Wellbutrin today, follow-up in about 3 weeks, consider SSRI at that point.  Would preferentially avoid fluoxetine just given the history of anxiety.  Could consider fluvoxamine as this may be more weight neutral.  Was also given a prescription for hydroxyzine for anxiety to use in the meantime for those higher anxiety days.  Given a referral today to our behavioral health specialist, Mel.  - buPROPion (WELLBUTRIN XL) 150 MG 24 hr tablet  Dispense: 90 tablet; Refill: 0  - hydrOXYzine (VISTARIL) 25 MG capsule  Dispense: 30 capsule; Refill: 0  - PRIMARY CARE FOLLOW-UP SCHEDULING  - Adult Mental Health [facility] Referral    Class 2 severe obesity due to excess calories with serious comorbidity and body mass index (BMI) of 36.0 to 36.9 in adult (H)  Nonalcoholic fatty liver disease  Regarding her weight, she is down 2 pounds since her last visit, however stopped taking the metformin.  Remove this from her medication list today.  This made her sick.  She has had decreased appetite, likely related to her worsening mental health.  We are putting her on Wellbutrin as above, which may have some benefit with her weight.  Ultimately, we have limited options if she continues to desire pregnancy.  Monitor over time.  - PRIMARY CARE FOLLOW-UP  "SCHEDULING    I spent a total of 36 minutes on the day of the visit.   Time spent by me doing chart review, history and exam, documentation and further activities per the note       BMI:   Estimated body mass index is 34.39 kg/m  as calculated from the following:    Height as of this encounter: 1.575 m (5' 2\").    Weight as of this encounter: 85.3 kg (188 lb).     Depression Screening Follow Up        9/15/2023     3:07 PM   PHQ   PHQ-9 Total Score 19   Q9: Thoughts of better off dead/self-harm past 2 weeks Not at all       Follow Up Actions Taken  Crisis resource information provided in After Visit Summary  Mental Health Referral placed  Follow up recommended: with me 3 weeks      Brandy Zimmerman MD  [facility]    Jayant Elena is a 38 year old, presenting for the following health issues:  Depression        9/15/2023     3:11 PM   Additional Questions   Roomed by Mariposa CARY   Accompanied by Self       History of Present Illness       Reason for visit:  Depression    She eats 2-3 servings of fruits and vegetables daily.She consumes 1 sweetened beverage(s) daily.She exercises with enough effort to increase her heart rate 10 to 19 minutes per day.  She exercises with enough effort to increase her heart rate 4 days per week.   She is taking medications regularly.       Depression Followup  How are you doing with your depression since your last visit? Worsened   Are you having other symptoms that might be associated with depression? Yes:  mood swings , sleep issues, irritable/rage  Have you had a significant life event?  No   Are you feeling anxious or having panic attacks?   Yes:     Do you have any concerns with your use of alcohol or other drugs? No    Heart racing 2-3 times a day.   Thinks it is her anxiety.     Down 2 lbs. But kept taking the topiramate.   Forget to eat. Stopped metformin.   Then would binge eat    History of suicide attempt a long time ago.   Tried a few that made " "her feel like zombie.   Had eye twitchign and hard time sleeping.   Social History     Tobacco Use    Smoking status: Never    Smokeless tobacco: Never   Vaping Use    Vaping Use: Never used   Substance Use Topics    Alcohol use: No    Drug use: No         11/1/2022     9:29 AM 4/17/2023     7:02 AM 9/15/2023     3:07 PM   PHQ   PHQ-9 Total Score 1 4 19   Q9: Thoughts of better off dead/self-harm past 2 weeks Not at all Not at all Not at all         3/18/2022     8:43 AM 4/17/2023     7:02 AM 9/15/2023     3:08 PM   JOSÉ MIGUEL-7 SCORE   Total Score 5 (mild anxiety)  10 (moderate anxiety)   Total Score 5 1 10         Review of Systems   Constitutional, HEENT, cardiovascular, pulmonary, GI, , musculoskeletal, neuro, skin, endocrine and psych systems are negative, except as otherwise noted.      Objective    /80 (BP Location: Right arm, Patient Position: Sitting, Cuff Size: Adult Large)   Pulse 60   Resp 18   Ht 1.575 m (5' 2\")   Wt 85.3 kg (188 lb)   LMP 09/02/2023   SpO2 98%   BMI 34.39 kg/m    Body mass index is 34.39 kg/m .  Physical Exam  Constitutional:       Appearance: Normal appearance.   HENT:      Head: Normocephalic.   Eyes:      General: No scleral icterus.     Extraocular Movements: Extraocular movements intact.      Conjunctiva/sclera: Conjunctivae normal.   Cardiovascular:      Rate and Rhythm: Normal rate.   Pulmonary:      Effort: Pulmonary effort is normal.   Neurological:      General: No focal deficit present.      Mental Status: She is alert and oriented to person, place, and time.   Psychiatric:         Mood and Affect: Mood is anxious and depressed. Affect is flat.          Results from last visit:  Office Visit on 07/06/2023   Component Date Value Ref Range Status    TSH 07/06/2023 0.59  0.30 - 4.20 uIU/mL Final                     "

## 2023-09-15 NOTE — PATIENT INSTRUCTIONS
The following resource is a relatively affordable workbook that you can use over time to help work through anxiety. This is not necessarily a replacement for mental health counseling/therapy, however, it can be a great first step. While going through the workbook, there are questions to answer. I recommend that you get a small notebook and write down the answers in there so that you can go back to the book later and not see your previous answers.    The Cognitive Behavioral Workbook for Anxiety: A Step-By-Step Program  By Qiuncy Contreras EdD, Chepe Ward PsyD, EdD ABPP    https://www.cF[employer]/Cognitive-Behavioral-Workbook-Anxiety-Step/dp/0155907792/ref=sr_1_8?crid=[email or web address]&keywords=anxiety+workbook&vpy=[email or web address]&sprefix=anxiety+workbook%2Caps%2C77&sr=8-8    The following resource is a relatively affordable workbook that you can use over time to help work through depression. This is not necessarily a replacement for mental health counseling/therapy, however, it can be a great first step. While going through the workbook, there are questions to answer. I recommend that you get a small notebook and write down the answers in there so that you can go back to the book later and not see your previous answers.    The Cognitive Behavioral Workbook for Depression: A Step-by-Step Program (A [place] Self-Help Workbook).   By Gideon Olvera EdD PhD    https://www.cFares/Cognitive-Behavioral-Workbook-Depression-Step/dp/3773562891/ref=sr_1_6?crid=[email or web address]&keywords=depression+workbook&iuj=[email or web address]&sprefix=depression+workboo%2Caps%2C92&sr=8-6

## 2023-10-05 ASSESSMENT — PATIENT HEALTH QUESTIONNAIRE - PHQ9
SUM OF ALL RESPONSES TO PHQ QUESTIONS 1-9: 5
SUM OF ALL RESPONSES TO PHQ QUESTIONS 1-9: 5
10. IF YOU CHECKED OFF ANY PROBLEMS, HOW DIFFICULT HAVE THESE PROBLEMS MADE IT FOR YOU TO DO YOUR WORK, TAKE CARE OF THINGS AT HOME, OR GET ALONG WITH OTHER PEOPLE: NOT DIFFICULT AT ALL

## 2023-10-05 ASSESSMENT — ANXIETY QUESTIONNAIRES
GAD7 TOTAL SCORE: 6
3. WORRYING TOO MUCH ABOUT DIFFERENT THINGS: SEVERAL DAYS
7. FEELING AFRAID AS IF SOMETHING AWFUL MIGHT HAPPEN: NOT AT ALL
6. BECOMING EASILY ANNOYED OR IRRITABLE: SEVERAL DAYS
5. BEING SO RESTLESS THAT IT IS HARD TO SIT STILL: SEVERAL DAYS
GAD7 TOTAL SCORE: 6
1. FEELING NERVOUS, ANXIOUS, OR ON EDGE: SEVERAL DAYS
4. TROUBLE RELAXING: SEVERAL DAYS
2. NOT BEING ABLE TO STOP OR CONTROL WORRYING: SEVERAL DAYS
IF YOU CHECKED OFF ANY PROBLEMS ON THIS QUESTIONNAIRE, HOW DIFFICULT HAVE THESE PROBLEMS MADE IT FOR YOU TO DO YOUR WORK, TAKE CARE OF THINGS AT HOME, OR GET ALONG WITH OTHER PEOPLE: SOMEWHAT DIFFICULT

## 2023-10-06 ENCOUNTER — OFFICE VISIT (OUTPATIENT)
Dept: FAMILY MEDICINE | Facility: CLINIC | Age: 38
End: 2023-10-06
Payer: COMMERCIAL

## 2023-10-06 VITALS
HEART RATE: 68 BPM | OXYGEN SATURATION: 96 % | RESPIRATION RATE: 16 BRPM | BODY MASS INDEX: 34.23 KG/M2 | TEMPERATURE: 98.4 F | SYSTOLIC BLOOD PRESSURE: 130 MMHG | DIASTOLIC BLOOD PRESSURE: 68 MMHG | HEIGHT: 62 IN | WEIGHT: 186 LBS

## 2023-10-06 DIAGNOSIS — F33.1 MODERATE EPISODE OF RECURRENT MAJOR DEPRESSIVE DISORDER (H): ICD-10-CM

## 2023-10-06 DIAGNOSIS — F41.1 GAD (GENERALIZED ANXIETY DISORDER): ICD-10-CM

## 2023-10-06 PROCEDURE — 91320 SARSCV2 VAC 30MCG TRS-SUC IM: CPT | Performed by: STUDENT IN AN ORGANIZED HEALTH CARE EDUCATION/TRAINING PROGRAM

## 2023-10-06 PROCEDURE — 90480 ADMN SARSCOV2 VAC 1/ONLY CMP: CPT | Performed by: STUDENT IN AN ORGANIZED HEALTH CARE EDUCATION/TRAINING PROGRAM

## 2023-10-06 PROCEDURE — 99214 OFFICE O/P EST MOD 30 MIN: CPT | Performed by: STUDENT IN AN ORGANIZED HEALTH CARE EDUCATION/TRAINING PROGRAM

## 2023-10-06 RX ORDER — BUPROPION HYDROCHLORIDE 300 MG/1
300 TABLET ORAL EVERY MORNING
Qty: 90 TABLET | Refills: 3 | Status: SHIPPED | OUTPATIENT
Start: 2023-10-06

## 2023-10-06 RX ORDER — FLUVOXAMINE MALEATE 25 MG
25 TABLET ORAL AT BEDTIME
Qty: 90 TABLET | Refills: 3 | Status: SHIPPED | OUTPATIENT
Start: 2023-10-06 | End: 2023-11-10 | Stop reason: SINTOL

## 2023-10-06 ASSESSMENT — PAIN SCALES - GENERAL: PAINLEVEL: NO PAIN (0)

## 2023-10-06 NOTE — PROGRESS NOTES
"  Assessment & Plan     Patient is a very pleasant 38-year-old female who presents today for follow-up on mental health.  Patient was started on Wellbutrin 150 mg 3 weeks ago, tapered up to 300 mg about a week ago, and did notice some improvement initially with energy level, but currently now is still dealing with decreased interest.  She feels her depression symptoms are the most pervasive.  Overall, PHQ-9 and JOSÉ MIGUEL-7 scores are improved, but certainly there is still room for improvement.  We discussed trying Wellbutrin 300 mg a little bit longer, see if that alone will do the trick, otherwise plan to start fluvoxamine.  She has previously tried Celexa and Prozac, does not remember why the Prozac was stopped, and did stop the Celexa due to feeling like a zombie.  In review, patient was on 20 mg of Celexa, may benefit from a lower dose.  We opted for fluvoxamine primarily for the lower side effect chance of weight gain.  Follow-up in 1 month.    Moderate episode of recurrent major depressive disorder (H)  - buPROPion (WELLBUTRIN XL) 300 MG 24 hr tablet  Dispense: 90 tablet; Refill: 3  - fluvoxaMINE (LUVOX) 25 MG tablet  Dispense: 90 tablet; Refill: 3  - PRIMARY CARE FOLLOW-UP SCHEDULING    JOSÉ MIGUEL (generalized anxiety disorder)  - buPROPion (WELLBUTRIN XL) 300 MG 24 hr tablet  Dispense: 90 tablet; Refill: 3  - fluvoxaMINE (LUVOX) 25 MG tablet  Dispense: 90 tablet; Refill: 3  - PRIMARY CARE FOLLOW-UP SCHEDULING      I spent a total of 20 minutes on the day of the visit.   Time spent by me doing chart review, history and exam, documentation and further activities per the note     BMI:   Estimated body mass index is 34.02 kg/m  as calculated from the following:    Height as of this encounter: 1.575 m (5' 2\").    Weight as of this encounter: 84.4 kg (186 lb).     Brandy Zimmerman MD  [facility]    Jayant Elena is a 38 year old, presenting for the following health issues:  Mental Health " "Problem        10/6/2023     2:41 PM   Additional Questions   Roomed by Kelsi MORALES CMA       History of Present Illness       Mental Health Follow-up:  Patient presents to follow-up on Depression & Anxiety.Patient's depression since last visit has been:  Better  The patient is not having other symptoms associated with depression.  Patient's anxiety since last visit has been:  Medium  The patient is not having other symptoms associated with anxiety.  Any significant life events: grief or loss  Patient is feeling anxious or having panic attacks.  Patient has no concerns about alcohol or drug use.    She eats 2-3 servings of fruits and vegetables daily.She consumes 1 sweetened beverage(s) daily.She exercises with enough effort to increase her heart rate 10 to 19 minutes per day.  She exercises with enough effort to increase her heart rate 4 days per week.   She is taking medications regularly.         4/17/2023     7:02 AM 9/15/2023     3:07 PM 10/5/2023     2:24 PM   PHQ   PHQ-9 Total Score 4 19 5   Q9: Thoughts of better off dead/self-harm past 2 weeks Not at all Not at all Not at all         4/17/2023     7:02 AM 9/15/2023     3:08 PM 10/5/2023     2:25 PM   JOSÉ MIGUEL-7 SCORE   Total Score  10 (moderate anxiety) 6 (mild anxiety)   Total Score 1 10 6     Felt a jump right away with energy and then now sort of tapered of a little again.   After 2-3 days got tired again.     Had some hard time sleeping the first night.     Migraines okay except when really stressed out.   Period irregular. Last time had it was 2 days long.       Review of Systems   Constitutional, HEENT, cardiovascular, pulmonary, GI, , musculoskeletal, neuro, skin, endocrine and psych systems are negative, except as otherwise noted.      Objective    /68 (BP Location: Right arm, Patient Position: Sitting, Cuff Size: Adult Large)   Pulse 68   Temp 98.4  F (36.9  C) (Tympanic)   Resp 16   Ht 1.575 m (5' 2\")   Wt 84.4 kg (186 lb)   LMP 09/02/2023   " SpO2 96%   BMI 34.02 kg/m    Body mass index is 34.02 kg/m .  Physical Exam  Constitutional:       Appearance: Normal appearance.   HENT:      Head: Normocephalic.   Eyes:      General: No scleral icterus.     Extraocular Movements: Extraocular movements intact.      Conjunctiva/sclera: Conjunctivae normal.   Cardiovascular:      Rate and Rhythm: Normal rate.   Pulmonary:      Effort: Pulmonary effort is normal.   Neurological:      General: No focal deficit present.      Mental Status: She is alert and oriented to person, place, and time.   Psychiatric:         Mood and Affect: Mood is anxious and depressed. Affect is flat.         Speech: Speech normal.         Behavior: Behavior normal. Behavior is cooperative.         Cognition and Memory: Cognition normal.          Results from last visit:  Office Visit on 07/06/2023   Component Date Value Ref Range Status    TSH 07/06/2023 0.59  0.30 - 4.20 uIU/mL Final   \

## 2023-11-08 ASSESSMENT — ANXIETY QUESTIONNAIRES
7. FEELING AFRAID AS IF SOMETHING AWFUL MIGHT HAPPEN: NOT AT ALL
6. BECOMING EASILY ANNOYED OR IRRITABLE: SEVERAL DAYS
5. BEING SO RESTLESS THAT IT IS HARD TO SIT STILL: SEVERAL DAYS
GAD7 TOTAL SCORE: 8
IF YOU CHECKED OFF ANY PROBLEMS ON THIS QUESTIONNAIRE, HOW DIFFICULT HAVE THESE PROBLEMS MADE IT FOR YOU TO DO YOUR WORK, TAKE CARE OF THINGS AT HOME, OR GET ALONG WITH OTHER PEOPLE: SOMEWHAT DIFFICULT
4. TROUBLE RELAXING: MORE THAN HALF THE DAYS
2. NOT BEING ABLE TO STOP OR CONTROL WORRYING: SEVERAL DAYS
GAD7 TOTAL SCORE: 8
1. FEELING NERVOUS, ANXIOUS, OR ON EDGE: MORE THAN HALF THE DAYS
3. WORRYING TOO MUCH ABOUT DIFFERENT THINGS: SEVERAL DAYS

## 2023-11-10 ENCOUNTER — OFFICE VISIT (OUTPATIENT)
Dept: FAMILY MEDICINE | Facility: CLINIC | Age: 38
End: 2023-11-10
Attending: STUDENT IN AN ORGANIZED HEALTH CARE EDUCATION/TRAINING PROGRAM
Payer: COMMERCIAL

## 2023-11-10 VITALS
SYSTOLIC BLOOD PRESSURE: 128 MMHG | RESPIRATION RATE: 14 BRPM | HEIGHT: 62 IN | DIASTOLIC BLOOD PRESSURE: 74 MMHG | WEIGHT: 180 LBS | HEART RATE: 66 BPM | BODY MASS INDEX: 33.13 KG/M2 | OXYGEN SATURATION: 99 % | TEMPERATURE: 97.9 F

## 2023-11-10 DIAGNOSIS — F33.1 MODERATE EPISODE OF RECURRENT MAJOR DEPRESSIVE DISORDER (H): ICD-10-CM

## 2023-11-10 DIAGNOSIS — F41.1 GAD (GENERALIZED ANXIETY DISORDER): ICD-10-CM

## 2023-11-10 PROCEDURE — 99214 OFFICE O/P EST MOD 30 MIN: CPT | Performed by: STUDENT IN AN ORGANIZED HEALTH CARE EDUCATION/TRAINING PROGRAM

## 2023-11-10 RX ORDER — NORTRIPTYLINE HCL 10 MG
10 CAPSULE ORAL AT BEDTIME
Qty: 30 CAPSULE | Refills: 1 | Status: SHIPPED | OUTPATIENT
Start: 2023-11-10 | End: 2023-12-11

## 2023-11-10 ASSESSMENT — PATIENT HEALTH QUESTIONNAIRE - PHQ9
SUM OF ALL RESPONSES TO PHQ QUESTIONS 1-9: 12
10. IF YOU CHECKED OFF ANY PROBLEMS, HOW DIFFICULT HAVE THESE PROBLEMS MADE IT FOR YOU TO DO YOUR WORK, TAKE CARE OF THINGS AT HOME, OR GET ALONG WITH OTHER PEOPLE: SOMEWHAT DIFFICULT
SUM OF ALL RESPONSES TO PHQ QUESTIONS 1-9: 12

## 2023-11-10 ASSESSMENT — ENCOUNTER SYMPTOMS: NERVOUS/ANXIOUS: 1

## 2023-11-10 ASSESSMENT — PAIN SCALES - GENERAL: PAINLEVEL: NO PAIN (0)

## 2023-11-10 NOTE — PROGRESS NOTES
"  Assessment & Plan     Moderate episode of recurrent major depressive disorder (H)  JOSÉ MIGUEL (generalized anxiety disorder)  Very pleasant 38 year old who presents for follow up on mental health. Patient went up to 300 mg Wellbutrin and tolerated that, but when starting fluvoxamine, she had significant nausea and wretching, abdominal discomfort and change in bowel movements. We discussed discontinuing this, and trying nortriptyline for both mental health and for potential benefit from migraine perspective. If this helps with anxiety and depression, consider trying decreased migraine meds over time as well. Follow up 1 month.   - PRIMARY CARE FOLLOW-UP SCHEDULING  - nortriptyline (PAMELOR) 10 MG capsule  Dispense: 30 capsule; Refill: 1  - PRIMARY CARE FOLLOW-UP SCHEDULING      I spent a total of 33 minutes on the day of the visit.   Time spent by me doing chart review, history and exam, documentation and further activities per the note     BMI:   Estimated body mass index is 32.92 kg/m  as calculated from the following:    Height as of this encounter: 1.575 m (5' 2\").    Weight as of this encounter: 81.6 kg (180 lb).     Depression Screening Follow Up        11/10/2023    12:32 PM   PHQ   PHQ-9 Total Score 12   Q9: Thoughts of better off dead/self-harm past 2 weeks Not at all     Follow Up Actions Taken  Crisis resource information provided in After Visit Summary  Follow up recommended: with me 1 month      Brandy Zimmerman MD  [facility]    Jayant Elena is a 38 year old, presenting for the following health issues:  Anxiety and Depression        11/10/2023    12:43 PM   Additional Questions   Roomed by Mariposa CARY   Accompanied by Self       History of Present Illness       Mental Health Follow-up:  Patient presents to follow-up on Depression & Anxiety.Patient's depression since last visit has been:  Better  The patient is not having other symptoms associated with depression.  Patient's " "anxiety since last visit has been:  Worse  The patient is not having other symptoms associated with anxiety.  Any significant life events: No  Patient is feeling anxious or having panic attacks.  Patient has no concerns about alcohol or drug use.          9/15/2023     3:07 PM 10/5/2023     2:24 PM 11/10/2023    12:32 PM   PHQ   PHQ-9 Total Score 19 5 12   Q9: Thoughts of better off dead/self-harm past 2 weeks Not at all Not at all Not at all         9/15/2023     3:08 PM 10/5/2023     2:25 PM 11/8/2023     8:46 AM   JOSÉ MIGUEL-7 SCORE   Total Score 10 (moderate anxiety) 6 (mild anxiety) 8 (mild anxiety)   Total Score 10 6 8       Really nauseaed for a few weeks. Improved, but still kind of nauseated.   Eats but feels like the naxiety is contributing to bathroom habit issues.     Wellbutrin 300   Fluvoxamine a week after here last.     Feels depression improved but anxiety is worse.     Long covid - headache, for a while also racing heart. Some brain fog. Gastroenterolog issues.       Review of Systems   Psychiatric/Behavioral:  The patient is nervous/anxious.       Constitutional, HEENT, cardiovascular, pulmonary, GI, , musculoskeletal, neuro, skin, endocrine and psych systems are negative, except as otherwise noted.      Objective    /74 (BP Location: Right arm, Patient Position: Sitting, Cuff Size: Adult Regular)   Pulse 66   Temp 97.9  F (36.6  C) (Tympanic)   Resp 14   Ht 1.575 m (5' 2\")   Wt 81.6 kg (180 lb)   [facility] 11/10/2023   SpO2 99%   BMI 32.92 kg/m    Body mass index is 32.92 kg/m .  Physical Exam  Constitutional:       Appearance: Normal appearance.   HENT:      Head: Normocephalic.   Eyes:      General: No scleral icterus.     Extraocular Movements: Extraocular movements intact.      Conjunctiva/sclera: Conjunctivae normal.   Cardiovascular:      Rate and Rhythm: Normal rate.   Pulmonary:      Effort: Pulmonary effort is normal.   Neurological:      General: No focal deficit present.      Mental " Status: She is alert and oriented to person, place, and time.   Psychiatric:         Mood and Affect: Mood is anxious. Affect is flat.

## 2023-12-10 ASSESSMENT — ANXIETY QUESTIONNAIRES
7. FEELING AFRAID AS IF SOMETHING AWFUL MIGHT HAPPEN: NOT AT ALL
2. NOT BEING ABLE TO STOP OR CONTROL WORRYING: NOT AT ALL
3. WORRYING TOO MUCH ABOUT DIFFERENT THINGS: NOT AT ALL
5. BEING SO RESTLESS THAT IT IS HARD TO SIT STILL: NOT AT ALL
4. TROUBLE RELAXING: SEVERAL DAYS
GAD7 TOTAL SCORE: 3
IF YOU CHECKED OFF ANY PROBLEMS ON THIS QUESTIONNAIRE, HOW DIFFICULT HAVE THESE PROBLEMS MADE IT FOR YOU TO DO YOUR WORK, TAKE CARE OF THINGS AT HOME, OR GET ALONG WITH OTHER PEOPLE: SOMEWHAT DIFFICULT
GAD7 TOTAL SCORE: 3
1. FEELING NERVOUS, ANXIOUS, OR ON EDGE: SEVERAL DAYS
6. BECOMING EASILY ANNOYED OR IRRITABLE: SEVERAL DAYS

## 2023-12-11 ENCOUNTER — OFFICE VISIT (OUTPATIENT)
Dept: FAMILY MEDICINE | Facility: CLINIC | Age: 38
End: 2023-12-11
Payer: COMMERCIAL

## 2023-12-11 VITALS
RESPIRATION RATE: 16 BRPM | BODY MASS INDEX: 32.94 KG/M2 | OXYGEN SATURATION: 98 % | TEMPERATURE: 97.8 F | HEART RATE: 80 BPM | DIASTOLIC BLOOD PRESSURE: 78 MMHG | HEIGHT: 62 IN | SYSTOLIC BLOOD PRESSURE: 132 MMHG | WEIGHT: 179 LBS

## 2023-12-11 DIAGNOSIS — F41.1 GAD (GENERALIZED ANXIETY DISORDER): ICD-10-CM

## 2023-12-11 DIAGNOSIS — G43.709 CHRONIC MIGRAINE WITHOUT AURA WITHOUT STATUS MIGRAINOSUS, NOT INTRACTABLE: ICD-10-CM

## 2023-12-11 DIAGNOSIS — F33.1 MODERATE EPISODE OF RECURRENT MAJOR DEPRESSIVE DISORDER (H): Primary | ICD-10-CM

## 2023-12-11 DIAGNOSIS — K11.7 XEROSTOMIA: ICD-10-CM

## 2023-12-11 DIAGNOSIS — R63.1 INCREASED THIRST: ICD-10-CM

## 2023-12-11 DIAGNOSIS — E03.9 HYPOTHYROIDISM, UNSPECIFIED TYPE: ICD-10-CM

## 2023-12-11 DIAGNOSIS — K76.0 NONALCOHOLIC FATTY LIVER DISEASE: ICD-10-CM

## 2023-12-11 DIAGNOSIS — G43.109 MIGRAINE WITH AURA AND WITHOUT STATUS MIGRAINOSUS, NOT INTRACTABLE: ICD-10-CM

## 2023-12-11 LAB
ALBUMIN SERPL BCG-MCNC: 4.8 G/DL (ref 3.5–5.2)
ALP SERPL-CCNC: 77 U/L (ref 40–150)
ALT SERPL W P-5'-P-CCNC: 34 U/L (ref 0–50)
ANION GAP SERPL CALCULATED.3IONS-SCNC: 10 MMOL/L (ref 7–15)
AST SERPL W P-5'-P-CCNC: 27 U/L (ref 0–45)
BILIRUB SERPL-MCNC: 0.2 MG/DL
BUN SERPL-MCNC: 7.9 MG/DL (ref 6–20)
CALCIUM SERPL-MCNC: 9.2 MG/DL (ref 8.6–10)
CHLORIDE SERPL-SCNC: 109 MMOL/L (ref 98–107)
CREAT SERPL-MCNC: 0.8 MG/DL (ref 0.51–0.95)
DEPRECATED HCO3 PLAS-SCNC: 19 MMOL/L (ref 22–29)
EGFRCR SERPLBLD CKD-EPI 2021: >90 ML/MIN/1.73M2
GLUCOSE SERPL-MCNC: 102 MG/DL (ref 70–99)
POTASSIUM SERPL-SCNC: 4.2 MMOL/L (ref 3.4–5.3)
PROT SERPL-MCNC: 7.8 G/DL (ref 6.4–8.3)
SODIUM SERPL-SCNC: 138 MMOL/L (ref 135–145)

## 2023-12-11 PROCEDURE — 99214 OFFICE O/P EST MOD 30 MIN: CPT | Performed by: STUDENT IN AN ORGANIZED HEALTH CARE EDUCATION/TRAINING PROGRAM

## 2023-12-11 PROCEDURE — 80053 COMPREHEN METABOLIC PANEL: CPT | Performed by: STUDENT IN AN ORGANIZED HEALTH CARE EDUCATION/TRAINING PROGRAM

## 2023-12-11 PROCEDURE — 36415 COLL VENOUS BLD VENIPUNCTURE: CPT | Performed by: STUDENT IN AN ORGANIZED HEALTH CARE EDUCATION/TRAINING PROGRAM

## 2023-12-11 RX ORDER — PROPRANOLOL HYDROCHLORIDE 60 MG/1
60 TABLET ORAL DAILY
Qty: 90 TABLET | Refills: 3 | Status: SHIPPED | OUTPATIENT
Start: 2023-12-11 | End: 2024-02-12

## 2023-12-11 RX ORDER — TOPIRAMATE 25 MG/1
100 TABLET, FILM COATED ORAL AT BEDTIME
Qty: 360 TABLET | Refills: 3 | Status: SHIPPED | OUTPATIENT
Start: 2023-12-11 | End: 2024-02-26

## 2023-12-11 RX ORDER — LEVOTHYROXINE SODIUM 88 UG/1
88 TABLET ORAL DAILY
Qty: 90 TABLET | Refills: 3 | Status: SHIPPED | OUTPATIENT
Start: 2023-12-11

## 2023-12-11 RX ORDER — NORTRIPTYLINE HCL 10 MG
10 CAPSULE ORAL AT BEDTIME
Qty: 90 CAPSULE | Refills: 3 | Status: SHIPPED | OUTPATIENT
Start: 2023-12-11 | End: 2024-02-12

## 2023-12-11 ASSESSMENT — PATIENT HEALTH QUESTIONNAIRE - PHQ9
SUM OF ALL RESPONSES TO PHQ QUESTIONS 1-9: 3
10. IF YOU CHECKED OFF ANY PROBLEMS, HOW DIFFICULT HAVE THESE PROBLEMS MADE IT FOR YOU TO DO YOUR WORK, TAKE CARE OF THINGS AT HOME, OR GET ALONG WITH OTHER PEOPLE: NOT DIFFICULT AT ALL
SUM OF ALL RESPONSES TO PHQ QUESTIONS 1-9: 3

## 2023-12-11 ASSESSMENT — PAIN SCALES - GENERAL: PAINLEVEL: NO PAIN (0)

## 2023-12-11 NOTE — PROGRESS NOTES
Assessment & Plan     Moderate episode of recurrent major depressive disorder (H)  JOSÉ MIGUEL (generalized anxiety disorder)  Patient is a very pleasant 38-year-old female presents today for follow-up from her last visit regarding mental health.  Patient has been on nortriptyline 10 mg doing much improved from a mental health perspective.  She continues to have side effects though including unusual dreams as well as significant increased thirst.  As she is generally able to tolerate the side effects, we opted to continue on current medication.  We discussed that the anticholinergic side effects of the TCAs are likely what are contributing to this.  - nortriptyline (PAMELOR) 10 MG capsule  Dispense: 90 capsule; Refill: 3    Xerostomia  Increased thirst  Nonalcoholic fatty liver disease  Patient had significant increased thirst with starting nortriptyline. With a concern for developing SIADH, we did obtain the below workup, as well as to check on her nonalcoholic fatty liver disease.  LFTs are back in normal range and sodium is normal.  - Comprehensive metabolic panel  - Comprehensive metabolic panel    Hypothyroidism, unspecified type  Due for refill of her levothyroxine, sent today.  - levothyroxine (SYNTHROID) 88 MCG tablet  Dispense: 90 tablet; Refill: 3    Chronic migraine without aura without status migrainosus, not intractable  She has been taking 4 tablets of the topiramate, with a plan to hopefully taper down if needed, continue with this dosing with a refill sent.  - topiramate (TOPAMAX) 25 MG tablet  Dispense: 360 tablet; Refill: 3    Migraine with aura and without status migrainosus, not intractable  Due for refill propranolol, sent today.  - propranolol (INDERAL) 60 MG tablet  Dispense: 90 tablet; Refill: 3        I spent a total of 23 minutes on the day of the visit.   Time spent by me doing chart review, history and exam, documentation and further activities per the note     BMI:   Estimated body mass index  "is 32.74 kg/m  as calculated from the following:    Height as of this encounter: 1.575 m (5' 2\").    Weight as of this encounter: 81.2 kg (179 lb).     Brandy Zimmerman MD  [facility]    Jayant Elena is a 38 year old, presenting for the following health issues:  Depression and Anxiety        12/11/2023     7:40 AM   Additional Questions   Roomed by Mariposa CARY   Accompanied by Self       History of Present Illness       Mental Health Follow-up:  Patient presents to follow-up on Depression & Anxiety.Patient's depression since last visit has been:  Better  The patient is not having other symptoms associated with depression.  Patient's anxiety since last visit has been:  Better  The patient is not having other symptoms associated with anxiety.  Any significant life events: No  Patient is feeling anxious or having panic attacks.  Patient has no concerns about alcohol or drug use.    She eats 2-3 servings of fruits and vegetables daily.She consumes 1 sweetened beverage(s) daily.She exercises with enough effort to increase her heart rate 10 to 19 minutes per day.  She exercises with enough effort to increase her heart rate 4 days per week.   She is taking medications regularly.         10/5/2023     2:24 PM 11/10/2023    12:32 PM 12/11/2023     7:38 AM   PHQ   PHQ-9 Total Score 5 12 3   Q9: Thoughts of better off dead/self-harm past 2 weeks Not at all Not at all Not at all          10/5/2023     2:25 PM 11/8/2023     8:46 AM 12/10/2023     2:02 PM   JOSÉ MIGUEL-7 SCORE   Total Score 6 (mild anxiety) 8 (mild anxiety) 3 (minimal anxiety)   Total Score 6 8 3        Side note:   In review of last weights:   202 lb 6.4 oz in November 2022  Progressive decrease, now down to 179 today (about 11.5 % down)      Review of Systems   Constitutional, HEENT, cardiovascular, pulmonary, GI, , musculoskeletal, neuro, skin, endocrine and psych systems are negative, except as otherwise noted.      Objective    BP " "132/78 (BP Location: Right arm, Patient Position: Sitting, Cuff Size: Adult Regular)   Pulse 80   Temp 97.8  F (36.6  C) (Tympanic)   Resp 16   Ht 1.575 m (5' 2\")   Wt 81.2 kg (179 lb)   LMP 11/10/2023   SpO2 98%   BMI 32.74 kg/m    Body mass index is 32.74 kg/m .  Physical Exam  Constitutional:       Appearance: Normal appearance.   HENT:      Head: Normocephalic.   Eyes:      General: No scleral icterus.     Extraocular Movements: Extraocular movements intact.      Conjunctiva/sclera: Conjunctivae normal.   Cardiovascular:      Rate and Rhythm: Normal rate.   Pulmonary:      Effort: Pulmonary effort is normal.   Neurological:      General: No focal deficit present.      Mental Status: She is alert and oriented to person, place, and time.           Results from this visit  Results for orders placed or performed in visit on 12/11/23   Comprehensive metabolic panel     Status: Abnormal   Result Value Ref Range    Sodium 138 135 - 145 mmol/L    Potassium 4.2 3.4 - 5.3 mmol/L    Carbon Dioxide (CO2) 19 (L) 22 - 29 mmol/L    Anion Gap 10 7 - 15 mmol/L    Urea Nitrogen 7.9 6.0 - 20.0 mg/dL    Creatinine 0.80 0.51 - 0.95 mg/dL    GFR Estimate >90 >60 mL/min/1.73m2    Calcium 9.2 8.6 - 10.0 mg/dL    Chloride 109 (H) 98 - 107 mmol/L    Glucose 102 (H) 70 - 99 mg/dL    Alkaline Phosphatase 77 40 - 150 U/L    AST 27 0 - 45 U/L    ALT 34 0 - 50 U/L    Protein Total 7.8 6.4 - 8.3 g/dL    Albumin 4.8 3.5 - 5.2 g/dL    Bilirubin Total 0.2 <=1.2 mg/dL                     "

## 2024-01-24 ENCOUNTER — MYC MEDICAL ADVICE (OUTPATIENT)
Dept: FAMILY MEDICINE | Facility: CLINIC | Age: 39
End: 2024-01-24
Payer: COMMERCIAL

## 2024-01-25 ASSESSMENT — ANXIETY QUESTIONNAIRES
6. BECOMING EASILY ANNOYED OR IRRITABLE: MORE THAN HALF THE DAYS
GAD7 TOTAL SCORE: 10
3. WORRYING TOO MUCH ABOUT DIFFERENT THINGS: MORE THAN HALF THE DAYS
7. FEELING AFRAID AS IF SOMETHING AWFUL MIGHT HAPPEN: NOT AT ALL
GAD7 TOTAL SCORE: 10
5. BEING SO RESTLESS THAT IT IS HARD TO SIT STILL: SEVERAL DAYS
4. TROUBLE RELAXING: MORE THAN HALF THE DAYS
GAD7 TOTAL SCORE: 10
1. FEELING NERVOUS, ANXIOUS, OR ON EDGE: MORE THAN HALF THE DAYS
2. NOT BEING ABLE TO STOP OR CONTROL WORRYING: SEVERAL DAYS
7. FEELING AFRAID AS IF SOMETHING AWFUL MIGHT HAPPEN: NOT AT ALL
IF YOU CHECKED OFF ANY PROBLEMS ON THIS QUESTIONNAIRE, HOW DIFFICULT HAVE THESE PROBLEMS MADE IT FOR YOU TO DO YOUR WORK, TAKE CARE OF THINGS AT HOME, OR GET ALONG WITH OTHER PEOPLE: SOMEWHAT DIFFICULT
8. IF YOU CHECKED OFF ANY PROBLEMS, HOW DIFFICULT HAVE THESE MADE IT FOR YOU TO DO YOUR WORK, TAKE CARE OF THINGS AT HOME, OR GET ALONG WITH OTHER PEOPLE?: SOMEWHAT DIFFICULT

## 2024-01-25 ASSESSMENT — PATIENT HEALTH QUESTIONNAIRE - PHQ9
10. IF YOU CHECKED OFF ANY PROBLEMS, HOW DIFFICULT HAVE THESE PROBLEMS MADE IT FOR YOU TO DO YOUR WORK, TAKE CARE OF THINGS AT HOME, OR GET ALONG WITH OTHER PEOPLE: SOMEWHAT DIFFICULT
SUM OF ALL RESPONSES TO PHQ QUESTIONS 1-9: 11
SUM OF ALL RESPONSES TO PHQ QUESTIONS 1-9: 11

## 2024-01-26 ENCOUNTER — OFFICE VISIT (OUTPATIENT)
Dept: PEDIATRICS | Facility: CLINIC | Age: 39
End: 2024-01-26
Payer: COMMERCIAL

## 2024-01-26 ENCOUNTER — OFFICE VISIT (OUTPATIENT)
Dept: FAMILY MEDICINE | Facility: CLINIC | Age: 39
End: 2024-01-26
Payer: COMMERCIAL

## 2024-01-26 VITALS
RESPIRATION RATE: 18 BRPM | HEIGHT: 62 IN | HEART RATE: 65 BPM | DIASTOLIC BLOOD PRESSURE: 91 MMHG | BODY MASS INDEX: 32.2 KG/M2 | WEIGHT: 175 LBS | OXYGEN SATURATION: 99 % | SYSTOLIC BLOOD PRESSURE: 137 MMHG | TEMPERATURE: 98 F

## 2024-01-26 VITALS
HEIGHT: 62 IN | DIASTOLIC BLOOD PRESSURE: 82 MMHG | HEART RATE: 77 BPM | BODY MASS INDEX: 32.57 KG/M2 | TEMPERATURE: 97.9 F | SYSTOLIC BLOOD PRESSURE: 130 MMHG | RESPIRATION RATE: 18 BRPM | OXYGEN SATURATION: 96 % | WEIGHT: 177 LBS

## 2024-01-26 DIAGNOSIS — R51.9 POST-COVID CHRONIC HEADACHE: ICD-10-CM

## 2024-01-26 DIAGNOSIS — R19.7 DIARRHEA, UNSPECIFIED TYPE: ICD-10-CM

## 2024-01-26 DIAGNOSIS — U09.9 POST-COVID CHRONIC HEADACHE: ICD-10-CM

## 2024-01-26 DIAGNOSIS — G89.29 POST-COVID CHRONIC HEADACHE: ICD-10-CM

## 2024-01-26 DIAGNOSIS — R11.0 NAUSEA: Primary | ICD-10-CM

## 2024-01-26 DIAGNOSIS — G93.32 POST-COVID CHRONIC FATIGUE: ICD-10-CM

## 2024-01-26 DIAGNOSIS — U09.9 POST-COVID-19 CONDITION: ICD-10-CM

## 2024-01-26 DIAGNOSIS — G43.909 ACUTE MIGRAINE: Primary | ICD-10-CM

## 2024-01-26 DIAGNOSIS — E86.0 DEHYDRATION: ICD-10-CM

## 2024-01-26 DIAGNOSIS — U09.9 POST-COVID CHRONIC FATIGUE: ICD-10-CM

## 2024-01-26 DIAGNOSIS — G43.909 MIGRAINE WITHOUT STATUS MIGRAINOSUS, NOT INTRACTABLE, UNSPECIFIED MIGRAINE TYPE: ICD-10-CM

## 2024-01-26 LAB
ANION GAP SERPL CALCULATED.3IONS-SCNC: 14 MMOL/L (ref 7–15)
BUN SERPL-MCNC: 7.1 MG/DL (ref 6–20)
CALCIUM SERPL-MCNC: 9.4 MG/DL (ref 8.6–10)
CHLORIDE SERPL-SCNC: 107 MMOL/L (ref 98–107)
CREAT SERPL-MCNC: 0.69 MG/DL (ref 0.51–0.95)
DEPRECATED HCO3 PLAS-SCNC: 15 MMOL/L (ref 22–29)
EGFRCR SERPLBLD CKD-EPI 2021: >90 ML/MIN/1.73M2
GLUCOSE SERPL-MCNC: 92 MG/DL (ref 70–99)
MAGNESIUM SERPL-MCNC: 2.1 MG/DL (ref 1.7–2.3)
POTASSIUM SERPL-SCNC: 5.1 MMOL/L (ref 3.4–5.3)
SODIUM SERPL-SCNC: 136 MMOL/L (ref 135–145)

## 2024-01-26 PROCEDURE — 36415 COLL VENOUS BLD VENIPUNCTURE: CPT

## 2024-01-26 PROCEDURE — 96361 HYDRATE IV INFUSION ADD-ON: CPT

## 2024-01-26 PROCEDURE — 96360 HYDRATION IV INFUSION INIT: CPT

## 2024-01-26 PROCEDURE — 83735 ASSAY OF MAGNESIUM: CPT

## 2024-01-26 PROCEDURE — 96372 THER/PROPH/DIAG INJ SC/IM: CPT | Mod: 59

## 2024-01-26 PROCEDURE — 80048 BASIC METABOLIC PNL TOTAL CA: CPT

## 2024-01-26 PROCEDURE — 99207 REFERRAL TO ACUTE AND DIAGNOSTIC SERVICES: CPT | Performed by: STUDENT IN AN ORGANIZED HEALTH CARE EDUCATION/TRAINING PROGRAM

## 2024-01-26 PROCEDURE — 99214 OFFICE O/P EST MOD 30 MIN: CPT | Mod: 25

## 2024-01-26 RX ORDER — SUMATRIPTAN 6 MG/.5ML
6 INJECTION, SOLUTION SUBCUTANEOUS ONCE
Status: COMPLETED | OUTPATIENT
Start: 2024-01-26 | End: 2024-01-26

## 2024-01-26 RX ORDER — ONDANSETRON 4 MG/1
4 TABLET, ORALLY DISINTEGRATING ORAL
Status: ACTIVE | OUTPATIENT
Start: 2024-01-26 | End: 2024-01-27

## 2024-01-26 RX ORDER — ONDANSETRON 2 MG/ML
4 INJECTION INTRAMUSCULAR; INTRAVENOUS
Status: DISCONTINUED | OUTPATIENT
Start: 2024-01-26 | End: 2024-01-26

## 2024-01-26 RX ORDER — ONDANSETRON 4 MG/1
4 TABLET, ORALLY DISINTEGRATING ORAL EVERY 8 HOURS PRN
Qty: 30 TABLET | Refills: 1 | Status: SHIPPED | OUTPATIENT
Start: 2024-01-26 | End: 2024-07-02

## 2024-01-26 RX ADMIN — ONDANSETRON 4 MG: 4 TABLET, ORALLY DISINTEGRATING ORAL at 12:41

## 2024-01-26 RX ADMIN — Medication 1000 ML: at 12:01

## 2024-01-26 RX ADMIN — SUMATRIPTAN 6 MG: 6 INJECTION, SOLUTION SUBCUTANEOUS at 12:03

## 2024-01-26 ASSESSMENT — PAIN SCALES - GENERAL
PAINLEVEL: SEVERE PAIN (6)
PAINLEVEL: SEVERE PAIN (6)

## 2024-01-26 NOTE — Clinical Note
Thanks for sending Kassy Saavedra over to [facility] today.  She presented with a fairly typical migraine for her, though more severe than usual, and this time associated with some nausea.  She responded well to sumatriptan hand and IV fluids.  We also gave her a dose of ondansetron for her nausea.  She feels well now, and is heading home.  She will keep in touch with you if her headaches become more frequent or more severe.

## 2024-01-26 NOTE — PROGRESS NOTES
"  Assessment & Plan     (G43.909) Acute migraine  (primary encounter diagnosis)  Comment: Kassy describes a history of chronic migraine, though they became much more severe and frequent after acute COVID in 2021 complicated by \"long COVID\" symptoms.  Ordinarily prophylactic topiramate has been successful, with a recent frequency of a few headaches per month, not severe.  She had a COVID recurrence 1/10/2022, and has had more frequent headaches since.  She describes a severe headache for the last 4 days, perhaps slightly better today than yesterday.  Headache is associated with photophobia and nausea so far without emesis.  Plan:   Sodium chloride 0.9% BOLUS 1,000 mL, will probably need additional fluid.  SUMAtriptan (IMITREX) injection 6 mg subcutaneous x1, can repeat x1 if needed.    (R19.7) Diarrhea, unspecified type  Comment: Present over the last few days.  Kassy says that this is not unusual for her as she has a history of irritable bowel syndrome.  Plan:    Magnesium, Basic metabolic panel ordered to evaluate for solute loss and/or renal injury.  IV fluids ordered as above.    DISCUSSION:  Lab called to inform us that Kassy's basic metabolic panel was slightly hemolyzed.  Sodium, potassium, and magnesium were normal, however, as was renal function.  We administered sumatriptan 6 mg subcutaneous, gave her 1 L 0.9 NaCl, as well as a dose of ondansetron ODT 4 mg once.  Kassy has responded well to treatment.  She says that her headache is \"almost gone\", and that her nausea is gone.  She feels very comfortable going home from [facility] at this point.  She has a supply of rizatriptan at home that she can use for as needed acute migraine treatment.  We made no changes to her home medication list.  She agrees to contact Dr. Zimmerman should her headaches recur, in particular should they become more frequent or more severe.      BMI  Estimated body mass index is 32.01 kg/m  as calculated from the following:    Height as of this " "encounter: 1.575 m (5' 2\").    Weight as of this encounter: 79.4 kg (175 lb).       No follow-ups on file.    Jayant Elena is a 38 year old, presenting for the following health issues:  Headache (Positive COVID 1/10/2024)    HPI     Headache  Onset/Duration: Radu 10, 2024  Description:   Location: unilateral in the right frontal area, unilateral in the right temporal area, unilateral in the right occipital area, in back of head, everywhere  Character: throbbing pain, sharp pain, global, pressure  Frequency:  waxes and wanes  Duration:  On and off since Radu 10  Wake with headaches:  YES  Able to do daily activities when headache present:  YES- some actives she can not  Intensity: moderate  Progression of Symptoms:  intermittent and waxing and waning  Accompanying Signs & Symptoms:  Stiff neck: no   Neck or upper back pain: no            Sinus or URI symptoms: YES- running nose, throat drainage  Fever: no   Nausea or vomiting: YES- nausea  Dizziness: no   Numbness/tingling: no   Weakness: no   Visual changes: YES- slight when intense  History:   Head trauma: no   Family history of migraines: no   Daily pain medication use: YES- Excedrin            Previous tests for headaches: no            Neurologist evaluations: YES- 2021 through [facility]  Precipitating or alleviating factors:   Does light make it worse: YES  Does sound make it worse: YES  Does sleep help: YES  Therapies Tried and outcome: Tomasz and Guyalmike    Kassy Saavedra is a 38-year-old woman who describes a history of chronic migraines for at least the last 10 years.  She then developed COVID in 2021, and has developed \"long COVID\" symptoms ever since.  Headache frequency and severity became more severe after that episode of COVID.  She has been using topiramate and propranolol as prophylaxis, and until the last couple of weeks found this treatment to be successful, with headaches occurring no more than a few times a month, and not severe.    Unfortunately, Kassy got " "COVID again on 1/10/2024.  Although she has recovered from the acute respiratory symptoms, she has had more frequent headaches ever since.  For the last 4 days, she has had a headache that she characterizes as severe.  It is associated with photophobia and phonophobia, as well as nausea, so far without emesis.  She has been able to eat and drink, though in lower amounts than usual.  She has tried Excedrin as a as needed medication which has not been effective.    Kassy contacted her primary care office today to report his ongoing, subjectively severe headache.  Referral was made to the [facility] today, where I am seeing her this morning.    Kassy reports diarrhea over the last few days, but says that this is not unusual for her, as she carries the diagnosis of irritable bowel syndrome.  She reports no urinary symptoms.  Kassy tells me that there is no possibility that she is pregnant.      Review of Systems  As per the history of present illness.  No additional positives or negatives are obtained.        Objective    BP (!) 145/93 (BP Location: Right arm, Patient Position: Sitting, Cuff Size: Adult Regular)   Pulse 65   Temp 98  F (36.7  C) (Oral)   Resp 18   Ht 1.575 m (5' 2\")   Wt 79.4 kg (175 lb)   SpO2 99%   BMI 32.01 kg/m    Body mass index is 32.01 kg/m .  Physical Exam     GENERAL: Very pleasant woman, seated in a recliner.  She looks uncomfortable, but is not in acute distress.  EYES: Eyes grossly normal to inspection, extraocular movements intact  HENT: Deferred, as she was wearing a mask.  RESP: No accessory muscle use.  Symmetrical breath sounds.  Lungs clear throughout on inspiration and expiration.  Expiration not prolonged, no wheeze.  CV: Regular rate and rhythm, non-tachycardic.  Normal S1 S2, no murmur or extra sound.   ABDOMEN: Soft, non-tender, no guarding.  Liver and spleen not palpable, no masses palpable.  Bowel sounds positive.  MS: No bony deformities noted.  No red or " inflamed joints.  SKIN: Warm and dry, no rashes.  NEURO: Alert, oriented, conversant.  Cranial nerves III - XII grossly intact.  No gross motor or sensory deficits.    PSYCH: Calm, alert, conversant.  Able to articulate logical thoughts, no tangential thoughts, no hallucinations or delusions.  Affect normal.      Results for orders placed or performed in visit on 01/26/24 (from the past 24 hour(s))   Magnesium   Result Value Ref Range    Magnesium 2.1 1.7 - 2.3 mg/dL   Basic metabolic panel   Result Value Ref Range    Sodium 136 135 - 145 mmol/L    Potassium 5.1 3.4 - 5.3 mmol/L    Chloride 107 98 - 107 mmol/L    Carbon Dioxide (CO2) 15 (L) 22 - 29 mmol/L    Anion Gap 14 7 - 15 mmol/L    Urea Nitrogen 7.1 6.0 - 20.0 mg/dL    Creatinine 0.69 0.51 - 0.95 mg/dL    GFR Estimate >90 >60 mL/min/1.73m2    Calcium 9.4 8.6 - 10.0 mg/dL    Glucose 92 70 - 99 mg/dL           Signed Electronically by: Norris Chung MD

## 2024-01-26 NOTE — PATIENT INSTRUCTIONS
Following some Imitrex, some IV fluids, and a dose of Zofran, your headache has improved, and your nausea is better.  I agree that you are ready to go home this afternoon.  I am glad you have a supply of Maxalt at home in case your severe headache recurs.  We made no changes today in your home medication list.  Please keep in touch with Dr. Zimmerman, particularly if your headaches become more frequent or more severe.    It was nice meeting you today.

## 2024-01-26 NOTE — NURSING NOTE
"Chief Complaint   Patient presents with    Depression    Anxiety    Headache       Initial There were no vitals taken for this visit. Estimated body mass index is 32.74 kg/m  as calculated from the following:    Height as of 12/11/23: 1.575 m (5' 2\").    Weight as of 12/11/23: 81.2 kg (179 lb).    Patient presents to the clinic using No DME    Is there anyone who you would like to be able to receive your results? No  If yes have patient fill out BUSH[facility]      "

## 2024-01-26 NOTE — LETTER
January 26, 2024      Lady Saavedra  [id number] [address] [place] [id number]        To Whom It May Concern:    Lady Saavedra  was seen on 01/26/2024.  Please excuse her until 2/5/24 due to illness.        Sincerely,        Brandy Zimmerman MD

## 2024-01-26 NOTE — PROGRESS NOTES
Assessment & Plan     Nausea  Patient is a very pleasant 38-year-old female who unfortunately has been suffering with post-COVID syndrome.  She had initial COVID infection in April 2021.  Has battled ongoing symptoms since then.  Symptoms were started to become a little more manageable, and unfortunately had acute COVID infection within the past couple weeks.  This has flared her previous symptoms significantly.    In particular, patient is currently dealing with significant nausea, retching, migraine.  Has had multiple episodes of diarrhea.  Very minimal oral intake.  On exam today, does have some dry mucous membranes.  She does become tearful today as well.    For treatment of her acute flare in symptoms, she is given a prescription for Zofran to go home with.  Hopefully this will help nausea, ultimately improve her overall status.  - ondansetron (ZOFRAN ODT) 4 MG ODT tab  Dispense: 30 tablet; Refill: 1  - Referral to Acute and Diagnostic Services (Day of diagnostic / First order acute)    Migraine without status migrainosus, not intractable, unspecified migraine type  Diarrhea, unspecified type  Dehydration  In addition to above, patient was referred to the acute diagnostic services for same-day treatment of her migraine and dehydration.  With rehydration, hopefully patient's symptoms will improve.  In addition, with the above Zofran prescription, hopefully she can maintain oral hydration.  Has tried multiple medications for slowing bowel movements, none seem to be significantly helpful.  Can certainly try Imodium, though will recommend this hesitantly given the risk for resultant constipation.  - Referral to Acute and Diagnostic Services (Day of diagnostic / First order acute)    Post-COVID chronic headache  Post-COVID chronic fatigue  Post-COVID-19 condition  For ongoing COVID symptoms, now made worse with the acute infection, we discussed possibility of long-term treatment options including adjusting  "nortriptyline dosage, considering naloxone compounded, and repeat referral to the post-COVID group versus referral to [facility] for the post-COVID services.  Follow-up regarding ongoing symptoms if symptoms do not improve within the next week.  Given work note to be off through next week, though may need more extended leave of absence for this flare in symptoms.        Referral to Acute and Diagnostic Services    [phone] ([place]) [place] - [address] [place] [phone]    Transition to Acute & Diagnostic Services Clinic has been discussed with patient, and she agrees with next level of care.   Patient understands that evaluation/treatment at [facility] typically takes significantly longer than in clinic/urgent care (>2 hours).  The [facility] Acute [facility] Diagnostics [facility] Clinic has been contacted by provider/staff to confirm patient acceptance.         Special issues:      None                     The following provider has assessed this patient for intervention at [facility], and directed the patient for referral: Brandy Zimmerman MD                  BMI  Estimated body mass index is 32.37 kg/m  as calculated from the following:    Height as of this encounter: 1.575 m (5' 2.01\").    Weight as of this encounter: 80.3 kg (177 lb).     Depression Screening Follow Up        1/25/2024     1:29 PM   PHQ   PHQ-9 Total Score 11   Q9: Thoughts of better off dead/self-harm past 2 weeks Not at all     Follow Up Actions Taken  Crisis resource information provided in After Visit Summary       Jayant Elena is a 38 year old, presenting for the following health issues:  Depression, Anxiety, and Headache        1/26/2024     9:19 AM   Additional Questions   Roomed by darin rogers cma     History of Present Illness       Mental Health Follow-up:  Patient presents to follow-up on Depression & Anxiety.Patient's depression since last visit has been:  Medium  The patient is not having other symptoms associated with " "depression.  Patient's anxiety since last visit has been:  Worse  The patient is not having other symptoms associated with anxiety.  Any significant life events: No  Patient is feeling anxious or having panic attacks.  Patient has no concerns about alcohol or drug use.    Headaches:   Since the patient's last clinic visit, headaches are: worsened  The patient is getting headaches:  Every day  She is not able to do normal daily activities when she has a migraine.  The patient is taking the following rescue/relief medications:  Excedrin and Maxalt   Patient states \"The relief is inconsistent\" from the rescue/relief medications.   The patient is taking the following medications to prevent migraines:  Topomax  In the past 4 weeks, the patient has gone to an Urgent Care or Emergency Room 0 times times due to headaches.    She eats 0-1 servings of fruits and vegetables daily.She consumes 1 sweetened beverage(s) daily.She exercises with enough effort to increase her heart rate 10 to 19 minutes per day.  She exercises with enough effort to increase her heart rate 3 or less days per week. She is missing 2 dose(s) of medications per week.  She is not taking prescribed medications regularly due to remembering to take.     1.5 week ago   Since then headaches worse.           11/10/2023    12:32 PM 12/11/2023     7:38 AM 1/25/2024     1:29 PM   PHQ   PHQ-9 Total Score 12 3 11   Q9: Thoughts of better off dead/self-harm past 2 weeks Not at all Not at all Not at all         11/8/2023     8:46 AM 12/10/2023     2:02 PM 1/25/2024     1:35 PM   JOSÉ MIGUEL-7 SCORE   Total Score 8 (mild anxiety) 3 (minimal anxiety) 10 (moderate anxiety)   Total Score 8 3 10     Nauseated, can't eat.   Abdominal cramping, diarrhea  Tired, brain fog  Everything came back  Haven't been able to work  Forgetting to take meds again.   Anxious all the time.   Feels worse this time.     Heaving on Monday night. Hurt really badly. Did end up throwing up (water). " "  Dehydrated at the time.     COVID in April 2021, had 1 episode of COVID infection since then, as well as the new infection earlier this month.  Has been vaccinated    Has not taken the Reglan or Benadryl  Avoids Maxalt causes nausea.  No dizziness or heart palpitations.  Using dicyclomine regularly which seems to help a little    Omeprazole in the past worked but worsening symptoms that she was try to get off of them.      Review of Systems  Constitutional, HEENT, cardiovascular, pulmonary, gi and gu systems are negative, except as otherwise noted.      Objective    /82 (BP Location: Right arm, Patient Position: Sitting, Cuff Size: Adult Regular)   Pulse 77   Temp 97.9  F (36.6  C) (Tympanic)   Resp 18   Ht 1.575 m (5' 2.01\")   Wt 80.3 kg (177 lb)   SpO2 96%   BMI 32.37 kg/m    Body mass index is 32.37 kg/m .  Physical Exam  Constitutional:       Appearance: Normal appearance.   HENT:      Head: Normocephalic.      Mouth/Throat:      Mouth: Mucous membranes are dry.   Eyes:      General: No scleral icterus.     Extraocular Movements: Extraocular movements intact.      Conjunctiva/sclera: Conjunctivae normal.   Cardiovascular:      Rate and Rhythm: Normal rate.   Pulmonary:      Effort: Pulmonary effort is normal.   Neurological:      General: No focal deficit present.      Mental Status: She is alert and oriented to person, place, and time.   Psychiatric:         Mood and Affect: Affect is tearful.                  Signed Electronically by: Brandy Zimmerman MD    "

## 2024-02-01 ENCOUNTER — MYC MEDICAL ADVICE (OUTPATIENT)
Dept: FAMILY MEDICINE | Facility: CLINIC | Age: 39
End: 2024-02-01
Payer: COMMERCIAL

## 2024-02-10 ASSESSMENT — ANXIETY QUESTIONNAIRES
4. TROUBLE RELAXING: MORE THAN HALF THE DAYS
5. BEING SO RESTLESS THAT IT IS HARD TO SIT STILL: SEVERAL DAYS
GAD7 TOTAL SCORE: 9
8. IF YOU CHECKED OFF ANY PROBLEMS, HOW DIFFICULT HAVE THESE MADE IT FOR YOU TO DO YOUR WORK, TAKE CARE OF THINGS AT HOME, OR GET ALONG WITH OTHER PEOPLE?: VERY DIFFICULT
IF YOU CHECKED OFF ANY PROBLEMS ON THIS QUESTIONNAIRE, HOW DIFFICULT HAVE THESE PROBLEMS MADE IT FOR YOU TO DO YOUR WORK, TAKE CARE OF THINGS AT HOME, OR GET ALONG WITH OTHER PEOPLE: VERY DIFFICULT
7. FEELING AFRAID AS IF SOMETHING AWFUL MIGHT HAPPEN: NOT AT ALL
1. FEELING NERVOUS, ANXIOUS, OR ON EDGE: MORE THAN HALF THE DAYS
2. NOT BEING ABLE TO STOP OR CONTROL WORRYING: SEVERAL DAYS
3. WORRYING TOO MUCH ABOUT DIFFERENT THINGS: SEVERAL DAYS
GAD7 TOTAL SCORE: 9
6. BECOMING EASILY ANNOYED OR IRRITABLE: MORE THAN HALF THE DAYS
7. FEELING AFRAID AS IF SOMETHING AWFUL MIGHT HAPPEN: NOT AT ALL

## 2024-02-12 ENCOUNTER — MYC MEDICAL ADVICE (OUTPATIENT)
Dept: FAMILY MEDICINE | Facility: CLINIC | Age: 39
End: 2024-02-12

## 2024-02-12 ENCOUNTER — OFFICE VISIT (OUTPATIENT)
Dept: FAMILY MEDICINE | Facility: CLINIC | Age: 39
End: 2024-02-12
Payer: COMMERCIAL

## 2024-02-12 VITALS
HEART RATE: 80 BPM | SYSTOLIC BLOOD PRESSURE: 126 MMHG | OXYGEN SATURATION: 99 % | HEIGHT: 62 IN | DIASTOLIC BLOOD PRESSURE: 80 MMHG | BODY MASS INDEX: 32.2 KG/M2 | RESPIRATION RATE: 16 BRPM | WEIGHT: 175 LBS

## 2024-02-12 DIAGNOSIS — G43.109 MIGRAINE WITH AURA AND WITHOUT STATUS MIGRAINOSUS, NOT INTRACTABLE: ICD-10-CM

## 2024-02-12 DIAGNOSIS — G43.709 CHRONIC MIGRAINE WITHOUT AURA WITHOUT STATUS MIGRAINOSUS, NOT INTRACTABLE: ICD-10-CM

## 2024-02-12 DIAGNOSIS — U09.9 COVID-19 LONG HAULER: Primary | ICD-10-CM

## 2024-02-12 DIAGNOSIS — F33.1 MODERATE EPISODE OF RECURRENT MAJOR DEPRESSIVE DISORDER (H): ICD-10-CM

## 2024-02-12 DIAGNOSIS — R11.0 NAUSEA: ICD-10-CM

## 2024-02-12 DIAGNOSIS — F41.1 GAD (GENERALIZED ANXIETY DISORDER): ICD-10-CM

## 2024-02-12 PROCEDURE — 99214 OFFICE O/P EST MOD 30 MIN: CPT | Performed by: STUDENT IN AN ORGANIZED HEALTH CARE EDUCATION/TRAINING PROGRAM

## 2024-02-12 RX ORDER — RIZATRIPTAN BENZOATE 10 MG/1
10 TABLET ORAL
Qty: 30 TABLET | Refills: 1 | Status: SHIPPED | OUTPATIENT
Start: 2024-02-12 | End: 2024-06-20

## 2024-02-12 RX ORDER — NORTRIPTYLINE HCL 10 MG
20 CAPSULE ORAL AT BEDTIME
Qty: 60 CAPSULE | Refills: 1 | Status: SHIPPED | OUTPATIENT
Start: 2024-02-12 | End: 2024-03-29

## 2024-02-12 RX ORDER — PROPRANOLOL HYDROCHLORIDE 60 MG/1
TABLET ORAL
Qty: 135 TABLET | Refills: 3 | Status: SHIPPED | OUTPATIENT
Start: 2024-02-12 | End: 2024-04-25

## 2024-02-12 RX ORDER — SCOLOPAMINE TRANSDERMAL SYSTEM 1 MG/1
1 PATCH, EXTENDED RELEASE TRANSDERMAL
Qty: 10 PATCH | Refills: 1 | Status: SHIPPED | OUTPATIENT
Start: 2024-02-12 | End: 2024-03-29

## 2024-02-12 ASSESSMENT — PATIENT HEALTH QUESTIONNAIRE - PHQ9
10. IF YOU CHECKED OFF ANY PROBLEMS, HOW DIFFICULT HAVE THESE PROBLEMS MADE IT FOR YOU TO DO YOUR WORK, TAKE CARE OF THINGS AT HOME, OR GET ALONG WITH OTHER PEOPLE: SOMEWHAT DIFFICULT
SUM OF ALL RESPONSES TO PHQ QUESTIONS 1-9: 12
SUM OF ALL RESPONSES TO PHQ QUESTIONS 1-9: 12

## 2024-02-12 ASSESSMENT — PAIN SCALES - GENERAL: PAINLEVEL: MODERATE PAIN (4)

## 2024-02-12 NOTE — PROGRESS NOTES
Assessment & Plan     COVID-19 long hauler  Patient is a very pleasant 38 year old who presents today for follow up on resurgence and worsening of chronic long covid symptoms after recent infection. She was treated at the [facility] with improvement at the time of migraine. Unfortunately, has migraines still almost daily. Did try to go back to work full time last week, which was likely too quickly of a return. We discussed symptomatic treatment as below, in addition to very very slow return to activity post covid. She is given work note for today. Discussed that she should consider an extended leave, drop work to 4 hour days 3 days a week with a day in between. She does get more anxious when she isn't working as often, so we discussed an alternative of 5 hour days, 4 days a week (ideally MT/TF with W off) Should work remotely, allowed to take breaks. For physical activity, increase very slowly by just a few minutes here and there.     In addition to below medication options, consider compounded naltrexone of 1.5-3 mg daily, but unsure if [facility] is compounding this yet.   - PRIMARY CARE FOLLOW-UP SCHEDULING    Nausea  For nausea, including what sounds like the development of motion sickness in the care, will trial scopolamine. This may worsen the dry mouth symptoms, though, so monitor for side effects.   - scopolamine (TRANSDERM) 1 MG/3DAYS 72 hr patch  Dispense: 10 patch; Refill: 1  - PRIMARY CARE FOLLOW-UP SCHEDULING    Moderate episode of recurrent major depressive disorder (H)  JOSÉ MIGUEL (generalized anxiety disorder)  For mental health and migraines, we did opt to try increase nortriptyline dosage. If this causes worsening dry mouth symptoms, then we can drop back to 10 and then try duloxetine instead.   - nortriptyline (PAMELOR) 10 MG capsule  Dispense: 60 capsule; Refill: 1  - PRIMARY CARE FOLLOW-UP SCHEDULING    Migraine with aura and without status migrainosus, not intractable  Chronic migraine without aura without status  "migrainosus, not intractable  For migraines, will increase dose of propranolol (was on higher dose in the past). Monitor heart rate. Can continue to use Maxalt as needed. Ideally will need less of this moving forward. In addition, lifestyle adjustment as above.   - rizatriptan (MAXALT) 10 MG tablet  Dispense: 30 tablet; Refill: 1  - PRIMARY CARE FOLLOW-UP SCHEDULING  - propranolol (INDERAL) 60 MG tablet  Dispense: 135 tablet; Refill: 3  - PRIMARY CARE FOLLOW-UP SCHEDULING    Follow up in a few weeks.     I spent a total of 35 minutes on the day of the visit.   Time spent by me doing chart review, history and exam, documentation and further activities per the note      BMI  Estimated body mass index is 32.01 kg/m  as calculated from the following:    Height as of this encounter: 1.575 m (5' 2\").    Weight as of this encounter: 79.4 kg (175 lb).     Depression Screening Follow Up        2/12/2024     6:52 AM   PHQ   PHQ-9 Total Score 12   Q9: Thoughts of better off dead/self-harm past 2 weeks Not at all     Follow Up Actions Taken  Crisis resource information provided in After Visit Summary       Jayant Elena is a 38 year old, presenting for the following health issues:  Anxiety, Depression, and Headache        2/12/2024     6:57 AM   Additional Questions   Roomed by Mariposa CARY   Accompanied by Self     History of Present Illness       Mental Health Follow-up:  Patient presents to follow-up on Depression & Anxiety.Patient's depression since last visit has been:  Bad  The patient is not having other symptoms associated with depression.  Patient's anxiety since last visit has been:  Bad  The patient is not having other symptoms associated with anxiety.  Any significant life events: No  Patient is feeling anxious or having panic attacks.  Patient has no concerns about alcohol or drug use.    Headaches:   Since the patient's last clinic visit, headaches are: no change  The patient is getting headaches:  Almost daily " "still  She is not able to do normal daily activities when she has a migraine.  The patient is taking the following rescue/relief medications:  Excedrin and Maxalt   Patient states \"The relief is inconsistent\" from the rescue/relief medications.   The patient is taking the following medications to prevent migraines:  Topomax  In the past 4 weeks, the patient has gone to an Urgent Care or Emergency Room 0 times times due to headaches.    She eats 0-1 servings of fruits and vegetables daily.She consumes 1 sweetened beverage(s) daily.She exercises with enough effort to increase her heart rate 10 to 19 minutes per day.  She exercises with enough effort to increase her heart rate 3 or less days per week.   She is taking medications regularly.         12/11/2023     7:38 AM 1/25/2024     1:29 PM 2/12/2024     6:52 AM   PHQ   PHQ-9 Total Score 3 11 12   Q9: Thoughts of better off dead/self-harm past 2 weeks Not at all Not at all Not at all          12/10/2023     2:02 PM 1/25/2024     1:35 PM 2/10/2024     3:39 PM   JOSÉ MIGUEL-7 SCORE   Total Score 3 (minimal anxiety) 10 (moderate anxiety) 9 (mild anxiety)   Total Score 3 10 9        Review of Systems  Constitutional, HEENT, cardiovascular, pulmonary, gi and gu systems are negative, except as otherwise noted.      Objective    /80 (BP Location: Right arm, Patient Position: Sitting, Cuff Size: Adult Regular)   Pulse 80   Resp 16   Ht 1.575 m (5' 2\")   Wt 79.4 kg (175 lb)   LMP 01/10/2024   SpO2 99%   BMI 32.01 kg/m    Body mass index is 32.01 kg/m .  Physical Exam  Constitutional:       Appearance: Normal appearance.   HENT:      Head: Normocephalic.   Eyes:      General: No scleral icterus.     Extraocular Movements: Extraocular movements intact.      Conjunctiva/sclera: Conjunctivae normal.   Cardiovascular:      Rate and Rhythm: Normal rate.   Pulmonary:      Effort: Pulmonary effort is normal.   Neurological:      General: No focal deficit present.      Mental " Status: She is alert and oriented to person, place, and time.         Results from this visitNo results found for any visits on 02/12/24.            Signed Electronically by: Brandy Zimmerman MD

## 2024-02-12 NOTE — LETTER
February 12, 2024      Lady Saavedra  [address] [place] [phone]        To Whom It May Concern:    Lady Saavedra was seen in our clinic. Due to ongoing symptoms of chronic illness, patient should be excused from work starting today through 2/25/24.     Upon return, patient will return for 2 weeks of working 4 hour days Monday, Wednesday, Friday (or 3 days per week with 1 day of rest in between, however that works best).     After that 2 weeks, will increase to 5 hour days 4 days a week (20 hours) and will be reevaluated in clinic for further restrictions moving forward.     Because patient's current illness contributes to daily symptoms made worse with travel, she should be allowed to work from home and avoid travel.       Sincerely,      Brandy Zimmerman MD

## 2024-02-12 NOTE — LETTER
February 12, 2024      Lady Saavedra  [address] [place] [id number]        To Whom It May Concern:    Lady Saavedra  was seen on 02/12/2024.  Please excuse her from work today due to illness. Return date TBD at this time. New letter will be available within the next 24 hours regarding return date and restrictions.         Sincerely,        Brandy Zimmerman MD

## 2024-02-21 ENCOUNTER — MYC MEDICAL ADVICE (OUTPATIENT)
Dept: FAMILY MEDICINE | Facility: CLINIC | Age: 39
End: 2024-02-21
Payer: COMMERCIAL

## 2024-02-25 ASSESSMENT — ANXIETY QUESTIONNAIRES
GAD7 TOTAL SCORE: 7
5. BEING SO RESTLESS THAT IT IS HARD TO SIT STILL: NOT AT ALL
4. TROUBLE RELAXING: MORE THAN HALF THE DAYS
3. WORRYING TOO MUCH ABOUT DIFFERENT THINGS: SEVERAL DAYS
IF YOU CHECKED OFF ANY PROBLEMS ON THIS QUESTIONNAIRE, HOW DIFFICULT HAVE THESE PROBLEMS MADE IT FOR YOU TO DO YOUR WORK, TAKE CARE OF THINGS AT HOME, OR GET ALONG WITH OTHER PEOPLE: SOMEWHAT DIFFICULT
1. FEELING NERVOUS, ANXIOUS, OR ON EDGE: MORE THAN HALF THE DAYS
7. FEELING AFRAID AS IF SOMETHING AWFUL MIGHT HAPPEN: NOT AT ALL
6. BECOMING EASILY ANNOYED OR IRRITABLE: SEVERAL DAYS
2. NOT BEING ABLE TO STOP OR CONTROL WORRYING: SEVERAL DAYS
8. IF YOU CHECKED OFF ANY PROBLEMS, HOW DIFFICULT HAVE THESE MADE IT FOR YOU TO DO YOUR WORK, TAKE CARE OF THINGS AT HOME, OR GET ALONG WITH OTHER PEOPLE?: SOMEWHAT DIFFICULT
GAD7 TOTAL SCORE: 7
GAD7 TOTAL SCORE: 7
7. FEELING AFRAID AS IF SOMETHING AWFUL MIGHT HAPPEN: NOT AT ALL

## 2024-02-25 ASSESSMENT — PATIENT HEALTH QUESTIONNAIRE - PHQ9
SUM OF ALL RESPONSES TO PHQ QUESTIONS 1-9: 10
SUM OF ALL RESPONSES TO PHQ QUESTIONS 1-9: 10
10. IF YOU CHECKED OFF ANY PROBLEMS, HOW DIFFICULT HAVE THESE PROBLEMS MADE IT FOR YOU TO DO YOUR WORK, TAKE CARE OF THINGS AT HOME, OR GET ALONG WITH OTHER PEOPLE: SOMEWHAT DIFFICULT

## 2024-02-26 ENCOUNTER — OFFICE VISIT (OUTPATIENT)
Dept: FAMILY MEDICINE | Facility: CLINIC | Age: 39
End: 2024-02-26
Payer: COMMERCIAL

## 2024-02-26 VITALS
HEIGHT: 62 IN | SYSTOLIC BLOOD PRESSURE: 136 MMHG | DIASTOLIC BLOOD PRESSURE: 84 MMHG | HEART RATE: 80 BPM | TEMPERATURE: 98.5 F | OXYGEN SATURATION: 98 % | WEIGHT: 179 LBS | RESPIRATION RATE: 14 BRPM | BODY MASS INDEX: 32.94 KG/M2

## 2024-02-26 DIAGNOSIS — R73.03 PREDIABETES: ICD-10-CM

## 2024-02-26 DIAGNOSIS — E55.9 VITAMIN D DEFICIENCY: ICD-10-CM

## 2024-02-26 DIAGNOSIS — F33.1 MODERATE EPISODE OF RECURRENT MAJOR DEPRESSIVE DISORDER (H): ICD-10-CM

## 2024-02-26 DIAGNOSIS — R41.89 BRAIN FOG: ICD-10-CM

## 2024-02-26 DIAGNOSIS — K59.00 CONSTIPATION, UNSPECIFIED CONSTIPATION TYPE: ICD-10-CM

## 2024-02-26 DIAGNOSIS — G43.709 CHRONIC MIGRAINE WITHOUT AURA WITHOUT STATUS MIGRAINOSUS, NOT INTRACTABLE: ICD-10-CM

## 2024-02-26 DIAGNOSIS — E03.9 HYPOTHYROIDISM, UNSPECIFIED TYPE: ICD-10-CM

## 2024-02-26 DIAGNOSIS — M79.10 MYALGIA: ICD-10-CM

## 2024-02-26 DIAGNOSIS — R11.0 NAUSEA: ICD-10-CM

## 2024-02-26 DIAGNOSIS — R53.83 FATIGUE, UNSPECIFIED TYPE: ICD-10-CM

## 2024-02-26 DIAGNOSIS — F41.1 GAD (GENERALIZED ANXIETY DISORDER): ICD-10-CM

## 2024-02-26 DIAGNOSIS — R10.9 ABDOMINAL CRAMPING: ICD-10-CM

## 2024-02-26 DIAGNOSIS — U09.9 COVID-19 LONG HAULER: Primary | ICD-10-CM

## 2024-02-26 LAB
CRP SERPL-MCNC: <3 MG/L
ERYTHROCYTE [SEDIMENTATION RATE] IN BLOOD BY WESTERGREN METHOD: 10 MM/HR (ref 0–20)
FERRITIN SERPL-MCNC: 60 NG/ML (ref 6–175)
HBA1C MFR BLD: 5.3 % (ref 0–5.6)
IRON BINDING CAPACITY (ROCHE): 371 UG/DL (ref 240–430)
IRON SATN MFR SERPL: 43 % (ref 15–46)
IRON SERPL-MCNC: 159 UG/DL (ref 37–145)
TSH SERPL DL<=0.005 MIU/L-ACNC: 0.8 UIU/ML (ref 0.3–4.2)

## 2024-02-26 PROCEDURE — 83540 ASSAY OF IRON: CPT | Performed by: STUDENT IN AN ORGANIZED HEALTH CARE EDUCATION/TRAINING PROGRAM

## 2024-02-26 PROCEDURE — 85652 RBC SED RATE AUTOMATED: CPT | Performed by: STUDENT IN AN ORGANIZED HEALTH CARE EDUCATION/TRAINING PROGRAM

## 2024-02-26 PROCEDURE — 82306 VITAMIN D 25 HYDROXY: CPT | Performed by: STUDENT IN AN ORGANIZED HEALTH CARE EDUCATION/TRAINING PROGRAM

## 2024-02-26 PROCEDURE — 84443 ASSAY THYROID STIM HORMONE: CPT | Performed by: STUDENT IN AN ORGANIZED HEALTH CARE EDUCATION/TRAINING PROGRAM

## 2024-02-26 PROCEDURE — 82728 ASSAY OF FERRITIN: CPT | Performed by: STUDENT IN AN ORGANIZED HEALTH CARE EDUCATION/TRAINING PROGRAM

## 2024-02-26 PROCEDURE — 99214 OFFICE O/P EST MOD 30 MIN: CPT | Performed by: STUDENT IN AN ORGANIZED HEALTH CARE EDUCATION/TRAINING PROGRAM

## 2024-02-26 PROCEDURE — 86140 C-REACTIVE PROTEIN: CPT | Performed by: STUDENT IN AN ORGANIZED HEALTH CARE EDUCATION/TRAINING PROGRAM

## 2024-02-26 PROCEDURE — 83550 IRON BINDING TEST: CPT | Performed by: STUDENT IN AN ORGANIZED HEALTH CARE EDUCATION/TRAINING PROGRAM

## 2024-02-26 PROCEDURE — 83036 HEMOGLOBIN GLYCOSYLATED A1C: CPT | Performed by: STUDENT IN AN ORGANIZED HEALTH CARE EDUCATION/TRAINING PROGRAM

## 2024-02-26 PROCEDURE — 36415 COLL VENOUS BLD VENIPUNCTURE: CPT | Performed by: STUDENT IN AN ORGANIZED HEALTH CARE EDUCATION/TRAINING PROGRAM

## 2024-02-26 RX ORDER — METHOCARBAMOL 500 MG/1
500 TABLET, FILM COATED ORAL 4 TIMES DAILY PRN
Qty: 30 TABLET | Refills: 1 | Status: SHIPPED | OUTPATIENT
Start: 2024-02-26 | End: 2024-10-04

## 2024-02-26 RX ORDER — TOPIRAMATE 25 MG/1
TABLET, FILM COATED ORAL
Qty: 177 TABLET | Refills: 0 | Status: SHIPPED | OUTPATIENT
Start: 2024-02-26 | End: 2024-03-29

## 2024-02-26 RX ORDER — BISACODYL 10 MG
10 SUPPOSITORY, RECTAL RECTAL DAILY PRN
Qty: 16 SUPPOSITORY | Refills: 1 | Status: SHIPPED | OUTPATIENT
Start: 2024-02-26 | End: 2024-08-26

## 2024-02-26 ASSESSMENT — PAIN SCALES - GENERAL: PAINLEVEL: MODERATE PAIN (5)

## 2024-02-26 NOTE — PROGRESS NOTES
Assessment & Plan     COVID-19 long hauler  Patient is a very pleasant 38 year old who presents today for follow up regarding post-covid symptoms. See prior notes for more details. In the interim, patient started work today with limitation to 4 hours 3 days a week. She is feeling quite fatigued and starting to develop a headache. Other persistent symptoms include nausea (decreased somewhat with scopolamine), myalgias/arthralgias, brain fog/memory issues (forgets to take afternoon medications, for example), poor mental health. She has also not yet had menses since prior to most recent COVID infection.     Patient will require extended work from home, avoid travel, limit screen time when possible.     For symptomatic treatment, we will adjust topiramate and propranolol dosing. Further testing is completed today as well. See individual problems below for more details. We will also refer back to post-covid group for more assistance with symptom management.   - Adult Post Cov[facility] Clinic  Referral  - PRIMARY CARE FOLLOW-UP SCHEDULING    Fatigue, unspecified type  For ongoing fatigue, likely primarily post-covid related. Does have low vitamin d, see below for more details. We briefly, again, discussed naltrexone for symptom management. Will message patient regarding this and will send RX to compounding pharmacy if patient is interested.   - TSH with free T4 reflex  - Ferritin  - Iron and iron binding capacity  - Vitamin D deficiency screening  - Adult Post Covid Clinic  Referral  - PRIMARY CARE FOLLOW-UP SCHEDULING  - TSH with free T4 reflex  - Ferritin  - Iron and iron binding capacity  - Vitamin D deficiency screening    Brain fog  As above, naltrexone may be helpful for concentration and brain fog. Also consider methylphenidate at some point.   - Ferritin  - Iron and iron binding capacity  - Adult Post Covid Clinic  Referral  - PRIMARY CARE FOLLOW-UP SCHEDULING  - Ferritin  - Iron and iron  binding capacity    Myalgia  Inflammatory markers reassuringly normal. Will trial robaxin for myalgias as well as abdominal cramping.   - Erythrocyte sedimentation rate auto  - CRP inflammation  - methocarbamol (ROBAXIN) 500 MG tablet  Dispense: 30 tablet; Refill: 1  - Adult [facility] [place] Referral  - PRIMARY CARE FOLLOW-UP SCHEDULING  - Erythrocyte sedimentation rate auto  - CRP inflammation    Nausea  For nausea, continue scopolamine. Consider switching to nighttime application vs cutting in 1/2.   - Adult [facility] [place] Referral  - PRIMARY CARE FOLLOW-UP SCHEDULING    Abdominal cramping  Can trial methocarbamol instead of bentyl.   - methocarbamol (ROBAXIN) 500 MG tablet  Dispense: 30 tablet; Refill: 1  - Adult [facility] [place] Referral  - PRIMARY CARE FOLLOW-UP SCHEDULING    Constipation, unspecified constipation type  For constipation, bowel regimen is sent for her.   - psyllium (METAMUCIL/KONSYL) capsule  Dispense: 90 capsule; Refill: 3  - bisacodyl (DULCOLAX) 10 MG suppository  Dispense: 16 suppository; Refill: 1  - PRIMARY CARE FOLLOW-UP SCHEDULING    Chronic migraine without aura without status migrainosus, not intractable  For migraines, will trial increase in topamax to 150 mg. Could increase further to 200 mg if no significant side effects. If not helping enough, we can consider propranolol dosage adjustment. May attempt to obtain XR formulation   - topiramate (TOPAMAX) 25 MG tablet  Dispense: 177 tablet; Refill: 0  - PRIMARY CARE FOLLOW-UP SCHEDULING    Moderate episode of recurrent major depressive disorder (H)  JOSÉ MIGUEL (generalized anxiety disorder)  For mental health, continue to treat other side effects as noted elsewhere. Consider adjustment to nortriptyline dosage.   - Adult [facility] [place] Referral  - PRIMARY CARE FOLLOW-UP SCHEDULING    Hypothyroidism, unspecified type  For thyroid, tsh obtained today and reassuringly well controlled.   - TSH with  "free T4 reflex  - PRIMARY CARE FOLLOW-UP SCHEDULING  - TSH with free T4 reflex    Vitamin D deficiency  Noted to have significant vitamin d deficiency. Will treat with high dose initially, followed by daily dosage   - vitamin D2 (ERGOCALCIFEROL) 58183 units (1250 mcg) capsule  Dispense: 12 capsule; Refill: 0  - vitamin D3 (CHOLECALCIFEROL) 50 mcg (2000 units) tablet  Dispense: 90 tablet; Refill: 3    Prediabetes  Noted on prior labs, recheck today now in normal range.   - Hemoglobin A1c  - PRIMARY CARE FOLLOW-UP SCHEDULING  - Hemoglobin A1c    I spent a total of 38 minutes on the day of the visit.   Time spent by me doing chart review, history and exam, documentation and further activities per the note      BMI  Estimated body mass index is 32.74 kg/m  as calculated from the following:    Height as of this encounter: 1.575 m (5' 2\").    Weight as of this encounter: 81.2 kg (179 lb).     Depression Screening Follow Up        2/25/2024     3:31 PM   PHQ   PHQ-9 Total Score 10   Q9: Thoughts of better off dead/self-harm past 2 weeks Not at all     Follow Up Actions Taken  Crisis resource information provided in After Visit Summary       Jayant Elena is a 38 year old, presenting for the following health issues:  Anxiety, Depression, and Headache        2/26/2024     2:15 PM   Additional Questions   Roomed by Mariposa CARY   Accompanied by Self     History of Present Illness       Mental Health Follow-up:  Patient presents to follow-up on Depression & Anxiety.Patient's depression since last visit has been:  Better  The patient is not having other symptoms associated with depression.  Patient's anxiety since last visit has been:  Medium  The patient is having other symptoms associated with anxiety.  Any significant life events: grief or loss  Patient is feeling anxious or having panic attacks.  Patient has no concerns about alcohol or drug use.    Headaches:   Since the patient's last clinic visit, headaches are: " "improved  The patient is getting headaches:  Depends on what I am doing the severity, but still mostly everyday.  She is not able to do normal daily activities when she has a migraine.  The patient is taking the following rescue/relief medications:  Excedrin and Maxalt   Patient states \"The relief is inconsistent\" from the rescue/relief medications.   The patient is taking the following medications to prevent migraines:  Topomax  In the past 4 weeks, the patient has gone to an Urgent Care or Emergency Room 0 times times due to headaches.    Reason for visit:  Long Covid    She eats 2-3 servings of fruits and vegetables daily.She consumes 1 sweetened beverage(s) daily.She exercises with enough effort to increase her heart rate 9 or less minutes per day.  She exercises with enough effort to increase her heart rate 3 or less days per week. She is missing 2 dose(s) of medications per week.  She is not taking prescribed medications regularly due to remembering to take.         1/25/2024     1:29 PM 2/12/2024     6:52 AM 2/25/2024     3:31 PM   PHQ   PHQ-9 Total Score 11 12 10   Q9: Thoughts of better off dead/self-harm past 2 weeks Not at all Not at all Not at all          1/25/2024     1:35 PM 2/10/2024     3:39 PM 2/25/2024     3:31 PM   JOSÉ MIGUEL-7 SCORE   Total Score 10 (moderate anxiety) 9 (mild anxiety) 7 (mild anxiety)   Total Score 10 9 7            Review of Systems  Constitutional, HEENT, cardiovascular, pulmonary, gi and gu systems are negative, except as otherwise noted.      Objective    /84 (BP Location: Right arm, Patient Position: Sitting, Cuff Size: Adult Regular)   Pulse 80   Temp 98.5  F (36.9  C) (Tympanic)   Resp 14   Ht 1.575 m (5' 2\")   Wt 81.2 kg (179 lb)   LMP 01/10/2024   SpO2 98%   BMI 32.74 kg/m    Body mass index is 32.74 kg/m .  Physical Exam  Constitutional:       Appearance: Normal appearance.   HENT:      Head: Normocephalic.   Eyes:      General: No scleral icterus.     " Extraocular Movements: Extraocular movements intact.      Conjunctiva/sclera: Conjunctivae normal.   Cardiovascular:      Rate and Rhythm: Normal rate.   Pulmonary:      Effort: Pulmonary effort is normal.   Neurological:      General: No focal deficit present.      Mental Status: She is alert and oriented to person, place, and time.             Results from this visit  Results for orders placed or performed in visit on 02/26/24   TSH with free T4 reflex     Status: Normal   Result Value Ref Range    TSH 0.80 0.30 - 4.20 uIU/mL   Ferritin     Status: Normal   Result Value Ref Range    Ferritin 60 6 - 175 ng/mL   Iron and iron binding capacity     Status: Abnormal   Result Value Ref Range    Iron 159 (H) 37 - 145 ug/dL    Iron Binding Capacity 371 240 - 430 ug/dL    Iron Sat Index 43 15 - 46 %   Hemoglobin A1c     Status: Normal   Result Value Ref Range    Hemoglobin A1C 5.3 0.0 - 5.6 %   Vitamin D deficiency screening     Status: Abnormal   Result Value Ref Range    Vitamin D, Total (25-Hydroxy) 8 (L) 20 - 50 ng/mL    Narrative    Season, race, dietary intake, and treatment affect the concentration of 25-hydroxy-Vitamin D. Values may decrease during winter months and increase during summer months.    Vitamin D determination is routinely performed by an immunoassay specific for 25 hydroxyvitamin D3.  If an individual is on vitamin D2(ergocalciferol) supplementation, please specify 25 OH vitamin D2 and D3 level determination by LCMSMS test VITD23.     Erythrocyte sedimentation rate auto     Status: Normal   Result Value Ref Range    Erythrocyte Sedimentation Rate 10 0 - 20 mm/hr   CRP inflammation     Status: Normal   Result Value Ref Range    CRP Inflammation <3.00 <5.00 mg/L             Signed Electronically by: Brandy Zimmerman MD

## 2024-02-26 NOTE — PATIENT INSTRUCTIONS
"Scopolamine - consider changing right before bed to avoid the side effects, or try cutting in 1/2 and doing that to see if it helps with nausea, but decreases the negative side effects of it.   Instead of bentyl (dicyclomine) you can try Robaxin (methocarbamol) and see if that works better for abdominal cramping and muscle aches with fewer side effects. For Robaxin dosing, you can go up to 1000 mg (2 tablets) at a time if need be, but that'll increase the \"loopiness\"  For migraines:  Increase topamax to 125 mg daily for 3 days then up to 150 mg daily for another 3 days. If at that point, migraines aren't significantly improved, then let me know and we can adjust propranolol next.   For now, continue either just 60 mg propranolol in the morning (since you often forget the second anyway), or keep trying to 1/2 tablet later in the day - you decide.  For now, let's keep on the nortriptyline for mental health since it helped before, but we may want to consider switching to something else in the future, such as gabapentin and/or duloxetine (Cymbalta).   Metamucil Capsules instead of powder for easiness in adding to your regimen. Use suppository of no substantial bowel movement in 2 days.   "

## 2024-02-26 NOTE — LETTER
February 26, 2024      Lady Saavedra  [id number] [address] [place] [id number]        To Whom It May Concern:    Lady Saavedra was seen in our clinic. She may return to work with the following restrictions:    Starting today and through 3/10/24, patient should be working 4 hour days Monday, Wednesday, Friday (or 3 days per week with 1 day of rest in between, however that works best).      Starting 3/11/24 and going until 6/1/2024, patient can work up to 5 hour days, 4 days a week (maximum 20 hour work weeks). Because patient's current illness contributes to daily symptoms made worse with travel, she should be allowed to work from home and avoid travel during this time as well.  Further restrictions will be noted prior to end date of current restrictions.       Sincerely,      Brandy Zimmerman

## 2024-02-27 LAB — VIT D+METAB SERPL-MCNC: 8 NG/ML (ref 20–50)

## 2024-02-27 RX ORDER — CHOLECALCIFEROL (VITAMIN D3) 50 MCG
1 TABLET ORAL DAILY
Qty: 90 TABLET | Refills: 3 | Status: SHIPPED | OUTPATIENT
Start: 2024-05-15

## 2024-02-27 RX ORDER — ERGOCALCIFEROL 1.25 MG/1
50000 CAPSULE, LIQUID FILLED ORAL WEEKLY
Qty: 12 CAPSULE | Refills: 0 | Status: SHIPPED | OUTPATIENT
Start: 2024-02-27 | End: 2024-05-15

## 2024-03-17 SDOH — SOCIAL STABILITY: SOCIAL NETWORK: I HAVE TROUBLE DOING ALL OF MY USUAL WORK (INCLUDE WORK AT HOME): SOMETIMES

## 2024-03-17 SDOH — SOCIAL STABILITY: SOCIAL NETWORK: I HAVE TROUBLE DOING ALL OF MY REGULAR LEISURE ACTIVITIES WITH OTHERS: USUALLY

## 2024-03-17 SDOH — SOCIAL STABILITY: SOCIAL NETWORK: I HAVE TROUBLE DOING ALL OF THE FAMILY ACTIVITIES THAT I WANT TO DO: USUALLY

## 2024-03-17 SDOH — SOCIAL STABILITY: SOCIAL NETWORK: I HAVE TROUBLE DOING ALL OF THE ACTIVITIES WITH FRIENDS THAT I WANT TO DO: USUALLY

## 2024-03-17 SDOH — SOCIAL STABILITY: SOCIAL NETWORK

## 2024-03-17 SDOH — SOCIAL STABILITY: SOCIAL NETWORK: PROMIS ABILITY TO PARTICIPATE IN SOCIAL ROLES & ACTIVITIES T-SCORE: 40

## 2024-03-17 ASSESSMENT — PATIENT HEALTH QUESTIONNAIRE - PHQ9
SUM OF ALL RESPONSES TO PHQ QUESTIONS 1-9: 8
SUM OF ALL RESPONSES TO PHQ QUESTIONS 1-9: 8
10. IF YOU CHECKED OFF ANY PROBLEMS, HOW DIFFICULT HAVE THESE PROBLEMS MADE IT FOR YOU TO DO YOUR WORK, TAKE CARE OF THINGS AT HOME, OR GET ALONG WITH OTHER PEOPLE: SOMEWHAT DIFFICULT
10. IF YOU CHECKED OFF ANY PROBLEMS, HOW DIFFICULT HAVE THESE PROBLEMS MADE IT FOR YOU TO DO YOUR WORK, TAKE CARE OF THINGS AT HOME, OR GET ALONG WITH OTHER PEOPLE: SOMEWHAT DIFFICULT
SUM OF ALL RESPONSES TO PHQ QUESTIONS 1-9: 8
SUM OF ALL RESPONSES TO PHQ QUESTIONS 1-9: 8

## 2024-03-17 ASSESSMENT — ANXIETY QUESTIONNAIRES
GAD7 TOTAL SCORE: 9
4. TROUBLE RELAXING: MORE THAN HALF THE DAYS
4. TROUBLE RELAXING: MORE THAN HALF THE DAYS
IF YOU CHECKED OFF ANY PROBLEMS ON THIS QUESTIONNAIRE, HOW DIFFICULT HAVE THESE PROBLEMS MADE IT FOR YOU TO DO YOUR WORK, TAKE CARE OF THINGS AT HOME, OR GET ALONG WITH OTHER PEOPLE: SOMEWHAT DIFFICULT
GAD7 TOTAL SCORE: 9
3. WORRYING TOO MUCH ABOUT DIFFERENT THINGS: MORE THAN HALF THE DAYS
1. FEELING NERVOUS, ANXIOUS, OR ON EDGE: MORE THAN HALF THE DAYS
6. BECOMING EASILY ANNOYED OR IRRITABLE: SEVERAL DAYS
GAD7 TOTAL SCORE: 9
3. WORRYING TOO MUCH ABOUT DIFFERENT THINGS: MORE THAN HALF THE DAYS
7. FEELING AFRAID AS IF SOMETHING AWFUL MIGHT HAPPEN: NOT AT ALL
2. NOT BEING ABLE TO STOP OR CONTROL WORRYING: SEVERAL DAYS
7. FEELING AFRAID AS IF SOMETHING AWFUL MIGHT HAPPEN: NOT AT ALL
IF YOU CHECKED OFF ANY PROBLEMS ON THIS QUESTIONNAIRE, HOW DIFFICULT HAVE THESE PROBLEMS MADE IT FOR YOU TO DO YOUR WORK, TAKE CARE OF THINGS AT HOME, OR GET ALONG WITH OTHER PEOPLE: SOMEWHAT DIFFICULT
GAD7 TOTAL SCORE: 9
7. FEELING AFRAID AS IF SOMETHING AWFUL MIGHT HAPPEN: NOT AT ALL
GAD7 TOTAL SCORE: 9
GAD7 TOTAL SCORE: 9
2. NOT BEING ABLE TO STOP OR CONTROL WORRYING: SEVERAL DAYS
7. FEELING AFRAID AS IF SOMETHING AWFUL MIGHT HAPPEN: NOT AT ALL
6. BECOMING EASILY ANNOYED OR IRRITABLE: SEVERAL DAYS
1. FEELING NERVOUS, ANXIOUS, OR ON EDGE: MORE THAN HALF THE DAYS
8. IF YOU CHECKED OFF ANY PROBLEMS, HOW DIFFICULT HAVE THESE MADE IT FOR YOU TO DO YOUR WORK, TAKE CARE OF THINGS AT HOME, OR GET ALONG WITH OTHER PEOPLE?: SOMEWHAT DIFFICULT
5. BEING SO RESTLESS THAT IT IS HARD TO SIT STILL: SEVERAL DAYS
5. BEING SO RESTLESS THAT IT IS HARD TO SIT STILL: SEVERAL DAYS
8. IF YOU CHECKED OFF ANY PROBLEMS, HOW DIFFICULT HAVE THESE MADE IT FOR YOU TO DO YOUR WORK, TAKE CARE OF THINGS AT HOME, OR GET ALONG WITH OTHER PEOPLE?: SOMEWHAT DIFFICULT

## 2024-03-18 ENCOUNTER — VIRTUAL VISIT (OUTPATIENT)
Dept: FAMILY MEDICINE | Facility: CLINIC | Age: 39
End: 2024-03-18
Payer: COMMERCIAL

## 2024-03-18 ENCOUNTER — VIRTUAL VISIT (OUTPATIENT)
Dept: PHYSICAL MEDICINE AND REHAB | Facility: CLINIC | Age: 39
End: 2024-03-18
Attending: STUDENT IN AN ORGANIZED HEALTH CARE EDUCATION/TRAINING PROGRAM
Payer: COMMERCIAL

## 2024-03-18 VITALS — HEIGHT: 62 IN | BODY MASS INDEX: 32.94 KG/M2 | WEIGHT: 179 LBS

## 2024-03-18 DIAGNOSIS — R53.83 FATIGUE, UNSPECIFIED TYPE: ICD-10-CM

## 2024-03-18 DIAGNOSIS — R41.841 COGNITIVE COMMUNICATION DEFICIT: Primary | ICD-10-CM

## 2024-03-18 DIAGNOSIS — H91.93 DECREASED HEARING OF BOTH EARS: ICD-10-CM

## 2024-03-18 DIAGNOSIS — R10.9 ABDOMINAL CRAMPING: ICD-10-CM

## 2024-03-18 DIAGNOSIS — G43.709 CHRONIC MIGRAINE WITHOUT AURA WITHOUT STATUS MIGRAINOSUS, NOT INTRACTABLE: ICD-10-CM

## 2024-03-18 DIAGNOSIS — R11.0 NAUSEA: ICD-10-CM

## 2024-03-18 DIAGNOSIS — U09.9 COVID-19 LONG HAULER: Primary | ICD-10-CM

## 2024-03-18 DIAGNOSIS — G43.109 MIGRAINE WITH AURA AND WITHOUT STATUS MIGRAINOSUS, NOT INTRACTABLE: ICD-10-CM

## 2024-03-18 DIAGNOSIS — U09.9 COVID-19 LONG HAULER: ICD-10-CM

## 2024-03-18 DIAGNOSIS — F33.1 MODERATE EPISODE OF RECURRENT MAJOR DEPRESSIVE DISORDER (H): ICD-10-CM

## 2024-03-18 DIAGNOSIS — F41.1 GAD (GENERALIZED ANXIETY DISORDER): ICD-10-CM

## 2024-03-18 DIAGNOSIS — R41.89 BRAIN FOG: ICD-10-CM

## 2024-03-18 PROCEDURE — 99214 OFFICE O/P EST MOD 30 MIN: CPT | Mod: 95 | Performed by: STUDENT IN AN ORGANIZED HEALTH CARE EDUCATION/TRAINING PROGRAM

## 2024-03-18 PROCEDURE — 99204 OFFICE O/P NEW MOD 45 MIN: CPT | Mod: 95 | Performed by: PHYSICIAN ASSISTANT

## 2024-03-18 RX ORDER — FLUTICASONE PROPIONATE 50 MCG
1 SPRAY, SUSPENSION (ML) NASAL DAILY
Qty: 15.8 ML | Refills: 1 | Status: SHIPPED | OUTPATIENT
Start: 2024-03-18 | End: 2024-10-04

## 2024-03-18 ASSESSMENT — PAIN SCALES - GENERAL: PAINLEVEL: NO PAIN (0)

## 2024-03-18 NOTE — PROGRESS NOTES
"Kassy is a 39 year old who is being evaluated via a billable video visit.    How would you like to obtain your AVS? Negrito  If the video visit is dropped, the invitation should be resent by: Negrito  Will anyone else be joining your video visit? No      Assessment & Plan     COVID-19 long hauler  Patient is a very pleasant 39-year-old female presents today for follow-up regarding long COVID symptoms.  Since her last visit, patient continues to have very severe brain fog, fatigue.  Abdominal cramping has improved.  Nausea seems well-controlled on scopolamine.  She has appointment with the post-COVID group later today.    Patient will update me in 2 weeks on how naltrexone is going for her. Follow up in 1 month, or TBD pending post-covid group recommendations.     For now, continue at 4 day/week, 5 hr days at work.   - PRIMARY CARE FOLLOW-UP SCHEDULING    Brain fog  Fatigue, unspecified type  For brain fog and fatigue, patient was only just able to start the 1.5 mg of naltrexone just a few days ago.  For now, will continue on this, consider increasing to 3 mg if tolerated and helping.  - PRIMARY CARE FOLLOW-UP SCHEDULING    Nausea  For nausea, patient is using scopolamine patch, which is helping significantly.  Continue that at this time.  - PRIMARY CARE FOLLOW-UP SCHEDULING    Abdominal cramping  Improved, using Bentyl less frequently.  - PRIMARY CARE FOLLOW-UP SCHEDULING    Chronic migraine without aura without status migrainosus, not intractable  Migraines remain, though seem to be somewhat improved.  For migraines and brain fog/fatigue, consider referral to vision therapy.  - PRIMARY CARE FOLLOW-UP SCHEDULING    JOSÉ MIGUEL (generalized anxiety disorder)  Moderate episode of recurrent major depressive disorder (H)  Mental health is a little bit better.  Has not yet had a significant amount of time on the naltrexone to see if this might help with her symptoms as well.  She does feel that she is \"contributing more\" at work, " "which is also helping her mental health.  - PRIMARY CARE FOLLOW-UP SCHEDULING    Decreased hearing of both ears  Patient is noting decreased hearing in both ears, I suspect this is primarily eustachian tube dysfunction and middle ear effusion.  We will trial Flonase nasal spray.  Could also consider external ear flush at home.  - fluticasone (FLONASE) 50 MCG/ACT nasal spray  Dispense: 15.8 mL; Refill: 1  - PRIMARY CARE FOLLOW-UP SCHEDULING    I spent a total of 32 minutes on the day of the visit.   Time spent by me doing chart review, history and exam, documentation and further activities per the note      BMI  Estimated body mass index is 32.73 kg/m  as calculated from the following:    Height as of an earlier encounter on 3/18/24: 1.575 m (5' 2.01\").    Weight as of an earlier encounter on 3/18/24: 81.2 kg (179 lb).       Jayant Elena is a 39 year old, presenting for the following health issues:  Depression, Anxiety, and Headache        3/18/2024     7:48 AM   Additional Questions   Roomed by Mariposa CARY   Accompanied by Self     History of Present Illness       Mental Health Follow-up:  Patient presents to follow-up on Depression & Anxiety.Patient's depression since last visit has been:  Better  The patient is not having other symptoms associated with depression.  Patient's anxiety since last visit has been:  Better  The patient is not having other symptoms associated with anxiety.  Any significant life events: grief or loss  Patient is feeling anxious or having panic attacks.  Patient has no concerns about alcohol or drug use.    Headaches:   Since the patient's last clinic visit, headaches are: improved  The patient is getting headaches:  A few times a week  She is not able to do normal daily activities when she has a migraine.  The patient is taking the following rescue/relief medications:  Excedrin and Maxalt   Patient states \"The relief is inconsistent\" from the rescue/relief medications.   The patient " is taking the following medications to prevent migraines:  Topomax  In the past 4 weeks, the patient has gone to an Urgent Care or Emergency Room 0 times times due to headaches.    Reason for visit:  Post-Covid    She eats 2-3 servings of fruits and vegetables daily.She consumes 1 sweetened beverage(s) daily.She exercises with enough effort to increase her heart rate 9 or less minutes per day.  She exercises with enough effort to increase her heart rate 3 or less days per week.   She is taking medications regularly.         2/12/2024     6:52 AM 2/25/2024     3:31 PM 3/17/2024    11:15 AM   PHQ   PHQ-9 Total Score 12 10 8    8   Q9: Thoughts of better off dead/self-harm past 2 weeks Not at all Not at all Not at all          2/10/2024     3:39 PM 2/25/2024     3:31 PM 3/17/2024    11:17 AM   JOSÉ MIGUEL-7 SCORE   Total Score 9 (mild anxiety) 7 (mild anxiety) 9 (mild anxiety)   Total Score 9 7 9    9        Review of Systems  Constitutional, HEENT, cardiovascular, pulmonary, gi and gu systems are negative, except as otherwise noted.      Objective           Vitals:  No vitals were obtained today due to virtual visit.    Physical Exam   GENERAL: alert and no distress  EYES: Eyes grossly normal to inspection.  No discharge or erythema, or obvious scleral/conjunctival abnormalities.  RESP: No audible wheeze, cough, or visible cyanosis.    SKIN: Visible skin clear. No significant rash, abnormal pigmentation or lesions.  NEURO: Cranial nerves grossly intact.  Mentation and speech appropriate for age.  PSYCH: Appropriate affect, tone, and pace of words        Video-Visit Details    Type of service:  Video Visit   Originating Location (pt. Location): Home    Distant Location (provider location):  On-site  Platform used for Video Visit: Fuad  Signed Electronically by: Brandy Zimmerman MD

## 2024-03-18 NOTE — PATIENT INSTRUCTIONS
TO Do:    Call neurology for follow up - Ask for appointment with Louann Fuentes NP or Dr. Paredes  Schedule occupational therapy  Schedule speech therapy  Check B12  Follow-up in 6 weeks    Post COVID Self Care Suggestions:     Fatigue Management:       https://www.archives-pmr.org/action/showPdf?ica=-7549%2819%7335624-0       Self Care:      [email or web address]  Recovery World Health Organization:    https://apps.who.int/iris/C[employer]/handle/48346/769649/ICL-ZHUU-2025-318-89351-97437-eng.pdf  Breathing exercises:    [email or web address]/conditions-[email or web address]-[email or web address]/[email or web address]/[email or web address]-recovery-breathing-exercises      Assessment of sense of smell and taste    Smell training:  Flowery - Charity  Fruity - Lemon  Spicy - Cloves  Resinous - Eucalyptus      1 - Pour a few droplets of one of the oils on to a cotton pad or ball  2 - Do not try to sniff the pad immediately; leave it for a few minutes for the fragrance to develop  3 - Hold the first stick/pad up to your nose, about an inch away.  The order in which you test the oils does not matter  4 - Relax and try to inhale naturally through the nose - sniffing too quickly and deeply is likely to result in you not being able to detect anything  5 - Try this a couple more times, then rest for five minutes  6 - Move on to the next oil and repeat as above.    After three months switch to a new set of odors: menthol, thyme, tangerine, and tyson, training with them twice daily.    After another three months, switch to a third new set of odors: green tea, bergamot, rosemary, and otto, again training with them twice daily.    Studies:   Paz Paxlovid [place]  [email or web address]/paxlc-study/?[email or web address]    Cognitive Post-COVID study  z.[facility]/covid-ema    Supplements:  Fatigue- COQ10 100mg 3x day  ( side effects GI)  Brain fog-N-acetylcysteine 600 mg daily (side effects GI)

## 2024-03-18 NOTE — LETTER
3/18/2024       RE: Lady Saavedra  [id number] [address] [place] [id number]       Dear Colleague,    Thank you for referring your patient, Lady Saavedra, to the [facility] PHYSICAL MEDICINE AND REHABILITATION CLINIC [place] at [facility]. Please see a copy of my visit note below.    Lady Saavedra is a 39 year old female who presents to be evaluated for a billable video visit.      Assessment/ Impression:   1. Cognitive communication deficit  Patient with few short-term memory as well as word recall difficulties since most recent COVID infection in January 2024.  Of note patient was recently found to have significant low vitamin D as well as had increase in Topamax for her migraines.  Discussed with patient that low vitamin D can contribute to fatigue as well as concentration and that doses of Topamax above 100 mg twice daily typically can contribute to concentration difficulties.  Due to patient's concentration difficulties discussed working with speech therapy as well as checking B12.  - Adult Post Covid Clinic  Referral  - Vitamin B12; Future  - Speech Therapy  Referral; Future    2. Fatigue, unspecified type  Patient with ongoing significant malaise.  Discussed energy conservation and provided information on fatigue management.  Also discussed referral to Occupational therapy for the COVID-19 program.  Encourage patient to start pacing and prioritizing tasks to decrease disruption of daily activities due to her fatigue.  - Adult Post Covid Clinic  Referral  - Occupational Therapy  Referral; Future    3. Migraine with aura and without status migrainosus, not intractable  Patient with worsening migraines after recent COVID infection in January 2024.  Patient did have increase in Topamax and discussed as above that this may be contributing to her cognitive communication deficit.  Encouraged patient to reestablish  with neurology and discuss alternative medications as Topamax seems to be less effective now that she may need to lower dose due to her concentration difficulties.  Of note patient wishes to switch provider and several provider names given in the [place] clinic who might be alternatives.  - Adult Neurology  Referral; Future    4. COVID-19 long hauler  Discussed COVID and Post COVID with patient.  Educational materials provided and all questions answered.    - Adult Post Covid Clinic  Referral        Plan:  I reviewed present knowledge on long-Covid.  Education was provided and question were answered.  Orders/Referrals as above  Will advised patient on test results  I will follow up with Lady Saavedra in 6 weeks. I will review progress and consider need for any other therapeutic interventions. If there are any questions and/or concerns she will call the clinic.      On day of encounter time spent in chart review and with patient in consultation, exam, education, coordination of care, review of outside charts/data and documentation:  46 minutes     I have attempted to proof read for major spelling errors and apologize for any minor errors I may have missed.      This note was dictated using voice recognition software. Any grammatical or context distortions are unintentional and inherent to the software.  _____________    Subjective   This 39 year old female presents to the [facility] Rehabilitation Medicine Post-COVID clinic as a new consult to evaluate continuing symptoms after COVID infection initially diagnosed 3/31/21.  Lady Saavedra presented to ED on 4/5/21 complaining of shortness of breath, cough, headache, generalized aches and pains, diarrhea, fatigue, and weakness. Treatment was Oxygen, Decadron, Remdesivir.  Lady Saavedra experienced complications of lingering Dyspnea.  Patient did see Dr. Yuan in December 2021 and does report that she follow through with all  suggestions recommended by Dr. Yuan's ( Chest Xray, Mental health referral, Neurology and Gastro referral) and has since resolved from the symptoms.  She contracted COVID again January 10th 2024 and had symptoms of headaches, body aches, brain fog, diarrhea, nausea, sore throat, cough, fatigue.  Continuing symptoms include fatigue, headache, brain fog, distractibility, diarrhea, and nausea.   Patient was having diarrhea and now this has improved to alternating diarrhea/constipation.  She is now taking stool softeners.  Patient is having trouble with short term memory. She will forget how to spell words and will have recall issues.  She will have trouble with verbal and auditory recall. She has a lot of word finding difficulty as well.  She did just have Topamax increased on the 26th and noticed worsening symptoms in the past few week. Patient has been having worsening fatigue and this is new.  She has to take more frequent breaks and rest. She will need to take breaks after most exertion. She has no issues with sleep. Patient has improvement of nausea with a scopalamine patch.  She is a little more sensitive to light and sound.   ast medical history is significant for  Pre diabetes, Migraines, depression, and anxiety. The patient was vaccinated against COVID x5, last vaccine 10/6/23 .  Previous activity was full time work, [employer]      History of COVID-19 infection: 3/31/21, 1/10/24  Date of first symptoms: 3/22/21, 1/9/24  Diagnosis:  Tomásia PCR  Hospitalization: [facility] from 4/5-4/9/2021  Treatment: Hospitalization:  oxygen, steroids, and remdesivir  Current Symptoms: See subjective  Goals of Care: increase energy, decrease anxiety, decrease depression, improve thinking, improve quality of life, and return to work          3/17/2024    11:15 AM   PHQ Assesment Total Score(s)   PHQ-9 Score 8    8           3/17/2024    11:17 AM   JOSÉ MIGUEL-7 Results   JOSÉ MIGUEL 7 TOTAL SCORE  9 (mild anxiety)   JOSÉ MIGUEL-7 Total Score 9    9         3/17/2024    11:27 AM   PTSD Screen Score   Have you ever experienced this kind of event? No         3/17/2024    12:23 PM   PROMIS-29   PROMIS Physical Function T-Score 40 (mild dysfunction)   PROMIS Anxiety T-Score 58 (mild)   PROMIS Depression T-Score 52 (within normal limits)   PROMIS Fatigue T-Score 67 (moderate)   PROMIS Sleep Disturbance T-Score 54 (within normal limits)   PROMIS Ability to Participate in Social Roles & Activities T-Score 40 (mild dysfunction)   PROMIS Pain Interference T-Score 64 (moderate)   PROMIS Pain Intensity 6       Past Medical History:   Diagnosis Date    Abnormal Pap smear of cervix 2009, 2012, 2014    see problem list    Cervical high risk HPV (human papillomavirus) test positive 2009, 2012    see problem list    Chickenpox     Depressive disorder     Gestational HTN 01/01/2015    Hypothyroidism     Migraine with aura and without status migrainosus, not intractable     Seasonal allergies     Was on clariten uses occasional sudafed       Past Surgical History:   Procedure Laterality Date    BIOPSY      COSMETIC SURGERY      HC ENLARGE BREAST WITH IMPLANT      MOUTH SURGERY  age 18    wisdom teeth       Family History   Adopted: Yes   Problem Relation Age of Onset    Unknown/Adopted Mother     Unknown/Adopted Father        Social History     Tobacco Use    Smoking status: Never    Smokeless tobacco: Never   Vaping Use    Vaping Use: Never used   Substance Use Topics    Alcohol use: No    Drug use: No         Current Outpatient Medications:     aspirin-acetaminophen-caffeine (EXCEDRIN MIGRAINE) 250-250-65 MG tablet, Take 1 tablet by mouth daily as needed for headaches, Disp: , Rfl:     bisacodyl (DULCOLAX) 10 MG suppository, Place 1 suppository (10 mg) rectally daily as needed for constipation, Disp: 16 suppository, Rfl: 1    buPROPion (WELLBUTRIN XL) 300 MG 24 hr tablet, Take 1 tablet (300 mg) by mouth every morning, Disp: 90  tablet, Rfl: 3    dicyclomine (BENTYL) 10 MG capsule, Take 1 capsule (10 mg) by mouth 4 times daily as needed (abdominal cramping), Disp: 90 capsule, Rfl: 11    diphenhydrAMINE (BENADRYL) 25 MG capsule, Take 2 capsules (50 mg) by mouth every 6 hours as needed for other (migraine), Disp: 60 capsule, Rfl: 0    erythromycin with ethanol (ERYGEL) 2 % gel, Apply topically 2 times daily, Disp: 30 g, Rfl: 3    fluticasone (FLONASE) 50 MCG/ACT nasal spray, Spray 1 spray into both nostrils daily, Disp: 15.8 mL, Rfl: 1    hydrOXYzine (VISTARIL) 25 MG capsule, Take 1-2 capsules (25-50 mg) by mouth 3 times daily as needed for anxiety, Disp: 30 capsule, Rfl: 0    levothyroxine (SYNTHROID) 88 MCG tablet, Take 1 tablet (88 mcg) by mouth daily, Disp: 90 tablet, Rfl: 3    methocarbamol (ROBAXIN) 500 MG tablet, Take 1 tablet (500 mg) by mouth 4 times daily as needed for muscle spasms, Disp: 30 tablet, Rfl: 1    Naltrexone HCl POWD, , Disp: , Rfl:     Naltrexone HCl, Pain, 1.5 MG CAPS, Take 1 capsule by mouth daily, Disp: 90 capsule, Rfl: 3    nortriptyline (PAMELOR) 10 MG capsule, Take 2 capsules (20 mg) by mouth at bedtime, Disp: 60 capsule, Rfl: 1    ondansetron (ZOFRAN ODT) 4 MG ODT tab, Take 1 tablet (4 mg) by mouth every 8 hours as needed for nausea, Disp: 30 tablet, Rfl: 1    propranolol (INDERAL) 60 MG tablet, Take 1 tablet (60 mg) by mouth every morning AND 0.5 tablets (30 mg) every evening. (Patient taking differently: Take 1 tablet (60 mg) by mouth every morning  ), Disp: 135 tablet, Rfl: 3    psyllium (METAMUCIL/KONSYL) capsule, Take 1 capsule by mouth daily, Disp: 90 capsule, Rfl: 3    rizatriptan (MAXALT) 10 MG tablet, Take 1 tablet (10 mg) by mouth at onset of headache for migraine May repeat in 2 hours., Disp: 30 tablet, Rfl: 1    scopolamine (TRANSDERM) 1 MG/3DAYS 72 hr patch, Place 1 patch onto the skin every 72 hours, Disp: 10 patch, Rfl: 1    topiramate (TOPAMAX) 25 MG tablet, Take 5 tablets (125 mg) by mouth daily  for 3 days, THEN 6 tablets (150 mg) daily for 27 days., Disp: 177 tablet, Rfl: 0    vitamin D2 (ERGOCALCIFEROL) 68691 units (1250 mcg) capsule, Take 1 capsule (50,000 Units) by mouth once a week for 12 doses, Disp: 12 capsule, Rfl: 0    [START ON 5/15/2024] vitamin D3 (CHOLECALCIFEROL) 50 mcg (2000 units) tablet, Take 1 tablet (50 mcg) by mouth daily (Patient not taking: Reported on 3/18/2024), Disp: 90 tablet, Rfl: 3    Review of Systems   Constitutional, HEENT, cardiovascular, pulmonary, GI, , musculoskeletal, neuro, skin, endocrine and psych systems are negative, except as otherwise noted.      Objective    Vitals:  No vitals were obtained today due to virtual visit.    Physical Exam   EYES: Eyes grossly normal to inspection.  No discharge or erythema, or obvious scleral/conjunctival abnormalities.  SKIN: Visible skin clear. No significant rash, abnormal pigmentation or lesions.  NEURO: Cranial nerves grossly intact.  Mentation and speech appropriate for age.  GENERAL: Healthy, alert and no distress  RESP: No audible wheeze, cough, or visible cyanosis.  No visible retractions or increased work of breathing.    PSYCH: Mentation appears normal, affect normal/bright, judgement and insight intact, normal speech and appearance well-groomed.            CRP Inflammation   Date Value Ref Range Status   08/12/2021 <2.9 0.0 - 8.0 mg/L Final   06/14/2021 <2.9 0.0 - 8.0 mg/L Final      Erythrocyte Sedimentation Rate   Date Value Ref Range Status   02/26/2024 10 0 - 20 mm/hr Final      Last Renal Panel:  Sodium   Date Value Ref Range Status   01/26/2024 136 135 - 145 mmol/L Final     Comment:     Reference intervals for this test were updated on 09/26/2023 to more accurately reflect our healthy population. There may be differences in the flagging of prior results with similar values performed with this method. Interpretation of those prior results can be made in the context of the updated reference intervals.    06/14/2021  140 133 - 144 mmol/L Final     Potassium   Date Value Ref Range Status   01/26/2024 5.1 3.4 - 5.3 mmol/L Final     Comment:     Specimen moderately hemolyzed. The reported potassium value *IS LIKELY TO BE FALSELY ELEVATED* and should be interpreted with caution for clinical decision making. Analysis of a non-hemolyzed specimen (i.e. re-draw) may result in a lower potassium value.   06/14/2021 3.8 3.4 - 5.3 mmol/L Final     Chloride   Date Value Ref Range Status   01/26/2024 107 98 - 107 mmol/L Final   06/14/2021 108 94 - 109 mmol/L Final     Carbon Dioxide   Date Value Ref Range Status   06/14/2021 24 20 - 32 mmol/L Final     Carbon Dioxide (CO2)   Date Value Ref Range Status   01/26/2024 15 (L) 22 - 29 mmol/L Final     Anion Gap   Date Value Ref Range Status   01/26/2024 14 7 - 15 mmol/L Final   06/14/2021 8 3 - 14 mmol/L Final     Glucose   Date Value Ref Range Status   01/26/2024 92 70 - 99 mg/dL Final   06/14/2021 92 70 - 99 mg/dL Final     Urea Nitrogen   Date Value Ref Range Status   01/26/2024 7.1 6.0 - 20.0 mg/dL Final   06/14/2021 10 7 - 30 mg/dL Final     Creatinine   Date Value Ref Range Status   01/26/2024 0.69 0.51 - 0.95 mg/dL Final   06/14/2021 0.52 0.52 - 1.04 mg/dL Final     GFR Estimate   Date Value Ref Range Status   01/26/2024 >90 >60 mL/min/1.73m2 Final   06/14/2021 >90 >60 mL/min/[1.73_m2] Final     Comment:     Non  GFR Calc  Starting 12/18/2018, serum creatinine based estimated GFR (eGFR) will be   calculated using the Chronic Kidney Disease Epidemiology Collaboration   (CKD-EPI) equation.       Calcium   Date Value Ref Range Status   01/26/2024 9.4 8.6 - 10.0 mg/dL Final   06/14/2021 9.2 8.5 - 10.1 mg/dL Final     Phosphorus   Date Value Ref Range Status   06/14/2021 3.8 2.5 - 4.5 mg/dL Final     Albumin   Date Value Ref Range Status   12/11/2023 4.8 3.5 - 5.2 g/dL Final   06/14/2021 4.4 3.4 - 5.0 g/dL Final      Lab Results   Component Value Date    WBC 7.5 06/08/2022    WBC  "6.6 06/14/2021     Lab Results   Component Value Date    RBC 4.74 06/08/2022    RBC 4.52 06/14/2021     Lab Results   Component Value Date    HGB 14.4 06/08/2022    HGB 14.1 06/14/2021     Lab Results   Component Value Date    HCT 42.8 06/08/2022    HCT 40.5 06/14/2021     No components found for: \"MCT\"  Lab Results   Component Value Date    MCV 90 06/08/2022    MCV 90 06/14/2021     Lab Results   Component Value Date    MCH 30.4 06/08/2022    MCH 31.2 06/14/2021     Lab Results   Component Value Date    MCHC 33.6 06/08/2022    MCHC 34.8 06/14/2021     Lab Results   Component Value Date    RDW 11.7 06/08/2022    RDW 12.3 06/14/2021     Lab Results   Component Value Date     06/08/2022     06/14/2021      Lab Results   Component Value Date    A1C 5.3 02/26/2024    A1C 5.7 04/17/2023    A1C 6.0 07/11/2022      TSH   Date Value Ref Range Status   02/26/2024 0.80 0.30 - 4.20 uIU/mL Final   06/08/2022 1.42 0.40 - 4.00 mU/L Final   07/06/2021 7.81 (H) 0.40 - 4.00 mU/L Final      Lab Results   Component Value Date    VITDT 8 (L) 02/26/2024      Recent Labs   Lab Test 01/26/24  1202 06/14/21 1955   MAG 2.1 2.3     Last Comprehensive Metabolic Panel:  Sodium   Date Value Ref Range Status   01/26/2024 136 135 - 145 mmol/L Final     Comment:     Reference intervals for this test were updated on 09/26/2023 to more accurately reflect our healthy population. There may be differences in the flagging of prior results with similar values performed with this method. Interpretation of those prior results can be made in the context of the updated reference intervals.    06/14/2021 140 133 - 144 mmol/L Final     Potassium   Date Value Ref Range Status   01/26/2024 5.1 3.4 - 5.3 mmol/L Final     Comment:     Specimen moderately hemolyzed. The reported potassium value *IS LIKELY TO BE FALSELY ELEVATED* and should be interpreted with caution for clinical decision making. Analysis of a non-hemolyzed specimen (i.e. re-draw) " may result in a lower potassium value.   06/14/2021 3.8 3.4 - 5.3 mmol/L Final     Chloride   Date Value Ref Range Status   01/26/2024 107 98 - 107 mmol/L Final   06/14/2021 108 94 - 109 mmol/L Final     Carbon Dioxide   Date Value Ref Range Status   06/14/2021 24 20 - 32 mmol/L Final     Carbon Dioxide (CO2)   Date Value Ref Range Status   01/26/2024 15 (L) 22 - 29 mmol/L Final     Anion Gap   Date Value Ref Range Status   01/26/2024 14 7 - 15 mmol/L Final   06/14/2021 8 3 - 14 mmol/L Final     Glucose   Date Value Ref Range Status   01/26/2024 92 70 - 99 mg/dL Final   06/14/2021 92 70 - 99 mg/dL Final     Urea Nitrogen   Date Value Ref Range Status   01/26/2024 7.1 6.0 - 20.0 mg/dL Final   06/14/2021 10 7 - 30 mg/dL Final     Creatinine   Date Value Ref Range Status   01/26/2024 0.69 0.51 - 0.95 mg/dL Final   06/14/2021 0.52 0.52 - 1.04 mg/dL Final     GFR Estimate   Date Value Ref Range Status   01/26/2024 >90 >60 mL/min/1.73m2 Final   06/14/2021 >90 >60 mL/min/[1.73_m2] Final     Comment:     Non  GFR Calc  Starting 12/18/2018, serum creatinine based estimated GFR (eGFR) will be   calculated using the Chronic Kidney Disease Epidemiology Collaboration   (CKD-EPI) equation.       Calcium   Date Value Ref Range Status   01/26/2024 9.4 8.6 - 10.0 mg/dL Final   06/14/2021 9.2 8.5 - 10.1 mg/dL Final     Bilirubin Total   Date Value Ref Range Status   12/11/2023 0.2 <=1.2 mg/dL Final   06/14/2021 0.2 0.2 - 1.3 mg/dL Final     Alkaline Phosphatase   Date Value Ref Range Status   12/11/2023 77 40 - 150 U/L Final     Comment:     Reference intervals for this test were updated on 11/14/2023 to more accurately reflect our healthy population. There may be differences in the flagging of prior results with similar values performed with this method. Interpretation of those prior results can be made in the context of the updated reference intervals.   06/14/2021 58 40 - 150 U/L Final     ALT   Date Value Ref  Range Status   12/11/2023 34 0 - 50 U/L Final     Comment:     Reference intervals for this test were updated on 6/12/2023 to more accurately reflect our healthy population. There may be differences in the flagging of prior results with similar values performed with this method. Interpretation of those prior results can be made in the context of the updated reference intervals.     07/06/2021 58 (H) 0 - 50 U/L Final     AST   Date Value Ref Range Status   12/11/2023 27 0 - 45 U/L Final     Comment:     Reference intervals for this test were updated on 6/12/2023 to more accurately reflect our healthy population. There may be differences in the flagging of prior results with similar values performed with this method. Interpretation of those prior results can be made in the context of the updated reference intervals.   06/14/2021 37 0 - 45 U/L Final     Most Recent D-dimer:  Recent Labs   Lab Test 06/17/22  1348   DD 0.37       Imaging:  I personally reviewed the following imaging results today and those on care everywhere, if indicated     EXAMINATION: US ELASTOGRAPHY WITH ABDOMEN LIMITED, 5/3/2023 9:13 AM      COMPARISON: None.     HISTORY: Elevated AST (SGOT); Elevated ALT measurement     TECHNIQUE: The abdomen was scanned in standard fashion with  specialized ultrasound transducer(s) using both gray-scale and limited  color doppler technique.  Ultrasound elastography of the liver was performed using a Rucker  ultrasound machine using 10 separate measurements of the liver  parenchyma with patient in supine position. Measurements were obtained  approximately 2 cm beneath Marko's capsule, perpendicular to the  liver capsule. Images are of satisfactory quality.     FINDINGS:  Liver: Hyperechoic parenchyma. No solid mass. The main portal vein is  patent with antegrade flow, measuring 0.6 cm.  Right hepatic cyst measuring 0.9 x 0.9 x 0.9 cm.     The median liver stiffness is: 1.56 m/sec  The mean liver stiffness is:  1.58 m/sec  The interquartile range is: 0.17  IQR/median: 0.11.  (IQR/median value of greater than 0.15 indicates  that a dataset is unreliable)     Gallbladder: No wall thickening, pericholecystic fluid, positive  sonographic Mark's sign or gallstones.     Bile Ducts: No dilation.  The common bile duct measures 4.7 mm in  diameter.     Pancreas: Visualized portions of the head and body of the pancreas are  unremarkable.      Renal: Normal echogenicity. No mass or hydronephrosis.     Right measures- 11.5 cm     Aorta and IVC: The visualized portions of the aorta and IVC are  unremarkable. The proximal aorta measures 2.0 cm in diameter.     Fluid: No ascites.                                                                       IMPRESSION:   1.  Hyperechoic liver parenchyma, commonly seen in the setting of  hepatic steatosis.  2.  The median liver stiffness is 1.56 m/sec and the IQR/median value  is 0.11 . This is a low elastography value which rules out advanced  chronic liver disease in asymptomatic patients.        Consensus criteria for interpreting liver stiffness values in patients  with viral hepatitis or NAFLD according to SRU consensus guidelines  (Pacheco et. al. Radiology: 2020. epub)     High probability of being normal:  <1.3 m/sec  Rules out advanced chronic liver disease in asymptomatic patients:  <1.7 m/sec  Suggestive of advanced chronic liver disease: 1.7-2.1 m/sec  Indicates advanced chronic liver disease: >2.1 m/sec  Suggestive of clinically significant portal hypertension: >2.4 m/sec     I have personally reviewed the examination and initial interpretation  and I agree with the findings.     MOI AMBROSE, DO     EXAM: MR BRAIN W/O CONTRAST  LOCATION: [facility]  DATE/TIME: 9/23/2021 7:45 PM     INDICATION: Chronic headache with vision changes.  COMPARISON: CT head 6/14/2021.  TECHNIQUE: Routine multiplanar multisequence head MRI without intravenous contrast.      FINDINGS:  INTRACRANIAL CONTENTS: No acute or subacute infarct. No mass, acute hemorrhage, or extraaxial fluid collections. Scattered nonspecific foci of T2/FLAIR hyperintense signal in the cerebral white matter. Normal ventricles and sulci. Normal position of the   cerebellar tonsils.      SELLA: No abnormality accounting for technique.     OSSEOUS STRUCTURES/SOFT TISSUES: Normal marrow signal. The major intracranial vascular flow voids are maintained.      ORBITS: No abnormality accounting for technique.      SINUSES/MASTOIDS: No paranasal sinus mucosal disease. No middle ear or mastoid effusion.                                                                       IMPRESSION:  1.  No acute intracranial process.  2.  Nonspecific scattered T2 hyperintensities within the cerebral white matter. These can be seen in association with migraine headaches.       Reviewed imaging from [facility] sites     Medical Records Reviewed:    Reviewed consults/documents from [facility] including  Family Practice, Cardiology, Neurology, GI, and Behavioral Health         Again, thank you for allowing me to participate in the care of your patient.      Sincerely,    Esther Lerma PA-C

## 2024-03-18 NOTE — PROGRESS NOTES
Lady Saavedra is a 39 year old female who presents to be evaluated for a billable video visit.    Video-Visit Details    Video visit Start time: 9:31 AM    Type of service:  Video Visit    Video End Time: 10:08 AM    Originating Location (pt. Location): Home    Distant Location (provider location):  Off- Site    Platform used for Video Visit: Fuad    Assessment/ Impression:   1. Cognitive communication deficit  Patient with few short-term memory as well as word recall difficulties since most recent COVID infection in January 2024.  Of note patient was recently found to have significant low vitamin D as well as had increase in Topamax for her migraines.  Discussed with patient that low vitamin D can contribute to fatigue as well as concentration and that doses of Topamax above 100 mg twice daily typically can contribute to concentration difficulties.  Due to patient's concentration difficulties discussed working with speech therapy as well as checking B12.  - Adult Post Covid Clinic  Referral  - Vitamin B12; Future  - Speech Therapy  Referral; Future    2. Fatigue, unspecified type  Patient with ongoing significant malaise.  Discussed energy conservation and provided information on fatigue management.  Also discussed referral to Occupational therapy for the COVID-19 program.  Encourage patient to start pacing and prioritizing tasks to decrease disruption of daily activities due to her fatigue.  - Adult Post Covid Clinic  Referral  - Occupational Therapy  Referral; Future    3. Migraine with aura and without status migrainosus, not intractable  Patient with worsening migraines after recent COVID infection in January 2024.  Patient did have increase in Topamax and discussed as above that this may be contributing to her cognitive communication deficit.  Encouraged patient to reestablish with neurology and discuss alternative medications as Topamax seems to be less effective now  that she may need to lower dose due to her concentration difficulties.  Of note patient wishes to switch provider and several provider names given in the [facility] who might be alternatives.  - Adult Neurology  Referral; Future    4. COVID-19 long hauler  Discussed COVID and Post COVID with patient.  Educational materials provided and all questions answered.    - Adult Post Covid Clinic  Referral        Plan:  I reviewed present knowledge on long-Covid.  Education was provided and question were answered.  Orders/Referrals as above  Will advised patient on test results  I will follow up with Lady Saavedra in 6 weeks. I will review progress and consider need for any other therapeutic interventions. If there are any questions and/or concerns she will call the clinic.      On day of encounter time spent in chart review and with patient in consultation, exam, education, coordination of care, review of outside charts/data and documentation:  46 minutes     I have attempted to proof read for major spelling errors and apologize for any minor errors I may have missed.      This note was dictated using voice recognition software. Any grammatical or context distortions are unintentional and inherent to the software.    _____________  NIKITA [facility] PHYSICAL MEDICINE AND REHABILITATION CLINIC [place]    Subjective   This 39 year old female presents to the [facility] Rehabilitation Medicine Post-COVID clinic as a new consult to evaluate continuing symptoms after COVID infection initially diagnosed 3/31/21.  Lady Saavedra presented to ED on 4/5/21 complaining of shortness of breath, cough, headache, generalized aches and pains, diarrhea, fatigue, and weakness. Treatment was Oxygen, Decadron, Remdesivir.  Lady Saavedra experienced complications of lingering Dyspnea.  Patient did see Dr. Yuan in December 2021 and does report that she follow through  with all suggestions recommended by Dr. Yuan's ( Chest Xray, Mental health referral, Neurology and Gastro referral) and has since resolved from the symptoms.  She contracted COVID again January 10th 2024 and had symptoms of headaches, body aches, brain fog, diarrhea, nausea, sore throat, cough, fatigue.  Continuing symptoms include fatigue, headache, brain fog, distractibility, diarrhea, and nausea.   Patient was having diarrhea and now this has improved to alternating diarrhea/constipation.  She is now taking stool softeners.  Patient is having trouble with short term memory. She will forget how to spell words and will have recall issues.  She will have trouble with verbal and auditory recall. She has a lot of word finding difficulty as well.  She did just have Topamax increased on the 26th and noticed worsening symptoms in the past few week. Patient has been having worsening fatigue and this is new.  She has to take more frequent breaks and rest. She will need to take breaks after most exertion. She has no issues with sleep. Patient has improvement of nausea with a scopalamine patch.  She is a little more sensitive to light and sound.   ast medical history is significant for  Pre diabetes, Migraines, depression, and anxiety. The patient was vaccinated against COVID x5, last vaccine 10/6/23 .  Previous activity was full time work, [employer]      History of COVID-19 infection: 3/31/21, 1/10/24  Date of first symptoms: 3/22/21, 1/9/24  Diagnosis:  Moshe PCR  Hospitalization[place] [facility] [place]4/9/[place]  Treatment: Hospitalization:  oxygen, steroids, and remdesivir  Current Symptoms: See subjective  Goals of Care: increase energy, decrease anxiety, decrease depression, improve thinking, improve quality of life, and return to work          3/17/2024    11:15 AM   PHQ Assesment Total Score(s)   PHQ-9 Score 8    8           3/17/2024    11:17 AM   JOSÉ MIGUEL-7 Results   JOSÉ MIGUEL 7  TOTAL SCORE 9 (mild anxiety)   JOSÉ MIGUEL-7 Total Score 9    9         3/17/2024    11:27 AM   PTSD Screen Score   Have you ever experienced this kind of event? No         3/17/2024    12:23 PM   PROMIS-29   PROMIS Physical Function T-Score 40 (mild dysfunction)   PROMIS Anxiety T-Score 58 (mild)   PROMIS Depression T-Score 52 (within normal limits)   PROMIS Fatigue T-Score 67 (moderate)   PROMIS Sleep Disturbance T-Score 54 (within normal limits)   PROMIS Ability to Participate in Social Roles & Activities T-Score 40 (mild dysfunction)   PROMIS Pain Interference T-Score 64 (moderate)   PROMIS Pain Intensity 6       Past Medical History:   Diagnosis Date    Abnormal Pap smear of cervix 2009, 2012, 2014    see problem list    Cervical high risk HPV (human papillomavirus) test positive 2009, 2012    see problem list    Chickenpox     Depressive disorder     Gestational HTN 01/01/2015    Hypothyroidism     Migraine with aura and without status migrainosus, not intractable     Seasonal allergies     Was on clariten uses occasional sudafed       Past Surgical History:   Procedure Laterality Date    BIOPSY      COSMETIC SURGERY      HC ENLARGE BREAST WITH IMPLANT      MOUTH SURGERY  age 18    wisdom teeth       Family History   Adopted: Yes   Problem Relation Age of Onset    Unknown/Adopted Mother     Unknown/Adopted Father        Social History     Tobacco Use    Smoking status: Never    Smokeless tobacco: Never   Vaping Use    Vaping Use: Never used   Substance Use Topics    Alcohol use: No    Drug use: No         Current Outpatient Medications:     aspirin-acetaminophen-caffeine (EXCEDRIN MIGRAINE) 250-250-65 MG tablet, Take 1 tablet by mouth daily as needed for headaches, Disp: , Rfl:     bisacodyl (DULCOLAX) 10 MG suppository, Place 1 suppository (10 mg) rectally daily as needed for constipation, Disp: 16 suppository, Rfl: 1    buPROPion (WELLBUTRIN XL) 300 MG 24 hr tablet, Take 1 tablet (300 mg) by mouth every morning,  Disp: 90 tablet, Rfl: 3    dicyclomine (BENTYL) 10 MG capsule, Take 1 capsule (10 mg) by mouth 4 times daily as needed (abdominal cramping), Disp: 90 capsule, Rfl: 11    diphenhydrAMINE (BENADRYL) 25 MG capsule, Take 2 capsules (50 mg) by mouth every 6 hours as needed for other (migraine), Disp: 60 capsule, Rfl: 0    erythromycin with ethanol (ERYGEL) 2 % gel, Apply topically 2 times daily, Disp: 30 g, Rfl: 3    fluticasone (FLONASE) 50 MCG/ACT nasal spray, Spray 1 spray into both nostrils daily, Disp: 15.8 mL, Rfl: 1    hydrOXYzine (VISTARIL) 25 MG capsule, Take 1-2 capsules (25-50 mg) by mouth 3 times daily as needed for anxiety, Disp: 30 capsule, Rfl: 0    levothyroxine (SYNTHROID) 88 MCG tablet, Take 1 tablet (88 mcg) by mouth daily, Disp: 90 tablet, Rfl: 3    methocarbamol (ROBAXIN) 500 MG tablet, Take 1 tablet (500 mg) by mouth 4 times daily as needed for muscle spasms, Disp: 30 tablet, Rfl: 1    Naltrexone HCl POWD, , Disp: , Rfl:     Naltrexone HCl, Pain, 1.5 MG CAPS, Take 1 capsule by mouth daily, Disp: 90 capsule, Rfl: 3    nortriptyline (PAMELOR) 10 MG capsule, Take 2 capsules (20 mg) by mouth at bedtime, Disp: 60 capsule, Rfl: 1    ondansetron (ZOFRAN ODT) 4 MG ODT tab, Take 1 tablet (4 mg) by mouth every 8 hours as needed for nausea, Disp: 30 tablet, Rfl: 1    propranolol (INDERAL) 60 MG tablet, Take 1 tablet (60 mg) by mouth every morning AND 0.5 tablets (30 mg) every evening. (Patient taking differently: Take 1 tablet (60 mg) by mouth every morning  ), Disp: 135 tablet, Rfl: 3    psyllium (METAMUCIL/KONSYL) capsule, Take 1 capsule by mouth daily, Disp: 90 capsule, Rfl: 3    rizatriptan (MAXALT) 10 MG tablet, Take 1 tablet (10 mg) by mouth at onset of headache for migraine May repeat in 2 hours., Disp: 30 tablet, Rfl: 1    scopolamine (TRANSDERM) 1 MG/3DAYS 72 hr patch, Place 1 patch onto the skin every 72 hours, Disp: 10 patch, Rfl: 1    topiramate (TOPAMAX) 25 MG tablet, Take 5 tablets (125 mg) by  mouth daily for 3 days, THEN 6 tablets (150 mg) daily for 27 days., Disp: 177 tablet, Rfl: 0    vitamin D2 (ERGOCALCIFEROL) 48464 units (1250 mcg) capsule, Take 1 capsule (50,000 Units) by mouth once a week for 12 doses, Disp: 12 capsule, Rfl: 0    [START ON 5/15/2024] vitamin D3 (CHOLECALCIFEROL) 50 mcg (2000 units) tablet, Take 1 tablet (50 mcg) by mouth daily (Patient not taking: Reported on 3/18/2024), Disp: 90 tablet, Rfl: 3    Review of Systems   Constitutional, HEENT, cardiovascular, pulmonary, GI, , musculoskeletal, neuro, skin, endocrine and psych systems are negative, except as otherwise noted.      Objective    Vitals:  No vitals were obtained today due to virtual visit.    Physical Exam   EYES: Eyes grossly normal to inspection.  No discharge or erythema, or obvious scleral/conjunctival abnormalities.  SKIN: Visible skin clear. No significant rash, abnormal pigmentation or lesions.  NEURO: Cranial nerves grossly intact.  Mentation and speech appropriate for age.  GENERAL: Healthy, alert and no distress  RESP: No audible wheeze, cough, or visible cyanosis.  No visible retractions or increased work of breathing.    PSYCH: Mentation appears normal, affect normal/bright, judgement and insight intact, normal speech and appearance well-groomed.            CRP Inflammation   Date Value Ref Range Status   08/12/2021 <2.9 0.0 - 8.0 mg/L Final   06/14/2021 <2.9 0.0 - 8.0 mg/L Final      Erythrocyte Sedimentation Rate   Date Value Ref Range Status   02/26/2024 10 0 - 20 mm/hr Final      Last Renal Panel:  Sodium   Date Value Ref Range Status   01/26/2024 136 135 - 145 mmol/L Final     Comment:     Reference intervals for this test were updated on 09/26/2023 to more accurately reflect our healthy population. There may be differences in the flagging of prior results with similar values performed with this method. Interpretation of those prior results can be made in the context of the updated reference intervals.     06/14/2021 140 133 - 144 mmol/L Final     Potassium   Date Value Ref Range Status   01/26/2024 5.1 3.4 - 5.3 mmol/L Final     Comment:     Specimen moderately hemolyzed. The reported potassium value *IS LIKELY TO BE FALSELY ELEVATED* and should be interpreted with caution for clinical decision making. Analysis of a non-hemolyzed specimen (i.e. re-draw) may result in a lower potassium value.   06/14/2021 3.8 3.4 - 5.3 mmol/L Final     Chloride   Date Value Ref Range Status   01/26/2024 107 98 - 107 mmol/L Final   06/14/2021 108 94 - 109 mmol/L Final     Carbon Dioxide   Date Value Ref Range Status   06/14/2021 24 20 - 32 mmol/L Final     Carbon Dioxide (CO2)   Date Value Ref Range Status   01/26/2024 15 (L) 22 - 29 mmol/L Final     Anion Gap   Date Value Ref Range Status   01/26/2024 14 7 - 15 mmol/L Final   06/14/2021 8 3 - 14 mmol/L Final     Glucose   Date Value Ref Range Status   01/26/2024 92 70 - 99 mg/dL Final   06/14/2021 92 70 - 99 mg/dL Final     Urea Nitrogen   Date Value Ref Range Status   01/26/2024 7.1 6.0 - 20.0 mg/dL Final   06/14/2021 10 7 - 30 mg/dL Final     Creatinine   Date Value Ref Range Status   01/26/2024 0.69 0.51 - 0.95 mg/dL Final   06/14/2021 0.52 0.52 - 1.04 mg/dL Final     GFR Estimate   Date Value Ref Range Status   01/26/2024 >90 >60 mL/min/1.73m2 Final   06/14/2021 >90 >60 mL/min/[1.73_m2] Final     Comment:     Non  GFR Calc  Starting 12/18/2018, serum creatinine based estimated GFR (eGFR) will be   calculated using the Chronic Kidney Disease Epidemiology Collaboration   (CKD-EPI) equation.       Calcium   Date Value Ref Range Status   01/26/2024 9.4 8.6 - 10.0 mg/dL Final   06/14/2021 9.2 8.5 - 10.1 mg/dL Final     Phosphorus   Date Value Ref Range Status   06/14/2021 3.8 2.5 - 4.5 mg/dL Final     Albumin   Date Value Ref Range Status   12/11/2023 4.8 3.5 - 5.2 g/dL Final   06/14/2021 4.4 3.4 - 5.0 g/dL Final      Lab Results   Component Value Date    WBC 7.5  "06/08/2022    WBC 6.6 06/14/2021     Lab Results   Component Value Date    RBC 4.74 06/08/2022    RBC 4.52 06/14/2021     Lab Results   Component Value Date    HGB 14.4 06/08/2022    HGB 14.1 06/14/2021     Lab Results   Component Value Date    HCT 42.8 06/08/2022    HCT 40.5 06/14/2021     No components found for: \"MCT\"  Lab Results   Component Value Date    MCV 90 06/08/2022    MCV 90 06/14/2021     Lab Results   Component Value Date    MCH 30.4 06/08/2022    MCH 31.2 06/14/2021     Lab Results   Component Value Date    MCHC 33.6 06/08/2022    MCHC 34.8 06/14/2021     Lab Results   Component Value Date    RDW 11.7 06/08/2022    RDW 12.3 06/14/2021     Lab Results   Component Value Date     06/08/2022     06/14/2021      Lab Results   Component Value Date    A1C 5.3 02/26/2024    A1C 5.7 04/17/2023    A1C 6.0 07/11/2022      TSH   Date Value Ref Range Status   02/26/2024 0.80 0.30 - 4.20 uIU/mL Final   06/08/2022 1.42 0.40 - 4.00 mU/L Final   07/06/2021 7.81 (H) 0.40 - 4.00 mU/L Final      Lab Results   Component Value Date    VITDT 8 (L) 02/26/2024      Recent Labs   Lab Test 01/26/24  1202 06/14/21 1955   MAG 2.1 2.3     Last Comprehensive Metabolic Panel:  Sodium   Date Value Ref Range Status   01/26/2024 136 135 - 145 mmol/L Final     Comment:     Reference intervals for this test were updated on 09/26/2023 to more accurately reflect our healthy population. There may be differences in the flagging of prior results with similar values performed with this method. Interpretation of those prior results can be made in the context of the updated reference intervals.    06/14/2021 140 133 - 144 mmol/L Final     Potassium   Date Value Ref Range Status   01/26/2024 5.1 3.4 - 5.3 mmol/L Final     Comment:     Specimen moderately hemolyzed. The reported potassium value *IS LIKELY TO BE FALSELY ELEVATED* and should be interpreted with caution for clinical decision making. Analysis of a non-hemolyzed specimen " (i.e. re-draw) may result in a lower potassium value.   06/14/2021 3.8 3.4 - 5.3 mmol/L Final     Chloride   Date Value Ref Range Status   01/26/2024 107 98 - 107 mmol/L Final   06/14/2021 108 94 - 109 mmol/L Final     Carbon Dioxide   Date Value Ref Range Status   06/14/2021 24 20 - 32 mmol/L Final     Carbon Dioxide (CO2)   Date Value Ref Range Status   01/26/2024 15 (L) 22 - 29 mmol/L Final     Anion Gap   Date Value Ref Range Status   01/26/2024 14 7 - 15 mmol/L Final   06/14/2021 8 3 - 14 mmol/L Final     Glucose   Date Value Ref Range Status   01/26/2024 92 70 - 99 mg/dL Final   06/14/2021 92 70 - 99 mg/dL Final     Urea Nitrogen   Date Value Ref Range Status   01/26/2024 7.1 6.0 - 20.0 mg/dL Final   06/14/2021 10 7 - 30 mg/dL Final     Creatinine   Date Value Ref Range Status   01/26/2024 0.69 0.51 - 0.95 mg/dL Final   06/14/2021 0.52 0.52 - 1.04 mg/dL Final     GFR Estimate   Date Value Ref Range Status   01/26/2024 >90 >60 mL/min/1.73m2 Final   06/14/2021 >90 >60 mL/min/[1.73_m2] Final     Comment:     Non  GFR Calc  Starting 12/18/2018, serum creatinine based estimated GFR (eGFR) will be   calculated using the Chronic Kidney Disease Epidemiology Collaboration   (CKD-EPI) equation.       Calcium   Date Value Ref Range Status   01/26/2024 9.4 8.6 - 10.0 mg/dL Final   06/14/2021 9.2 8.5 - 10.1 mg/dL Final     Bilirubin Total   Date Value Ref Range Status   12/11/2023 0.2 <=1.2 mg/dL Final   06/14/2021 0.2 0.2 - 1.3 mg/dL Final     Alkaline Phosphatase   Date Value Ref Range Status   12/11/2023 77 40 - 150 U/L Final     Comment:     Reference intervals for this test were updated on 11/14/2023 to more accurately reflect our healthy population. There may be differences in the flagging of prior results with similar values performed with this method. Interpretation of those prior results can be made in the context of the updated reference intervals.   06/14/2021 58 40 - 150 U/L Final     ALT   Date  Value Ref Range Status   12/11/2023 34 0 - 50 U/L Final     Comment:     Reference intervals for this test were updated on 6/12/2023 to more accurately reflect our healthy population. There may be differences in the flagging of prior results with similar values performed with this method. Interpretation of those prior results can be made in the context of the updated reference intervals.     07/06/2021 58 (H) 0 - 50 U/L Final     AST   Date Value Ref Range Status   12/11/2023 27 0 - 45 U/L Final     Comment:     Reference intervals for this test were updated on 6/12/2023 to more accurately reflect our healthy population. There may be differences in the flagging of prior results with similar values performed with this method. Interpretation of those prior results can be made in the context of the updated reference intervals.   06/14/2021 37 0 - 45 U/L Final     Most Recent D-dimer:  Recent Labs   Lab Test 06/17/22  1348   DD 0.37       Imaging:  I personally reviewed the following imaging results today and those on care everywhere, if indicated     EXAMINATION: US ELASTOGRAPHY WITH ABDOMEN LIMITED, 5/3/2023 9:13 AM      COMPARISON: None.     HISTORY: Elevated AST (SGOT); Elevated ALT measurement     TECHNIQUE: The abdomen was scanned in standard fashion with  specialized ultrasound transducer(s) using both gray-scale and limited  color doppler technique.  Ultrasound elastography of the liver was performed using a Rucker  ultrasound machine using 10 separate measurements of the liver  parenchyma with patient in supine position. Measurements were obtained  approximately 2 cm beneath Marko's capsule, perpendicular to the  liver capsule. Images are of satisfactory quality.     FINDINGS:  Liver: Hyperechoic parenchyma. No solid mass. The main portal vein is  patent with antegrade flow, measuring 0.6 cm.  Right hepatic cyst measuring 0.9 x 0.9 x 0.9 cm.     The median liver stiffness is: 1.56 m/sec  The mean liver  stiffness is: 1.58 m/sec  The interquartile range is: 0.17  IQR/median: 0.11.  (IQR/median value of greater than 0.15 indicates  that a dataset is unreliable)     Gallbladder: No wall thickening, pericholecystic fluid, positive  sonographic Mark's sign or gallstones.     Bile Ducts: No dilation.  The common bile duct measures 4.7 mm in  diameter.     Pancreas: Visualized portions of the head and body of the pancreas are  unremarkable.      Renal: Normal echogenicity. No mass or hydronephrosis.     Right measures- 11.5 cm     Aorta and IVC: The visualized portions of the aorta and IVC are  unremarkable. The proximal aorta measures 2.0 cm in diameter.     Fluid: No ascites.                                                                       IMPRESSION:   1.  Hyperechoic liver parenchyma, commonly seen in the setting of  hepatic steatosis.  2.  The median liver stiffness is 1.56 m/sec and the IQR/median value  is 0.11 . This is a low elastography value which rules out advanced  chronic liver disease in asymptomatic patients.        Consensus criteria for interpreting liver stiffness values in patients  with viral hepatitis or NAFLD according to SRU consensus guidelines  (Pacheco et. al. Radiology: 2020. epub)     High probability of being normal:  <1.3 m/sec  Rules out advanced chronic liver disease in asymptomatic patients:  <1.7 m/sec  Suggestive of advanced chronic liver disease: 1.7-2.1 m/sec  Indicates advanced chronic liver disease: >2.1 m/sec  Suggestive of clinically significant portal hypertension: >2.4 m/sec     I have personally reviewed the examination and initial interpretation  and I agree with the findings.     MOI AMBROSE, DO     EXAM: MR BRAIN W/O CONTRAST  LOCATION: [facility]  DATE/TIME: 9/23/2021 7:45 PM     INDICATION: Chronic headache with vision changes.  COMPARISON: CT head 6/14/2021.  TECHNIQUE: Routine multiplanar multisequence head MRI without intravenous  contrast.     FINDINGS:  INTRACRANIAL CONTENTS: No acute or subacute infarct. No mass, acute hemorrhage, or extraaxial fluid collections. Scattered nonspecific foci of T2/FLAIR hyperintense signal in the cerebral white matter. Normal ventricles and sulci. Normal position of the   cerebellar tonsils.      SELLA: No abnormality accounting for technique.     OSSEOUS STRUCTURES/SOFT TISSUES: Normal marrow signal. The major intracranial vascular flow voids are maintained.      ORBITS: No abnormality accounting for technique.      SINUSES/MASTOIDS: No paranasal sinus mucosal disease. No middle ear or mastoid effusion.                                                                       IMPRESSION:  1.  No acute intracranial process.  2.  Nonspecific scattered T2 hyperintensities within the cerebral white matter. These can be seen in association with migraine headaches.       Reviewed imaging from [facility] sites     Medical Records Reviewed:    Reviewed consults/documents from [facility] including  Family Practice, Cardiology, Neurology, GI, and Behavioral Health

## 2024-03-18 NOTE — NURSING NOTE
Is the patient currently in the state of [place]? YES    Visit mode:VIDEO    If the visit is dropped, the patient can be reconnected by: VIDEO VISIT: Text to cell phone:   Telephone Information:   Mobile [phone]       Will anyone else be joining the visit? NO  (If patient encounters technical issues they should call [phone]    How would you like to obtain your AVS? MyChart    Are changes needed to the allergy or medication list? No    Reason for visit: Consult    Shakira SLATER

## 2024-03-19 ENCOUNTER — TELEPHONE (OUTPATIENT)
Dept: FAMILY MEDICINE | Facility: CLINIC | Age: 39
End: 2024-03-19
Payer: COMMERCIAL

## 2024-03-20 ENCOUNTER — LAB (OUTPATIENT)
Dept: LAB | Facility: CLINIC | Age: 39
End: 2024-03-20
Payer: COMMERCIAL

## 2024-03-20 DIAGNOSIS — R41.841 COGNITIVE COMMUNICATION DEFICIT: ICD-10-CM

## 2024-03-20 LAB — VIT B12 SERPL-MCNC: 244 PG/ML (ref 232–1245)

## 2024-03-20 PROCEDURE — 36415 COLL VENOUS BLD VENIPUNCTURE: CPT

## 2024-03-20 PROCEDURE — 82607 VITAMIN B-12: CPT

## 2024-03-29 ENCOUNTER — MYC REFILL (OUTPATIENT)
Dept: FAMILY MEDICINE | Facility: CLINIC | Age: 39
End: 2024-03-29
Payer: COMMERCIAL

## 2024-03-29 DIAGNOSIS — R11.0 NAUSEA: ICD-10-CM

## 2024-03-29 DIAGNOSIS — G43.709 CHRONIC MIGRAINE WITHOUT AURA WITHOUT STATUS MIGRAINOSUS, NOT INTRACTABLE: ICD-10-CM

## 2024-03-29 DIAGNOSIS — F33.1 MODERATE EPISODE OF RECURRENT MAJOR DEPRESSIVE DISORDER (H): ICD-10-CM

## 2024-03-29 DIAGNOSIS — F41.1 GAD (GENERALIZED ANXIETY DISORDER): ICD-10-CM

## 2024-04-01 RX ORDER — TOPIRAMATE 100 MG/1
150 TABLET, FILM COATED ORAL DAILY
Qty: 135 TABLET | Refills: 3 | Status: SHIPPED | OUTPATIENT
Start: 2024-04-01

## 2024-04-01 RX ORDER — SCOLOPAMINE TRANSDERMAL SYSTEM 1 MG/1
1 PATCH, EXTENDED RELEASE TRANSDERMAL
Qty: 10 PATCH | Refills: 1 | Status: SHIPPED | OUTPATIENT
Start: 2024-04-01 | End: 2024-07-02

## 2024-04-01 RX ORDER — NORTRIPTYLINE HCL 10 MG
20 CAPSULE ORAL AT BEDTIME
Qty: 180 CAPSULE | Refills: 3 | Status: SHIPPED | OUTPATIENT
Start: 2024-04-01 | End: 2024-04-25

## 2024-04-14 ASSESSMENT — ANXIETY QUESTIONNAIRES
IF YOU CHECKED OFF ANY PROBLEMS ON THIS QUESTIONNAIRE, HOW DIFFICULT HAVE THESE PROBLEMS MADE IT FOR YOU TO DO YOUR WORK, TAKE CARE OF THINGS AT HOME, OR GET ALONG WITH OTHER PEOPLE: SOMEWHAT DIFFICULT
8. IF YOU CHECKED OFF ANY PROBLEMS, HOW DIFFICULT HAVE THESE MADE IT FOR YOU TO DO YOUR WORK, TAKE CARE OF THINGS AT HOME, OR GET ALONG WITH OTHER PEOPLE?: SOMEWHAT DIFFICULT
7. FEELING AFRAID AS IF SOMETHING AWFUL MIGHT HAPPEN: NOT AT ALL
1. FEELING NERVOUS, ANXIOUS, OR ON EDGE: SEVERAL DAYS
6. BECOMING EASILY ANNOYED OR IRRITABLE: SEVERAL DAYS
4. TROUBLE RELAXING: SEVERAL DAYS
GAD7 TOTAL SCORE: 6
GAD7 TOTAL SCORE: 6
7. FEELING AFRAID AS IF SOMETHING AWFUL MIGHT HAPPEN: NOT AT ALL
GAD7 TOTAL SCORE: 6
3. WORRYING TOO MUCH ABOUT DIFFERENT THINGS: SEVERAL DAYS
5. BEING SO RESTLESS THAT IT IS HARD TO SIT STILL: SEVERAL DAYS
2. NOT BEING ABLE TO STOP OR CONTROL WORRYING: SEVERAL DAYS

## 2024-04-14 ASSESSMENT — PATIENT HEALTH QUESTIONNAIRE - PHQ9
10. IF YOU CHECKED OFF ANY PROBLEMS, HOW DIFFICULT HAVE THESE PROBLEMS MADE IT FOR YOU TO DO YOUR WORK, TAKE CARE OF THINGS AT HOME, OR GET ALONG WITH OTHER PEOPLE: SOMEWHAT DIFFICULT
SUM OF ALL RESPONSES TO PHQ QUESTIONS 1-9: 7
SUM OF ALL RESPONSES TO PHQ QUESTIONS 1-9: 7

## 2024-04-15 ENCOUNTER — THERAPY VISIT (OUTPATIENT)
Dept: SPEECH THERAPY | Facility: CLINIC | Age: 39
End: 2024-04-15
Attending: PHYSICIAN ASSISTANT
Payer: COMMERCIAL

## 2024-04-15 ENCOUNTER — VIRTUAL VISIT (OUTPATIENT)
Dept: FAMILY MEDICINE | Facility: CLINIC | Age: 39
End: 2024-04-15
Attending: STUDENT IN AN ORGANIZED HEALTH CARE EDUCATION/TRAINING PROGRAM
Payer: COMMERCIAL

## 2024-04-15 ENCOUNTER — THERAPY VISIT (OUTPATIENT)
Dept: OCCUPATIONAL THERAPY | Facility: CLINIC | Age: 39
End: 2024-04-15
Attending: PHYSICIAN ASSISTANT
Payer: COMMERCIAL

## 2024-04-15 DIAGNOSIS — L65.9 HAIR THINNING: ICD-10-CM

## 2024-04-15 DIAGNOSIS — R41.89 BRAIN FOG: ICD-10-CM

## 2024-04-15 DIAGNOSIS — U09.9 COVID-19 LONG HAULER: Primary | ICD-10-CM

## 2024-04-15 DIAGNOSIS — R53.83 FATIGUE, UNSPECIFIED TYPE: ICD-10-CM

## 2024-04-15 DIAGNOSIS — K59.00 CONSTIPATION, UNSPECIFIED CONSTIPATION TYPE: ICD-10-CM

## 2024-04-15 DIAGNOSIS — G43.709 CHRONIC MIGRAINE WITHOUT AURA WITHOUT STATUS MIGRAINOSUS, NOT INTRACTABLE: ICD-10-CM

## 2024-04-15 DIAGNOSIS — R10.9 ABDOMINAL CRAMPING: ICD-10-CM

## 2024-04-15 DIAGNOSIS — R11.0 NAUSEA: ICD-10-CM

## 2024-04-15 DIAGNOSIS — R41.841 COGNITIVE COMMUNICATION DEFICIT: ICD-10-CM

## 2024-04-15 PROCEDURE — 96125 COGNITIVE TEST BY HC PRO: CPT | Mod: GN,59 | Performed by: SPEECH-LANGUAGE PATHOLOGIST

## 2024-04-15 PROCEDURE — 97165 OT EVAL LOW COMPLEX 30 MIN: CPT | Mod: GO | Performed by: OCCUPATIONAL THERAPIST

## 2024-04-15 PROCEDURE — 97535 SELF CARE MNGMENT TRAINING: CPT | Mod: GO | Performed by: OCCUPATIONAL THERAPIST

## 2024-04-15 PROCEDURE — 99214 OFFICE O/P EST MOD 30 MIN: CPT | Mod: 95 | Performed by: STUDENT IN AN ORGANIZED HEALTH CARE EDUCATION/TRAINING PROGRAM

## 2024-04-15 RX ORDER — MECLIZINE HCL 12.5 MG 12.5 MG/1
12.5 TABLET ORAL 3 TIMES DAILY PRN
Qty: 30 TABLET | Refills: 1 | Status: SHIPPED | OUTPATIENT
Start: 2024-04-15 | End: 2024-10-04

## 2024-04-15 NOTE — PROGRESS NOTES
SPEECH LANGUAGE PATHOLOGY EVALUATION    See electronic medical record for Abuse and Falls Screening details.    Subjective      Presenting condition or subjective complaint: Forgetting words in sentences.  Date of onset:      Relevant medical history: Bladder or bowel problems; Depression; Dizziness; High blood pressure; Mental Illness; Migraines or headaches; Overweight; Pregnant or breastfeeding; Severe headaches; Thyroid problems   Patient with few short-term memory as well as word recall difficulties since most recent COVID infection in 2024.   Dates & types of surgery: [place] teeth- .    Prior diagnostic imaging/testing results:     No  Prior therapy history for the same diagnosis, illness or injury: No      Living Environment  Social support: With a significant other or spouse   Help at home: None    Employment: Yes Operations   Hobbies/Interests: Going to [employer] games, doing crafts, cooking/baking, reading.    Patient goals for therapy: Not get frustrated when I can t recall a word or express what I mean.    Pain assessment: Location: headache/Ratin/10 pre and post session.     Objective     AUDITORY COMPREHENSION (understanding of spoken language)  Comprehension level of impairment: no impairment     VERBAL EXPRESSION (use of spoken language to express information)  Word Finding Skills: organized conversation difficult due to pt 'losing thought' or can't think of word.   Conversational Speech: connected speech: minimal impairment    Verbal expression level of impairment: mild impairment    READING COMPREHENSION (understanding written language)  Reading comprehension level of impairment:  Did not test.  Pt reports no deficits other than need to reread to ensure accuracy and remember content.    WRITTEN EXPRESSION (use of writing skills to express information)  Written expression level of impairment:  Did not assess.  Pt reports no concerns.    PRAGMATICS (the social or  functional use of language)  Pragmatics level of impairment: no impairment    COGNITIVE STATUS    Repeatable Battery for the Assessment of Neuropsychological Status Update   (RBANS  Update)  The Repeatable Battery for the Assessment of Neuropsychological Status Update (RBANS  Update) was administered to Lady Saavedra on 4/15/2024.  The RBANS Update was originally developed with a primary focus on assessment of dementia, and measures cognitive decline or improvement across these domains: immediate memory, visuospatial/constructional; language; attention; and delayed memory.  However, special group studies are available for Alzheimer's Disease, Vascular Dementia, HIV Dementia, Ernesto's Disease, Parkinson's Disease, Depression, Schizophrenia, and Closed Head Injury.   The RBANS can be used by clinicians and neuropsychologists to help screen for deficits in acute-care settings; track recovery during rehabilitation; track progression of neurological disorders; and screen for neurocognitive status in adolescents.  This test is normed for ages 12-89.  The subtest normative data are presented in scaled score units that have a mean of 10 and a standard deviation of 3.          Total Score Scaled Score  Percentile Group       I.  Immediate memory    1.  List Learning   28  8   2.  Story Memory   16  7  Immediate Memory Index Score  = 85    II.  Visuospatial/Constructional    3.  Figure copy   19  9  4.  Line Orientation   20     >75  Visuospatial/Constructional Index Score  = 109    III.  Language     5.  Picture Naming   10     51-75  6.  Semantic Fluency   22  10  Language Index Score = 101    IV.  Attention  7.  Digit Span     6  2  8.  Coding     36  3  Attention Index Score = 56    V.  Delayed Memory  9.  List recall    5     10-16  10. List Recognition   20     51-75  11. Story Recall   9  8  12. Figure Recall   15  9  Delayed Memory Index Score = 91  Sum of Total Scores for Subtest 9+11+12 29    Sum of Index  Scores = 442  Total Scale Score = 84      INTERPRETATION OF TEST RESULTS: Moderate-severe deficits with Attention.  Low average for Immediate memory.  Visuospatial/Constructional, Language, and Delayed memory in average range    TIME ADMINISTERING TEST: 30      Repeatable Battery for the Assessment of Neuropsychological Status Update (RBANS Update). Copyright   2012 [facility] Adapted and reproduced with permission. All rights reserved.    Cognition level of impairment: mild impairment      Assessment & Plan   CLINICAL IMPRESSIONS   Medical Diagnosis: Cognitive Communication Deficit    Treatment Diagnosis: Cognitive Communication Deficit   Impression/Assessment: Pt is a 39 year old female with memory and word finding complaints. The following significant findings have been identified: impaired cognition, characterized by significant deficits in the domain of Attention. Identified deficits interfere with their ability to function independently and efficiently with cognitive linguistic tasks  . Pt noted to pause in conversation due to word finding difficulty or losing focus on topic.  Deficits are impaired compared to previous level of function.    PLAN OF CARE  Treatment Interventions: Cognitive skills    Prognosis to achieve stated therapy goals is good   Rehab potential is impacted by: current level of function, family/caregiver support, patient awareness of deficits, patient motivation    Long Term Goals:   SLP Goal 1  Goal Identifier: attention  Goal Description: Pt will perform alternating attention tasks with 85% acc  Rationale: To maximize safety and independence with cognitive function within the home or community  Target Date: 05/07/24  SLP Goal 2  Goal Identifier: attention  Goal Description: Pt will perform attention tasks with distractions with 85% acc  Rationale: To maximize safety and independence with cognitive function within the home or community  Target Date: 05/07/24  SLP Goal 3  Goal  Identifier: attention  Goal Description: Pt will ID and utilize two strategies to increase attention/focus for work and reading tasks.  Rationale: To maximize safety and independence with cognitive function within the home or community  Target Date: 05/27/24  SLP Goal 4  Goal Identifier: short term memory  Goal Description: Pt will utilize two different internal and external memory strategies for IADLs 75% of the time.  Rationale: To maximize safety and independence with cognitive function within the home or community  Target Date: 05/27/24      Frequency of Treatment: 1x/wk  Duration of Treatment: 6 weeks     Education Assessment:   Learner/Method: Patient  Education Comments: results, recommendation, and goal planning    Risks and benefits of evaluation/treatment have been explained.   Patient/Family/caregiver agrees with Plan of Care.     Evaluation Time:         Signing Clinician: Katherine Amaya SLP

## 2024-04-15 NOTE — PROGRESS NOTES
OCCUPATIONAL THERAPY EVALUATION  Type of Visit: Evaluation    See electronic medical record for Abuse and Falls Screening details.    Subjective      Presenting condition or subjective complaint: Forgetting words in sentences.  Date of onset: 03/18/24 (referral date)    Relevant medical history: Bladder or bowel problems; Depression; Dizziness; High blood pressure; Mental Illness; Migraines or headaches; Overweight; Pregnant or breastfeeding; Severe headaches; Thyroid problems   Dates & types of surgery: [place] teeth- 2003.    Prior diagnostic imaging/testing results:       Prior therapy history for the same diagnosis, illness or injury: No      Prior Level of Function  Transfers: Independent  Ambulation: Independent  ADL: Independent  IADL: Childcare, Driving, Finances, Housekeeping, Laundry, Meal preparation, Medication management, Work    Living Environment  Social support: With a significant other or spouse   Type of home: House; Multi-level; Basement   Stairs to enter the home: Yes 11 Is there a railing: Yes   Ramp: No   Stairs inside the home: Yes 16 Is there a railing: Yes   Help at home: None  Equipment owned:       Employment: Yes Operations   Hobbies/Interests: Going to baseball games, doing crafts, cooking/baking, reading.    Patient goals for therapy: Not get frustrated when I can t recall a word or express what I mean.    Pain assessment: Pain present  Headache     Objective     Cognitive Status Examination  Orientation: Oriented to person, place and time   Level of Consciousness: Alert  Follows Commands and Answers Questions:  speech to assess  Personal Safety and Judgement: Intact  Memory:  reporting impairment  Attention: Reports problems attending, Quiet environment required, Sustained attention impaired, Selective attention impaired, difficulty ignoring irrelevant stimuli, Alternating attention impaired, difficulty shifting between tasks, Divided attention impaired, difficulty  with simultaneous tasks, Difficulty with dual tasking   Organization/Problem Solving: Reports problems with organization, Reports problems with problem solving during eval, Problem solving impaired  Executive Function: Working memory impaired, decreased storage of information for performing tasks, Cognitive flexibility impaired    VISUAL SKILLS  Visual Acuity: Wears glasses  Visual Field: Appears normal  Visual Attention: Appears normal  Oculomotor: WNL  Feels like eyes are not working well.        FUNCTIONAL MOBILITY  Assistive Device(s): None  Ambulation: ambulatory    BED MOBILITY: WNL    TRANSFERS: WNL    BATHING: WFL:  Sometimes forgets to bath and becomes emotional about showering because her hair is falling out    UPPER BODY DRESSING: WNL    LOWER BODY DRESSING: WNL    TOILETING: WNL  Significant GI issues:  At times this is triggered by anxiety    GROOMING: WNL    EATING/SELF FEEDING: WNL   Ok appetite.      ACTIVITY TOLERANCE: Feelings of becoming easily exhausted.      INSTRUMENTAL ACTIVITIES OF DAILY LIVING (IADL): IADL's are impacted by cognitive struggles.  Speech therapy is assessing and working on cognition at this time.    Meal Planning/Prep: Takes a great deal of breaks when cooking  Home/Financial Management: Does money management/quotes for work, noting having issues with numbers  Communication/Computer Use: works daily on computer:  Noting has work station at home of 3 monitors that are ergonomically set.  The company has ordered special light management screens for her use.  Not yet received.  She is working on taking breaks and walking away from the screens.  When typing she struggles with spelling and needs to work very hard to make sure she has accuracy with insurance quotes (numbers).    Community Mobility: does drive however is bothered by the sunlight    Assessment & Plan   CLINICAL IMPRESSIONS  Medical Diagnosis: COVID-19 Long Haul    Treatment Diagnosis: Impaired cognition and fatigue  impacting ADL's and IADL's    Impression/Assessment: Pt is a 39 year old female presenting to Occupational Therapy due to ongoing symptoms related to COVID-19 Long Haul.  The following significant findings have been identified: Impaired activity tolerance, Impaired cognition, Impaired communication, and Anxiety and/or fear.  These identified deficits interfere with their ability to perform self care tasks, work tasks, household chores, community mobility, meal planning and preparation, and community or volunteer activities as compared to previous level of function.     Clinical Decision Making (Complexity):  Assessment of Occupational Performance: 3-5 Performance Deficits  Occupational Performance Limitations: communication management, driving and community mobility, health management and maintenance, home establishment and management, meal preparation and cleanup, shopping, work, leisure activities, and social participation  Clinical Decision Making (Complexity): Low complexity    PLAN OF CARE  Treatment Interventions:  Interventions: Self-Care/Home Management, Therapeutic Activity, Therapeutic Exercise, Neuromuscular Re-education, Sensory Integration    Long Term Goals   OT Goal 1  Goal Identifier: Energy Conservation and work simplification  Goal Description: Pt will utilize energy conservation and work simplification strategies throughout her day in an effort to be able to particpate in daily life  Rationale: In order to maximize safety and independence with performance of self-care activities  Goal Progress: Began instruction of energy conservation/pacing strategies  Target Date: 05/15/24  OT Goal 2  Goal Identifier: Sensory Management  Goal Description: Pt will utilize sensory system (light, sound, movement) strategies to minimize sensitivity results throughout the day  Rationale: In order to maximize safety and independence with performance of self-care activities  Target Date: 05/15/24  OT Goal 3  Goal  Identifier: Nervous System Regulating Strategies  Goal Description: Pt will verbalize and trial 3 nervous system regulating activities to aide in symptom management of anxiety throughout the day, specifically being able to go to a store  Rationale: In order to maximize safety and independence with performance of self-care activities  Goal Progress: Provided with packet to review  Target Date: 05/15/24  OT Goal 4  Goal Identifier: Further SSP Goal  Goal Description: Plan to set SSP goal  Target Date: 05/15/24      Frequency of Treatment: 1x week  Duration of Treatment: 12 weeks     Recommended Referrals to Other Professionals: Occupational Therapy, Speech Language Pathology  Education Assessment: Learner/Method: Patient;Listening     Risks and benefits of evaluation/treatment have been explained.   Patient/Family/caregiver agrees with Plan of Care.     Evaluation Time:    OT Eval, Low Complexity Minutes (87609): 20   Present: Not applicable     Signing Clinician: Dada Garcia OT

## 2024-04-15 NOTE — PROGRESS NOTES
Kassy is a 39 year old who is being evaluated via a billable video visit.    How would you like to obtain your AVS? MyChart  If the video visit is dropped, the invitation should be resent by: Text to cell phone: [phone]  Will anyone else be joining your video visit? No      Assessment & Plan     COVID-19 long hauler  Brain fog  Fatigue, unspecified type  Patient is a pleasant 39-year-old female who presents today for follow-up on COVID related chronic conditions.  Patient was seen by [facility] since her last visit, has OT and speech therapy visits starting today.  Continues to feel brain fog.  Has been taking 1.5 mg of naltrexone, no significant improvement in brain fog and fatigue.  We discussed a trial of an increased dosage of 3 mg of naltrexone for the next couple weeks, she will notify me by the 29th how the brain fog is, if the increased dose of naltrexone is helpful.  Briefly again discussed the consideration of a stimulant medication as neck step for symptom management.  Could also try modafinil.  - PRIMARY CARE FOLLOW-UP SCHEDULING  - PRIMARY CARE FOLLOW-UP SCHEDULING    Chronic migraine without aura without status migrainosus, not intractable  For chronic migraines, has upcoming appointment with neurology.  - PRIMARY CARE FOLLOW-UP SCHEDULING  - PRIMARY CARE FOLLOW-UP SCHEDULING    Abdominal cramping  Constipation, unspecified constipation type  Nausea  Improved abdominal cramping.  Not having regular bowel movements.  Has not needed to use suppository.  Having increasing nausea, sometimes dizzy spells.  [place]azer like the ground is tilting.  She especially notices this with strobe light affects in the car, such as with the shadows of the trees.  We discussed a trial of meclizine, she was interested in this.  We discussed using this with or without scopolamine, though may cause increased anticholinergic side effects.  - PRIMARY CARE FOLLOW-UP SCHEDULING  - meclizine (ANTIVERT) 12.5 MG tablet  Dispense: 30  "tablet; Refill: 1  - PRIMARY CARE FOLLOW-UP SCHEDULING    Hair thinning  Patient is noticing significant hair thinning, diffuse loss of the hair.  Her hair is significantly more thin now compared to couple months ago.  We discussed the possible stress effects of the COVID infection on her hair follicles.  This is more of a diffuse loss, low concern for tinea.  Her thyroid levels normal at last check.  Ferritin at 60, low likelihood that this is contributing.  Vitamin B12 low at 244.  She is taking supplementation for this. No clear medication cause. If not stabilizing out, consider biopsy to evaluate underlying cause. Most likely related to post covid at this point.     I spent a total of 38 minutes on the day of the visit.   Time spent by me doing chart review, history and exam, documentation and further activities per the note      BMI  Estimated body mass index is 32.73 kg/m  as calculated from the following:    Height as of 3/18/24: 1.575 m (5' 2.01\").    Weight as of 3/18/24: 81.2 kg (179 lb).       Jayant Elena is a 39 year old, presenting for the following health issues:  Anxiety, Depression, and Headache        4/15/2024     7:19 AM   Additional Questions   Roomed by Mariposa CARY   Accompanied by Self     History of Present Illness       Mental Health Follow-up:  Patient presents to follow-up on Depression & Anxiety.Patient's depression since last visit has been:  Better  The patient is not having other symptoms associated with depression.  Patient's anxiety since last visit has been:  Better  The patient is not having other symptoms associated with anxiety.  Any significant life events: No  Patient is feeling anxious or having panic attacks.  Patient has no concerns about alcohol or drug use.    Headaches:   Since the patient's last clinic visit, headaches are: no change  The patient is getting headaches:  A few times a week  She is not able to do normal daily activities when she has a migraine.  The " "patient is taking the following rescue/relief medications:  Excedrin and Maxalt   Patient states \"The relief is inconsistent\" from the rescue/relief medications.   The patient is taking the following medications to prevent migraines:  Topomax  In the past 4 weeks, the patient has gone to an Urgent Care or Emergency Room 0 times times due to headaches.    Reason for visit:  Long-Covid/ medications    She eats 2-3 servings of fruits and vegetables daily.She consumes 1 sweetened beverage(s) daily.She exercises with enough effort to increase her heart rate 10 to 19 minutes per day.  She exercises with enough effort to increase her heart rate 3 or less days per week.   She is taking medications regularly.           2/25/2024     3:31 PM 3/17/2024    11:15 AM 4/14/2024    10:05 AM   PHQ   PHQ-9 Total Score 10 8    8 7   Q9: Thoughts of better off dead/self-harm past 2 weeks Not at all Not at all Not at all          2/25/2024     3:31 PM 3/17/2024    11:17 AM 4/14/2024    10:06 AM   JOSÉ MIGUEL-7 SCORE   Total Score 7 (mild anxiety) 9 (mild anxiety) 6 (mild anxiety)   Total Score 7 9    9 6            Review of Systems  Constitutional, HEENT, cardiovascular, pulmonary, gi and gu systems are negative, except as otherwise noted.      Objective           Vitals:  No vitals were obtained today due to virtual visit.    Physical Exam   GENERAL: alert and no distress  EYES: Eyes grossly normal to inspection.  No discharge or erythema, or obvious scleral/conjunctival abnormalities.  RESP: No audible wheeze, cough, or visible cyanosis.    SKIN: Visible skin clear. No significant rash, abnormal pigmentation or lesions.  NEURO: Cranial nerves grossly intact.  Mentation and speech appropriate for age.  PSYCH: Appropriate affect, tone, and pace of words     Results from this visitNo results found for any visits on 04/15/24.        Video-Visit Details    Type of service:  Video Visit   Originating Location (pt. Location): Home  Total video time " 29 min 13 sec      Distant Location (provider location):  On-site  Platform used for Video Visit: Fuad  Signed Electronically by: Brandy Zimmerman MD

## 2024-04-22 ENCOUNTER — THERAPY VISIT (OUTPATIENT)
Dept: SPEECH THERAPY | Facility: CLINIC | Age: 39
End: 2024-04-22
Attending: PHYSICIAN ASSISTANT
Payer: COMMERCIAL

## 2024-04-22 DIAGNOSIS — U09.9 COVID-19 LONG HAULER: ICD-10-CM

## 2024-04-22 DIAGNOSIS — R41.841 COGNITIVE COMMUNICATION DEFICIT: Primary | ICD-10-CM

## 2024-04-22 PROCEDURE — 97130 THER IVNTJ EA ADDL 15 MIN: CPT | Mod: GN | Performed by: SPEECH-LANGUAGE PATHOLOGIST

## 2024-04-22 PROCEDURE — 97129 THER IVNTJ 1ST 15 MIN: CPT | Mod: GN | Performed by: SPEECH-LANGUAGE PATHOLOGIST

## 2024-04-25 ENCOUNTER — OFFICE VISIT (OUTPATIENT)
Dept: NEUROLOGY | Facility: CLINIC | Age: 39
End: 2024-04-25
Payer: COMMERCIAL

## 2024-04-25 VITALS
WEIGHT: 175 LBS | DIASTOLIC BLOOD PRESSURE: 88 MMHG | RESPIRATION RATE: 16 BRPM | HEART RATE: 76 BPM | BODY MASS INDEX: 32 KG/M2 | SYSTOLIC BLOOD PRESSURE: 142 MMHG

## 2024-04-25 DIAGNOSIS — G43.709 CHRONIC MIGRAINE WITHOUT AURA WITHOUT STATUS MIGRAINOSUS, NOT INTRACTABLE: Primary | ICD-10-CM

## 2024-04-25 DIAGNOSIS — R41.89 BRAIN FOG: ICD-10-CM

## 2024-04-25 DIAGNOSIS — G43.109 MIGRAINE WITH AURA AND WITHOUT STATUS MIGRAINOSUS, NOT INTRACTABLE: ICD-10-CM

## 2024-04-25 DIAGNOSIS — R76.8 ANTI-TPO ANTIBODIES PRESENT: ICD-10-CM

## 2024-04-25 DIAGNOSIS — G47.00 INSOMNIA, UNSPECIFIED TYPE: ICD-10-CM

## 2024-04-25 DIAGNOSIS — Z86.16 HISTORY OF 2019 NOVEL CORONAVIRUS DISEASE (COVID-19): ICD-10-CM

## 2024-04-25 PROCEDURE — 99214 OFFICE O/P EST MOD 30 MIN: CPT | Performed by: PSYCHIATRY & NEUROLOGY

## 2024-04-25 PROCEDURE — G2211 COMPLEX E/M VISIT ADD ON: HCPCS | Performed by: PSYCHIATRY & NEUROLOGY

## 2024-04-25 RX ORDER — PROPRANOLOL HYDROCHLORIDE 60 MG/1
TABLET ORAL
Qty: 180 TABLET | Refills: 3 | Status: SHIPPED | OUTPATIENT
Start: 2024-04-25

## 2024-04-25 RX ORDER — NORTRIPTYLINE HCL 10 MG
CAPSULE ORAL
Qty: 540 CAPSULE | Refills: 1 | Status: SHIPPED | OUTPATIENT
Start: 2024-04-25 | End: 2024-07-02

## 2024-04-25 RX ORDER — PREDNISONE 20 MG/1
60 TABLET ORAL DAILY
Qty: 18 TABLET | Refills: 0 | Status: SHIPPED | OUTPATIENT
Start: 2024-04-25 | End: 2024-05-01

## 2024-04-25 NOTE — PROGRESS NOTES
NEUROLOGY OUTPATIENT PROGRESS NOTE   Apr 25, 2024     CHIEF COMPLAINT/REASON FOR VISIT/REASON FOR CONSULT  Patient presents with:  Headache    REASON FOR CONSULTATION-headaches and brain fog.    REFERRAL SOURCE  Dr. Lalita Birmingham   Dr. Lalita Birmingham    HISTORY OF PRESENT ILLNESS  Lady Saavedra is a 39 year old female seen today for evaluation of headaches and brain fog.  She reports that she has a history of headache but then in March she got Covid.  She was admitted for Covid related pneumonia.  Was in the hospital for 1 week.  Since then the headaches have been much more frequent.  She is having them every day.  Reports significant photophobia and phonophobia with the headaches.  Headaches can be on any side in different places.  They can be behind the eyes and in the back of the head.  They are throbbing in nature.  Reports no visual aura with these.  Has been put on amitriptyline which has helped her sleep as well as with the headaches but nothing significant to get rid of the headaches completely.  Is using Advil and Tylenol for the headache still would last the whole day.  Is not using them around-the-clock.  Denies any other provoking factors.    Is also been diagnosed with thyroiditis since the Covid infection.  Is working with endocrinology on this.    Reports brain fog has been going on since the Covid infection.  Has not had any improvement.  Was let go of her job.  Has difficulty with memory, staying focused and doing tasks that she could previously do easily.  Does get good sleep at night with the amitriptyline.    Patient has recently been in contact with the Covid rehabilitation clinic.  Has not started the program yet.    2/14/22  Patient returns today  1.  Patient reports that her headaches have significantly improved with the propanolol.  Is taking 60 mg twice a day.  Denies any side effects.  Headaches of 4-5 times a month.  Headaches are much less severe compared to before and she does  not need to take abortive medication.  Did try the Maxalt once without any side effects and it did not work for her.  2.  Continues to have the brain fog.  Has been seen in the [facility] rehabilitation clinic and has not been able to do the therapies due to lack of insurance.  Steroids did not help with the brain fog.  3.  Reports that the amitriptyline is no longer working to help her sleep.  She is up till 2 AM every night.  Discussed about increasing the dose and she is interested in that.  Denies any side effects with the amitriptyline.    4/12/22  Patient returns today  1.  She reports that the headaches are twice a month.  Maxalt does work as abortive therapy though she has to eat something with it otherwise feels nauseous with the medication.  Propanolol amitriptyline is helping.  Some somnolence with the amitriptyline which is getting better.  Discussed about decreasing the amitriptyline and she wants to stay on the higher dose.  It was increased last time because the propanolol was not working by itself.  2.  Continues to have the brain fog.  Has insurance now and has gone to the [facility] rehabilitation clinic though has not been able to see the therapist yet.  Is only seeing her counselor for her mental illness.    3.  Is sleeping better with the amitriptyline at this point though does have some somnolence.  No insomnia.    2/1/23  Patient returns today  1.  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  Headaches have since then gotten worse and she is having 2-3 headaches a week.  About 15 headache days a month.  Remains on the amitriptyline and propanolol.  This daytime somnolence with the amitriptyline has improved.  No side effects with propanolol.  Blood pressure is better at this point as well.  Maxalt does work as abortive therapy.  She does need to use it with food to avoid nausea.  2.  Brain fog is also improved with  time.  3.  Sleeping better with the amitriptyline.  No issues.  No other new symptoms    4/5/23  Patient returns today  1.  She is currently on 100 mg of Topamax.  She takes it in the morning because it prevents her from sleeping at night.  She reports that she had complete resolution of the headaches.  Has not had any headaches in the last month.  2.  Occasionally if she does have a headache she will use Maxalt which is still working  3.  She remains on propanolol.  She feels that is also being helping with the tachycardia.  Remains on amitriptyline though does complain that she occasionally cannot sleep.  She feels that she is not exercising enough to tire herself out to fall asleep  4.  Brain fog symptoms have now improved.  No new issues/concerns.    4/25/24  Patient returns today.  Since she was last seen she has had COVID and has been having long-haul COVID symptoms along with worsening headaches.  1.  Headaches are almost every day.  Headaches are brought on by bright lights.  She does have accommodation at work.  Has been able to work from home which is helping.  In terms of her medications she has been on a higher dose of Topamax.  Is taking 1-1/2 tablet in the morning.  Has been switched from amitriptyline to nortriptyline.  No side effects.  Feels that she is sleeping well.  Propranolol dose has been decreased.  Is using Zofran and meclizine for nausea with the headaches.  Maxalt does work but causes some nausea.    2.  Per the post-COVID clinic she has been working with PT/OT/speech therapy with no significant improvement.  Brain fog is also gotten worse.  No other new concerns.    Previous history is reviewed and this is unchanged.    PAST MEDICAL/SURGICAL HISTORY  Past Medical History:   Diagnosis Date    Abnormal Pap smear of cervix 2009, 2012, 2014    see problem list    Cervical high risk HPV (human papillomavirus) test positive 2009, 2012    see problem list    Chickenpox     Depressive disorder      Gestational HTN 01/01/2015    Hypothyroidism     Migraine with aura and without status migrainosus, not intractable     Seasonal allergies     Was on clariten uses occasional sudafed     Patient Active Problem List   Diagnosis    CARDIOVASCULAR SCREENING; LDL GOAL LESS THAN 160    Pneumonia due to 2019 novel coronavirus    COVID-19 long hauler    Anovulation    Prediabetes    Migraine with aura and without status migrainosus, not intractable    Hyperlipidemia LDL goal <100    Nonalcoholic fatty liver disease    Cognitive communication deficit   Significant of migraines, mental illness, suicide attempt, depression.  Feels the depression is under good control with EMR    FAMILY HISTORY  Family History   Adopted: Yes   Problem Relation Age of Onset    Unknown/Adopted Mother     Unknown/Adopted Father    Patient is adopted and she does not know her family.    SOCIAL HISTORY  Social History     Tobacco Use    Smoking status: Never    Smokeless tobacco: Never   Vaping Use    Vaping status: Never Used   Substance Use Topics    Alcohol use: No    Drug use: No       SYSTEMS REVIEW  Twelve-system ROS was done and other than the HPI this was negative except for neck pain, back pain, arm and leg pain, joint pain, numbness and tingling, sleepiness during the day, sleeping problems, headaches, anxiety, depression, palpitations, bloating, stomach pain, bowel problems, respiratory problems, skin changes, weight gain.  No new symptoms/issues.    MEDICATIONS  Current Outpatient Medications   Medication Sig Dispense Refill    aspirin-acetaminophen-caffeine (EXCEDRIN MIGRAINE) 250-250-65 MG tablet Take 1 tablet by mouth daily as needed for headaches      bisacodyl (DULCOLAX) 10 MG suppository Place 1 suppository (10 mg) rectally daily as needed for constipation 16 suppository 1    buPROPion (WELLBUTRIN XL) 300 MG 24 hr tablet Take 1 tablet (300 mg) by mouth every morning 90 tablet 3    dicyclomine (BENTYL) 10 MG capsule Take 1  capsule (10 mg) by mouth 4 times daily as needed (abdominal cramping) 90 capsule 11    erythromycin with ethanol (ERYGEL) 2 % gel Apply topically 2 times daily 30 g 3    fluticasone (FLONASE) 50 MCG/ACT nasal spray Spray 1 spray into both nostrils daily 15.8 mL 1    hydrOXYzine (VISTARIL) 25 MG capsule Take 1-2 capsules (25-50 mg) by mouth 3 times daily as needed for anxiety 30 capsule 0    levothyroxine (SYNTHROID) 88 MCG tablet Take 1 tablet (88 mcg) by mouth daily 90 tablet 3    meclizine (ANTIVERT) 12.5 MG tablet Take 1 tablet (12.5 mg) by mouth 3 times daily as needed for dizziness or nausea 30 tablet 1    methocarbamol (ROBAXIN) 500 MG tablet Take 1 tablet (500 mg) by mouth 4 times daily as needed for muscle spasms 30 tablet 1    Naltrexone HCl, Pain, 1.5 MG CAPS Take 1 capsule by mouth daily 90 capsule 3    nortriptyline (PAMELOR) 10 MG capsule Take 3cap/night and then increase by 1 cap/week to a max of 6 caps/night as needed and tolerated. 540 capsule 1    ondansetron (ZOFRAN ODT) 4 MG ODT tab Take 1 tablet (4 mg) by mouth every 8 hours as needed for nausea 30 tablet 1    predniSONE (DELTASONE) 20 MG tablet Take 3 tablets (60 mg) by mouth daily for 6 days Take in AM with food. 18 tablet 0    propranolol (INDERAL) 60 MG tablet Take 1 tablet (60 mg) by mouth every morning AND 1 tablet (60 mg) every evening. 180 tablet 3    psyllium (METAMUCIL/KONSYL) capsule Take 1 capsule by mouth daily 90 capsule 3    rizatriptan (MAXALT) 10 MG tablet Take 1 tablet (10 mg) by mouth at onset of headache for migraine May repeat in 2 hours. 30 tablet 1    scopolamine (TRANSDERM) 1 MG/3DAYS 72 hr patch Place 1 patch onto the skin every 72 hours 10 patch 1    topiramate (TOPAMAX) 100 MG tablet Take 1.5 tablets (150 mg) by mouth daily 135 tablet 3    vitamin D2 (ERGOCALCIFEROL) 39083 units (1250 mcg) capsule Take 1 capsule (50,000 Units) by mouth once a week for 12 doses 12 capsule 0    [START ON 5/15/2024] vitamin D3  (CHOLECALCIFEROL) 50 mcg (2000 units) tablet Take 1 tablet (50 mcg) by mouth daily 90 tablet 3    Naltrexone HCl POWD  (Patient not taking: Reported on 3/18/2024)       No current facility-administered medications for this visit.        PHYSICAL EXAMINATION  VITALS: BP (!) 142/88   Pulse 76   Resp 16   Wt 79.4 kg (175 lb)   LMP 04/04/2024   BMI 32.00 kg/m    GENERAL: Healthy appearing, alert, no acute distress, normal habitus.  CARDIOVASCULAR: Extremities warm and well perfused. Pulses present.   NEUROLOGICAL:  Patient is awake and oriented to self, place and time.  Attention span is normal.  Memory is grossly intact; previously MoCA 26.  Language is fluent and follows commands appropriately.  Appropriate fund of knowledge. Cranial nerves 2-12 are intact. There is no pronator drift.  Motor exam shows 5/5 strength in all extremities.  Tone is symmetric bilaterally in upper and lower extremities.  (Previously reflexes are symmetric and 2+ in upper extremities and lower extremities. Sensory exam is grossly intact to light touch, pin prick and vibration.)  Finger to nose and heel to shin is without dysmetria.  Romberg is negative.  Gait is normal and the patient is able to do tandem walk and walk on toes and heels.  Exam stable.    DIAGNOSTICS  MRI-images reviewed.  No major structural lesions noted.  Some T2 hyperintensities.  IMPRESSION:  1.  No acute intracranial process.  2.  Nonspecific scattered T2 hyperintensities within the cerebral white matter. These can be seen in association with migraine headaches.    Labs  Component      Latest Ref Rng & Units 8/31/2021 9/13/2021 10/15/2021 12/7/2021   Thyroid Peroxidase Antibody      <35 IU/mL >5,000 (H)      TSH      0.40 - 4.00 mU/L  1.72 0.11 (L) 1.22   T4 Free      0.76 - 1.46 ng/dL   1.32          OUTSIDE RECORDS  Outside referral notes and chart notes were reviewed and pertinent information has been summarized (in addition to the HPI):-Patient has been  diagnosed with long-haul Covid. Multiple testing has been ordered. Has been seen with cardiology. Also seeing endocrinology. Has been having brain fog for which she was referred to neurology. Does complain of easy fatigability as well.    LABS  Component      Latest Ref Rng & Units 12/14/2021   Vitamin B12      213 - 816 pg/mL 413   Vitamin B1 Whole Blood Level      70 - 180 nmol/L 104   Ferritin      10 - 130 ng/mL 192 (H)   Methylmalonic Acid      0.00 - 0.40 umol/L 0.21     EEG  IMPRESSION/REPORT/PLAN  This is a normal EEG during wakefulness and drowsiness except for mild generalized background dysrhythmia. Further clinical correlation is needed.     Please note that the absence of epileptiform abnormalities does not exclude the possibility of epilepsy in any patient.      Component      Latest Ref Rng & Units 6/8/2022 11/1/2022   Ferritin      12 - 150 ng/mL 484 (H)    TSH      0.30 - 4.20 uIU/mL  1.01   T4 Free      0.90 - 1.70 ng/dL  1.29       IMPRESSION/REPORT/PLAN  History of anti-TPO antibodies  History of 2019 novel coronavirus disease (COVID-19)  Brain fog/cognitive difficulty-improved  Chronic migraine without aura without status migrainosus, not intractable  Insomnia, unspecified type  Recent COVID infection with worsening symptoms    This is a 39 year old female with history of migraines with worsening migraines after COVID-19 infection and cognitive decline after COVID-19 infection.    Previously amitriptyline and propanolol were helping.  Blood pressure was slightly elevated and amitriptyline was causing daytime somnolence.  Propanolol was also helping with some tachycardia.  These side effects have now improved.  With addition of Topamax headaches have significantly improved.    Maxalt is helping with abortive therapy and will continue.    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  Anti-TPO antibodies were positive and she was treated with steroids with no  benefit.  MRI and EEG were noncontributory.  She does have a history of thyroid disease and is on levothyroxine.  Recent TSH levels were normal.  Continue to monitor.  She was previously referred to neuropsychology but this was never completed.  We will hold off for right now since the brain fog is better.  Stable.    For insomnia amitriptyline is helping but not completely.  She plans to exercise more to see if that would further help with her insomnia.    Headaches have improved eventually I would like to get her off some of these medications.  Currently she needs the amitriptyline for sleep and the propanolol for the tachycardia.  Topamax is being used for the headaches.    Worsening symptoms after recent COVID infection will put her on some steroids to see if that helps calm her headaches down.  Will also increase the dose of her nortriptyline.  She has been switched from amitriptyline to nortriptyline by her primary care provider.  Will further continue the Topamax.  She is taking 150 mg a day.  Will further increase the dose of propranolol which was decreased by primary care provider.  She can continue Maxalt and Zofran for abortive therapy.    Return back in 2 months to decide on further plan.    -     rizatriptan (MAXALT) 10 MG tablet; Take 1 tablet (10 mg) by mouth at onset of headache for migraine May repeat in 2 hours.  -     topiramate (TOPAMAX) 150 mg daily-when to take at night due to brain fog issues.  -     predniSONE (DELTASONE) 20 MG tablet; Take 3 tablets (60 mg) by mouth daily for 6 days Take in AM with food.  -     nortriptyline (PAMELOR) 10 MG capsule; Take 3cap/night and then increase by 1 cap/week to a max of 6 caps/night as needed and tolerated.  -     propranolol (INDERAL) 60 MG tablet; Take 1 tablet (60 mg) by mouth every morning AND 1 tablet (60 mg) every evening.      Return in about 2 months (around 6/25/2024) for In-Clinic Visit (must), Add on PAOR.    Over 30 minutes were spent  coordinating the care for the patient on the day of the encounter.  This includes previsit, during visit and post visit activities as documented above.  Counseling patient.  Multiple medications prescribed.  Worsening problems.  (Activities include but not inclusive of reviewing chart, reviewing outside records, reviewing labs and imaging study results as well as the images, patient visit time including getting history and exam,  use if applicable, review of test results with the patient and coming up with a plan in a shared model, counseling patient and family, education and answering patient questions, EMR , EMR diagnosis entry and problem list management, medication reconciliation and prescription management if applicable, paperwork if applicable, printing documents and documentation of the visit activities.)      Ranjan Parisi MD  Neurologist  [facility]  Tel:- [phone]    This note was dictated using voice recognition software.  Any grammatical or context distortions are unintentional and inherent to the software.

## 2024-04-25 NOTE — LETTER
4/25/2024         RE: Lady Saavedra  [id number] [address] [place] [id number]        Dear Colleague,    Thank you for referring your patient, Lady Saavedra, to the [facility] [place]. Please see a copy of my visit note below.    NEUROLOGY OUTPATIENT PROGRESS NOTE   Apr 25, 2024     CHIEF COMPLAINT/REASON FOR VISIT/REASON FOR CONSULT  Patient presents with:  Headache    REASON FOR CONSULTATION-headaches and brain fog.    REFERRAL SOURCE  Dr. Lalita Birmingham  CC Dr. Lalita Birmingham    HISTORY OF PRESENT ILLNESS  Lady Saavedra is a 39 year old female seen today for evaluation of headaches and brain fog.  She reports that she has a history of headache but then in March she got Covid.  She was admitted for Covid related pneumonia.  Was in the hospital for 1 week.  Since then the headaches have been much more frequent.  She is having them every day.  Reports significant photophobia and phonophobia with the headaches.  Headaches can be on any side in different places.  They can be behind the eyes and in the back of the head.  They are throbbing in nature.  Reports no visual aura with these.  Has been put on amitriptyline which has helped her sleep as well as with the headaches but nothing significant to get rid of the headaches completely.  Is using Advil and Tylenol for the headache still would last the whole day.  Is not using them around-the-clock.  Denies any other provoking factors.    Is also been diagnosed with thyroiditis since the Covid infection.  Is working with endocrinology on this.    Reports brain fog has been going on since the Covid infection.  Has not had any improvement.  Was let go of her job.  Has difficulty with memory, staying focused and doing tasks that she could previously do easily.  Does get good sleep at night with the amitriptyline.    Patient has recently been in contact with the Covid rehabilitation clinic.  Has not started the program  yet.    2/14/22  Patient returns today  1.  Patient reports that her headaches have significantly improved with the propanolol.  Is taking 60 mg twice a day.  Denies any side effects.  Headaches of 4-5 times a month.  Headaches are much less severe compared to before and she does not need to take abortive medication.  Did try the Maxalt once without any side effects and it did not work for her.  2.  Continues to have the brain fog.  Has been seen in the [facility] rehabilitation clinic and has not been able to do the therapies due to lack of insurance.  Steroids did not help with the brain fog.  3.  Reports that the amitriptyline is no longer working to help her sleep.  She is up till 2 AM every night.  Discussed about increasing the dose and she is interested in that.  Denies any side effects with the amitriptyline.    4/12/22  Patient returns today  1.  She reports that the headaches are twice a month.  Maxalt does work as abortive therapy though she has to eat something with it otherwise feels nauseous with the medication.  Propanolol amitriptyline is helping.  Some somnolence with the amitriptyline which is getting better.  Discussed about decreasing the amitriptyline and she wants to stay on the higher dose.  It was increased last time because the propanolol was not working by itself.  2.  Continues to have the brain fog.  Has insurance now and has gone to the [facility] rehabilitation clinic though has not been able to see the therapist yet.  Is only seeing her counselor for her mental illness.    3.  Is sleeping better with the amitriptyline at this point though does have some somnolence.  No insomnia.    2/1/23  Patient returns today  1.  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  Headaches have since then gotten worse and she is having 2-3 headaches a week.  About 15 headache days a month.  Remains on the amitriptyline and propanolol.   This daytime somnolence with the amitriptyline has improved.  No side effects with propanolol.  Blood pressure is better at this point as well.  Maxalt does work as abortive therapy.  She does need to use it with food to avoid nausea.  2.  Brain fog is also improved with time.  3.  Sleeping better with the amitriptyline.  No issues.  No other new symptoms    4/5/23  Patient returns today  1.  She is currently on 100 mg of Topamax.  She takes it in the morning because it prevents her from sleeping at night.  She reports that she had complete resolution of the headaches.  Has not had any headaches in the last month.  2.  Occasionally if she does have a headache she will use Maxalt which is still working  3.  She remains on propanolol.  She feels that is also being helping with the tachycardia.  Remains on amitriptyline though does complain that she occasionally cannot sleep.  She feels that she is not exercising enough to tire herself out to fall asleep  4.  Brain fog symptoms have now improved.  No new issues/concerns.    4/25/24  Patient returns today.  Since she was last seen she has had COVID and has been having long-haul COVID symptoms along with worsening headaches.  1.  Headaches are almost every day.  Headaches are brought on by bright lights.  She does have accommodation at work.  Has been able to work from home which is helping.  In terms of her medications she has been on a higher dose of Topamax.  Is taking 1-1/2 tablet in the morning.  Has been switched from amitriptyline to nortriptyline.  No side effects.  Feels that she is sleeping well.  Propranolol dose has been decreased.  Is using Zofran and meclizine for nausea with the headaches.  Maxalt does work but causes some nausea.    2.  Per the post-COVID clinic she has been working with PT/OT/speech therapy with no significant improvement.  Brain fog is also gotten worse.  No other new concerns.    Previous history is reviewed and this is  unchanged.    PAST MEDICAL/SURGICAL HISTORY  Past Medical History:   Diagnosis Date     Abnormal Pap smear of cervix 2009, 2012, 2014    see problem list     Cervical high risk HPV (human papillomavirus) test positive 2009, 2012    see problem list     Chickenpox      Depressive disorder      Gestational HTN 01/01/2015     Hypothyroidism      Migraine with aura and without status migrainosus, not intractable      Seasonal allergies     Was on clariten uses occasional sudafed     Patient Active Problem List   Diagnosis     CARDIOVASCULAR SCREENING; LDL GOAL LESS THAN 160     Pneumonia due to 2019 novel coronavirus     COVID-19 long hauler     Anovulation     Prediabetes     Migraine with aura and without status migrainosus, not intractable     Hyperlipidemia LDL goal <100     Nonalcoholic fatty liver disease     Cognitive communication deficit   Significant of migraines, mental illness, suicide attempt, depression.  Feels the depression is under good control with EMR    FAMILY HISTORY  Family History   Adopted: Yes   Problem Relation Age of Onset     Unknown/Adopted Mother      Unknown/Adopted Father    Patient is adopted and she does not know her family.    SOCIAL HISTORY  Social History     Tobacco Use     Smoking status: Never     Smokeless tobacco: Never   Vaping Use     Vaping status: Never Used   Substance Use Topics     Alcohol use: No     Drug use: No       SYSTEMS REVIEW  Twelve-system ROS was done and other than the HPI this was negative except for neck pain, back pain, arm and leg pain, joint pain, numbness and tingling, sleepiness during the day, sleeping problems, headaches, anxiety, depression, palpitations, bloating, stomach pain, bowel problems, respiratory problems, skin changes, weight gain.  No new symptoms/issues.    MEDICATIONS  Current Outpatient Medications   Medication Sig Dispense Refill     aspirin-acetaminophen-caffeine (EXCEDRIN MIGRAINE) 250-250-65 MG tablet Take 1 tablet by mouth daily  as needed for headaches       bisacodyl (DULCOLAX) 10 MG suppository Place 1 suppository (10 mg) rectally daily as needed for constipation 16 suppository 1     buPROPion (WELLBUTRIN XL) 300 MG 24 hr tablet Take 1 tablet (300 mg) by mouth every morning 90 tablet 3     dicyclomine (BENTYL) 10 MG capsule Take 1 capsule (10 mg) by mouth 4 times daily as needed (abdominal cramping) 90 capsule 11     erythromycin with ethanol (ERYGEL) 2 % gel Apply topically 2 times daily 30 g 3     fluticasone (FLONASE) 50 MCG/ACT nasal spray Spray 1 spray into both nostrils daily 15.8 mL 1     hydrOXYzine (VISTARIL) 25 MG capsule Take 1-2 capsules (25-50 mg) by mouth 3 times daily as needed for anxiety 30 capsule 0     levothyroxine (SYNTHROID) 88 MCG tablet Take 1 tablet (88 mcg) by mouth daily 90 tablet 3     meclizine (ANTIVERT) 12.5 MG tablet Take 1 tablet (12.5 mg) by mouth 3 times daily as needed for dizziness or nausea 30 tablet 1     methocarbamol (ROBAXIN) 500 MG tablet Take 1 tablet (500 mg) by mouth 4 times daily as needed for muscle spasms 30 tablet 1     Naltrexone HCl, Pain, 1.5 MG CAPS Take 1 capsule by mouth daily 90 capsule 3     nortriptyline (PAMELOR) 10 MG capsule Take 3cap/night and then increase by 1 cap/week to a max of 6 caps/night as needed and tolerated. 540 capsule 1     ondansetron (ZOFRAN ODT) 4 MG ODT tab Take 1 tablet (4 mg) by mouth every 8 hours as needed for nausea 30 tablet 1     predniSONE (DELTASONE) 20 MG tablet Take 3 tablets (60 mg) by mouth daily for 6 days Take in AM with food. 18 tablet 0     propranolol (INDERAL) 60 MG tablet Take 1 tablet (60 mg) by mouth every morning AND 1 tablet (60 mg) every evening. 180 tablet 3     psyllium (METAMUCIL/KONSYL) capsule Take 1 capsule by mouth daily 90 capsule 3     rizatriptan (MAXALT) 10 MG tablet Take 1 tablet (10 mg) by mouth at onset of headache for migraine May repeat in 2 hours. 30 tablet 1     scopolamine (TRANSDERM) 1 MG/3DAYS 72 hr patch Place 1  patch onto the skin every 72 hours 10 patch 1     topiramate (TOPAMAX) 100 MG tablet Take 1.5 tablets (150 mg) by mouth daily 135 tablet 3     vitamin D2 (ERGOCALCIFEROL) 54686 units (1250 mcg) capsule Take 1 capsule (50,000 Units) by mouth once a week for 12 doses 12 capsule 0     [START ON 5/15/2024] vitamin D3 (CHOLECALCIFEROL) 50 mcg (2000 units) tablet Take 1 tablet (50 mcg) by mouth daily 90 tablet 3     Naltrexone HCl POWD  (Patient not taking: Reported on 3/18/2024)       No current facility-administered medications for this visit.        PHYSICAL EXAMINATION  VITALS: BP (!) 142/88   Pulse 76   Resp 16   Wt 79.4 kg (175 lb)   LMP 04/04/2024   BMI 32.00 kg/m    GENERAL: Healthy appearing, alert, no acute distress, normal habitus.  CARDIOVASCULAR: Extremities warm and well perfused. Pulses present.   NEUROLOGICAL:  Patient is awake and oriented to self, place and time.  Attention span is normal.  Memory is grossly intact; previously MoCA 26.  Language is fluent and follows commands appropriately.  Appropriate fund of knowledge. Cranial nerves 2-12 are intact. There is no pronator drift.  Motor exam shows 5/5 strength in all extremities.  Tone is symmetric bilaterally in upper and lower extremities.  (Previously reflexes are symmetric and 2+ in upper extremities and lower extremities. Sensory exam is grossly intact to light touch, pin prick and vibration.)  Finger to nose and heel to shin is without dysmetria.  Romberg is negative.  Gait is normal and the patient is able to do tandem walk and walk on toes and heels.  Exam stable.    DIAGNOSTICS  MRI-images reviewed.  No major structural lesions noted.  Some T2 hyperintensities.  IMPRESSION:  1.  No acute intracranial process.  2.  Nonspecific scattered T2 hyperintensities within the cerebral white matter. These can be seen in association with migraine headaches.    Labs  Component      Latest Ref Rng & Units 8/31/2021 9/13/2021 10/15/2021 12/7/2021    Thyroid Peroxidase Antibody      <35 IU/mL >5,000 (H)      TSH      0.40 - 4.00 mU/L  1.72 0.11 (L) 1.22   T4 Free      0.76 - 1.46 ng/dL   1.32          OUTSIDE RECORDS  Outside referral notes and chart notes were reviewed and pertinent information has been summarized (in addition to the HPI):-Patient has been diagnosed with long-haul Covid. Multiple testing has been ordered. Has been seen with cardiology. Also seeing endocrinology. Has been having brain fog for which she was referred to neurology. Does complain of easy fatigability as well.    LABS  Component      Latest Ref Rng & Units 12/14/2021   Vitamin B12      213 - 816 pg/mL 413   Vitamin B1 Whole Blood Level      70 - 180 nmol/L 104   Ferritin      10 - 130 ng/mL 192 (H)   Methylmalonic Acid      0.00 - 0.40 umol/L 0.21     EEG  IMPRESSION/REPORT/PLAN  This is a normal EEG during wakefulness and drowsiness except for mild generalized background dysrhythmia. Further clinical correlation is needed.     Please note that the absence of epileptiform abnormalities does not exclude the possibility of epilepsy in any patient.      Component      Latest Ref Rng & Units 6/8/2022 11/1/2022   Ferritin      12 - 150 ng/mL 484 (H)    TSH      0.30 - 4.20 uIU/mL  1.01   T4 Free      0.90 - 1.70 ng/dL  1.29       IMPRESSION/REPORT/PLAN  History of anti-TPO antibodies  History of 2019 novel coronavirus disease (COVID-19)  Brain fog/cognitive difficulty-improved  Chronic migraine without aura without status migrainosus, not intractable  Insomnia, unspecified type  Recent COVID infection with worsening symptoms    This is a 39 year old female with history of migraines with worsening migraines after COVID-19 infection and cognitive decline after COVID-19 infection.    Previously amitriptyline and propanolol were helping.  Blood pressure was slightly elevated and amitriptyline was causing daytime somnolence.  Propanolol was also helping with some tachycardia.  These side  effects have now improved.  With addition of Topamax headaches have significantly improved.    Maxalt is helping with abortive therapy and will continue.    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  Anti-TPO antibodies were positive and she was treated with steroids with no benefit.  MRI and EEG were noncontributory.  She does have a history of thyroid disease and is on levothyroxine.  Recent TSH levels were normal.  Continue to monitor.  She was previously referred to neuropsychology but this was never completed.  We will hold off for right now since the brain fog is better.  Stable.    For insomnia amitriptyline is helping but not completely.  She plans to exercise more to see if that would further help with her insomnia.    Headaches have improved eventually I would like to get her off some of these medications.  Currently she needs the amitriptyline for sleep and the propanolol for the tachycardia.  Topamax is being used for the headaches.    Worsening symptoms after recent COVID infection will put her on some steroids to see if that helps calm her headaches down.  Will also increase the dose of her nortriptyline.  She has been switched from amitriptyline to nortriptyline by her primary care provider.  Will further continue the Topamax.  She is taking 150 mg a day.  Will further increase the dose of propranolol which was decreased by primary care provider.  She can continue Maxalt and Zofran for abortive therapy.    Return back in 2 months to decide on further plan.    -     rizatriptan (MAXALT) 10 MG tablet; Take 1 tablet (10 mg) by mouth at onset of headache for migraine May repeat in 2 hours.  -     topiramate (TOPAMAX) 150 mg daily-when to take at night due to brain fog issues.  -     predniSONE (DELTASONE) 20 MG tablet; Take 3 tablets (60 mg) by mouth daily for 6 days Take in AM with food.  -     nortriptyline (PAMELOR) 10 MG capsule; Take 3cap/night and then increase  by 1 cap/week to a max of 6 caps/night as needed and tolerated.  -     propranolol (INDERAL) 60 MG tablet; Take 1 tablet (60 mg) by mouth every morning AND 1 tablet (60 mg) every evening.      Return in about 2 months (around 6/25/2024) for In-Clinic Visit (must), Add on PAOR.    Over 30 minutes were spent coordinating the care for the patient on the day of the encounter.  This includes previsit, during visit and post visit activities as documented above.  Counseling patient.  Multiple medications prescribed.  Worsening problems.  (Activities include but not inclusive of reviewing chart, reviewing outside records, reviewing labs and imaging study results as well as the images, patient visit time including getting history and exam,  use if applicable, review of test results with the patient and coming up with a plan in a shared model, counseling patient and family, education and answering patient questions, EMR , EMR diagnosis entry and problem list management, medication reconciliation and prescription management if applicable, paperwork if applicable, printing documents and documentation of the visit activities.)      Ranjan Parisi MD  Neurologist  [facility]  Tel:- [phone]    This note was dictated using voice recognition software.  Any grammatical or context distortions are unintentional and inherent to the software.      Again, thank you for allowing me to participate in the care of your patient.        Sincerely,        Ranjan Parisi MD

## 2024-04-26 ENCOUNTER — MYC MEDICAL ADVICE (OUTPATIENT)
Dept: FAMILY MEDICINE | Facility: CLINIC | Age: 39
End: 2024-04-26
Payer: COMMERCIAL

## 2024-04-26 DIAGNOSIS — U09.9 COVID-19 LONG HAULER: ICD-10-CM

## 2024-04-26 DIAGNOSIS — R41.89 BRAIN FOG: ICD-10-CM

## 2024-04-26 DIAGNOSIS — R53.83 FATIGUE, UNSPECIFIED TYPE: ICD-10-CM

## 2024-04-28 SDOH — SOCIAL STABILITY: SOCIAL NETWORK

## 2024-04-28 SDOH — SOCIAL STABILITY: SOCIAL NETWORK: I HAVE TROUBLE DOING ALL OF MY USUAL WORK (INCLUDE WORK AT HOME): SOMETIMES

## 2024-04-28 SDOH — SOCIAL STABILITY: SOCIAL NETWORK: PROMIS ABILITY TO PARTICIPATE IN SOCIAL ROLES & ACTIVITIES T-SCORE: 48

## 2024-04-28 SDOH — SOCIAL STABILITY: SOCIAL NETWORK: I HAVE TROUBLE DOING ALL OF MY REGULAR LEISURE ACTIVITIES WITH OTHERS: RARELY

## 2024-04-28 SDOH — SOCIAL STABILITY: SOCIAL NETWORK: I HAVE TROUBLE DOING ALL OF THE ACTIVITIES WITH FRIENDS THAT I WANT TO DO: SOMETIMES

## 2024-04-28 SDOH — SOCIAL STABILITY: SOCIAL NETWORK: I HAVE TROUBLE DOING ALL OF THE FAMILY ACTIVITIES THAT I WANT TO DO: RARELY

## 2024-04-28 ASSESSMENT — ANXIETY QUESTIONNAIRES
3. WORRYING TOO MUCH ABOUT DIFFERENT THINGS: NOT AT ALL
2. NOT BEING ABLE TO STOP OR CONTROL WORRYING: NOT AT ALL
IF YOU CHECKED OFF ANY PROBLEMS ON THIS QUESTIONNAIRE, HOW DIFFICULT HAVE THESE PROBLEMS MADE IT FOR YOU TO DO YOUR WORK, TAKE CARE OF THINGS AT HOME, OR GET ALONG WITH OTHER PEOPLE: SOMEWHAT DIFFICULT
8. IF YOU CHECKED OFF ANY PROBLEMS, HOW DIFFICULT HAVE THESE MADE IT FOR YOU TO DO YOUR WORK, TAKE CARE OF THINGS AT HOME, OR GET ALONG WITH OTHER PEOPLE?: SOMEWHAT DIFFICULT
GAD7 TOTAL SCORE: 3
6. BECOMING EASILY ANNOYED OR IRRITABLE: SEVERAL DAYS
4. TROUBLE RELAXING: SEVERAL DAYS
5. BEING SO RESTLESS THAT IT IS HARD TO SIT STILL: NOT AT ALL
1. FEELING NERVOUS, ANXIOUS, OR ON EDGE: SEVERAL DAYS
GAD7 TOTAL SCORE: 3
GAD7 TOTAL SCORE: 3
7. FEELING AFRAID AS IF SOMETHING AWFUL MIGHT HAPPEN: NOT AT ALL
7. FEELING AFRAID AS IF SOMETHING AWFUL MIGHT HAPPEN: NOT AT ALL

## 2024-04-28 ASSESSMENT — PATIENT HEALTH QUESTIONNAIRE - PHQ9
SUM OF ALL RESPONSES TO PHQ QUESTIONS 1-9: 6
SUM OF ALL RESPONSES TO PHQ QUESTIONS 1-9: 6
10. IF YOU CHECKED OFF ANY PROBLEMS, HOW DIFFICULT HAVE THESE PROBLEMS MADE IT FOR YOU TO DO YOUR WORK, TAKE CARE OF THINGS AT HOME, OR GET ALONG WITH OTHER PEOPLE: SOMEWHAT DIFFICULT

## 2024-04-29 ENCOUNTER — VIRTUAL VISIT (OUTPATIENT)
Dept: PHYSICAL MEDICINE AND REHAB | Facility: CLINIC | Age: 39
End: 2024-04-29
Payer: COMMERCIAL

## 2024-04-29 DIAGNOSIS — G43.109 MIGRAINE WITH AURA AND WITHOUT STATUS MIGRAINOSUS, NOT INTRACTABLE: ICD-10-CM

## 2024-04-29 DIAGNOSIS — U09.9 COVID-19 LONG HAULER: ICD-10-CM

## 2024-04-29 DIAGNOSIS — R53.83 FATIGUE, UNSPECIFIED TYPE: ICD-10-CM

## 2024-04-29 DIAGNOSIS — R41.841 COGNITIVE COMMUNICATION DEFICIT: Primary | ICD-10-CM

## 2024-04-29 PROCEDURE — 99213 OFFICE O/P EST LOW 20 MIN: CPT | Mod: 95 | Performed by: PHYSICIAN ASSISTANT

## 2024-04-29 NOTE — LETTER
4/29/2024       RE: Lady Saavedra  [id number] [address] [place] [id number]       Dear Colleague,    Thank you for referring your patient, Lady Saavedra, to the [facility] PHYSICAL MEDICINE AND REHABILITATION CLINIC [place] [facility]. Please see a copy of my visit note below.    Lady Saavedra is a 39 year old female who presents to be evaluated for a billable video visit.      Assessment/Impression   1. Cognitive communication deficit  Patient with short-term memory as well as word recall difficulties since most recent COVID infection in January 2024.  Of note patient was recently found to have significant low vitamin D as well as had increase in Topamax for her migraines.  Encouraged to continue with vitamin D and B12 replacements as well as working with speech therapy.  Appreciate Neurology as well.      2. Fatigue, unspecified type  Patient with ongoing significant malaise.    Encourage patient to continue pacing and prioritizing tasks to decrease disruption of daily activities due to her fatigue. Discussed also increase in her Pamelor and Propanolol are likely contributing to her excessive fatigue this weekend.  Will monitor as well as send to Sleep medicine due to profound daytime sleepiness.  Encouraged to continue working with Occupational therapy.   - Adult Sleep medicine Eval & Management ; Future     3. Migraine with aura and without status migrainosus, not intractable  Patient with worsening migraines after recent COVID infection in January 2024.  Patient did have increase in Topamax and discussed as above that this may be contributing to her cognitive communication deficit.  She is following with Neurology and recently had a steroid burst as well as medications increased.  She will be following up in June. Appreciate recommendations.      4. COVID-19 long hauler  Discussed COVID and Post COVID with patient.  Educational materials  provided and all questions answered.   - Adult Post Covid Clinic  Referral       Plan:  I reviewed present knowledge on long-Covid.  Education was provided and question were answered.  Orders/Referrals as above  I will advised patient on test results  I will follow up with Lady Saavedra in 2 months. I will review progress and consider need for any other therapeutic interventions. If there are any questions and/or concerns she will call the clinic.      On day of encounter time spent in chart review and with patient in consultation, exam, education,coordination of care,  review of outside charts/documentation and documentation:   28 minutes     I have attempted to proof read for major spelling errors and apologize for any minor errors I may have missed.     This note was dictated using voice recognition software. Any grammatical or context distortions are unintentional and inherent to the software.  _________________________________  Esther Lerma PA-C  [facility] PHYSICAL MEDICINE AND REHABILITATION CLINIC [facility]   This 39 year old female presents to the [facility] Post-COVID clinic as a new consult on 3/18/24 to evaluate continuing symptoms after COVID infection initially diagnosed 3/31/21.  Lady Saavedra presented to ED on 4/5/21 complaining of shortness of breath, cough, headache, generalized aches and pains, diarrhea, fatigue, and weakness. Treatment was Oxygen, Decadron, Remdesivir.  Lady Saavedra experienced complications of lingering Dyspnea.  Patient did see Dr. Yuan in December 2021 and does report that she follow through with all suggestions recommended by Dr. Yuan's ( Chest Xray, Mental health referral, Neurology and Gastro referral) and has since resolved from the symptoms.  She contracted COVID again January 10th 2024 and had symptoms of headaches, body aches, brain fog, diarrhea, nausea, sore throat, cough,  fatigue.  Continuing symptoms include fatigue, headache, brain fog, distractibility, diarrhea, and nausea.     Today 4/29/24  Patient feels her headaches are improving a little.  She has headaches but they are not as intense and are several times a week.  She still has concentration issues.  She is using blue blocking screens which is helping.  She feels her word finding is improving. She is working with OT and Speech.  She feels her fatigue is poor. She is getting lots of sleep.  She does occasionally wake up but will fall back asleep.  She is not feeling rested wake up. She does not snore and she asked her . She does have excessive daytime somnolence but recently had medications increased. Overall some improvement in her concentration but still with migraines and fatigue.     Current concerns: Health Concerns      4/28/2024    10:09 AM   PHQ Assesment Total Score(s)   PHQ-9 Score 6           4/28/2024    10:12 AM   JOSÉ MIGUEL-7 Results   JOSÉ MIGUEL 7 TOTAL SCORE 3 (minimal anxiety)   JOSÉ MIGUEL-7 Total Score 3         4/28/2024    10:13 AM   PTSD Screen Score   Have you ever experienced this kind of event? No         4/28/2024    10:30 AM   PROMIS-29   PROMIS Physical Function T-Score 40 (mild dysfunction)   PROMIS Anxiety T-Score 48 (within normal limits)   PROMIS Depression T-Score 49 (within normal limits)   PROMIS Fatigue T-Score 61 (moderate)   PROMIS Sleep Disturbance T-Score 44 (within normal limits)   PROMIS Ability to Participate in Social Roles & Activities T-Score 48 (within normal limits)   PROMIS Pain Interference T-Score 56 (mild)   PROMIS Pain Intensity 3       Past Medical History:   Diagnosis Date    Abnormal Pap smear of cervix 2009, 2012, 2014    see problem list    Cervical high risk HPV (human papillomavirus) test positive 2009, 2012    see problem list    Chickenpox     Depressive disorder     Gestational HTN 01/01/2015    Hypothyroidism     Migraine with aura and without status migrainosus, not  intractable     Seasonal allergies     Was on clariten uses occasional sudafed       Past Surgical History:   Procedure Laterality Date    BIOPSY      COSMETIC SURGERY      HC ENLARGE BREAST WITH IMPLANT      MOUTH SURGERY  age 18    wisdom teeth       Family History   Adopted: Yes   Problem Relation Age of Onset    Unknown/Adopted Mother     Unknown/Adopted Father        Social History     Tobacco Use    Smoking status: Never    Smokeless tobacco: Never   Vaping Use    Vaping status: Never Used   Substance Use Topics    Alcohol use: No    Drug use: No         Current Outpatient Medications:     aspirin-acetaminophen-caffeine (EXCEDRIN MIGRAINE) 250-250-65 MG tablet, Take 1 tablet by mouth daily as needed for headaches, Disp: , Rfl:     bisacodyl (DULCOLAX) 10 MG suppository, Place 1 suppository (10 mg) rectally daily as needed for constipation, Disp: 16 suppository, Rfl: 1    buPROPion (WELLBUTRIN XL) 300 MG 24 hr tablet, Take 1 tablet (300 mg) by mouth every morning, Disp: 90 tablet, Rfl: 3    dicyclomine (BENTYL) 10 MG capsule, Take 1 capsule (10 mg) by mouth 4 times daily as needed (abdominal cramping), Disp: 90 capsule, Rfl: 11    erythromycin with ethanol (ERYGEL) 2 % gel, Apply topically 2 times daily, Disp: 30 g, Rfl: 3    fluticasone (FLONASE) 50 MCG/ACT nasal spray, Spray 1 spray into both nostrils daily, Disp: 15.8 mL, Rfl: 1    hydrOXYzine (VISTARIL) 25 MG capsule, Take 1-2 capsules (25-50 mg) by mouth 3 times daily as needed for anxiety, Disp: 30 capsule, Rfl: 0    levothyroxine (SYNTHROID) 88 MCG tablet, Take 1 tablet (88 mcg) by mouth daily, Disp: 90 tablet, Rfl: 3    meclizine (ANTIVERT) 12.5 MG tablet, Take 1 tablet (12.5 mg) by mouth 3 times daily as needed for dizziness or nausea, Disp: 30 tablet, Rfl: 1    methocarbamol (ROBAXIN) 500 MG tablet, Take 1 tablet (500 mg) by mouth 4 times daily as needed for muscle spasms, Disp: 30 tablet, Rfl: 1    Naltrexone HCl POWD, , Disp: , Rfl:     Naltrexone  HCl, Pain, 1.5 MG CAPS, Take 2 capsules by mouth daily, Disp: 180 capsule, Rfl: 3    nortriptyline (PAMELOR) 10 MG capsule, Take 3cap/night and then increase by 1 cap/week to a max of 6 caps/night as needed and tolerated., Disp: 540 capsule, Rfl: 1    ondansetron (ZOFRAN ODT) 4 MG ODT tab, Take 1 tablet (4 mg) by mouth every 8 hours as needed for nausea, Disp: 30 tablet, Rfl: 1    predniSONE (DELTASONE) 20 MG tablet, Take 3 tablets (60 mg) by mouth daily for 6 days Take in AM with food., Disp: 18 tablet, Rfl: 0    propranolol (INDERAL) 60 MG tablet, Take 1 tablet (60 mg) by mouth every morning AND 1 tablet (60 mg) every evening., Disp: 180 tablet, Rfl: 3    psyllium (METAMUCIL/KONSYL) capsule, Take 1 capsule by mouth daily, Disp: 90 capsule, Rfl: 3    rizatriptan (MAXALT) 10 MG tablet, Take 1 tablet (10 mg) by mouth at onset of headache for migraine May repeat in 2 hours., Disp: 30 tablet, Rfl: 1    scopolamine (TRANSDERM) 1 MG/3DAYS 72 hr patch, Place 1 patch onto the skin every 72 hours, Disp: 10 patch, Rfl: 1    topiramate (TOPAMAX) 100 MG tablet, Take 1.5 tablets (150 mg) by mouth daily, Disp: 135 tablet, Rfl: 3    vitamin D2 (ERGOCALCIFEROL) 27585 units (1250 mcg) capsule, Take 1 capsule (50,000 Units) by mouth once a week for 12 doses, Disp: 12 capsule, Rfl: 0    [START ON 5/15/2024] vitamin D3 (CHOLECALCIFEROL) 50 mcg (2000 units) tablet, Take 1 tablet (50 mcg) by mouth daily, Disp: 90 tablet, Rfl: 3    Review of Systems   Constitutional, HEENT, cardiovascular, pulmonary, gi and gu systems are negative, except as otherwise noted.      Objective         Vitals:  No vitals were obtained today due to virtual visit.    Physical Exam   EYES: Eyes grossly normal to inspection.  No discharge or erythema, or obvious scleral/conjunctival abnormalities.  SKIN: Visible skin clear. No significant rash, abnormal pigmentation or lesions.  NEURO: Cranial nerves grossly intact.  Mentation and speech appropriate for  age.  GENERAL: Healthy, alert and no distress  RESP: No audible wheeze, cough, or visible cyanosis.  No visible retractions or increased work of breathing.    PSYCH: Mentation appears normal, affect normal/bright, judgement and insight intact, normal speech and appearance well-groomed.      Labs:   Lab on 03/20/2024   Component Date Value Ref Range Status    Vitamin B12 03/20/2024 244  232 - 1,245 pg/mL Final     Imaging:  I personally reviewed the following imaging results today and those on care everywhere, if indicated     Nothing new to review    Reviewed imaging from [facility] sites     Medical Records Reviewed:  Reviewed consults/documents from [facility] including  Primary Care, Neurology, OT, and SLP         Again, thank you for allowing me to participate in the care of your patient.      Sincerely,    Esther Lerma PA-C

## 2024-04-29 NOTE — PROGRESS NOTES
Lady Saavedra is a 39 year old female who presents to be evaluated for a billable video visit.    Video-Visit Details    Video visit Start time:9:31 AM    Type of service:  Video Visit    Video End Time: 9:48 AM    Originating Location (pt. Location): Home    Distant Location (provider location):  Off- Site    Platform used for Video Visit: [facility]    Assessment/Impression   1. Cognitive communication deficit  Patient with short-term memory as well as word recall difficulties since most recent COVID infection in January 2024.  Of note patient was recently found to have significant low vitamin D as well as had increase in Topamax for her migraines.  Encouraged to continue with vitamin D and B12 replacements as well as working with speech therapy.  Appreciate Neurology as well.      2. Fatigue, unspecified type  Patient with ongoing significant malaise.    Encourage patient to continue pacing and prioritizing tasks to decrease disruption of daily activities due to her fatigue. Discussed also increase in her Pamelor and Propanolol are likely contributing to her excessive fatigue this weekend.  Will monitor as well as send to Sleep medicine due to profound daytime sleepiness.  Encouraged to continue working with Occupational therapy.   - Adult Sleep medicine Eval & Management ; Future     3. Migraine with aura and without status migrainosus, not intractable  Patient with worsening migraines after recent COVID infection in January 2024.  Patient did have increase in Topamax and discussed as above that this may be contributing to her cognitive communication deficit.  She is following with Neurology and recently had a steroid burst as well as medications increased.  She will be following up in June. Appreciate recommendations.      4. COVID-19 long hauler  Discussed COVID and Post COVID with patient.  Educational materials provided and all questions answered.   - Adult Post Covid Clinic  Referral       Plan:  I  reviewed present knowledge on long-Covid.  Education was provided and question were answered.  Orders/Referrals as above  I will advised patient on test results  I will follow up with Lady Saavedra in 2 months. I will review progress and consider need for any other therapeutic interventions. If there are any questions and/or concerns she will call the clinic.      On day of encounter time spent in chart review and with patient in consultation, exam, education,coordination of care,  review of outside charts/documentation and documentation:   28 minutes     I have attempted to proof read for major spelling errors and apologize for any minor errors I may have missed.     This note was dictated using voice recognition software. Any grammatical or context distortions are unintentional and inherent to the software.  _________________________________  Esther Lerma PA-C  [facility] PHYSICAL MEDICINE AND REHABILITATION CLINIC [facility]   This 39 year old female presents to the [facility] Rehabilitation Medicine Post-COVID clinic as a new consult on 3/18/24 to evaluate continuing symptoms after COVID infection initially diagnosed 3/31/21.  Lady Saavedra presented to ED on 4/5/21 complaining of shortness of breath, cough, headache, generalized aches and pains, diarrhea, fatigue, and weakness. Treatment was Oxygen, Decadron, Remdesivir.  Lady Saavedra experienced complications of lingering Dyspnea.  Patient did see Dr. Yuan in December 2021 and does report that she follow through with all suggestions recommended by Dr. Yuan's ( Chest Xray, Mental health referral, Neurology and Gastro referral) and has since resolved from the symptoms.  She contracted COVID again January 10th 2024 and had symptoms of headaches, body aches, brain fog, diarrhea, nausea, sore throat, cough, fatigue.  Continuing symptoms include fatigue, headache, brain fog, distractibility, diarrhea, and  nausea.     Today 4/29/24  Patient feels her headaches are improving a little.  She has headaches but they are not as intense and are several times a week.  She still has concentration issues.  She is using blue blocking screens which is helping.  She feels her word finding is improving. She is working with OT and Speech.  She feels her fatigue is poor. She is getting lots of sleep.  She does occasionally wake up but will fall back asleep.  She is not feeling rested wake up. She does not snore and she asked her . She does have excessive daytime somnolence but recently had medications increased. Overall some improvement in her concentration but still with migraines and fatigue.     Current concerns: Health Concerns      4/28/2024    10:09 AM   PHQ Assesment Total Score(s)   PHQ-9 Score 6           4/28/2024    10:12 AM   JOSÉ MIGUEL-7 Results   JOSÉ MIGUEL 7 TOTAL SCORE 3 (minimal anxiety)   JOSÉ MIGUEL-7 Total Score 3         4/28/2024    10:13 AM   PTSD Screen Score   Have you ever experienced this kind of event? No         4/28/2024    10:30 AM   PROMIS-29   PROMIS Physical Function T-Score 40 (mild dysfunction)   PROMIS Anxiety T-Score 48 (within normal limits)   PROMIS Depression T-Score 49 (within normal limits)   PROMIS Fatigue T-Score 61 (moderate)   PROMIS Sleep Disturbance T-Score 44 (within normal limits)   PROMIS Ability to Participate in Social Roles & Activities T-Score 48 (within normal limits)   PROMIS Pain Interference T-Score 56 (mild)   PROMIS Pain Intensity 3       Past Medical History:   Diagnosis Date    Abnormal Pap smear of cervix 2009, 2012, 2014    see problem list    Cervical high risk HPV (human papillomavirus) test positive 2009, 2012    see problem list    Chickenpox     Depressive disorder     Gestational HTN 01/01/2015    Hypothyroidism     Migraine with aura and without status migrainosus, not intractable     Seasonal allergies     Was on clariten uses occasional sudafed       Past Surgical History:    Procedure Laterality Date    BIOPSY      COSMETIC SURGERY      HC ENLARGE BREAST WITH IMPLANT      MOUTH SURGERY  age 18    wisdom teeth       Family History   Adopted: Yes   Problem Relation Age of Onset    Unknown/Adopted Mother     Unknown/Adopted Father        Social History     Tobacco Use    Smoking status: Never    Smokeless tobacco: Never   Vaping Use    Vaping status: Never Used   Substance Use Topics    Alcohol use: No    Drug use: No         Current Outpatient Medications:     aspirin-acetaminophen-caffeine (EXCEDRIN MIGRAINE) 250-250-65 MG tablet, Take 1 tablet by mouth daily as needed for headaches, Disp: , Rfl:     bisacodyl (DULCOLAX) 10 MG suppository, Place 1 suppository (10 mg) rectally daily as needed for constipation, Disp: 16 suppository, Rfl: 1    buPROPion (WELLBUTRIN XL) 300 MG 24 hr tablet, Take 1 tablet (300 mg) by mouth every morning, Disp: 90 tablet, Rfl: 3    dicyclomine (BENTYL) 10 MG capsule, Take 1 capsule (10 mg) by mouth 4 times daily as needed (abdominal cramping), Disp: 90 capsule, Rfl: 11    erythromycin with ethanol (ERYGEL) 2 % gel, Apply topically 2 times daily, Disp: 30 g, Rfl: 3    fluticasone (FLONASE) 50 MCG/ACT nasal spray, Spray 1 spray into both nostrils daily, Disp: 15.8 mL, Rfl: 1    hydrOXYzine (VISTARIL) 25 MG capsule, Take 1-2 capsules (25-50 mg) by mouth 3 times daily as needed for anxiety, Disp: 30 capsule, Rfl: 0    levothyroxine (SYNTHROID) 88 MCG tablet, Take 1 tablet (88 mcg) by mouth daily, Disp: 90 tablet, Rfl: 3    meclizine (ANTIVERT) 12.5 MG tablet, Take 1 tablet (12.5 mg) by mouth 3 times daily as needed for dizziness or nausea, Disp: 30 tablet, Rfl: 1    methocarbamol (ROBAXIN) 500 MG tablet, Take 1 tablet (500 mg) by mouth 4 times daily as needed for muscle spasms, Disp: 30 tablet, Rfl: 1    Naltrexone HCl POWD, , Disp: , Rfl:     Naltrexone HCl, Pain, 1.5 MG CAPS, Take 2 capsules by mouth daily, Disp: 180 capsule, Rfl: 3    nortriptyline (PAMELOR)  10 MG capsule, Take 3cap/night and then increase by 1 cap/week to a max of 6 caps/night as needed and tolerated., Disp: 540 capsule, Rfl: 1    ondansetron (ZOFRAN ODT) 4 MG ODT tab, Take 1 tablet (4 mg) by mouth every 8 hours as needed for nausea, Disp: 30 tablet, Rfl: 1    predniSONE (DELTASONE) 20 MG tablet, Take 3 tablets (60 mg) by mouth daily for 6 days Take in AM with food., Disp: 18 tablet, Rfl: 0    propranolol (INDERAL) 60 MG tablet, Take 1 tablet (60 mg) by mouth every morning AND 1 tablet (60 mg) every evening., Disp: 180 tablet, Rfl: 3    psyllium (METAMUCIL/KONSYL) capsule, Take 1 capsule by mouth daily, Disp: 90 capsule, Rfl: 3    rizatriptan (MAXALT) 10 MG tablet, Take 1 tablet (10 mg) by mouth at onset of headache for migraine May repeat in 2 hours., Disp: 30 tablet, Rfl: 1    scopolamine (TRANSDERM) 1 MG/3DAYS 72 hr patch, Place 1 patch onto the skin every 72 hours, Disp: 10 patch, Rfl: 1    topiramate (TOPAMAX) 100 MG tablet, Take 1.5 tablets (150 mg) by mouth daily, Disp: 135 tablet, Rfl: 3    vitamin D2 (ERGOCALCIFEROL) 68497 units (1250 mcg) capsule, Take 1 capsule (50,000 Units) by mouth once a week for 12 doses, Disp: 12 capsule, Rfl: 0    [START ON 5/15/2024] vitamin D3 (CHOLECALCIFEROL) 50 mcg (2000 units) tablet, Take 1 tablet (50 mcg) by mouth daily, Disp: 90 tablet, Rfl: 3    Review of Systems   Constitutional, HEENT, cardiovascular, pulmonary, gi and gu systems are negative, except as otherwise noted.      Objective         Vitals:  No vitals were obtained today due to virtual visit.    Physical Exam   EYES: Eyes grossly normal to inspection.  No discharge or erythema, or obvious scleral/conjunctival abnormalities.  SKIN: Visible skin clear. No significant rash, abnormal pigmentation or lesions.  NEURO: Cranial nerves grossly intact.  Mentation and speech appropriate for age.  GENERAL: Healthy, alert and no distress  RESP: No audible wheeze, cough, or visible cyanosis.  No visible  retractions or increased work of breathing.    PSYCH: Mentation appears normal, affect normal/bright, judgement and insight intact, normal speech and appearance well-groomed.      Labs:   Lab on 03/20/2024   Component Date Value Ref Range Status    Vitamin B12 03/20/2024 244  232 - 1,245 pg/mL Final       Imaging:    I personally reviewed the following imaging results today and those on care everywhere, if indicated     Nothing new to review    Reviewed imaging from [facility] sites     Medical Records Reviewed:    Reviewed consults/documents from [facility] including  Primary Care, Neurology, OT, and SLP

## 2024-04-29 NOTE — NURSING NOTE
Is the patient currently in the state of [place]? YES    Visit mode:VIDEO    If the visit is dropped, the patient can be reconnected by: TELEPHONE VISIT: Phone number:   Telephone Information:   Mobile [phone]       Will anyone else be joining the visit? NO  (If patient encounters technical issues they should call [phone]    How would you like to obtain your AVS? MyChart    Are changes needed to the allergy or medication list? Pt stated no med changes    Are refills needed on medications prescribed by this physician? NO    Reason for visit: Video Visit (6 week follow-up )    Nita SLATER

## 2024-04-29 NOTE — PATIENT INSTRUCTIONS
To Do:   Continue Speech/ Occupational therapy  Sleep medicine referral  Continue with migraine medications - may be contributing to fatigue  Follow up in 2 months    Post COVID Self Care Suggestions:     Fatigue Management:       https://www.archives-pmr.org/action/showPdf?[email or web address]       Self Care:      [email or web address]  Recovery World Health Organization:    https://apps.who.int/iris/bitstream/handle/23232/553888/ECD-NZAX-6089-157-11887-55[address]-eng.pdf  Breathing exercises:    [email or web address]/conditions-[email or web address]-[email or web address]/coronavirus-recovery-breathing-exercises      Assessment of sense of smell and taste    Smell training:  Flowery - Charity  Fruity - Lemon  Spicy - Cloves  Resinous - Eucalyptus      1 - Pour a few droplets of one of the oils on to a cotton pad or ball  2 - Do not try to sniff the pad immediately; leave it for a few minutes for the fragrance to develop  3 - Hold the first stick/pad up to your nose, about an inch away.  The order in which you test the oils does not matter  4 - Relax and try to inhale naturally through the nose - sniffing too quickly and deeply is likely to result in you not being able to detect anything  5 - Try this a couple more times, then rest for five minutes  6 - Move on to the next oil and repeat as above.    After three months switch to a new set of odors: menthol, thyme, tangerine, and tyson, training with them twice daily.    After another three months, switch to a third new set of odors: green tea, bergamot, rosemary, and otto, again training with them twice daily.    Studies:   [employer] Paxlovid [place]  [email or web address]://[email or web address]/[email or web address]/research/[email or web address]-study/?[email or web address]    Cognitive Post-COVID study  [email or web address]    Supplements:  Fatigue- COQ10 100mg 3x day  ( side effects GI)  Brain fog-N-acetylcysteine 600 mg daily (side effects GI)

## 2024-04-30 ENCOUNTER — THERAPY VISIT (OUTPATIENT)
Dept: OCCUPATIONAL THERAPY | Facility: CLINIC | Age: 39
End: 2024-04-30
Attending: PHYSICIAN ASSISTANT
Payer: COMMERCIAL

## 2024-04-30 DIAGNOSIS — U09.9 COVID-19 LONG HAULER: Primary | ICD-10-CM

## 2024-04-30 DIAGNOSIS — R53.83 FATIGUE, UNSPECIFIED TYPE: ICD-10-CM

## 2024-04-30 PROCEDURE — 97533 SENSORY INTEGRATION: CPT | Mod: GO | Performed by: OCCUPATIONAL THERAPIST

## 2024-04-30 PROCEDURE — 97535 SELF CARE MNGMENT TRAINING: CPT | Mod: GO | Performed by: OCCUPATIONAL THERAPIST

## 2024-05-06 ENCOUNTER — THERAPY VISIT (OUTPATIENT)
Dept: OCCUPATIONAL THERAPY | Facility: CLINIC | Age: 39
End: 2024-05-06
Attending: PHYSICIAN ASSISTANT
Payer: COMMERCIAL

## 2024-05-06 DIAGNOSIS — U09.9 COVID-19 LONG HAULER: Primary | ICD-10-CM

## 2024-05-06 PROCEDURE — 97530 THERAPEUTIC ACTIVITIES: CPT | Mod: GO | Performed by: OCCUPATIONAL THERAPIST

## 2024-05-07 ENCOUNTER — THERAPY VISIT (OUTPATIENT)
Dept: SPEECH THERAPY | Facility: CLINIC | Age: 39
End: 2024-05-07
Attending: PHYSICIAN ASSISTANT
Payer: COMMERCIAL

## 2024-05-07 DIAGNOSIS — R41.841 COGNITIVE COMMUNICATION DEFICIT: Primary | ICD-10-CM

## 2024-05-07 PROCEDURE — 97130 THER IVNTJ EA ADDL 15 MIN: CPT | Mod: GN | Performed by: SPEECH-LANGUAGE PATHOLOGIST

## 2024-05-07 PROCEDURE — 97129 THER IVNTJ 1ST 15 MIN: CPT | Mod: GN | Performed by: SPEECH-LANGUAGE PATHOLOGIST

## 2024-05-13 ENCOUNTER — THERAPY VISIT (OUTPATIENT)
Dept: SPEECH THERAPY | Facility: CLINIC | Age: 39
End: 2024-05-13
Attending: PHYSICIAN ASSISTANT
Payer: COMMERCIAL

## 2024-05-13 DIAGNOSIS — R41.841 COGNITIVE COMMUNICATION DEFICIT: Primary | ICD-10-CM

## 2024-05-13 PROCEDURE — 97129 THER IVNTJ 1ST 15 MIN: CPT | Mod: GN | Performed by: SPEECH-LANGUAGE PATHOLOGIST

## 2024-05-13 PROCEDURE — 97130 THER IVNTJ EA ADDL 15 MIN: CPT | Mod: GN | Performed by: SPEECH-LANGUAGE PATHOLOGIST

## 2024-05-13 NOTE — PROGRESS NOTES
DISCHARGE  Reason for Discharge: Patient has met all goals.    Discharge Plan: Patient to continue home program.    Referring Provider:  Esther Lerma    Speech Therapy Discharge Note  05/13/24    Appointment Info   Treating Provider Katherine Amaya MA, CCC/SLP   Visits Used 4   Medical Diagnosis Cognitive Communication Deficit   SLP Tx Diagnosis Cognitive Communication Deficit   Progress Note/Certification   Onset Of Illness/injury Or Date Of Surgery 03/18/24  (referral date)   Therapy Frequency 1x/wk   Predicted Duration 6 weeks   Progress Note Due Date 05/14/24   Subjective Report   Subjective Report Pt reports using strategies at home.  Overall feeling better unless a headache occurs.   SLP Goal 1   Goal Identifier attention   Goal Description Pt will perform alternating attention tasks with 85% acc   Rationale To maximize safety and independence with cognitive function within the home or community   Goal Progress 85% alternating attn task with auditory word lists.   Target Date 05/07/24   Date Met 05/13/24   SLP Goal 2   Goal Identifier attention   Goal Description Pt will perform attention tasks with distractions with 85% acc   Rationale To maximize safety and independence with cognitive function within the home or community   Goal Progress t   Target Date 05/07/24   SLP Goal 3   Goal Identifier attention   Goal Description Pt will ID and utilize two strategies to increase attention/focus for work and reading tasks.   Rationale To maximize safety and independence with cognitive function within the home or community   Goal Progress Education on strategies: including reducing distractions, setting limits, piroritizing, taking breaks, Active listening and rewording/restating info, and one task at a time.   Target Date 05/27/24   Date Met 05/13/24   SLP Goal 4   Goal Identifier short term memory   Goal Description Pt will utilize two different internal and external memory strategies for IADLs 75% of the time.    Rationale To maximize safety and independence with cognitive function within the home or community   Goal Progress Goal Met: set reminders on work calendars, write notes and lists, visualized, tactical placement of notes.  Was 93% with use of strategies after short term and delayed memory questions from complex paragraph.   Target Date 05/27/24   Date Met 05/07/24   Treatment Cognitive Skill   Cognitive Skills Intervention: 1st 15 minutes Timed (83279) 15 Minutes   Cognitive Skills Intervention: Addl minutes Timed (45959) 20 Minutes   Cognitive Skills Intervention 1 Memory   Skilled Intervention Internal memory strategy training;External memory strategy training   Patient Response/Progress Pt has met short term SLP goals.  Pt reports she feels independent with strategies and tries to implement at home.  Headaches a concern at time.  Will d/c today from .  Pt will continue with OT and SSP with them.   Education   Learner/Method Patient   Plan   Updates to plan of care Discharge from SLP today.   Total Session Time   Timed Code Treatment Minutes 35   Total Treatment Time (sum of timed and untimed services) 35

## 2024-05-20 ASSESSMENT — ANXIETY QUESTIONNAIRES
GAD7 TOTAL SCORE: 5
3. WORRYING TOO MUCH ABOUT DIFFERENT THINGS: SEVERAL DAYS
7. FEELING AFRAID AS IF SOMETHING AWFUL MIGHT HAPPEN: NOT AT ALL
1. FEELING NERVOUS, ANXIOUS, OR ON EDGE: SEVERAL DAYS
5. BEING SO RESTLESS THAT IT IS HARD TO SIT STILL: NOT AT ALL
GAD7 TOTAL SCORE: 5
8. IF YOU CHECKED OFF ANY PROBLEMS, HOW DIFFICULT HAVE THESE MADE IT FOR YOU TO DO YOUR WORK, TAKE CARE OF THINGS AT HOME, OR GET ALONG WITH OTHER PEOPLE?: SOMEWHAT DIFFICULT
4. TROUBLE RELAXING: NOT AT ALL
6. BECOMING EASILY ANNOYED OR IRRITABLE: MORE THAN HALF THE DAYS
IF YOU CHECKED OFF ANY PROBLEMS ON THIS QUESTIONNAIRE, HOW DIFFICULT HAVE THESE PROBLEMS MADE IT FOR YOU TO DO YOUR WORK, TAKE CARE OF THINGS AT HOME, OR GET ALONG WITH OTHER PEOPLE: SOMEWHAT DIFFICULT
GAD7 TOTAL SCORE: 5
2. NOT BEING ABLE TO STOP OR CONTROL WORRYING: SEVERAL DAYS
7. FEELING AFRAID AS IF SOMETHING AWFUL MIGHT HAPPEN: NOT AT ALL

## 2024-05-20 ASSESSMENT — PATIENT HEALTH QUESTIONNAIRE - PHQ9
SUM OF ALL RESPONSES TO PHQ QUESTIONS 1-9: 10
10. IF YOU CHECKED OFF ANY PROBLEMS, HOW DIFFICULT HAVE THESE PROBLEMS MADE IT FOR YOU TO DO YOUR WORK, TAKE CARE OF THINGS AT HOME, OR GET ALONG WITH OTHER PEOPLE: SOMEWHAT DIFFICULT
SUM OF ALL RESPONSES TO PHQ QUESTIONS 1-9: 10

## 2024-05-21 ENCOUNTER — OFFICE VISIT (OUTPATIENT)
Dept: FAMILY MEDICINE | Facility: CLINIC | Age: 39
End: 2024-05-21
Attending: STUDENT IN AN ORGANIZED HEALTH CARE EDUCATION/TRAINING PROGRAM
Payer: COMMERCIAL

## 2024-05-21 VITALS
WEIGHT: 172 LBS | OXYGEN SATURATION: 99 % | RESPIRATION RATE: 16 BRPM | HEART RATE: 86 BPM | SYSTOLIC BLOOD PRESSURE: 118 MMHG | BODY MASS INDEX: 31.65 KG/M2 | HEIGHT: 62 IN | DIASTOLIC BLOOD PRESSURE: 70 MMHG

## 2024-05-21 DIAGNOSIS — E55.9 VITAMIN D DEFICIENCY: ICD-10-CM

## 2024-05-21 DIAGNOSIS — R53.83 FATIGUE, UNSPECIFIED TYPE: ICD-10-CM

## 2024-05-21 DIAGNOSIS — K59.00 CONSTIPATION, UNSPECIFIED CONSTIPATION TYPE: ICD-10-CM

## 2024-05-21 DIAGNOSIS — R11.0 NAUSEA: ICD-10-CM

## 2024-05-21 DIAGNOSIS — R10.9 ABDOMINAL CRAMPING: ICD-10-CM

## 2024-05-21 DIAGNOSIS — G43.709 CHRONIC MIGRAINE WITHOUT AURA WITHOUT STATUS MIGRAINOSUS, NOT INTRACTABLE: ICD-10-CM

## 2024-05-21 DIAGNOSIS — G47.10 HYPERSOMNIA: ICD-10-CM

## 2024-05-21 DIAGNOSIS — R41.89 BRAIN FOG: ICD-10-CM

## 2024-05-21 DIAGNOSIS — U09.9 COVID-19 LONG HAULER: Primary | ICD-10-CM

## 2024-05-21 PROCEDURE — 82306 VITAMIN D 25 HYDROXY: CPT | Performed by: STUDENT IN AN ORGANIZED HEALTH CARE EDUCATION/TRAINING PROGRAM

## 2024-05-21 PROCEDURE — 99214 OFFICE O/P EST MOD 30 MIN: CPT | Performed by: STUDENT IN AN ORGANIZED HEALTH CARE EDUCATION/TRAINING PROGRAM

## 2024-05-21 PROCEDURE — 82607 VITAMIN B-12: CPT | Performed by: STUDENT IN AN ORGANIZED HEALTH CARE EDUCATION/TRAINING PROGRAM

## 2024-05-21 PROCEDURE — G2211 COMPLEX E/M VISIT ADD ON: HCPCS | Performed by: STUDENT IN AN ORGANIZED HEALTH CARE EDUCATION/TRAINING PROGRAM

## 2024-05-21 PROCEDURE — 36415 COLL VENOUS BLD VENIPUNCTURE: CPT | Performed by: STUDENT IN AN ORGANIZED HEALTH CARE EDUCATION/TRAINING PROGRAM

## 2024-05-21 RX ORDER — MODAFINIL 100 MG/1
100-200 TABLET ORAL DAILY
Qty: 45 TABLET | Refills: 0 | Status: SHIPPED | OUTPATIENT
Start: 2024-05-21 | End: 2024-07-30

## 2024-05-21 ASSESSMENT — PAIN SCALES - GENERAL: PAINLEVEL: SEVERE PAIN (6)

## 2024-05-21 NOTE — PATIENT INSTRUCTIONS
You could consider the following:   Consider waiting to make changes while waiting ot see how the 6 capsules of notriptyline goes and if your side effects improve  At any time, could back down to 1.5 mg (1 capsule) of the naltrexone and monitor for worsening brain fog/memory. If it worsens, increase back to 3 mg. Try the 1.5 mg for at least a month, then you could  try off of it if you desire to.   You can try the modafinil for staying awake during the day. Start with 1 tablet first to monitor for side effects. Can increase to 2 tablets (200 mg) if you need more assistance.   In the future we could consider Ritalin or another stimulant if you feel the modafinil isn't helping.

## 2024-05-21 NOTE — PROGRESS NOTES
"  Assessment & Plan     COVID-19 long hauler  Patient is a very pleasant 39-year-old female who presents today for follow-up regarding long COVID symptoms.  Patient continues to work with neurology for migraines, [place] post-COVID symptoms, has been doing speech therapy and occupational therapy as well.  We did trial naltrexone for brain fog, patient does have some slight improvement in that, sleeping better, some improvement in memory.  - PRIMARY CARE FOLLOW-UP SCHEDULING  - PRIMARY CARE FOLLOW-UP SCHEDULING; Future    Chronic migraine without aura without status migrainosus, not intractable  From a chronic migraine perspective in the context of post-COVID, patient continues to work with neurology.  Recently had increase in nortriptyline and propranolol dosing, which may be contributing to daytime sleepiness.  Also on Topamax with Maxalt as needed.  Unclear if there plan is to consider more intense preventive therapy or continue with current regimen.  Patient just recently increased to 60 mg of nortriptyline at bedtime.  - PRIMARY CARE FOLLOW-UP SCHEDULING  - PRIMARY CARE FOLLOW-UP SCHEDULING; Future    Brain fog  Patient's brain fog is improving somewhat, continues to have significant fatigue.  Sleep is more restful at night, not as fragmented.  Feels that Occupational Therapy was significantly helpful, using \"safe and sound\" and having improvement with that.  Does not know if naltrexone has been a significant contributing factor to this or not.  We discussed considering decreasing down on the dosage to 1.5 mg for least a month before discontinuing it to the monitor and see if this actually was improving symptoms.  I did not change the prescription today  - PRIMARY CARE FOLLOW-UP SCHEDULING  - PRIMARY CARE FOLLOW-UP SCHEDULING; Future    Fatigue, unspecified type  Patient continues to have significant daytime fatigue, as noted above, she is on multiple medications for migraines that may be worsening symptoms.  " "We had previously discussed alternative options for management of this, and today we opted to prescribe Provigil, start with just 100 mg daily, can increase to 200 mg if needed to maintain wakefulness.  May be able to discontinue this in the future if fatigue is largely related to medication side effects.  - PRIMARY CARE FOLLOW-UP SCHEDULING  - modafinil (PROVIGIL) 100 MG tablet; Take 1-2 tablets (100-200 mg) by mouth daily  - PRIMARY CARE FOLLOW-UP SCHEDULING; Future  - Vitamin B12    Abdominal cramping  Nausea  Constipation, unspecified constipation type  Rare symptoms recently, does occasionally use as needed medications including as needed Bentyl.  Nausea significantly improved, not using scopolamine.  Does get significant increased nausea with Maxalt usage.  - PRIMARY CARE FOLLOW-UP SCHEDULING  - PRIMARY CARE FOLLOW-UP SCHEDULING; Future    Hypersomnia  See above under fatigue for more details  - modafinil (PROVIGIL) 100 MG tablet; Take 1-2 tablets (100-200 mg) by mouth daily  - PRIMARY CARE FOLLOW-UP SCHEDULING; Future    Vitamin D deficiency  Patient had very low vitamin D about 3 months ago, will recheck labs today.  - Vitamin D deficiency screening; Future  - Vitamin D deficiency screening    The longitudinal plan of care for the diagnosis(es)/condition(s) as documented were addressed during this visit. Due to the added complexity in care, I will continue to support Kassy in the subsequent management and with ongoing continuity of care.  I spent a total of 37 minutes on the day of the visit.   Time spent by me doing chart review, history and exam, documentation and further activities per the note    BMI  Estimated body mass index is 31.46 kg/m  as calculated from the following:    Height as of this encounter: 1.575 m (5' 2\").    Weight as of this encounter: 78 kg (172 lb).     Depression Screening Follow Up        Follow Up Actions Taken  Crisis resource information provided in After Visit Summary " "        Jayant Elena is a 39 year old, presenting for the following health issues:  Anxiety, Depression, and Headache        5/21/2024     1:51 PM   Additional Questions   Roomed by Mariposa CARY   Accompanied by Self     History of Present Illness       Mental Health Follow-up:  Patient presents to follow-up on Depression & Anxiety.Patient's depression since last visit has been:  Worse  The patient is not having other symptoms associated with depression.  Patient's anxiety since last visit has been:  Better  The patient is not having other symptoms associated with anxiety.  Any significant life events: No  Patient is feeling anxious or having panic attacks.  Patient has no concerns about alcohol or drug use.    Headaches:   Since the patient's last clinic visit, headaches are: worsened  The patient is getting headaches:  4-5 days a week  She is not able to do normal daily activities when she has a migraine.  The patient is taking the following rescue/relief medications:  Excedrin, Maxalt and other   Patient states \"The relief is inconsistent\" from the rescue/relief medications.   The patient is taking the following medications to prevent migraines:  Topomax and other  In the past 4 weeks, the patient has gone to an Urgent Care or Emergency Room 0 times times due to headaches.    Reason for visit:  Medication check in    She eats 2-3 servings of fruits and vegetables daily.She consumes 0 sweetened beverage(s) daily.She exercises with enough effort to increase her heart rate 10 to 19 minutes per day.  She exercises with enough effort to increase her heart rate 4 days per week.   She is taking medications regularly.         4/14/2024    10:05 AM 4/28/2024    10:09 AM 5/20/2024     4:00 PM   PHQ   PHQ-9 Total Score 7 6 10   Q9: Thoughts of better off dead/self-harm past 2 weeks Not at all Not at all Not at all          4/14/2024    10:06 AM 4/28/2024    10:12 AM 5/20/2024     4:01 PM   JOSÉ MIGUEL-7 SCORE   Total Score 6 " "(mild anxiety) 3 (minimal anxiety) 5 (mild anxiety)   Total Score 6 3 5      Eyes \"hurt so bad\" that's what makes the migraines so much worse    Eye doctor last week, normal optometry exam    Neurology - increased nortriptyline.     Gets tired.   For example, does laundry, so will \"preserve energy\" and have someone help her.   For example  bringing laundry up and down stairs.       Review of Systems  Constitutional, HEENT, cardiovascular, pulmonary, gi and gu systems are negative, except as otherwise noted.      Objective    /70 (BP Location: Right arm, Patient Position: Sitting, Cuff Size: Adult Regular)   Pulse 86   Resp 16   Ht 1.575 m (5' 2\")   Wt 78 kg (172 lb)   LMP 05/02/2024   SpO2 99%   BMI 31.46 kg/m    Body mass index is 31.46 kg/m .  Physical Exam  Constitutional:       Appearance: Normal appearance.   HENT:      Head: Normocephalic.   Eyes:      General: No scleral icterus.     Extraocular Movements: Extraocular movements intact.      Conjunctiva/sclera: Conjunctivae normal.   Cardiovascular:      Rate and Rhythm: Normal rate.   Pulmonary:      Effort: Pulmonary effort is normal.   Neurological:      General: No focal deficit present.      Mental Status: She is alert and oriented to person, place, and time.              Signed Electronically by: Brandy Zimmerman MD    "

## 2024-05-21 NOTE — LETTER
May 21, 2024      Lady Saavedra  [id number] [address] [place] [id number]        To Whom It May Concern:    Lady Saavedra was seen in our clinic again today, 05/21/2024. She may return to work with the following:     Starting 5/28/2024 and going until 8/1/2024, patient can work up to 6 hour days, 4 days a week (maximum 24 hour work weeks). Because patient's current illness contributes to daily symptoms made worse with travel, she should be allowed to continue to work from home and avoid travel during this time as well.  Further restrictions will be noted prior to end date of current restrictions. In the interim, she is continuing to work with specialists and having improvement in status.        Sincerely,      Brandy Zimmerman

## 2024-05-22 LAB
VIT B12 SERPL-MCNC: 294 PG/ML (ref 232–1245)
VIT D+METAB SERPL-MCNC: 28 NG/ML (ref 20–50)

## 2024-05-29 ENCOUNTER — TELEPHONE (OUTPATIENT)
Dept: FAMILY MEDICINE | Facility: CLINIC | Age: 39
End: 2024-05-29
Payer: COMMERCIAL

## 2024-06-07 ENCOUNTER — THERAPY VISIT (OUTPATIENT)
Dept: OCCUPATIONAL THERAPY | Facility: CLINIC | Age: 39
End: 2024-06-07
Attending: PHYSICIAN ASSISTANT
Payer: COMMERCIAL

## 2024-06-07 DIAGNOSIS — U09.9 COVID-19 LONG HAULER: Primary | ICD-10-CM

## 2024-06-07 PROCEDURE — 97530 THERAPEUTIC ACTIVITIES: CPT | Mod: GO | Performed by: OCCUPATIONAL THERAPIST

## 2024-06-11 ENCOUNTER — THERAPY VISIT (OUTPATIENT)
Dept: OCCUPATIONAL THERAPY | Facility: CLINIC | Age: 39
End: 2024-06-11
Attending: PHYSICIAN ASSISTANT
Payer: COMMERCIAL

## 2024-06-11 DIAGNOSIS — U09.9 COVID-19 LONG HAULER: Primary | ICD-10-CM

## 2024-06-11 PROCEDURE — 97530 THERAPEUTIC ACTIVITIES: CPT | Mod: GO | Performed by: OCCUPATIONAL THERAPIST

## 2024-06-20 ENCOUNTER — OFFICE VISIT (OUTPATIENT)
Dept: NEUROLOGY | Facility: CLINIC | Age: 39
End: 2024-06-20
Payer: COMMERCIAL

## 2024-06-20 VITALS
HEART RATE: 70 BPM | BODY MASS INDEX: 31.46 KG/M2 | DIASTOLIC BLOOD PRESSURE: 86 MMHG | SYSTOLIC BLOOD PRESSURE: 125 MMHG | WEIGHT: 172 LBS

## 2024-06-20 DIAGNOSIS — Z86.16 HISTORY OF 2019 NOVEL CORONAVIRUS DISEASE (COVID-19): ICD-10-CM

## 2024-06-20 DIAGNOSIS — R76.8 ANTI-TPO ANTIBODIES PRESENT: ICD-10-CM

## 2024-06-20 DIAGNOSIS — R41.89 BRAIN FOG: ICD-10-CM

## 2024-06-20 DIAGNOSIS — G47.00 INSOMNIA, UNSPECIFIED TYPE: ICD-10-CM

## 2024-06-20 DIAGNOSIS — G43.709 CHRONIC MIGRAINE WITHOUT AURA WITHOUT STATUS MIGRAINOSUS, NOT INTRACTABLE: Primary | ICD-10-CM

## 2024-06-20 PROCEDURE — 99214 OFFICE O/P EST MOD 30 MIN: CPT | Performed by: PSYCHIATRY & NEUROLOGY

## 2024-06-20 RX ORDER — ELETRIPTAN HYDROBROMIDE 40 MG/1
40 TABLET, FILM COATED ORAL
Qty: 18 TABLET | Refills: 3 | Status: SHIPPED | OUTPATIENT
Start: 2024-06-20

## 2024-06-20 NOTE — LETTER
6/20/2024      Lady Saavedra  [address] [address] [place] [id number]      Dear Colleague,    Thank you for referring your patient, Lady Saavedra, to the [facility] [place]. Please see a copy of my visit note below.    NEUROLOGY OUTPATIENT PROGRESS NOTE   Jun 20, 2024     CHIEF COMPLAINT/REASON FOR VISIT/REASON FOR CONSULT  Patient presents with:  Headache: Doing a bit better. Still having them but more controlled and less frequent.    REASON FOR CONSULTATION-headaches and brain fog.    REFERRAL SOURCE  Dr. Lalita Birminhgam  [facility] Dr. Lalita Birmingham    HISTORY OF PRESENT ILLNESS  Lady Saavedra is a 39 year old female seen today for evaluation of headaches and brain fog.  She reports that she has a history of headache but then in March she got Covid.  She was admitted for Covid related pneumonia.  Was in the hospital for 1 week.  Since then the headaches have been much more frequent.  She is having them every day.  Reports significant photophobia and phonophobia with the headaches.  Headaches can be on any side in different places.  They can be behind the eyes and in the back of the head.  They are throbbing in nature.  Reports no visual aura with these.  Has been put on amitriptyline which has helped her sleep as well as with the headaches but nothing significant to get rid of the headaches completely.  Is using Advil and Tylenol for the headache still would last the whole day.  Is not using them around-the-clock.  Denies any other provoking factors.    Is also been diagnosed with thyroiditis since the Covid infection.  Is working with endocrinology on this.    Reports brain fog has been going on since the Covid infection.  Has not had any improvement.  Was let go of her job.  Has difficulty with memory, staying focused and doing tasks that she could previously do easily.  Does get good sleep at night with the amitriptyline.    Patient has recently been in contact with the Covid  rehabilitation clinic.  Has not started the program yet.    2/14/22  Patient returns today  1.  Patient reports that her headaches have significantly improved with the propanolol.  Is taking 60 mg twice a day.  Denies any side effects.  Headaches of 4-5 times a month.  Headaches are much less severe compared to before and she does not need to take abortive medication.  Did try the Maxalt once without any side effects and it did not work for her.  2.  Continues to have the brain fog.  Has been seen in the [facility] rehabilitation clinic and has not been able to do the therapies due to lack of insurance.  Steroids did not help with the brain fog.  3.  Reports that the amitriptyline is no longer working to help her sleep.  She is up till 2 AM every night.  Discussed about increasing the dose and she is interested in that.  Denies any side effects with the amitriptyline.    4/12/22  Patient returns today  1.  She reports that the headaches are twice a month.  Maxalt does work as abortive therapy though she has to eat something with it otherwise feels nauseous with the medication.  Propanolol amitriptyline is helping.  Some somnolence with the amitriptyline which is getting better.  Discussed about decreasing the amitriptyline and she wants to stay on the higher dose.  It was increased last time because the propanolol was not working by itself.  2.  Continues to have the brain fog.  Has insurance now and has gone to the [facility] rehabilitation clinic though has not been able to see the therapist yet.  Is only seeing her counselor for her mental illness.    3.  Is sleeping better with the amitriptyline at this point though does have some somnolence.  No insomnia.    2/1/23  Patient returns today  1.  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  Headaches have since then gotten worse and she is having 2-3 headaches a week.  About 15 headache days a  month.  Remains on the amitriptyline and propanolol.  This daytime somnolence with the amitriptyline has improved.  No side effects with propanolol.  Blood pressure is better at this point as well.  Maxalt does work as abortive therapy.  She does need to use it with food to avoid nausea.  2.  Brain fog is also improved with time.  3.  Sleeping better with the amitriptyline.  No issues.  No other new symptoms    4/5/23  Patient returns today  1.  She is currently on 100 mg of Topamax.  She takes it in the morning because it prevents her from sleeping at night.  She reports that she had complete resolution of the headaches.  Has not had any headaches in the last month.  2.  Occasionally if she does have a headache she will use Maxalt which is still working  3.  She remains on propanolol.  She feels that is also being helping with the tachycardia.  Remains on amitriptyline though does complain that she occasionally cannot sleep.  She feels that she is not exercising enough to tire herself out to fall asleep  4.  Brain fog symptoms have now improved.  No new issues/concerns.    4/25/24  Patient returns today.  Since she was last seen she has had COVID and has been having long-haul COVID symptoms along with worsening headaches.  1.  Headaches are almost every day.  Headaches are brought on by bright lights.  She does have accommodation at work.  Has been able to work from home which is helping.  In terms of her medications she has been on a higher dose of Topamax.  Is taking 1-1/2 tablet in the morning.  Has been switched from amitriptyline to nortriptyline.  No side effects.  Feels that she is sleeping well.  Propranolol dose has been decreased.  Is using Zofran and meclizine for nausea with the headaches.  Maxalt does work but causes some nausea.    2.  Per the post-COVID clinic she has been working with PT/OT/speech therapy with no significant improvement.  Brain fog is also gotten worse.  No other new  concerns.    6/20/24  Patient returns today  1.  Her brain fog symptoms have improved.  2.  Headaches are slowly getting better.  Still has them multiple times a week but not as severe compared to before.  Jaciel on the high doses of Topamax, nortriptyline, propranolol.  No side effects.  Steroids actually made things worse.  Maxalt makes her more tired.  Is interested in trying other medication since the headaches are still affecting quality of life  No other new concerns.    Previous history is reviewed and this is unchanged.    PAST MEDICAL/SURGICAL HISTORY  Past Medical History:   Diagnosis Date     Abnormal Pap smear of cervix 2009, 2012, 2014    see problem list     Cervical high risk HPV (human papillomavirus) test positive 2009, 2012    see problem list     Chickenpox      Depressive disorder      Gestational HTN 01/01/2015     Hypothyroidism      Migraine with aura and without status migrainosus, not intractable      Seasonal allergies     Was on clariten uses occasional sudafed     Patient Active Problem List   Diagnosis     CARDIOVASCULAR SCREENING; LDL GOAL LESS THAN 160     Pneumonia due to 2019 novel coronavirus     COVID-19 long hauler     Anovulation     Prediabetes     Migraine with aura and without status migrainosus, not intractable     Hyperlipidemia LDL goal <100     Nonalcoholic fatty liver disease     Cognitive communication deficit   Significant of migraines, mental illness, suicide attempt, depression.  Feels the depression is under good control with EMR    FAMILY HISTORY  Family History   Adopted: Yes   Problem Relation Age of Onset     Unknown/Adopted Mother      Unknown/Adopted Father    Patient is adopted and she does not know her family.    SOCIAL HISTORY  Social History     Tobacco Use     Smoking status: Never     Smokeless tobacco: Never   Vaping Use     Vaping status: Never Used   Substance Use Topics     Alcohol use: No     Drug use: No       SYSTEMS REVIEW  Twelve-system ROS was  done and other than the HPI this was negative except for neck pain, back pain, arm and leg pain, joint pain, numbness and tingling, sleepiness during the day, sleeping problems, headaches, anxiety, depression, palpitations, bloating, stomach pain, bowel problems, respiratory problems, skin changes, weight gain.  No new concerns/issues.    MEDICATIONS  Current Outpatient Medications   Medication Sig Dispense Refill     aspirin-acetaminophen-caffeine (EXCEDRIN MIGRAINE) 250-250-65 MG tablet Take 1 tablet by mouth daily as needed for headaches       bisacodyl (DULCOLAX) 10 MG suppository Place 1 suppository (10 mg) rectally daily as needed for constipation 16 suppository 1     buPROPion (WELLBUTRIN XL) 300 MG 24 hr tablet Take 1 tablet (300 mg) by mouth every morning 90 tablet 3     dicyclomine (BENTYL) 10 MG capsule Take 1 capsule (10 mg) by mouth 4 times daily as needed (abdominal cramping) 90 capsule 11     eletriptan (RELPAX) 40 MG tablet Take 1 tablet (40 mg) by mouth at onset of headache for migraine May repeat in 2 hours. Max 2 tablets/24 hours. 18 tablet 3     erythromycin with ethanol (ERYGEL) 2 % gel Apply topically 2 times daily 30 g 3     fluticasone (FLONASE) 50 MCG/ACT nasal spray Spray 1 spray into both nostrils daily 15.8 mL 1     galcanezumab-gnlm (EMGALITY) 120 MG/ML injection Inject 1 mL (120 mg) Subcutaneous every 28 days 1 mL 3     galcanezumab-gnlm (EMGALITY) 120 MG/ML injection Inject 2 mLs (240 mg) Subcutaneous every 28 days Loading dose. 2 mL 0     hydrOXYzine (VISTARIL) 25 MG capsule Take 1-2 capsules (25-50 mg) by mouth 3 times daily as needed for anxiety 30 capsule 0     levothyroxine (SYNTHROID) 88 MCG tablet Take 1 tablet (88 mcg) by mouth daily 90 tablet 3     meclizine (ANTIVERT) 12.5 MG tablet Take 1 tablet (12.5 mg) by mouth 3 times daily as needed for dizziness or nausea 30 tablet 1     methocarbamol (ROBAXIN) 500 MG tablet Take 1 tablet (500 mg) by mouth 4 times daily as needed for  muscle spasms 30 tablet 1     modafinil (PROVIGIL) 100 MG tablet Take 1-2 tablets (100-200 mg) by mouth daily 45 tablet 0     Naltrexone HCl, Pain, 1.5 MG CAPS Take 2 capsules by mouth daily 180 capsule 3     nortriptyline (PAMELOR) 10 MG capsule Take 3cap/night and then increase by 1 cap/week to a max of 6 caps/night as needed and tolerated. 540 capsule 1     ondansetron (ZOFRAN ODT) 4 MG ODT tab Take 1 tablet (4 mg) by mouth every 8 hours as needed for nausea 30 tablet 1     propranolol (INDERAL) 60 MG tablet Take 1 tablet (60 mg) by mouth every morning AND 1 tablet (60 mg) every evening. 180 tablet 3     psyllium (METAMUCIL/KONSYL) capsule Take 1 capsule by mouth daily 90 capsule 3     scopolamine (TRANSDERM) 1 MG/3DAYS 72 hr patch Place 1 patch onto the skin every 72 hours 10 patch 1     topiramate (TOPAMAX) 100 MG tablet Take 1.5 tablets (150 mg) by mouth daily 135 tablet 3     vitamin D3 (CHOLECALCIFEROL) 50 mcg (2000 units) tablet Take 1 tablet (50 mcg) by mouth daily 90 tablet 3     No current facility-administered medications for this visit.        PHYSICAL EXAMINATION  VITALS: /86   Pulse 70   Wt 78 kg (172 lb)   LMP 05/02/2024   BMI 31.46 kg/m    GENERAL: Healthy appearing, alert, no acute distress, normal habitus.  CARDIOVASCULAR: Extremities warm and well perfused. Pulses present.   NEUROLOGICAL:  Patient is awake and oriented to self, place and time.  Attention span is normal.  Memory is grossly intact; previously MoCA 26.  Language is fluent and follows commands appropriately.  Appropriate fund of knowledge. Cranial nerves 2-12 are intact. There is no pronator drift.  Motor exam shows 5/5 strength in all extremities.  Tone is symmetric bilaterally in upper and lower extremities.  (Previously reflexes are symmetric and 2+ in upper extremities and lower extremities. Sensory exam is grossly intact to light touch, pin prick and vibration.)  Finger to nose and heel to shin is without dysmetria.   Romberg is negative.  Gait is normal and the patient is able to do tandem walk and walk on toes and heels.  No exam change.    DIAGNOSTICS  MRI-images reviewed.  No major structural lesions noted.  Some T2 hyperintensities.  IMPRESSION:  1.  No acute intracranial process.  2.  Nonspecific scattered T2 hyperintensities within the cerebral white matter. These can be seen in association with migraine headaches.    Labs  Component      Latest Ref Rng & Units 8/31/2021 9/13/2021 10/15/2021 12/7/2021   Thyroid Peroxidase Antibody      <35 IU/mL >5,000 (H)      TSH      0.40 - 4.00 mU/L  1.72 0.11 (L) 1.22   T4 Free      0.76 - 1.46 ng/dL   1.32          OUTSIDE RECORDS  Outside referral notes and chart notes were reviewed and pertinent information has been summarized (in addition to the HPI):-Patient has been diagnosed with long-haul Covid. Multiple testing has been ordered. Has been seen with cardiology. Also seeing endocrinology. Has been having brain fog for which she was referred to neurology. Does complain of easy fatigability as well.    LABS  Component      Latest Ref Rng & Units 12/14/2021   Vitamin B12      213 - 816 pg/mL 413   Vitamin B1 Whole Blood Level      70 - 180 nmol/L 104   Ferritin      10 - 130 ng/mL 192 (H)   Methylmalonic Acid      0.00 - 0.40 umol/L 0.21     EEG  IMPRESSION/REPORT/PLAN  This is a normal EEG during wakefulness and drowsiness except for mild generalized background dysrhythmia. Further clinical correlation is needed.     Please note that the absence of epileptiform abnormalities does not exclude the possibility of epilepsy in any patient.      Component      Latest Ref Rng & Units 6/8/2022 11/1/2022   Ferritin      12 - 150 ng/mL 484 (H)    TSH      0.30 - 4.20 uIU/mL  1.01   T4 Free      0.90 - 1.70 ng/dL  1.29       IMPRESSION/REPORT/PLAN  History of anti-TPO antibodies  History of 2019 novel coronavirus disease (COVID-19)  Brain fog/cognitive difficulty-improved  Chronic migraine  without aura without status migrainosus, not intractable  Insomnia, unspecified type  Recent COVID infection with worsening symptoms    This is a 39 year old female with history of migraines with worsening migraines after COVID-19 infection and cognitive decline after COVID-19 infection.    Previously amitriptyline and propanolol were helping.  Blood pressure was slightly elevated and amitriptyline was causing daytime somnolence.  Propanolol was also helping with some tachycardia.  These side effects have now improved.  With addition of Topamax headaches have significantly improved.    Maxalt is helping with abortive therapy and will continue.  Previously amitriptyline was also being used for insomnia.    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  Anti-TPO antibodies were positive and she was treated with steroids with no benefit.  MRI and EEG were noncontributory.  She does have a history of thyroid disease and is on levothyroxine.  Recent TSH levels were normal.  This is now improved with therapy.    With worsening of headaches after the COVID she was initially put on nortriptyline, propranolol, Topamax at higher doses.  These have helped but not completely.  Will add Emgality.  Will furthermore switch from Maxalt to Relpax to see if it works better.  Continue Zofran for nausea.  Eventual goal will be to wean her off some of these medications.    Return back in 4 months.    -     topiramate (TOPAMAX) 150 mg daily-when to take at night due to brain fog issues.  -     nortriptyline (PAMELOR) 10 MG capsule; Take 3cap/night and then increase by 1 cap/week to a max of 6 caps/night as needed and tolerated.  -     propranolol (INDERAL) 60 MG tablet; Take 1 tablet (60 mg) by mouth every morning AND 1 tablet (60 mg) every evening.  -     eletriptan (RELPAX) 40 MG tablet; Take 1 tablet (40 mg) by mouth at onset of headache for migraine May repeat in 2 hours. Max 2 tablets/24 hours.  -      galcanezumab-gnlm (EMGALITY) 120 MG/ML injection; Inject 1 mL (120 mg) Subcutaneous every 28 days  -     galcanezumab-gnlm (EMGALITY) 120 MG/ML injection; Inject 2 mLs (240 mg) Subcutaneous every 28 days Loading dose.    Return in about 4 months (around 10/20/2024) for Add on PAOR, In-Clinic Visit (must).    Over 31 minutes were spent coordinating the care for the patient on the day of the encounter.  This includes previsit, during visit and post visit activities as documented above.  Counseling patient with prescription management.  Multiple problems reviewed.  (Activities include but not inclusive of reviewing chart, reviewing outside records, reviewing labs and imaging study results as well as the images, patient visit time including getting history and exam,  use if applicable, review of test results with the patient and coming up with a plan in a shared model, counseling patient and family, education and answering patient questions, EMR , EMR diagnosis entry and problem list management, medication reconciliation and prescription management if applicable, paperwork if applicable, printing documents and documentation of the visit activities.)      Ranjan Parisi MD  Neurologist  [facility]  Tel:- [phone]    This note was dictated using voice recognition software.  Any grammatical or context distortions are unintentional and inherent to the software.      Again, thank you for allowing me to participate in the care of your patient.        Sincerely,        Ranjan Parisi MD

## 2024-06-20 NOTE — PROGRESS NOTES
NEUROLOGY OUTPATIENT PROGRESS NOTE   Jun 20, 2024     CHIEF COMPLAINT/REASON FOR VISIT/REASON FOR CONSULT  Patient presents with:  Headache: Doing a bit better. Still having them but more controlled and less frequent.    REASON FOR CONSULTATION-headaches and brain fog.    REFERRAL SOURCE  Dr. Lalita Birmingham   Dr. Lalita Birmingham    HISTORY OF PRESENT ILLNESS  Lady Saavedra is a 39 year old female seen today for evaluation of headaches and brain fog.  She reports that she has a history of headache but then in March she got Covid.  She was admitted for Covid related pneumonia.  Was in the hospital for 1 week.  Since then the headaches have been much more frequent.  She is having them every day.  Reports significant photophobia and phonophobia with the headaches.  Headaches can be on any side in different places.  They can be behind the eyes and in the back of the head.  They are throbbing in nature.  Reports no visual aura with these.  Has been put on amitriptyline which has helped her sleep as well as with the headaches but nothing significant to get rid of the headaches completely.  Is using Advil and Tylenol for the headache still would last the whole day.  Is not using them around-the-clock.  Denies any other provoking factors.    Is also been diagnosed with thyroiditis since the Covid infection.  Is working with endocrinology on this.    Reports brain fog has been going on since the Covid infection.  Has not had any improvement.  Was let go of her job.  Has difficulty with memory, staying focused and doing tasks that she could previously do easily.  Does get good sleep at night with the amitriptyline.    Patient has recently been in contact with the Covid rehabilitation clinic.  Has not started the program yet.    2/14/22  Patient returns today  1.  Patient reports that her headaches have significantly improved with the propanolol.  Is taking 60 mg twice a day.  Denies any side effects.  Headaches of 4-5  times a month.  Headaches are much less severe compared to before and she does not need to take abortive medication.  Did try the Maxalt once without any side effects and it did not work for her.  2.  Continues to have the brain fog.  Has been seen in the [facility] rehabilitation clinic and has not been able to do the therapies due to lack of insurance.  Steroids did not help with the brain fog.  3.  Reports that the amitriptyline is no longer working to help her sleep.  She is up till 2 AM every night.  Discussed about increasing the dose and she is interested in that.  Denies any side effects with the amitriptyline.    4/12/22  Patient returns today  1.  She reports that the headaches are twice a month.  Maxalt does work as abortive therapy though she has to eat something with it otherwise feels nauseous with the medication.  Propanolol amitriptyline is helping.  Some somnolence with the amitriptyline which is getting better.  Discussed about decreasing the amitriptyline and she wants to stay on the higher dose.  It was increased last time because the propanolol was not working by itself.  2.  Continues to have the brain fog.  Has insurance now and has gone to the [facility] rehabilitation clinic though has not been able to see the therapist yet.  Is only seeing her counselor for her mental illness.    3.  Is sleeping better with the amitriptyline at this point though does have some somnolence.  No insomnia.    2/1/23  Patient returns today  1.  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  Headaches have since then gotten worse and she is having 2-3 headaches a week.  About 15 headache days a month.  Remains on the amitriptyline and propanolol.  This daytime somnolence with the amitriptyline has improved.  No side effects with propanolol.  Blood pressure is better at this point as well.  Maxalt does work as abortive therapy.  She does need to  use it with food to avoid nausea.  2.  Brain fog is also improved with time.  3.  Sleeping better with the amitriptyline.  No issues.  No other new symptoms    4/5/23  Patient returns today  1.  She is currently on 100 mg of Topamax.  She takes it in the morning because it prevents her from sleeping at night.  She reports that she had complete resolution of the headaches.  Has not had any headaches in the last month.  2.  Occasionally if she does have a headache she will use Maxalt which is still working  3.  She remains on propanolol.  She feels that is also being helping with the tachycardia.  Remains on amitriptyline though does complain that she occasionally cannot sleep.  She feels that she is not exercising enough to tire herself out to fall asleep  4.  Brain fog symptoms have now improved.  No new issues/concerns.    4/25/24  Patient returns today.  Since she was last seen she has had COVID and has been having long-haul COVID symptoms along with worsening headaches.  1.  Headaches are almost every day.  Headaches are brought on by bright lights.  She does have accommodation at work.  Has been able to work from home which is helping.  In terms of her medications she has been on a higher dose of Topamax.  Is taking 1-1/2 tablet in the morning.  Has been switched from amitriptyline to nortriptyline.  No side effects.  Feels that she is sleeping well.  Propranolol dose has been decreased.  Is using Zofran and meclizine for nausea with the headaches.  Maxalt does work but causes some nausea.    2.  Per the post-COVID clinic she has been working with PT/OT/speech therapy with no significant improvement.  Brain fog is also gotten worse.  No other new concerns.    6/20/24  Patient returns today  1.  Her brain fog symptoms have improved.  2.  Headaches are slowly getting better.  Still has them multiple times a week but not as severe compared to before.  Jaciel on the high doses of Topamax, nortriptyline, propranolol.   No side effects.  Steroids actually made things worse.  Maxalt makes her more tired.  Is interested in trying other medication since the headaches are still affecting quality of life  No other new concerns.    Previous history is reviewed and this is unchanged.    PAST MEDICAL/SURGICAL HISTORY  Past Medical History:   Diagnosis Date    Abnormal Pap smear of cervix 2009, 2012, 2014    see problem list    Cervical high risk HPV (human papillomavirus) test positive 2009, 2012    see problem list    Chickenpox     Depressive disorder     Gestational HTN 01/01/2015    Hypothyroidism     Migraine with aura and without status migrainosus, not intractable     Seasonal allergies     Was on clariten uses occasional sudafed     Patient Active Problem List   Diagnosis    CARDIOVASCULAR SCREENING; LDL GOAL LESS THAN 160    Pneumonia due to 2019 novel coronavirus    COVID-19 long hauler    Anovulation    Prediabetes    Migraine with aura and without status migrainosus, not intractable    Hyperlipidemia LDL goal <100    Nonalcoholic fatty liver disease    Cognitive communication deficit   Significant of migraines, mental illness, suicide attempt, depression.  Feels the depression is under good control with EMR    FAMILY HISTORY  Family History   Adopted: Yes   Problem Relation Age of Onset    Unknown/Adopted Mother     Unknown/Adopted Father    Patient is adopted and she does not know her family.    SOCIAL HISTORY  Social History     Tobacco Use    Smoking status: Never    Smokeless tobacco: Never   Vaping Use    Vaping status: Never Used   Substance Use Topics    Alcohol use: No    Drug use: No       SYSTEMS REVIEW  Twelve-system ROS was done and other than the HPI this was negative except for neck pain, back pain, arm and leg pain, joint pain, numbness and tingling, sleepiness during the day, sleeping problems, headaches, anxiety, depression, palpitations, bloating, stomach pain, bowel problems, respiratory problems, skin  changes, weight gain.  No new concerns/issues.    MEDICATIONS  Current Outpatient Medications   Medication Sig Dispense Refill    aspirin-acetaminophen-caffeine (EXCEDRIN MIGRAINE) 250-250-65 MG tablet Take 1 tablet by mouth daily as needed for headaches      bisacodyl (DULCOLAX) 10 MG suppository Place 1 suppository (10 mg) rectally daily as needed for constipation 16 suppository 1    buPROPion (WELLBUTRIN XL) 300 MG 24 hr tablet Take 1 tablet (300 mg) by mouth every morning 90 tablet 3    dicyclomine (BENTYL) 10 MG capsule Take 1 capsule (10 mg) by mouth 4 times daily as needed (abdominal cramping) 90 capsule 11    eletriptan (RELPAX) 40 MG tablet Take 1 tablet (40 mg) by mouth at onset of headache for migraine May repeat in 2 hours. Max 2 tablets/24 hours. 18 tablet 3    erythromycin with ethanol (ERYGEL) 2 % gel Apply topically 2 times daily 30 g 3    fluticasone (FLONASE) 50 MCG/ACT nasal spray Spray 1 spray into both nostrils daily 15.8 mL 1    galcanezumab-gnlm (EMGALITY) 120 MG/ML injection Inject 1 mL (120 mg) Subcutaneous every 28 days 1 mL 3    galcanezumab-gnlm (EMGALITY) 120 MG/ML injection Inject 2 mLs (240 mg) Subcutaneous every 28 days Loading dose. 2 mL 0    hydrOXYzine (VISTARIL) 25 MG capsule Take 1-2 capsules (25-50 mg) by mouth 3 times daily as needed for anxiety 30 capsule 0    levothyroxine (SYNTHROID) 88 MCG tablet Take 1 tablet (88 mcg) by mouth daily 90 tablet 3    meclizine (ANTIVERT) 12.5 MG tablet Take 1 tablet (12.5 mg) by mouth 3 times daily as needed for dizziness or nausea 30 tablet 1    methocarbamol (ROBAXIN) 500 MG tablet Take 1 tablet (500 mg) by mouth 4 times daily as needed for muscle spasms 30 tablet 1    modafinil (PROVIGIL) 100 MG tablet Take 1-2 tablets (100-200 mg) by mouth daily 45 tablet 0    Naltrexone HCl, Pain, 1.5 MG CAPS Take 2 capsules by mouth daily 180 capsule 3    nortriptyline (PAMELOR) 10 MG capsule Take 3cap/night and then increase by 1 cap/week to a max of 6  caps/night as needed and tolerated. 540 capsule 1    ondansetron (ZOFRAN ODT) 4 MG ODT tab Take 1 tablet (4 mg) by mouth every 8 hours as needed for nausea 30 tablet 1    propranolol (INDERAL) 60 MG tablet Take 1 tablet (60 mg) by mouth every morning AND 1 tablet (60 mg) every evening. 180 tablet 3    psyllium (METAMUCIL/KONSYL) capsule Take 1 capsule by mouth daily 90 capsule 3    scopolamine (TRANSDERM) 1 MG/3DAYS 72 hr patch Place 1 patch onto the skin every 72 hours 10 patch 1    topiramate (TOPAMAX) 100 MG tablet Take 1.5 tablets (150 mg) by mouth daily 135 tablet 3    vitamin D3 (CHOLECALCIFEROL) 50 mcg (2000 units) tablet Take 1 tablet (50 mcg) by mouth daily 90 tablet 3     No current facility-administered medications for this visit.        PHYSICAL EXAMINATION  VITALS: /86   Pulse 70   Wt 78 kg (172 lb)   LMP 05/02/2024   BMI 31.46 kg/m    GENERAL: Healthy appearing, alert, no acute distress, normal habitus.  CARDIOVASCULAR: Extremities warm and well perfused. Pulses present.   NEUROLOGICAL:  Patient is awake and oriented to self, place and time.  Attention span is normal.  Memory is grossly intact; previously MoCA 26.  Language is fluent and follows commands appropriately.  Appropriate fund of knowledge. Cranial nerves 2-12 are intact. There is no pronator drift.  Motor exam shows 5/5 strength in all extremities.  Tone is symmetric bilaterally in upper and lower extremities.  (Previously reflexes are symmetric and 2+ in upper extremities and lower extremities. Sensory exam is grossly intact to light touch, pin prick and vibration.)  Finger to nose and heel to shin is without dysmetria.  Romberg is negative.  Gait is normal and the patient is able to do tandem walk and walk on toes and heels.  No exam change.    DIAGNOSTICS  MRI-images reviewed.  No major structural lesions noted.  Some T2 hyperintensities.  IMPRESSION:  1.  No acute intracranial process.  2.  Nonspecific scattered T2  hyperintensities within the cerebral white matter. These can be seen in association with migraine headaches.    Labs  Component      Latest Ref Rng & Units 8/31/2021 9/13/2021 10/15/2021 12/7/2021   Thyroid Peroxidase Antibody      <35 IU/mL >5,000 (H)      TSH      0.40 - 4.00 mU/L  1.72 0.11 (L) 1.22   T4 Free      0.76 - 1.46 ng/dL   1.32          OUTSIDE RECORDS  Outside referral notes and chart notes were reviewed and pertinent information has been summarized (in addition to the HPI):-Patient has been diagnosed with long-haul Covid. Multiple testing has been ordered. Has been seen with cardiology. Also seeing endocrinology. Has been having brain fog for which she was referred to neurology. Does complain of easy fatigability as well.    LABS  Component      Latest Ref Rng & Units 12/14/2021   Vitamin B12      213 - 816 pg/mL 413   Vitamin B1 Whole Blood Level      70 - 180 nmol/L 104   Ferritin      10 - 130 ng/mL 192 (H)   Methylmalonic Acid      0.00 - 0.40 umol/L 0.21     EEG  IMPRESSION/REPORT/PLAN  This is a normal EEG during wakefulness and drowsiness except for mild generalized background dysrhythmia. Further clinical correlation is needed.     Please note that the absence of epileptiform abnormalities does not exclude the possibility of epilepsy in any patient.      Component      Latest Ref Rng & Units 6/8/2022 11/1/2022   Ferritin      12 - 150 ng/mL 484 (H)    TSH      0.30 - 4.20 uIU/mL  1.01   T4 Free      0.90 - 1.70 ng/dL  1.29       IMPRESSION/REPORT/PLAN  History of anti-TPO antibodies  History of 2019 novel coronavirus disease (COVID-19)  Brain fog/cognitive difficulty-improved  Chronic migraine without aura without status migrainosus, not intractable  Insomnia, unspecified type  Recent COVID infection with worsening symptoms    This is a 39 year old female with history of migraines with worsening migraines after COVID-19 infection and cognitive decline after COVID-19 infection.    Previously  amitriptyline and propanolol were helping.  Blood pressure was slightly elevated and amitriptyline was causing daytime somnolence.  Propanolol was also helping with some tachycardia.  These side effects have now improved.  With addition of Topamax headaches have significantly improved.    Maxalt is helping with abortive therapy and will continue.  Previously amitriptyline was also being used for insomnia.    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  Anti-TPO antibodies were positive and she was treated with steroids with no benefit.  MRI and EEG were noncontributory.  She does have a history of thyroid disease and is on levothyroxine.  Recent TSH levels were normal.  This is now improved with therapy.    With worsening of headaches after the COVID she was initially put on nortriptyline, propranolol, Topamax at higher doses.  These have helped but not completely.  Will add Emgality.  Will furthermore switch from Maxalt to Relpax to see if it works better.  Continue Zofran for nausea.  Eventual goal will be to wean her off some of these medications.    Return back in 4 months.    -     topiramate (TOPAMAX) 150 mg daily-when to take at night due to brain fog issues.  -     nortriptyline (PAMELOR) 10 MG capsule; Take 3cap/night and then increase by 1 cap/week to a max of 6 caps/night as needed and tolerated.  -     propranolol (INDERAL) 60 MG tablet; Take 1 tablet (60 mg) by mouth every morning AND 1 tablet (60 mg) every evening.  -     eletriptan (RELPAX) 40 MG tablet; Take 1 tablet (40 mg) by mouth at onset of headache for migraine May repeat in 2 hours. Max 2 tablets/24 hours.  -     galcanezumab-gnlm (EMGALITY) 120 MG/ML injection; Inject 1 mL (120 mg) Subcutaneous every 28 days  -     galcanezumab-gnlm (EMGALITY) 120 MG/ML injection; Inject 2 mLs (240 mg) Subcutaneous every 28 days Loading dose.    Return in about 4 months (around 10/20/2024) for Add on PAOR, In-Clinic Visit  (must).    Over 31 minutes were spent coordinating the care for the patient on the day of the encounter.  This includes previsit, during visit and post visit activities as documented above.  Counseling patient with prescription management.  Multiple problems reviewed.  (Activities include but not inclusive of reviewing chart, reviewing outside records, reviewing labs and imaging study results as well as the images, patient visit time including getting history and exam,  use if applicable, review of test results with the patient and coming up with a plan in a shared model, counseling patient and family, education and answering patient questions, EMR , EMR diagnosis entry and problem list management, medication reconciliation and prescription management if applicable, paperwork if applicable, printing documents and documentation of the visit activities.)      Ranjan Parisi MD  Neurologist  [facility]  Tel:- [phone]    This note was dictated using voice recognition software.  Any grammatical or context distortions are unintentional and inherent to the software.

## 2024-06-20 NOTE — NURSING NOTE
"Lady Saavedra is a 39 year old female who presents for:  Chief Complaint   Patient presents with    Headache     Doing a bit better. Still having them but more controlled and less frequent.        Initial Vitals:  /86   Pulse 70   Wt 78 kg (172 lb)   LMP 05/02/2024   BMI 31.46 kg/m   Estimated body mass index is 31.46 kg/m  as calculated from the following:    Height as of 5/21/24: 1.575 m (5' 2\").    Weight as of this encounter: 78 kg (172 lb).. Body surface area is 1.85 meters squared. BP completed using cuff size: wrist cuff    Delmar VElizabeth Kade  "

## 2024-06-26 ENCOUNTER — TELEPHONE (OUTPATIENT)
Dept: NEUROLOGY | Facility: CLINIC | Age: 39
End: 2024-06-26

## 2024-06-29 NOTE — TELEPHONE ENCOUNTER
[facility] Prior Authorization Team   Phone: [phone]    PA Initiation    Medication: EMGALITY 120 MG/ML SC SOAJ  Insurance Company: [employer]  Pharmacy Filling the Rx:    Filling Pharmacy Phone:    Filling Pharmacy Fax:    Start Date: 6/29/2024

## 2024-06-30 SDOH — SOCIAL STABILITY: SOCIAL NETWORK: I HAVE TROUBLE DOING ALL OF THE FAMILY ACTIVITIES THAT I WANT TO DO: SOMETIMES

## 2024-06-30 SDOH — SOCIAL STABILITY: SOCIAL NETWORK: I HAVE TROUBLE DOING ALL OF THE ACTIVITIES WITH FRIENDS THAT I WANT TO DO: SOMETIMES

## 2024-06-30 SDOH — SOCIAL STABILITY: SOCIAL NETWORK

## 2024-06-30 SDOH — SOCIAL STABILITY: SOCIAL NETWORK: I HAVE TROUBLE DOING ALL OF MY REGULAR LEISURE ACTIVITIES WITH OTHERS: SOMETIMES

## 2024-06-30 SDOH — SOCIAL STABILITY: SOCIAL NETWORK: I HAVE TROUBLE DOING ALL OF MY USUAL WORK (INCLUDE WORK AT HOME): SOMETIMES

## 2024-06-30 SDOH — SOCIAL STABILITY: SOCIAL NETWORK: PROMIS ABILITY TO PARTICIPATE IN SOCIAL ROLES & ACTIVITIES T-SCORE: 45

## 2024-06-30 ASSESSMENT — ANXIETY QUESTIONNAIRES
3. WORRYING TOO MUCH ABOUT DIFFERENT THINGS: NOT AT ALL
2. NOT BEING ABLE TO STOP OR CONTROL WORRYING: NOT AT ALL
6. BECOMING EASILY ANNOYED OR IRRITABLE: SEVERAL DAYS
GAD7 TOTAL SCORE: 3
4. TROUBLE RELAXING: SEVERAL DAYS
7. FEELING AFRAID AS IF SOMETHING AWFUL MIGHT HAPPEN: NOT AT ALL
8. IF YOU CHECKED OFF ANY PROBLEMS, HOW DIFFICULT HAVE THESE MADE IT FOR YOU TO DO YOUR WORK, TAKE CARE OF THINGS AT HOME, OR GET ALONG WITH OTHER PEOPLE?: SOMEWHAT DIFFICULT
5. BEING SO RESTLESS THAT IT IS HARD TO SIT STILL: NOT AT ALL
1. FEELING NERVOUS, ANXIOUS, OR ON EDGE: SEVERAL DAYS
IF YOU CHECKED OFF ANY PROBLEMS ON THIS QUESTIONNAIRE, HOW DIFFICULT HAVE THESE PROBLEMS MADE IT FOR YOU TO DO YOUR WORK, TAKE CARE OF THINGS AT HOME, OR GET ALONG WITH OTHER PEOPLE: SOMEWHAT DIFFICULT
7. FEELING AFRAID AS IF SOMETHING AWFUL MIGHT HAPPEN: NOT AT ALL

## 2024-06-30 ASSESSMENT — PATIENT HEALTH QUESTIONNAIRE - PHQ9
SUM OF ALL RESPONSES TO PHQ QUESTIONS 1-9: 3
SUM OF ALL RESPONSES TO PHQ QUESTIONS 1-9: 3
10. IF YOU CHECKED OFF ANY PROBLEMS, HOW DIFFICULT HAVE THESE PROBLEMS MADE IT FOR YOU TO DO YOUR WORK, TAKE CARE OF THINGS AT HOME, OR GET ALONG WITH OTHER PEOPLE: SOMEWHAT DIFFICULT

## 2024-07-01 ENCOUNTER — THERAPY VISIT (OUTPATIENT)
Dept: OCCUPATIONAL THERAPY | Facility: CLINIC | Age: 39
End: 2024-07-01
Attending: PHYSICIAN ASSISTANT
Payer: COMMERCIAL

## 2024-07-01 ENCOUNTER — VIRTUAL VISIT (OUTPATIENT)
Dept: PHYSICAL MEDICINE AND REHAB | Facility: CLINIC | Age: 39
End: 2024-07-01
Payer: COMMERCIAL

## 2024-07-01 VITALS — HEIGHT: 62 IN | WEIGHT: 172 LBS | BODY MASS INDEX: 31.65 KG/M2

## 2024-07-01 DIAGNOSIS — U09.9 COVID-19 LONG HAULER: ICD-10-CM

## 2024-07-01 DIAGNOSIS — R53.83 FATIGUE, UNSPECIFIED TYPE: Primary | ICD-10-CM

## 2024-07-01 DIAGNOSIS — G43.109 MIGRAINE WITH AURA AND WITHOUT STATUS MIGRAINOSUS, NOT INTRACTABLE: ICD-10-CM

## 2024-07-01 DIAGNOSIS — U09.9 COVID-19 LONG HAULER: Primary | ICD-10-CM

## 2024-07-01 PROCEDURE — 97535 SELF CARE MNGMENT TRAINING: CPT | Mod: GO | Performed by: OCCUPATIONAL THERAPIST

## 2024-07-01 PROCEDURE — 99213 OFFICE O/P EST LOW 20 MIN: CPT | Mod: 95 | Performed by: PHYSICIAN ASSISTANT

## 2024-07-01 ASSESSMENT — PAIN SCALES - GENERAL: PAINLEVEL: NO PAIN (0)

## 2024-07-01 NOTE — PROGRESS NOTES
DISCHARGE  Reason for Discharge: Patient has met all goals.    Equipment Issued: SSP    Discharge Plan: Patient to continue home program.    Referring Provider:  Esther Lerma    07/01/24 0500   Appointment Info   Treating Provider Dada Garcia OTR/L   Visits Used 5   Medical Diagnosis COVID-19 [place] Ha[place]   OT Tx Diagnosis Impaired cognition and fatigue impacting ADL's and IADL's   Progress Note/Certification   Onset of Illness/Injury or Date of Surgery 03/18/24  (referral date)   Therapy Frequency 3-4 additional visits   Predicted Duration 8 weeks   Progress Note Due Date 07/12/24   Goals   OT Goals 2;3;4   OT Goal 1   Goal Identifier Energy Conservation and work simplification   Goal Description Pt will utilize energy conservation and work simplification strategies throughout her day in an effort to be able to particpate in daily life   Rationale In order to maximize safety and independence with performance of self-care activities   Goal Progress pacing strategies and work simplifications / energy conservation strategies in laundry, meal prep, work skills   Target Date 07/12/24   OT Goal 2   Goal Identifier Sensory Management   Goal Description Pt will utilize sensory system (light, sound, movement) strategies to minimize sensitivity results throughout the day   Rationale In order to maximize safety and independence with performance of self-care activities   Goal Progress utilizing music prior to falling asleep and this is helping her to sleep through  the night and sleep hard.   Target Date 07/12/24   OT Goal 3   Goal Identifier Nervous System Regulating Strategies   Goal Description Pt will verbalize and trial 3 nervous system regulating activities to aide in symptom management of anxiety throughout the day, specifically being able to go to a store   Rationale In order to maximize safety and independence with performance of self-care activities   Goal Progress using music, meditation, breathing  strategies as well as crafts as tasks of enjoyment   Target Date 07/12/24   OT Goal 4   Goal Identifier Further SSP Goal   Goal Description Plan to set SSP goal   Goal Progress provided: balance freely   Target Date 07/12/24   Subjective Report   Subjective Report Discussion of status and planning   Treatment Interventions (OT)   Interventions Self Care/Home Management;Therapeutic Activity;Sensory Integration   Self Care/Home Management   Self-Care/Home Mgmt/ADL, Compensatory, Meal Prep Minutes (98093) 25 Minutes   Self Care 1 - Details Active review of current status regarding:  vision - currently dealing with the feeling of dry eyes.  Discussed the pacing of close up convergence when completing craft work.  Fatigue:  skilled pacing strategies with work and leisure tasks.  Exercise:  Graded advancing of exercise via time or distance or effort and timing of day when exercising.  Education of advancing the SSP to balance freely and process of using the balance.  Pt in agreement with plan   Skilled Intervention skilled education to aide in daily function   Education   Learner/Method Patient;Listening   Plan   Home program initiate consistent walking regimen with advancing slowly, utilizing nervous system regulating strategies throughout her day   Updates to plan of care discharge OT   Total Session Time   Timed Code Treatment Minutes 25   Total Treatment Time (sum of timed and untimed services) 25

## 2024-07-01 NOTE — PROGRESS NOTES
"Lady Saavedra is a 39 year old female who presents to be evaluated for a billable video visit.    Video-Visit Details    Video visit Start time:7:16 AM    Type of service:  Video Visit    Video End Time: 7:27    Originating Location (pt. Location): Home    Distant Location (provider location):  Off- Site    Platform used for Video Visit: [facility]    Assessment/Impression    1. Fatigue, unspecified type  Patient with ongoing significant malaise.    Encourage patient to continue pacing and prioritizing tasks to decrease disruption of daily activities due to her fatigue.  Encouraged to continue practicing energy conservation as well as techniques taught by occupational therapy.  Patient was prescribed Provigil by primary care and counseled patient on \"falls energy \".  Patient is scheduled to see sleep medicine in August and encouraged to keep visit as sleep is likely contributing to fatigue.      2. Migraine with aura and without status migrainosus, not intractable  Patient with worsening migraines after recent COVID infection in January 2024.  Patient is working with neurology and is working to have new medications approved by insurance.  Appreciate neurology     3. COVID-19 long hauler  Discussed COVID and Post COVID with patient.  Educational materials provided and all questions answered.       Plan:  I reviewed present knowledge on long-Covid.  Education was provided and question were answered.  Orders/Referrals as above  I will advised patient on test results  I will follow up with Lady Saavedra in 2 months. I will review progress and consider need for any other therapeutic interventions. If there are any questions and/or concerns she will call the clinic.      On day of encounter time spent in chart review and with patient in consultation, exam, education,coordination of care,  review of outside charts/documentation and documentation: 21 minutes     I have attempted to proof read for major spelling errors and " apologize for any minor errors I may have missed.     This note was dictated using voice recognition software. Any grammatical or context distortions are unintentional and inherent to the software.  _________________________________  NIKITA Ramsey [facility] PHYSICAL MEDICINE AND REHABILITATION CLINIC [place]    Subjective   HPI   Patients brain fog /concentration has significantly improved.  Still with daytime fatigue. She still has to pace herself with  basic tasks such as cooking.  Patient is not waking up rested.  States she has scheduled appointment for sleep medicine in August.  Still working with occupational therapy and practicing energy conservation.  Headaches are still an issue and working with Neurology.  Overall still suffering from daytime somnolence as well as migraines.  Patient feels she is slowly improving.    Current concerns: Health Concerns        6/30/2024     4:26 PM   PHQ Assesment Total Score(s)   PHQ-9 Score 3           6/30/2024     4:27 PM   JOSÉ MIGUEL-7 Results   JOSÉ MIGUEL 7 TOTAL SCORE 3 (minimal anxiety)   JOSÉ MIGUEL-7 Total Score 3         6/30/2024     4:27 PM   PTSD Screen Score   Have you ever experienced this kind of event? No         6/30/2024     4:36 PM   PROMIS-29   PROMIS Physical Function T-Score 43 (mild dysfunction)   PROMIS Anxiety T-Score 51 (within normal limits)   PROMIS Depression T-Score 41 (within normal limits)   PROMIS Fatigue T-Score 65 (moderate)   PROMIS Sleep Disturbance T-Score 46 (within normal limits)   PROMIS Ability to Participate in Social Roles & Activities T-Score 45 (within normal limits)   PROMIS Pain Interference T-Score 42 (within normal limits)   PROMIS Pain Intensity 2         Past Medical History:   Diagnosis Date    Abnormal Pap smear of cervix 2009, 2012, 2014    see problem list    Cervical high risk HPV (human papillomavirus) test positive 2009, 2012    see problem list    Chickenpox     Depressive disorder     Gestational HTN 01/01/2015     Hypothyroidism     Migraine with aura and without status migrainosus, not intractable     Seasonal allergies     Was on clariten uses occasional sudafed       Past Surgical History:   Procedure Laterality Date    BIOPSY      COSMETIC SURGERY      HC ENLARGE BREAST WITH IMPLANT      MOUTH SURGERY  age 18    wisdom teeth       Family History   Adopted: Yes   Problem Relation Age of Onset    Unknown/Adopted Mother     Unknown/Adopted Father        Social History     Tobacco Use    Smoking status: Never    Smokeless tobacco: Never   Vaping Use    Vaping status: Never Used   Substance Use Topics    Alcohol use: No    Drug use: No         Current Outpatient Medications:     aspirin-acetaminophen-caffeine (EXCEDRIN MIGRAINE) 250-250-65 MG tablet, Take 1 tablet by mouth daily as needed for headaches, Disp: , Rfl:     bisacodyl (DULCOLAX) 10 MG suppository, Place 1 suppository (10 mg) rectally daily as needed for constipation, Disp: 16 suppository, Rfl: 1    buPROPion (WELLBUTRIN XL) 300 MG 24 hr tablet, Take 1 tablet (300 mg) by mouth every morning, Disp: 90 tablet, Rfl: 3    dicyclomine (BENTYL) 10 MG capsule, Take 1 capsule (10 mg) by mouth 4 times daily as needed (abdominal cramping), Disp: 90 capsule, Rfl: 11    eletriptan (RELPAX) 40 MG tablet, Take 1 tablet (40 mg) by mouth at onset of headache for migraine May repeat in 2 hours. Max 2 tablets/24 hours., Disp: 18 tablet, Rfl: 3    erythromycin with ethanol (ERYGEL) 2 % gel, Apply topically 2 times daily, Disp: 30 g, Rfl: 3    fluticasone (FLONASE) 50 MCG/ACT nasal spray, Spray 1 spray into both nostrils daily, Disp: 15.8 mL, Rfl: 1    galcanezumab-gnlm (EMGALITY) 120 MG/ML injection, Inject 1 mL (120 mg) Subcutaneous every 28 days, Disp: 1 mL, Rfl: 3    galcanezumab-gnlm (EMGALITY) 120 MG/ML injection, Inject 2 mLs (240 mg) Subcutaneous every 28 days Loading dose., Disp: 2 mL, Rfl: 0    hydrOXYzine (VISTARIL) 25 MG capsule, Take 1-2 capsules (25-50 mg) by mouth 3  times daily as needed for anxiety, Disp: 30 capsule, Rfl: 0    levothyroxine (SYNTHROID) 88 MCG tablet, Take 1 tablet (88 mcg) by mouth daily, Disp: 90 tablet, Rfl: 3    meclizine (ANTIVERT) 12.5 MG tablet, Take 1 tablet (12.5 mg) by mouth 3 times daily as needed for dizziness or nausea, Disp: 30 tablet, Rfl: 1    methocarbamol (ROBAXIN) 500 MG tablet, Take 1 tablet (500 mg) by mouth 4 times daily as needed for muscle spasms, Disp: 30 tablet, Rfl: 1    modafinil (PROVIGIL) 100 MG tablet, Take 1-2 tablets (100-200 mg) by mouth daily, Disp: 45 tablet, Rfl: 0    Naltrexone HCl, Pain, 1.5 MG CAPS, Take 2 capsules by mouth daily, Disp: 180 capsule, Rfl: 3    nortriptyline (PAMELOR) 10 MG capsule, Take 3cap/night and then increase by 1 cap/week to a max of 6 caps/night as needed and tolerated., Disp: 540 capsule, Rfl: 1    ondansetron (ZOFRAN ODT) 4 MG ODT tab, Take 1 tablet (4 mg) by mouth every 8 hours as needed for nausea, Disp: 30 tablet, Rfl: 1    propranolol (INDERAL) 60 MG tablet, Take 1 tablet (60 mg) by mouth every morning AND 1 tablet (60 mg) every evening., Disp: 180 tablet, Rfl: 3    psyllium (METAMUCIL/KONSYL) capsule, Take 1 capsule by mouth daily, Disp: 90 capsule, Rfl: 3    scopolamine (TRANSDERM) 1 MG/3DAYS 72 hr patch, Place 1 patch onto the skin every 72 hours, Disp: 10 patch, Rfl: 1    topiramate (TOPAMAX) 100 MG tablet, Take 1.5 tablets (150 mg) by mouth daily, Disp: 135 tablet, Rfl: 3    vitamin D3 (CHOLECALCIFEROL) 50 mcg (2000 units) tablet, Take 1 tablet (50 mcg) by mouth daily, Disp: 90 tablet, Rfl: 3    Review of Systems   Constitutional, HEENT, cardiovascular, pulmonary, GI, , musculoskeletal, neuro, skin, endocrine and psych systems are negative, except as otherwise noted.      Objective         Vitals:  No vitals were obtained today due to virtual visit.    Physical Exam   EYES: Eyes grossly normal to inspection.  No discharge or erythema, or obvious scleral/conjunctival  abnormalities.  SKIN: Visible skin clear. No significant rash, abnormal pigmentation or lesions.  NEURO: Cranial nerves grossly intact.  Mentation and speech appropriate for age.  GENERAL: Healthy, alert and no distress  RESP: No audible wheeze, cough, or visible cyanosis.  No visible retractions or increased work of breathing.    PSYCH: Mentation appears normal, affect normal/bright, judgement and insight intact, normal speech and appearance well-groomed.      Labs :  Office Visit on 05/21/2024   Component Date Value Ref Range Status    Vitamin B12 05/21/2024 294  232 - 1,245 pg/mL Final    Vitamin D, Total (25-Hydroxy) 05/21/2024 28  20 - 50 ng/mL Final    optimum levels       Imaging:    I personally reviewed the following imaging results today and those on care everywhere, if indicated     Nothing new to review    Reviewed imaging from [facility] sites     Medical Records Reviewed:    Reviewed consults/documents from [facility] including  Family Practice, Neurology, OT, and SLP

## 2024-07-01 NOTE — NURSING NOTE
Is the patient currently in the state of [place]? YES    Visit mode:VIDEO    If the visit is dropped, the patient can be reconnected by: VIDEO VISIT: Text to cell phone:   Telephone Information:   Mobile [phone]       Will anyone else be joining the visit? NO  (If patient encounters technical issues they should call [phone]    How would you like to obtain your AVS? MyChart    Are changes needed to the allergy or medication list? No    Are refills needed on medications prescribed by this physician? NO    Reason for visit: Follow Up    Shakira SLATER

## 2024-07-01 NOTE — PROGRESS NOTES
PLAN  Continue therapy per current plan of care.    Beginning/End Dates of Progress Note Reporting Period:  4/15/2024  to 05/06/2024    Referring Provider:  Esther Lerma     05/06/24 0500   Appointment Info   Treating Provider Dada Garcia OTR/L   Visits Used 3   Medical Diagnosis COVID-19 [place]   OT Tx Diagnosis Impaired cognition and fatigue impacting ADL's and IADL's   Progress Note/Certification   Onset of Illness/Injury or Date of Surgery 03/18/24  (referral date)   Therapy Frequency 1x week   Predicted Duration 12 weeks   Progress Note Due Date 05/15/24   Goals   OT Goals 2;3;4   OT Goal 1   Goal Identifier Energy Conservation and work simplification   Goal Description Pt will utilize energy conservation and work simplification strategies throughout her day in an effort to be able to particpate in daily life   Rationale In order to maximize safety and independence with performance of self-care activities   Goal Progress Began instruction of energy conservation/pacing strategies   Target Date 05/15/24   OT Goal 2   Goal Identifier Sensory Management   Goal Description Pt will utilize sensory system (light, sound, movement) strategies to minimize sensitivity results throughout the day   Rationale In order to maximize safety and independence with performance of self-care activities   Target Date 05/15/24   OT Goal 3   Goal Identifier Nervous System Regulating Strategies   Goal Description Pt will verbalize and trial 3 nervous system regulating activities to aide in symptom management of anxiety throughout the day, specifically being able to go to a store   Rationale In order to maximize safety and independence with performance of self-care activities   Goal Progress Provided with packet to review   Target Date 05/15/24   OT Goal 4   Goal Identifier Further SSP Goal   Goal Description Plan to set SSP goal   Target Date 05/15/24   Subjective Report   Subjective Report Pt is present stating she has  been working on her SSP with one episode of feeling like it made it difficult to breath.  She reports she a big cry with her  and that helped.  She reports she is having her eyes examined feeling like they have gotten worse   Treatment Interventions (OT)   Interventions Self Care/Home Management;Therapeutic Activity;Sensory Integration   Therapeutic Activity   Therapeutic Activity Minutes (30926) 45   Skilled Intervention skilled visual strategies facilitating improving skills for reading and writing   Ther Act 1 - Details Visual activity of oculomotor smooth pursuit with one eye covered follow by other eye covered leading into convergence activity with each eye.   Saccadic eye activity in sitting with each eye followed by utilization of dobber activity in standing with design of where to the put the dots while standing on foam piece challenging balance through proprioceptive input and graded depth perception activity.  Utilization of Spot It game of visual saccadic activity using monocular activity.   Patient Response/Progress Noted left eye with jumpiness into convergence.   Education   Learner/Method Patient;Listening   Plan   Home program utilizing nervous system regulating strategies throughout her day   Plan for next session Post SSP assessment, visual skill activity, work simplification and energy conservation strategies   Total Session Time   Timed Code Treatment Minutes 45   Total Treatment Time (sum of timed and untimed services) 45

## 2024-07-01 NOTE — LETTER
"7/1/2024       RE: Lady Saavedra  [address] [address] [place] [id number]     Dear Colleague,    Thank you for referring your patient, Lady Saavedra, to the [facility] PHYSICAL MEDICINE AND REHABILITATION CLINIC [place] at [facility]. Please see a copy of my visit note below.    Lady Saavedra is a 39 year old female who presents to be evaluated for a billable video visit.    Assessment/Impression    1. Fatigue, unspecified type  Patient with ongoing significant malaise.    Encourage patient to continue pacing and prioritizing tasks to decrease disruption of daily activities due to her fatigue.  Encouraged to continue practicing energy conservation as well as techniques taught by occupational therapy.  Patient was prescribed Provigil by primary care and counseled patient on \"falls energy \".  Patient is scheduled to see sleep medicine in August and encouraged to keep visit as sleep is likely contributing to fatigue.      2. Migraine with aura and without status migrainosus, not intractable  Patient with worsening migraines after recent COVID infection in January 2024.  Patient is working with neurology and is working to have new medications approved by insurance.  Appreciate neurology     3. COVID-19 long hauler  Discussed COVID and Post COVID with patient.  Educational materials provided and all questions answered.       Plan:  I reviewed present knowledge on long-Covid.  Education was provided and question were answered.  Orders/Referrals as above  I will advised patient on test results  I will follow up with Lady Saavedra in 2 months. I will review progress and consider need for any other therapeutic interventions. If there are any questions and/or concerns she will call the clinic.      On day of encounter time spent in chart review and with patient in consultation, exam, education,coordination of care,  review of outside charts/documentation " and documentation: 21 minutes     I have attempted to proof read for major spelling errors and apologize for any minor errors I may have missed.     This note was dictated using voice recognition software. Any grammatical or context distortions are unintentional and inherent to the software.  _________________________________    Subjective   HPI   Patients brain fog /concentration has significantly improved.  Still with daytime fatigue. She still has to pace herself with  basic tasks such as cooking.  Patient is not waking up rested.  States she has scheduled appointment for sleep medicine in August.  Still working with occupational therapy and practicing energy conservation.  Headaches are still an issue and working with Neurology.  Overall still suffering from daytime somnolence as well as migraines.  Patient feels she is slowly improving.    Current concerns: Health Concerns        6/30/2024     4:26 PM   PHQ Assesment Total Score(s)   PHQ-9 Score 3           6/30/2024     4:27 PM   JOSÉ MIGUEL-7 Results   JOSÉ MIGUEL 7 TOTAL SCORE 3 (minimal anxiety)   JOSÉ MIGUEL-7 Total Score 3         6/30/2024     4:27 PM   PTSD Screen Score   Have you ever experienced this kind of event? No         6/30/2024     4:36 PM   PROMIS-29   PROMIS Physical Function T-Score 43 (mild dysfunction)   PROMIS Anxiety T-Score 51 (within normal limits)   PROMIS Depression T-Score 41 (within normal limits)   PROMIS Fatigue T-Score 65 (moderate)   PROMIS Sleep Disturbance T-Score 46 (within normal limits)   PROMIS Ability to Participate in Social Roles & Activities T-Score 45 (within normal limits)   PROMIS Pain Interference T-Score 42 (within normal limits)   PROMIS Pain Intensity 2         Past Medical History:   Diagnosis Date    Abnormal Pap smear of cervix 2009, 2012, 2014    see problem list    Cervical high risk HPV (human papillomavirus) test positive 2009, 2012    see problem list    Chickenpox     Depressive disorder     Gestational HTN 01/01/2015     Hypothyroidism     Migraine with aura and without status migrainosus, not intractable     Seasonal allergies     Was on clariten uses occasional sudafed       Past Surgical History:   Procedure Laterality Date    BIOPSY      COSMETIC SURGERY      HC ENLARGE BREAST WITH IMPLANT      MOUTH SURGERY  age 18    wisdom teeth       Family History   Adopted: Yes   Problem Relation Age of Onset    Unknown/Adopted Mother     Unknown/Adopted Father        Social History     Tobacco Use    Smoking status: Never    Smokeless tobacco: Never   Vaping Use    Vaping status: Never Used   Substance Use Topics    Alcohol use: No    Drug use: No         Current Outpatient Medications:     aspirin-acetaminophen-caffeine (EXCEDRIN MIGRAINE) 250-250-65 MG tablet, Take 1 tablet by mouth daily as needed for headaches, Disp: , Rfl:     bisacodyl (DULCOLAX) 10 MG suppository, Place 1 suppository (10 mg) rectally daily as needed for constipation, Disp: 16 suppository, Rfl: 1    buPROPion (WELLBUTRIN XL) 300 MG 24 hr tablet, Take 1 tablet (300 mg) by mouth every morning, Disp: 90 tablet, Rfl: 3    dicyclomine (BENTYL) 10 MG capsule, Take 1 capsule (10 mg) by mouth 4 times daily as needed (abdominal cramping), Disp: 90 capsule, Rfl: 11    eletriptan (RELPAX) 40 MG tablet, Take 1 tablet (40 mg) by mouth at onset of headache for migraine May repeat in 2 hours. Max 2 tablets/24 hours., Disp: 18 tablet, Rfl: 3    erythromycin with ethanol (ERYGEL) 2 % gel, Apply topically 2 times daily, Disp: 30 g, Rfl: 3    fluticasone (FLONASE) 50 MCG/ACT nasal spray, Spray 1 spray into both nostrils daily, Disp: 15.8 mL, Rfl: 1    galcanezumab-gnlm (EMGALITY) 120 MG/ML injection, Inject 1 mL (120 mg) Subcutaneous every 28 days, Disp: 1 mL, Rfl: 3    galcanezumab-gnlm (EMGALITY) 120 MG/ML injection, Inject 2 mLs (240 mg) Subcutaneous every 28 days Loading dose., Disp: 2 mL, Rfl: 0    hydrOXYzine (VISTARIL) 25 MG capsule, Take 1-2 capsules (25-50 mg) by mouth 3  times daily as needed for anxiety, Disp: 30 capsule, Rfl: 0    levothyroxine (SYNTHROID) 88 MCG tablet, Take 1 tablet (88 mcg) by mouth daily, Disp: 90 tablet, Rfl: 3    meclizine (ANTIVERT) 12.5 MG tablet, Take 1 tablet (12.5 mg) by mouth 3 times daily as needed for dizziness or nausea, Disp: 30 tablet, Rfl: 1    methocarbamol (ROBAXIN) 500 MG tablet, Take 1 tablet (500 mg) by mouth 4 times daily as needed for muscle spasms, Disp: 30 tablet, Rfl: 1    modafinil (PROVIGIL) 100 MG tablet, Take 1-2 tablets (100-200 mg) by mouth daily, Disp: 45 tablet, Rfl: 0    Naltrexone HCl, Pain, 1.5 MG CAPS, Take 2 capsules by mouth daily, Disp: 180 capsule, Rfl: 3    nortriptyline (PAMELOR) 10 MG capsule, Take 3cap/night and then increase by 1 cap/week to a max of 6 caps/night as needed and tolerated., Disp: 540 capsule, Rfl: 1    ondansetron (ZOFRAN ODT) 4 MG ODT tab, Take 1 tablet (4 mg) by mouth every 8 hours as needed for nausea, Disp: 30 tablet, Rfl: 1    propranolol (INDERAL) 60 MG tablet, Take 1 tablet (60 mg) by mouth every morning AND 1 tablet (60 mg) every evening., Disp: 180 tablet, Rfl: 3    psyllium (METAMUCIL/KONSYL) capsule, Take 1 capsule by mouth daily, Disp: 90 capsule, Rfl: 3    scopolamine (TRANSDERM) 1 MG/3DAYS 72 hr patch, Place 1 patch onto the skin every 72 hours, Disp: 10 patch, Rfl: 1    topiramate (TOPAMAX) 100 MG tablet, Take 1.5 tablets (150 mg) by mouth daily, Disp: 135 tablet, Rfl: 3    vitamin D3 (CHOLECALCIFEROL) 50 mcg (2000 units) tablet, Take 1 tablet (50 mcg) by mouth daily, Disp: 90 tablet, Rfl: 3    Review of Systems   Constitutional, HEENT, cardiovascular, pulmonary, GI, , musculoskeletal, neuro, skin, endocrine and psych systems are negative, except as otherwise noted.      Objective         Vitals:  No vitals were obtained today due to virtual visit.    Physical Exam   EYES: Eyes grossly normal to inspection.  No discharge or erythema, or obvious scleral/conjunctival  abnormalities.  SKIN: Visible skin clear. No significant rash, abnormal pigmentation or lesions.  NEURO: Cranial nerves grossly intact.  Mentation and speech appropriate for age.  GENERAL: Healthy, alert and no distress  RESP: No audible wheeze, cough, or visible cyanosis.  No visible retractions or increased work of breathing.    PSYCH: Mentation appears normal, affect normal/bright, judgement and insight intact, normal speech and appearance well-groomed.      Labs :  Office Visit on 05/21/2024   Component Date Value Ref Range Status    Vitamin B12 05/21/2024 294  232 - 1,245 pg/mL Final    Vitamin D, Total (25-Hydroxy) 05/21/2024 28  20 - 50 ng/mL Final    optimum levels       Imaging:    I personally reviewed the following imaging results today and those on care everywhere, if indicated     Nothing new to review    Reviewed imaging from [facility] sites     Medical Records Reviewed:    Reviewed consults/documents from [facility] including  Family Practice, Neurology, OT, and SLP         Again, thank you for allowing me to participate in the care of your patient.      Sincerely,    Esther Lerma PA-C

## 2024-07-01 NOTE — PATIENT INSTRUCTIONS
Post COVID Self Care Suggestions:     Fatigue Management:       https://www.archives-pmr.org/action/showPdf?gcn=-[email or web address]       Self Care:      [email or web address]  Recovery World Health Organization:    https://apps.who.int/iris/Downtymetream/handle/71383/765085/ZFQ-RMPG-4453-906-33520-47139-eng.pdf  Breathing exercises:    [email or web address]-[email or web address]      Assessment of sense of smell and taste    Smell training:  Flowery - Charity  Fruity - Lemon  Spicy - Cloves  Resinous - Eucalyptus      1 - Pour a few droplets of one of the oils on to a cotton pad or ball  2 - Do not try to sniff the pad immediately; leave it for a few minutes for the fragrance to develop  3 - Hold the first stick/pad up to your nose, about an inch away.  The order in which you test the oils does not matter  4 - Relax and try to inhale naturally through the nose - sniffing too quickly and deeply is likely to result in you not being able to detect anything  5 - Try this a couple more times, then rest for five minutes  6 - Move on to the next oil and repeat as above.    After three months switch to a new set of odors: menthol, thyme, tangerine, and tyson, training with them twice daily.    After another three months, switch to a third new set of odors: green tea, bergamot, rosemary, and otto, again training with them twice daily.    Studies:   [place] Paxlovid [place]  [email or web address]/[email or web address]/research/paxlc-study/?[email or web address]    Cognitive Post-COVID study  z.[email or web address]    Supplements:  Fatigue- COQ10 100mg 3x day  ( side effects GI)  Brain fog-N-acetylcysteine 600 mg daily (side effects GI)

## 2024-07-02 ENCOUNTER — OFFICE VISIT (OUTPATIENT)
Dept: FAMILY MEDICINE | Facility: CLINIC | Age: 39
End: 2024-07-02
Attending: STUDENT IN AN ORGANIZED HEALTH CARE EDUCATION/TRAINING PROGRAM
Payer: COMMERCIAL

## 2024-07-02 VITALS
WEIGHT: 172 LBS | BODY MASS INDEX: 31.65 KG/M2 | OXYGEN SATURATION: 100 % | DIASTOLIC BLOOD PRESSURE: 64 MMHG | RESPIRATION RATE: 14 BRPM | HEIGHT: 62 IN | SYSTOLIC BLOOD PRESSURE: 114 MMHG | HEART RATE: 72 BPM

## 2024-07-02 DIAGNOSIS — R53.83 FATIGUE, UNSPECIFIED TYPE: ICD-10-CM

## 2024-07-02 DIAGNOSIS — R11.0 NAUSEA: ICD-10-CM

## 2024-07-02 DIAGNOSIS — U09.9 COVID-19 LONG HAULER: Primary | ICD-10-CM

## 2024-07-02 DIAGNOSIS — R41.89 BRAIN FOG: ICD-10-CM

## 2024-07-02 DIAGNOSIS — R10.9 ABDOMINAL CRAMPING: ICD-10-CM

## 2024-07-02 DIAGNOSIS — G43.709 CHRONIC MIGRAINE WITHOUT AURA WITHOUT STATUS MIGRAINOSUS, NOT INTRACTABLE: ICD-10-CM

## 2024-07-02 DIAGNOSIS — K59.00 CONSTIPATION, UNSPECIFIED CONSTIPATION TYPE: ICD-10-CM

## 2024-07-02 DIAGNOSIS — G47.10 HYPERSOMNIA: ICD-10-CM

## 2024-07-02 PROCEDURE — 99213 OFFICE O/P EST LOW 20 MIN: CPT | Performed by: STUDENT IN AN ORGANIZED HEALTH CARE EDUCATION/TRAINING PROGRAM

## 2024-07-02 RX ORDER — NORTRIPTYLINE HYDROCHLORIDE 50 MG/1
50 CAPSULE ORAL AT BEDTIME
Qty: 90 CAPSULE | Refills: 3 | Status: SHIPPED | OUTPATIENT
Start: 2024-07-02

## 2024-07-02 RX ORDER — NORTRIPTYLINE HCL 10 MG
10 CAPSULE ORAL AT BEDTIME
Qty: 90 CAPSULE | Refills: 3 | Status: SHIPPED | OUTPATIENT
Start: 2024-07-02

## 2024-07-02 ASSESSMENT — PAIN SCALES - GENERAL: PAINLEVEL: MILD PAIN (3)

## 2024-07-02 ASSESSMENT — PATIENT HEALTH QUESTIONNAIRE - PHQ9
10. IF YOU CHECKED OFF ANY PROBLEMS, HOW DIFFICULT HAVE THESE PROBLEMS MADE IT FOR YOU TO DO YOUR WORK, TAKE CARE OF THINGS AT HOME, OR GET ALONG WITH OTHER PEOPLE: SOMEWHAT DIFFICULT
SUM OF ALL RESPONSES TO PHQ QUESTIONS 1-9: 5
SUM OF ALL RESPONSES TO PHQ QUESTIONS 1-9: 5

## 2024-07-02 NOTE — TELEPHONE ENCOUNTER
Prior Authorization Approval (Loading and Maintenance dose)    Medication: EMGALITY 120 MG/ML SC SOAJ  Authorization Effective Date: 7/2/2024  Authorization Expiration Date: 1/2/2025  Approved Dose/Quantity:   Reference #:     Insurance Company: Other (see comments)  Expected CoPay: $    CoPay Card Available:      Financial Assistance Needed:   Which Pharmacy is filling the prescription:    Pharmacy Notified: Yes  Patient Notified: **Instructed pharmacy to notify patient when script is ready to /ship.**    Approval for loading dose.        Approval for maintenance dose

## 2024-07-02 NOTE — PATIENT INSTRUCTIONS
You can take a few doses of imodium a day if needed for loose stools. Let me know if you want to go back to GI at all and get a second opinion/next step.   Message me at the end of the month and let me know how that work is going and I will write an updated letter  Try 1/2 tablet of modafinil if needed for trouble sleeping (can go to 1/4 tablet, but that sounds hard to cut down to).   Get on the emgality, and give that 1-2 months to see if it works.   If so and you're not making other medication changes with the neurologist, cut back to just 1 capsule of naltrexone daily for a month or so to see if it affects you cognitive abilities (brain fog, fatigue, etc). If you don't notice a change, then  you can stop it for another month and see if you notice any difference.

## 2024-07-02 NOTE — PROGRESS NOTES
Assessment & Plan     COVID-19 long hauler  Patient is a very pleasant 39 year old who presents today for follow up on long-COVID. She has completed speech therapy, just graduated OT yesterday. Saw PM&R yesterday as well and plans to follow up on that. Recently saw neurology for migraines and has plans to start Emgality. Follow up in 3 months.    Will need a new work note next month. Doing better with 6 hour days as she is able to take a longer break mid day and help with breaking up the work day. For now, continue 24 hour/week restriction. Consider adding back partial 5th day before increasing daily hours, pending patient's preference and functional ability. May improve as migraines improve as well.   - PRIMARY CARE FOLLOW-UP SCHEDULING  - PRIMARY CARE FOLLOW-UP SCHEDULING    Chronic migraine without aura without status migrainosus, not intractable  From a migraine perspective, we reviewed that Emgality was just approved by insurance earlier today after PA completion. Encouraged her to start that. Updated RX for notriptyline to decrease pill burden.   - PRIMARY CARE FOLLOW-UP SCHEDULING  - nortriptyline (PAMELOR) 10 MG capsule  Dispense: 90 capsule; Refill: 3  - nortriptyline (PAMELOR) 50 MG capsule  Dispense: 90 capsule; Refill: 3  - PRIMARY CARE FOLLOW-UP SCHEDULING    Brain fog  Fatigue, unspecified type  Hypersomnia  We discussed trying Provigil at a lower dose if still having trouble sleeping - didn't sleep well last night after 100 mg in the morning. Also, continue to work with neurology with regards to migraines. Give emgality a try for a few months, see if it helps. If it does, and no other medication changes at that time, start to decrease on naltrexone first to just 1.5 mg daily for 1 month, then try discontinuing and see if brain fog returns.   - PRIMARY CARE FOLLOW-UP SCHEDULING  - PRIMARY CARE FOLLOW-UP SCHEDULING    Abdominal cramping  Nausea  Constipation, unspecified constipation type  Cramping  "mostly under control. Now with more diarrhea than constipation. Can utilize small amount of imodium if needed, hold other bowel meds at this time. Offered referral back to GI, and I'm happy to place a referral if she would like a second opinion/next steps discussion with regards to bowels.   - PRIMARY CARE FOLLOW-UP SCHEDULING  - PRIMARY CARE FOLLOW-UP SCHEDULING      I spent a total of 27 minutes on the day of the visit.   Time spent by me doing chart review, history and exam, documentation and further activities per the note    BMI  Estimated body mass index is 31.46 kg/m  as calculated from the following:    Height as of this encounter: 1.575 m (5' 2\").    Weight as of this encounter: 78 kg (172 lb).       Jayant Elena is a 39 year old, presenting for the following health issues:  Anxiety, Depression, and Headache        7/2/2024     1:49 PM   Additional Questions   Roomed by Mariposa CARY   Accompanied by Self     History of Present Illness       Mental Health Follow-up:  Patient presents to follow-up on Depression & Anxiety.Patient's depression since last visit has been:  Good  The patient is not having other symptoms associated with depression.  Patient's anxiety since last visit has been:  Better  The patient is not having other symptoms associated with anxiety.  Any significant life events: No  Patient is not feeling anxious or having panic attacks.  Patient has no concerns about alcohol or drug use.    Headaches:   Since the patient's last clinic visit, headaches are: improved  The patient is getting headaches:  3 to 4 times a week  She is not able to do normal daily activities when she has a migraine.  The patient is taking the following rescue/relief medications:  Excedrin, Maxalt, Relpax and other   Patient states \"The relief is inconsistent\" from the rescue/relief medications.   The patient is taking the following medications to prevent migraines:  Inderal, Topomax and other  In the past 4 weeks, the " "patient has gone to an Urgent Care or Emergency Room 0 times times due to headaches.    Reason for visit:  Medication check-in    She eats 2-3 servings of fruits and vegetables daily.She consumes 1 sweetened beverage(s) daily.She exercises with enough effort to increase her heart rate 10 to 19 minutes per day.  She exercises with enough effort to increase her heart rate 4 days per week.   She is taking medications regularly.     Emgality high dose not covered by insurance.   Just approved by insurance today     Frequency of headaches hasn't changed, but intensity has.     Now stomach issues. Diarrhea. Way more. Multiple days in a row now.     Just started the stimulant yesterday. Couldn't fall asleep last night.   Took 1 pill at 8:12 in the morning and couldn't fall asleep.   Did feel like she was awake but \"not on speed\"     COVID visit yesterday, continue what she's doing  Sleep consultation end of august.     OT graduated that.   Speech done as well.     Anxiety/depression are way better.       Review of Systems  Constitutional, HEENT, cardiovascular, pulmonary, gi and gu systems are negative, except as otherwise noted.      Objective    /64 (BP Location: Right arm, Patient Position: Sitting, Cuff Size: Adult Regular)   Pulse 72   Resp 14   Ht 1.575 m (5' 2\")   Wt 78 kg (172 lb)   LMP 05/30/2024   SpO2 100%   BMI 31.46 kg/m    Body mass index is 31.46 kg/m .  Physical Exam  Constitutional:       Appearance: Normal appearance.   HENT:      Head: Normocephalic.   Eyes:      General: No scleral icterus.     Extraocular Movements: Extraocular movements intact.      Conjunctiva/sclera: Conjunctivae normal.   Cardiovascular:      Rate and Rhythm: Normal rate.   Pulmonary:      Effort: Pulmonary effort is normal.   Neurological:      General: No focal deficit present.      Mental Status: She is alert and oriented to person, place, and time.                    Signed Electronically by: Brandy Zimmerman, " MD

## 2024-07-09 ENCOUNTER — MYC MEDICAL ADVICE (OUTPATIENT)
Dept: FAMILY MEDICINE | Facility: CLINIC | Age: 39
End: 2024-07-09
Payer: COMMERCIAL

## 2024-07-25 ENCOUNTER — MYC MEDICAL ADVICE (OUTPATIENT)
Dept: FAMILY MEDICINE | Facility: CLINIC | Age: 39
End: 2024-07-25
Payer: COMMERCIAL

## 2024-07-30 ENCOUNTER — MYC REFILL (OUTPATIENT)
Dept: FAMILY MEDICINE | Facility: CLINIC | Age: 39
End: 2024-07-30
Payer: COMMERCIAL

## 2024-07-30 DIAGNOSIS — G47.10 HYPERSOMNIA: ICD-10-CM

## 2024-07-30 DIAGNOSIS — R53.83 FATIGUE, UNSPECIFIED TYPE: ICD-10-CM

## 2024-07-30 RX ORDER — MODAFINIL 200 MG/1
200 TABLET ORAL DAILY
Qty: 90 TABLET | Refills: 3 | Status: SHIPPED | OUTPATIENT
Start: 2024-07-30

## 2024-08-02 ENCOUNTER — TELEPHONE (OUTPATIENT)
Dept: FAMILY MEDICINE | Facility: CLINIC | Age: 39
End: 2024-08-02
Payer: COMMERCIAL

## 2024-08-02 NOTE — TELEPHONE ENCOUNTER
Prior Authorization Retail Medication Request    Medication/Dose:  modafinil  Diagnosis and ICD code (if different than what is on RX):    New/renewal/insurance change PA/secondary ins. PA:  Previously Tried and Failed:    Rationale:      Insurance   Primary: Covermymed: [id number]  Insurance ID:      Secondary (if applicable):  Insurance ID:      Pharmacy Information (if different than what is on RX)  Name:    Phone:    Fax:

## 2024-08-02 NOTE — LETTER
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 This letter provides information about the patient's medical history and diagnosis and a statement summarizing my treatment rationale.     Summary of Patient History and Diagnosis  Patient has had post-COVID syndrome (also called \"long COVID\" or \"COVID long-hauler\" in literature) for over 1 year, with worsening earlier this year in 2024 after acute COVID infection. Since then, she has been experiencing many symptoms consistent with post-COVID syndrome.     Treatment Rationale  Because of the new, yet-to-be-studied nature of post-COVID syndrome, there are no significant strong evidence based recommendations for treatment. For ME/CFS type post-COVID fatigue, an article from 2021 indicates expert opinion on options for therapy, which includes modafinil. Patient has had benefit from modafinil with regards to her ME/CFS related chronic fatigue from post-COVID syndrome and I would recommend continued therapy without sleep study.     https://[email or web address]/pmrj.61314     Duration  12 months.    Summary  In summary, modafinil is medically necessary for this patient s medical condition. Please call my office at [phone] if I can provide you with any additional information to approve my request. I look forward to receiving your timely response and approval of this request.     Sincerely,    Brandy Zimmerman MD  "

## 2024-08-02 NOTE — LETTER
2024    To:   lynda    RE: Lady Saavedra  [address] [address] [place] [id number]  : 1985  MRN: [id number]    To Whom It May Concern,    I am writing on behalf of my patient, Lady Saavedra to document the medical necessity of modafinil for the treatment of hypersomnia and chronic fatigue related to long COVID syndrome. This letter provides information about the patient's medical history and diagnosis and a statement summarizing my treatment rationale.     Summary of Patient History and Diagnosis  Patient has long COVID, also called post-COVID syndrome. One of her symptoms is significant daytime sleepiness.       Treatment Rationale  Modafinil has been used for treatment of hypersomnia and chronic fatigue. As she is returning to work, she has had some benefit from modafinil at lower doses. I have prescribed the 200 mg dose as a trial of that dose is working better for her. As this is a growing area of research and she has had benefit, I recommend continued therapy without further workup as it has not been indicated in the literature with COVID related chronic fatigue.       Duration  12 months    Summary  In summary, modafinil is medically necessary for this patient s medical condition. Please call my office at [phone] if I can provide you with any additional information to approve my request. I look forward to receiving your timely response and approval of this request.     Sincerely,    Brandy Zimmerman MD

## 2024-08-05 NOTE — TELEPHONE ENCOUNTER
Central Prior Authorization Team  Phone: [phone]    PA Initiation    Medication: MODAFINIL 200 MG PO TABS  Insurance Company: Comment:  [employer] ph: [phone]  Pharmacy Filling the Rx: [facility] [place] - [address] [facility]  Filling Pharmacy Phone: [phone]  Filling Pharmacy Fax: [phone]  Start Date: 8/5/2024

## 2024-08-06 ENCOUNTER — TELEPHONE (OUTPATIENT)
Dept: PHYSICAL MEDICINE AND REHAB | Facility: CLINIC | Age: 39
End: 2024-08-06

## 2024-08-06 NOTE — TELEPHONE ENCOUNTER
PRIOR AUTHORIZATION DENIED    Medication: MODAFINIL 200 MG PO TABS  Insurance Company: Comment:  [employer] ph: [phone]  Denial Date: 8/6/2024    Denial Reason(s):       Appeal Information: If provider would like to appeal please provide a letter of medical necessity.

## 2024-08-06 NOTE — TELEPHONE ENCOUNTER
Called to schedule 2 month f/u for Esther Lerma. Went to voicemail gave call back number.    Moriah Villanueva, EMT

## 2024-08-14 NOTE — TELEPHONE ENCOUNTER
Appeal letter written. Please send appeal letter plus the article linked in the letter for appeal.     Brandy Zimmerman MD

## 2024-08-15 NOTE — TELEPHONE ENCOUNTER
Medication Appeal Initiation    Medication: MODAFINIL 200 MG PO TABS  Appeal Start Date:  8/15/2024  Insurance Company: [employer]  Insurance Phone:   Insurance Fax:   Comments:   Faxed appeal letter to insurance.

## 2024-08-19 NOTE — TELEPHONE ENCOUNTER
MEDICATION APPEAL DENIED    Medication: MODAFINIL 200 MG PO TABS  Insurance Company: [employer]  Denial Date: 8/16/2024    Denial Reason(s):     Second Level Appeal Information: Second level appeals will be managed by the clinic staff and provider. Please contact the [employer] Prior Authorization Team if additional information about the denial is needed.

## 2024-08-22 ENCOUNTER — MYC MEDICAL ADVICE (OUTPATIENT)
Dept: FAMILY MEDICINE | Facility: CLINIC | Age: 39
End: 2024-08-22
Payer: COMMERCIAL

## 2024-08-22 NOTE — TELEPHONE ENCOUNTER
Previously written appeal letter, not covered.    Will offer sleep study for patient    Brandy Zimmerman MD

## 2024-08-25 NOTE — PROGRESS NOTES
Virtual Visit Details    Type of service:  Video Visit   Start Time: 12:29 PM   End Time: 1:11 PM    Originating Location (pt. Location): Home    Distant Location (provider location):  Off-site  Platform used for Video Visit: [facility]    Outpatient Sleep Medicine Consultation:      Name: Lady Saavedra MRN# [id number]   Age: 39 year old YOB: 1985     Date of Consultation: August 25, 2024  Consultation is requested by: Esther Lerma PA-C  [address] [place] [id number] Esther Lerma  Primary care provider: Brandy Zimmerman       Reason for Sleep Consult:     Lady Saavedra is sent by Esther Lerma for a sleep consultation regarding fatigue.    Patient s Reason for visit  Lady Saavedra main reason for visit: I have fatigue during the day no matter how much sleep I get at night. I also get easily fatigued from regular household activities that I didn t before my last covid infection.  Patient states problem(s) started: I started noticing this around March of this year.  Lady Thompsonleta's goals for this visit: To determine if a sleep study is necessary or other ways to help with the fatigue.           Assessment and Plan:     Summary Sleep Diagnoses and Recommendations:  (G47.10) Hypersomnia, unspecified  (primary encounter diagnosis)  Comment: Kassy presents with concerns of excessive sleepiness that became most prevalent around March of this year.  It seemed to be subsequent to a COVID infection in January.  She sleeps 8-9 hours at night, and sometimes has difficulty waking to alarms.  She gets very worn out with mild exertion, such as household chores or going shopping.  She will often nap afterwards for up to a couple of hours.  She seems to have good sleep quality at night and has a consistent schedule on all nights, suggesting against insufficient sleep or a circadian rhythm disorder.  She does not snore, so apnea is not highly likely.  She has had confusional arousals,  which could be a consequence of her high sleep drive or medications. She denies cataplexy or sleep paralysis. She is on a few sedating medications for migraines, including 60 mg nortriptyline at bedtime and 150 mg topiramate in the morning.  Most of her other sedating medications are as needed, and she does not take them even on a weekly basis. She has tried modafinil, which helped initially, but lost effect.   Plan: We discussed the option of doing a full sleepiness evaluation to assess for narcolepsy or idiopathic hypersomnia.  She would like to work with her other providers on reducing some of the medications which she is currently on before considering sleep testing.    Comorbid Diagnoses:  Long-COVID, anxiety/depression, migraines, BMI 31    Summary Counseling:    Sleep Testing Reviewed  Obstructive Sleep Apnea Reviewed  Complications of Untreated Sleep Apnea Reviewed      Patient will follow up in the future as needed  Bennett Goltz, PA-C      Total time spent reviewing medical records, history and physical examination, review of previous testing and interpretation as well as documentation on this date:49 min    CC: Esther Lerma          History of Present Illness:     Lady Saavedra presents with concerns of excessive sleepiness since March. She had COVID in 4/2021 and 1/2024. She had some residual fatigue after the first infection but it has been much worse after this last time. She gets exhausted after minimal exertion, such as doing chores around the house. She will sleep after them. She has struggles concentrating on work and sometimes head nods. She has to get up frequently to get her energy up. She sleeps 8-9 hours at night and sometimes does not wake to her alarms.  Her  says she switches sides a lot in her sleep, unsure if it is more than in the past.    She has not taken methocarbamol for a few weeks. She takes hydroxyzine a few times per month.  She takes topiramate 150 mg in the  morning.   Modafinil seems to have stopped working and her insurance is not covering it. She is on bupropion 300 mg for a long time and she does not recall an effect on her energy. She was deficient on vitamins and had a temporary benefit to improving her diet.     Past Sleep Evaluations:    SLEEP-WAKE SCHEDULE:     Work/School Days: Patient goes to school/work: Yes   Usually gets into bed at 10:30-11:00 PM  Takes patient about A few minutes to fall asleep  Has trouble falling asleep Usually none nights per week  Wakes up in the middle of the night 1-2 times.  Wakes up due to External stimuli (bed partner, pets, noise, etc);Nightmares (cats)  She has trouble falling back asleep Usually none times a week.   It usually takes Almost instantly to get back to sleep  Patient is usually up at 7-8 AM  Uses alarm: Yes    Weekends/Non-work Days/All Other Days:  Usually gets into bed at 10:30-11:00   Takes patient about Minutes to fall asleep  Patient is usually up at 7-8AM, on rare occasions, she may sleep as late as noon. That does not lead to better energy or any difficulty sleeping the next night.    Uses alarm: Yes    Sleep Need  Patient gets  8-9 hours sleep on average   Patient thinks she needs about 8hours sleep    Lady MANGO Alatorret prefers to sleep in this position(s): Side   Patient states they do the following activities in bed: Read;Watch TV;Use phone, computer, or tablet    Naps  Patient takes a purposeful nap 0 times a week and naps are usually   in duration  She feels better after a nap: No  She dozes off unintentionally 2 days per week. Last weekend, she went school shopping for her son and then fell asleep on the couch when she got home. She got up to make dinner and then slept 8:30-10:30 PM before going to bed.  Patient has had a driving accident or near-miss due to sleepiness/drowsiness: No she has not been driving because headlights make her nauseous. She nods as a passenger.      SLEEP  DISRUPTIONS:    Breathing/Snoring  Patient snores:No  Other people complain about her snoring: No  Patient has been told she stops breathing in her sleep:No  She has issues with the following: Morning headaches;Morning mouth dryness. Morning headaches have been rare since being on Emgality. She may have 2-3 days around her cycle where she will have headaches, morning or other times. no nocturnal heartburn or reflux. no nasal congestion at night.    Movement:  Patient gets pain, discomfort, with an urge to move:   no restless legs symptoms  It happens when she is resting:     It happens more at night:     Patient has been told she kicks her legs at night:    no     Behaviors in Sleep:  Lady Saavedra has experienced the following behaviors while sleeping:   She has been waking hallucinating that there are things like spiders hanging above her or something on the wall or ceiling.   Pt denies bruxism (dentist has not commented), sleep talking, sleep walking, and dream enactment behavior. Pt denies sleep paralysis, hypnagogue and cataplexy.       Is there anything else you would like your sleep provider to know:        CAFFEINE AND OTHER SUBSTANCES:    Patient consumes caffeinated beverages per day:    not much, maybe if she goes out to dinner she may have soda, it just makes her more dehydrated and she feels thirsty all of the time anyway  Last caffeine use is usually:    List of any prescribed or over the counter stimulants that patient takes:   modafinil 200 mg  List of any prescribed or over the counter sleep medication patient takes:  nortriptyline 60 mg (for headaches)  List of previous sleep medications that patient has tried:   amitriptyline (made her too sleepy)  Patient drinks alcohol to help them sleep:  no  Patient drinks alcohol near bedtime:  no    Family History:  Patient has a family member been diagnosed with a sleep disorder:      Adopted, unknown    Social History:  She works for [employer]. She works from home for now. They would like her back in the office.   She lives with her  and 1 son, almost 10.      SCALES:    EPWORTH SLEEPINESS SCALE         8/26/2024    12:26 PM    Oxford Sleepiness Scale ( PRECIOUS Thompson  [id number]<br>ESS - [place]/English - Final version - 21 Nov 07 - [facility] [place].)   Sitting and reading Slight chance of dozing   Watching TV High chance of dozing   Sitting, inactive in a public place (e.g. a theatre or a meeting) Would never doze   As a passenger in a car for an hour without a break Moderate chance of dozing   Lying down to rest in the afternoon when circumstances permit High chance of dozing   Sitting and talking to someone Would never doze   Sitting quietly after a lunch without alcohol Slight chance of dozing   In a car, while stopped for a few minutes in traffic Would never doze   Oxford Score (MC) 10   Oxford Score (Sleep) 10         INSOMNIA SEVERITY INDEX (DAKOTA)          8/24/2024     5:02 PM   Insomnia Severity Index (DAKOTA)   Difficulty falling asleep 1   Difficulty staying asleep 0   Problems waking up too early 0   How SATISFIED/DISSATISFIED are you with your CURRENT sleep pattern? 4   How NOTICEABLE to others do you think your sleep problem is in terms of impairing the quality of your life? 3   How WORRIED/DISTRESSED are you about your current sleep problem? 3   To what extent do you consider your sleep problem to INTERFERE with your daily functioning (e.g. daytime fatigue, mood, ability to function at work/daily chores, concentration, memory, mood, etc.) CURRENTLY? 4   DAKOTA Total Score 15       Guidelines for Scoring/Interpretation:  Total score categories:  0-7 = No clinically significant insomnia   8-14 = Subthreshold insomnia   15-21 = Clinical insomnia (moderate severity)  22-28 = Clinical insomnia (severe)  Used via courtesy of www.WeComicsealth.va.gov with permission from Florin Milton PhD., [facility]      STOP BANG          8/26/2024    12:27 PM   STOP BANG Questionnaire (  2008, the American Society of Anesthesiologists, Inc. [employer]   1. Snoring - Do you snore loudly (louder than talking or loud enough to be heard through closed doors)? No   2. Tired - Do you often feel tired, fatigued, or sleepy during daytime? Yes   3. Observed - Has anyone observed you stop breathing during your sleep? No   4. Blood pressure - Do you have or are you being treated for high blood pressure? No   5. BMI - BMI more than 35 kg/m2? No   6. Age - Age over 50 yr old? No   7. Neck circumference - Neck circumference greater than 40 cm? No   8. Gender - Gender male? No   STOP BANG Score (MC): 2 (Low risk of ADRIANA)         GAD7        6/30/2024     4:27 PM   JOSÉ MIGUEL-7    1. Feeling nervous, anxious, or on edge 1   2. Not being able to stop or control worrying 0   3. Worrying too much about different things 0   4. Trouble relaxing 1   5. Being so restless that it is hard to sit still 0   6. Becoming easily annoyed or irritable 1   7. Feeling afraid, as if something awful might happen 0   JOSÉ MIGUEL-7 Total Score 3   If you checked any problems, how difficult have they made it for you to do your work, take care of things at home, or get along with other people? Somewhat difficult         CAGE-AID        3/18/2022     8:43 AM   CAGE-AID Flowsheet   Have you ever felt you should Cut down on your drinking or drug use?    Have people Annoyed you by criticizing your drinking or drug use?    Have you ever felt bad or Guilty about your drinking or drug use?    Have you ever had a drink or used drugs first thing in the morning to steady your nerves or to get rid of a hangover? (Eye opener)    CAGE-AID SCORE    Have you ever felt you should Cut down on your drinking or drug use?    Have people Annoyed you by criticizing your drinking or drug use?    Have you ever felt bad or Guilty about your drinking or drug use?    Have you ever had a drink or used  "drugs first thing in the morning to steady your nerves or to get rid of a hangover? (Eye opener)    CAGE-AID SCORE        Information is confidential and restricted. Go to Review Flowsheets to unlock data.       CAGE-AID reprinted with permission from the [place] Medical Journal, ISELA Sampson. and OLENA Acosta, \"Conjoint screening questionnaires for alcohol and drug abuse\" [place] Medical Journal 94: 135-140, 1995.      PATIENT HEALTH QUESTIONNAIRE-9 (PHQ - 9)        7/2/2024     1:36 PM   PHQ-9 ([employer])   1.  Little interest or pleasure in doing things 0   2.  Feeling down, depressed, or hopeless 1   3.  Trouble falling or staying asleep, or sleeping too much 1   4.  Feeling tired or having little energy 2   5.  Poor appetite or overeating 0   6.  Feeling bad about yourself - or that you are a failure or have let yourself or your family down 0   7.  Trouble concentrating on things, such as reading the newspaper or watching television 1   8.  Moving or speaking so slowly that other people could have noticed. Or the opposite - being so fidgety or restless that you have been moving around a lot more than usual 0   9.  Thoughts that you would be better off dead, or of hurting yourself in some way 0   PHQ-9 Total Score 5   6.  Feeling bad about yourself 0   7.  Trouble concentrating 1   8.  Moving slowly or restless 0   9.  Suicidal or self-harm thoughts 0   1.  Little interest or pleasure in doing things Not at all   2.  Feeling down, depressed, or hopeless Several days   3.  Trouble falling or staying asleep, or sleeping too much Several days   4.  Feeling tired or having little energy More than half the days   5.  Poor appetite or overeating Not at all   6.  Feeling bad about yourself Not at all   7.  Trouble concentrating Several days   8.  Moving slowly or restless Not at all   9.  Suicidal or self-harm thoughts Not at all   PHQ-9 via MyChart TOTAL SCORE-----> 5 (Mild depression)   Difficulty at work, home, or with " people Somewhat difficult       Developed by Xavier Jessica, Nancy Bronson, Eliecer Zhong and colleagues, with an educational steffany from [employer] No permission required to reproduce, translate, display or distribute.        Allergies:    No Known Allergies    Medications:    Current Outpatient Medications   Medication Sig Dispense Refill    aspirin-acetaminophen-caffeine (EXCEDRIN MIGRAINE) 250-250-65 MG tablet Take 1 tablet by mouth daily as needed for headaches      buPROPion (WELLBUTRIN XL) 300 MG 24 hr tablet Take 1 tablet (300 mg) by mouth every morning 90 tablet 3    dicyclomine (BENTYL) 10 MG capsule Take 1 capsule (10 mg) by mouth 4 times daily as needed (abdominal cramping) 90 capsule 11    eletriptan (RELPAX) 40 MG tablet Take 1 tablet (40 mg) by mouth at onset of headache for migraine May repeat in 2 hours. Max 2 tablets/24 hours. 18 tablet 3    fluticasone (FLONASE) 50 MCG/ACT nasal spray Spray 1 spray into both nostrils daily 15.8 mL 1    hydrOXYzine (VISTARIL) 25 MG capsule Take 1-2 capsules (25-50 mg) by mouth 3 times daily as needed for anxiety 30 capsule 0    levothyroxine (SYNTHROID) 88 MCG tablet Take 1 tablet (88 mcg) by mouth daily 90 tablet 3    meclizine (ANTIVERT) 12.5 MG tablet Take 1 tablet (12.5 mg) by mouth 3 times daily as needed for dizziness or nausea 30 tablet 1    methocarbamol (ROBAXIN) 500 MG tablet Take 1 tablet (500 mg) by mouth 4 times daily as needed for muscle spasms 30 tablet 1    modafinil (PROVIGIL) 200 MG tablet Take 1 tablet (200 mg) by mouth daily 90 tablet 3    Naltrexone HCl, Pain, 1.5 MG CAPS Take 2 capsules by mouth daily 180 capsule 3    nortriptyline (PAMELOR) 10 MG capsule Take 1 capsule (10 mg) by mouth at bedtime . Take with 50 mg dose for total dose of 60 mg 90 capsule 3    nortriptyline (PAMELOR) 50 MG capsule Take 1 capsule (50 mg) by mouth at bedtime . Take with 10 mg dose for total dose of 60 mg 90 capsule 3    propranolol (INDERAL) 60 MG  tablet Take 1 tablet (60 mg) by mouth every morning AND 1 tablet (60 mg) every evening. 180 tablet 3    topiramate (TOPAMAX) 100 MG tablet Take 1.5 tablets (150 mg) by mouth daily 135 tablet 3    vitamin D3 (CHOLECALCIFEROL) 50 mcg (2000 units) tablet Take 1 tablet (50 mcg) by mouth daily 90 tablet 3    bisacodyl (DULCOLAX) 10 MG suppository Place 1 suppository (10 mg) rectally daily as needed for constipation 16 suppository 1    erythromycin with ethanol (ERYGEL) 2 % gel Apply topically 2 times daily 30 g 3    galcanezumab-gnlm (EMGALITY) 120 MG/ML injection Inject 1 mL (120 mg) Subcutaneous every 28 days (Patient not taking: Reported on 7/2/2024) 1 mL 3    galcanezumab-gnlm (EMGALITY) 120 MG/ML injection Inject 2 mLs (240 mg) Subcutaneous every 28 days Loading dose. (Patient not taking: Reported on 7/2/2024) 2 mL 0    psyllium (METAMUCIL/KONSYL) capsule Take 1 capsule by mouth daily 90 capsule 3       Problem List:  Patient Active Problem List    Diagnosis Date Noted    Cognitive communication deficit 04/15/2024     Priority: Medium    Prediabetes 06/01/2023     Priority: Medium    Migraine with aura and without status migrainosus, not intractable 06/01/2023     Priority: Medium    Hyperlipidemia LDL goal <100 06/01/2023     Priority: Medium    Nonalcoholic fatty liver disease 06/01/2023     Priority: Medium    Anovulation 07/15/2022     Priority: Medium     Plan: OI  Cycle #1 (7/22); Clomid 50mg day 3-7      COVID-19 long hauler 07/11/2022     Priority: Medium    Pneumonia due to 2019 novel coronavirus 04/05/2021     Priority: Medium    CARDIOVASCULAR SCREENING; LDL GOAL LESS THAN 160 10/31/2010     Priority: Medium        Past Medical/Surgical History:  Past Medical History:   Diagnosis Date    Abnormal Pap smear of cervix 2009, 2012, 2014    see problem list    Cervical high risk HPV (human papillomavirus) test positive 2009, 2012    see problem list    Chickenpox     Depressive disorder     Gestational HTN  01/01/2015    Hypothyroidism     Migraine with aura and without status migrainosus, not intractable     Seasonal allergies     Was on clariten uses occasional sudafed     Past Surgical History:   Procedure Laterality Date    BIOPSY      COSMETIC SURGERY      HC ENLARGE BREAST WITH IMPLANT      MOUTH SURGERY  age 18    wisdom teeth       Social History:  Social History     Socioeconomic History    Marital status:      Spouse name: Not on file    Number of children: 0    Years of education: Not on file    Highest education level: Not on file   Occupational History    Not on file   Tobacco Use    Smoking status: Never    Smokeless tobacco: Never   Vaping Use    Vaping status: Never Used   Substance and Sexual Activity    Alcohol use: No    Drug use: No    Sexual activity: Yes     Partners: Male     Birth control/protection: None   Other Topics Concern    Parent/sibling w/ CABG, MI or angioplasty before 65F 55M? No   Social History Narrative    Not on file     Social Determinants of Health     Financial Resource Strain: Low Risk  (1/25/2024)    Financial Resource Strain     Within the past 12 months, have you or your family members you live with been unable to get utilities (heat, electricity) when it was really needed?: No   Food Insecurity: Low Risk  (1/25/2024)    Food Insecurity     Within the past 12 months, did you worry that your food would run out before you got money to buy more?: No     Within the past 12 months, did the food you bought just not last and you didn t have money to get more?: No   Transportation Needs: Low Risk  (1/25/2024)    Transportation Needs     Within the past 12 months, has lack of transportation kept you from medical appointments, getting your medicines, non-medical meetings or appointments, work, or from getting things that you need?: No   Physical Activity: Not on file   Stress: Not on file   Social Connections: Not on file   Interpersonal Safety: Low Risk  (10/6/2023)     "Interpersonal Safety     Do you feel physically and emotionally safe where you currently live?: Yes     Within the past 12 months, have you been hit, slapped, kicked or otherwise physically hurt by someone?: No     Within the past 12 months, have you been humiliated or emotionally abused in other ways by your partner or ex-partner?: No   Housing Stability: Low Risk  (1/25/2024)    Housing Stability     Do you have housing? : Yes     Are you worried about losing your housing?: No       Family History:  Family History   Adopted: Yes   Problem Relation Age of Onset    Unknown/Adopted Mother     Unknown/Adopted Father        Review of Systems:  A complete review of systems reviewed by me is negative with the exeption of what has been mentioned in the history of present illness.        Physical Examination:  Vitals: Ht 1.575 m (5' 2\")   Wt 77.1 kg (170 lb)   LMP 05/30/2024   BMI 31.09 kg/m    BMI= Body mass index is 31.09 kg/m .           GENERAL APPEARANCE: healthy, alert, no distress, and cooperative  EYES: Eyes grossly normal to inspection and wearing glasses  HENT: oropharynx moderately crowded and tongue base sits high  NECK: no asymmetry, masses, or scars  RESP: no apparent respiratory distress (retractions or difficulty speaking in full sentences), no audible wheeze or cough   Mallampati Class: II.  Tonsillar Stage: could not observe well over video.         Data: All pertinent previous laboratory data reviewed     Recent Labs   Lab Test 01/26/24  1202 12/11/23  0826    138   POTASSIUM 5.1 4.2   CHLORIDE 107 109*   CO2 15* 19*   ANIONGAP 14 10   GLC 92 102*   BUN 7.1 7.9   CR 0.69 0.80   DIANA 9.4 9.2       Recent Labs   Lab Test 06/08/22  1620   WBC 7.5   RBC 4.74   HGB 14.4   HCT 42.8   MCV 90   MCH 30.4   MCHC 33.6   RDW 11.7          Recent Labs   Lab Test 12/11/23  0826   PROTTOTAL 7.8   ALBUMIN 4.8   BILITOTAL 0.2   ALKPHOS 77   AST 27   ALT 34       TSH   Date Value   02/26/2024 0.80 uIU/mL " "  07/06/2023 0.59 uIU/mL   06/08/2022 1.42 mU/L   03/07/2022 0.80 mU/L   07/06/2021 7.81 mU/L (H)   06/14/2021 0.13 mU/L (L)   06/14/2021 0.13 mU/L (L)       Amphetamine Qual Urine (no units)   Date Value   10/09/2010     Negative   Cutoff for a negative amphetamine is 500 ng/mL or less.     Barbiturates Qual Urine (no units)   Date Value   10/09/2010     Negative   Cutoff for a negative barbiturate is 200 ng/mL or less.     Benzodiazepine Qual Urine (no units)   Date Value   10/09/2010     Negative   Cutoff for a negative benzodiazepine is 200 ng/mL or less.     Cannabinoids Qual Urine (no units)   Date Value   10/09/2010     Negative   Cutoff for a negative cannabinoid is 50 ng/mL or less.     Cocaine Qual Urine (no units)   Date Value   10/09/2010     Negative   Cutoff for a negative cocaine is 300 ng/mL or less.     Opiates Qualitative Urine (no units)   Date Value   10/09/2010     Negative   Cutoff for a negative opiate is 300 ng/mL or less.     PCP Qual Urine (no units)   Date Value   10/09/2010     Negative   Cutoff for a negative PCP is 25 ng/mL or less.       Iron Sat Index   Date/Time Value Ref Range Status   02/26/2024 03:59 PM 43 15 - 46 % Final   06/08/2022 04:20 PM 30 15 - 46 % Final     Ferritin   Date/Time Value Ref Range Status   02/26/2024 03:59 PM 60 6 - 175 ng/mL Final       No results found for: \"PH\", \"PHARTERIAL\", \"PO2\", \"HD1JRLUZXCM\", \"SAT\", \"PCO2\", \"HCO3\", \"BASEEXCESS\", \"LEXIE\", \"BEB\"    @LABRCNTIPR(phv:4,pco2v:4,po2v:4,hco3v:4,leslie:4,o2per:4)@    Echocardiology: No results found for this or any previous visit (from the past 4320 hour(s)).    Chest x-ray: No results found for this or any previous visit from the past 365 days.      Chest CT: No results found for this or any previous visit from the past 365 days.      PFT: Most Recent [facility]ze Pulmonary Function Testing    FVC-Pred   Date Value Ref Range Status   12/15/2021 3.23 L      FVC-Pre   Date Value Ref Range Status   12/15/2021 2.97 L  " "    FVC-%Pred-Pre   Date Value Ref Range Status   12/15/2021 92 %      FEV1-Pre   Date Value Ref Range Status   12/15/2021 2.67 L      FEV1-%Pred-Pre   Date Value Ref Range Status   12/15/2021 97 %      FEV1FVC-Pred   Date Value Ref Range Status   12/15/2021 85 %      FEV1FVC-Pre   Date Value Ref Range Status   12/15/2021 90 %      No results found for: \"20029\"  FEFMax-Pred   Date Value Ref Range Status   12/15/2021 6.71 L/sec      FEFMax-Pre   Date Value Ref Range Status   12/15/2021 6.95 L/sec      FEFMax-%Pred-Pre   Date Value Ref Range Status   12/15/2021 103 %      ExpTime-Pre   Date Value Ref Range Status   12/15/2021 5.72 sec      FIFMax-Pre   Date Value Ref Range Status   12/15/2021 5.82 L/sec      FEV1FEV6-Pred   Date Value Ref Range Status   12/15/2021 85 %      FEV1FEV6-Pre   Date Value Ref Range Status   12/15/2021 90 %      No results found for: \"20055\"      Bennett Ezra Goltz, PA-C, NIKITA 8/25/2024          "

## 2024-08-26 ENCOUNTER — VIRTUAL VISIT (OUTPATIENT)
Dept: SLEEP MEDICINE | Facility: CLINIC | Age: 39
End: 2024-08-26
Payer: COMMERCIAL

## 2024-08-26 VITALS — HEIGHT: 62 IN | BODY MASS INDEX: 31.28 KG/M2 | WEIGHT: 170 LBS

## 2024-08-26 DIAGNOSIS — G47.10 HYPERSOMNIA, UNSPECIFIED: Primary | ICD-10-CM

## 2024-08-26 PROCEDURE — 99204 OFFICE O/P NEW MOD 45 MIN: CPT | Mod: 95 | Performed by: PHYSICIAN ASSISTANT

## 2024-08-26 ASSESSMENT — SLEEP AND FATIGUE QUESTIONNAIRES
HOW LIKELY ARE YOU TO NOD OFF OR FALL ASLEEP WHILE SITTING AND TALKING TO SOMEONE: WOULD NEVER DOZE
HOW LIKELY ARE YOU TO NOD OFF OR FALL ASLEEP WHILE SITTING AND READING: SLIGHT CHANCE OF DOZING
HOW LIKELY ARE YOU TO NOD OFF OR FALL ASLEEP WHILE WATCHING TV: HIGH CHANCE OF DOZING
HOW LIKELY ARE YOU TO NOD OFF OR FALL ASLEEP WHILE SITTING INACTIVE IN A PUBLIC PLACE: WOULD NEVER DOZE
HOW LIKELY ARE YOU TO NOD OFF OR FALL ASLEEP WHEN YOU ARE A PASSENGER IN A CAR FOR AN HOUR WITHOUT A BREAK: MODERATE CHANCE OF DOZING
HOW LIKELY ARE YOU TO NOD OFF OR FALL ASLEEP WHILE SITTING QUIETLY AFTER LUNCH WITHOUT ALCOHOL: SLIGHT CHANCE OF DOZING
HOW LIKELY ARE YOU TO NOD OFF OR FALL ASLEEP WHILE LYING DOWN TO REST IN THE AFTERNOON WHEN CIRCUMSTANCES PERMIT: HIGH CHANCE OF DOZING
HOW LIKELY ARE YOU TO NOD OFF OR FALL ASLEEP IN A CAR, WHILE STOPPED FOR A FEW MINUTES IN TRAFFIC: WOULD NEVER DOZE

## 2024-08-26 ASSESSMENT — PAIN SCALES - GENERAL: PAINLEVEL: NO PAIN (0)

## 2024-08-26 NOTE — NURSING NOTE
Current patient location: [address] [place] [id number]    Is the patient currently in the state of [place]? YES    Visit mode:VIDEO    If the visit is dropped, the patient can be reconnected by: VIDEO VISIT: Send to e-mail at: [email or web address]    Will anyone else be joining the visit? NO  (If patient encounters technical issues they should call [phone]    How would you like to obtain your AVS? MyChart    Are changes needed to the allergy or medication list? Pt stated no changes to allergies and Pt stated no med changes    Are refills needed on medications prescribed by this physician? NO    Rooming Documentation:  Questionnaire(s) completed      Reason for visit: Consult    Maricruz SLATER

## 2024-09-08 ENCOUNTER — HEALTH MAINTENANCE LETTER (OUTPATIENT)
Age: 39
End: 2024-09-08

## 2024-10-03 ASSESSMENT — ANXIETY QUESTIONNAIRES
GAD7 TOTAL SCORE: 13
6. BECOMING EASILY ANNOYED OR IRRITABLE: MORE THAN HALF THE DAYS
GAD7 TOTAL SCORE: 13
7. FEELING AFRAID AS IF SOMETHING AWFUL MIGHT HAPPEN: SEVERAL DAYS
3. WORRYING TOO MUCH ABOUT DIFFERENT THINGS: MORE THAN HALF THE DAYS
1. FEELING NERVOUS, ANXIOUS, OR ON EDGE: NEARLY EVERY DAY
8. IF YOU CHECKED OFF ANY PROBLEMS, HOW DIFFICULT HAVE THESE MADE IT FOR YOU TO DO YOUR WORK, TAKE CARE OF THINGS AT HOME, OR GET ALONG WITH OTHER PEOPLE?: VERY DIFFICULT
GAD7 TOTAL SCORE: 13
IF YOU CHECKED OFF ANY PROBLEMS ON THIS QUESTIONNAIRE, HOW DIFFICULT HAVE THESE PROBLEMS MADE IT FOR YOU TO DO YOUR WORK, TAKE CARE OF THINGS AT HOME, OR GET ALONG WITH OTHER PEOPLE: VERY DIFFICULT
4. TROUBLE RELAXING: MORE THAN HALF THE DAYS
5. BEING SO RESTLESS THAT IT IS HARD TO SIT STILL: SEVERAL DAYS
2. NOT BEING ABLE TO STOP OR CONTROL WORRYING: MORE THAN HALF THE DAYS
7. FEELING AFRAID AS IF SOMETHING AWFUL MIGHT HAPPEN: SEVERAL DAYS

## 2024-10-03 ASSESSMENT — PATIENT HEALTH QUESTIONNAIRE - PHQ9
10. IF YOU CHECKED OFF ANY PROBLEMS, HOW DIFFICULT HAVE THESE PROBLEMS MADE IT FOR YOU TO DO YOUR WORK, TAKE CARE OF THINGS AT HOME, OR GET ALONG WITH OTHER PEOPLE: VERY DIFFICULT
SUM OF ALL RESPONSES TO PHQ QUESTIONS 1-9: 13
SUM OF ALL RESPONSES TO PHQ QUESTIONS 1-9: 13

## 2024-10-04 ENCOUNTER — OFFICE VISIT (OUTPATIENT)
Dept: FAMILY MEDICINE | Facility: CLINIC | Age: 39
End: 2024-10-04
Attending: STUDENT IN AN ORGANIZED HEALTH CARE EDUCATION/TRAINING PROGRAM
Payer: COMMERCIAL

## 2024-10-04 VITALS
BODY MASS INDEX: 30.91 KG/M2 | SYSTOLIC BLOOD PRESSURE: 128 MMHG | DIASTOLIC BLOOD PRESSURE: 82 MMHG | OXYGEN SATURATION: 100 % | WEIGHT: 168 LBS | HEIGHT: 62 IN | RESPIRATION RATE: 18 BRPM | HEART RATE: 72 BPM

## 2024-10-04 DIAGNOSIS — U09.9 COVID-19 LONG HAULER: ICD-10-CM

## 2024-10-04 DIAGNOSIS — L65.9 ALOPECIA: ICD-10-CM

## 2024-10-04 DIAGNOSIS — G47.10 HYPERSOMNIA: ICD-10-CM

## 2024-10-04 DIAGNOSIS — F41.0 PANIC DISORDER WITHOUT AGORAPHOBIA: ICD-10-CM

## 2024-10-04 DIAGNOSIS — R53.83 FATIGUE, UNSPECIFIED TYPE: ICD-10-CM

## 2024-10-04 DIAGNOSIS — G43.709 CHRONIC MIGRAINE WITHOUT AURA WITHOUT STATUS MIGRAINOSUS, NOT INTRACTABLE: ICD-10-CM

## 2024-10-04 DIAGNOSIS — R41.89 BRAIN FOG: Primary | ICD-10-CM

## 2024-10-04 PROCEDURE — 90471 IMMUNIZATION ADMIN: CPT | Performed by: STUDENT IN AN ORGANIZED HEALTH CARE EDUCATION/TRAINING PROGRAM

## 2024-10-04 PROCEDURE — 99214 OFFICE O/P EST MOD 30 MIN: CPT | Mod: 25 | Performed by: STUDENT IN AN ORGANIZED HEALTH CARE EDUCATION/TRAINING PROGRAM

## 2024-10-04 PROCEDURE — 91320 SARSCV2 VAC 30MCG TRS-SUC IM: CPT | Performed by: STUDENT IN AN ORGANIZED HEALTH CARE EDUCATION/TRAINING PROGRAM

## 2024-10-04 PROCEDURE — 90480 ADMN SARSCOV2 VAC 1/ONLY CMP: CPT | Performed by: STUDENT IN AN ORGANIZED HEALTH CARE EDUCATION/TRAINING PROGRAM

## 2024-10-04 PROCEDURE — 90656 IIV3 VACC NO PRSV 0.5 ML IM: CPT | Performed by: STUDENT IN AN ORGANIZED HEALTH CARE EDUCATION/TRAINING PROGRAM

## 2024-10-04 RX ORDER — LORAZEPAM 0.5 MG/1
.5-1 TABLET ORAL EVERY 8 HOURS PRN
Qty: 30 TABLET | Refills: 2 | Status: SHIPPED | OUTPATIENT
Start: 2024-10-04

## 2024-10-04 RX ORDER — DULOXETIN HYDROCHLORIDE 20 MG/1
CAPSULE, DELAYED RELEASE ORAL
Qty: 85 CAPSULE | Refills: 0 | Status: SHIPPED | OUTPATIENT
Start: 2024-10-04 | End: 2024-11-04

## 2024-10-04 ASSESSMENT — PAIN SCALES - GENERAL: PAINLEVEL: NO PAIN (0)
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
## 2024-10-23 ENCOUNTER — OFFICE VISIT (OUTPATIENT)
Dept: NEUROLOGY | Facility: CLINIC | Age: 39
End: 2024-10-23
Payer: COMMERCIAL

## 2024-10-23 VITALS
SYSTOLIC BLOOD PRESSURE: 126 MMHG | WEIGHT: 168 LBS | HEIGHT: 62 IN | HEART RATE: 71 BPM | DIASTOLIC BLOOD PRESSURE: 85 MMHG | BODY MASS INDEX: 30.91 KG/M2

## 2024-10-23 DIAGNOSIS — R41.89 BRAIN FOG: ICD-10-CM

## 2024-10-23 DIAGNOSIS — G47.00 INSOMNIA, UNSPECIFIED TYPE: ICD-10-CM

## 2024-10-23 DIAGNOSIS — G43.709 CHRONIC MIGRAINE WITHOUT AURA WITHOUT STATUS MIGRAINOSUS, NOT INTRACTABLE: Primary | ICD-10-CM

## 2024-10-23 PROCEDURE — 99214 OFFICE O/P EST MOD 30 MIN: CPT | Performed by: PSYCHIATRY & NEUROLOGY

## 2024-10-23 RX ORDER — PROPRANOLOL HCL 20 MG
TABLET ORAL
Qty: 180 TABLET | Refills: 1 | Status: SHIPPED | OUTPATIENT
Start: 2024-10-23

## 2024-10-23 NOTE — LETTER
10/23/2024      Lady Saavedra  [id number] [address] [place] [id number]      Dear Colleague,    Thank you for referring your patient, Lady Saavedra, to the [facility] [place]. Please see a copy of my visit note below.    NEUROLOGY OUTPATIENT PROGRESS NOTE   Oct 23, 2024     CHIEF COMPLAINT/REASON FOR VISIT/REASON FOR CONSULT  Patient presents with:  Migraine: Patient states she is doing really well since starting the injection.     REASON FOR CONSULTATION-headaches and brain fog.    REFERRAL SOURCE  Dr. Lalita Birmingham  CC Dr. Lalita Birmingham    HISTORY OF PRESENT ILLNESS  Lady Saavedra is a 39 year old female seen today for evaluation of headaches and brain fog.  She reports that she has a history of headache but then in March she got Covid.  She was admitted for Covid related pneumonia.  Was in the hospital for 1 week.  Since then the headaches have been much more frequent.  She is having them every day.  Reports significant photophobia and phonophobia with the headaches.  Headaches can be on any side in different places.  They can be behind the eyes and in the back of the head.  They are throbbing in nature.  Reports no visual aura with these.  Has been put on amitriptyline which has helped her sleep as well as with the headaches but nothing significant to get rid of the headaches completely.  Is using Advil and Tylenol for the headache still would last the whole day.  Is not using them around-the-clock.  Denies any other provoking factors.    Is also been diagnosed with thyroiditis since the Covid infection.  Is working with endocrinology on this.    Reports brain fog has been going on since the Covid infection.  Has not had any improvement.  Was let go of her job.  Has difficulty with memory, staying focused and doing tasks that she could previously do easily.  Does get good sleep at night with the amitriptyline.    Patient has recently been in contact with the Covid  rehabilitation clinic.  Has not started the program yet.    2/14/22  Patient returns today  1.  Patient reports that her headaches have significantly improved with the propanolol.  Is taking 60 mg twice a day.  Denies any side effects.  Headaches of 4-5 times a month.  Headaches are much less severe compared to before and she does not need to take abortive medication.  Did try the Maxalt once without any side effects and it did not work for her.  2.  Continues to have the brain fog.  Has been seen in the [facility] rehabilitation clinic and has not been able to do the therapies due to lack of insurance.  Steroids did not help with the brain fog.  3.  Reports that the amitriptyline is no longer working to help her sleep.  She is up till 2 AM every night.  Discussed about increasing the dose and she is interested in that.  Denies any side effects with the amitriptyline.    4/12/22  Patient returns today  1.  She reports that the headaches are twice a month.  Maxalt does work as abortive therapy though she has to eat something with it otherwise feels nauseous with the medication.  Propanolol amitriptyline is helping.  Some somnolence with the amitriptyline which is getting better.  Discussed about decreasing the amitriptyline and she wants to stay on the higher dose.  It was increased last time because the propanolol was not working by itself.  2.  Continues to have the brain fog.  Has insurance now and has gone to the [facility] rehabilitation clinic though has not been able to see the therapist yet.  Is only seeing her counselor for her mental illness.    3.  Is sleeping better with the amitriptyline at this point though does have some somnolence.  No insomnia.    2/1/23  Patient returns today  1.  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  Headaches have since then gotten worse and she is having 2-3 headaches a week.  About 15 headache days a  month.  Remains on the amitriptyline and propanolol.  This daytime somnolence with the amitriptyline has improved.  No side effects with propanolol.  Blood pressure is better at this point as well.  Maxalt does work as abortive therapy.  She does need to use it with food to avoid nausea.  2.  Brain fog is also improved with time.  3.  Sleeping better with the amitriptyline.  No issues.  No other new symptoms    4/5/23  Patient returns today  1.  She is currently on 100 mg of Topamax.  She takes it in the morning because it prevents her from sleeping at night.  She reports that she had complete resolution of the headaches.  Has not had any headaches in the last month.  2.  Occasionally if she does have a headache she will use Maxalt which is still working  3.  She remains on propanolol.  She feels that is also being helping with the tachycardia.  Remains on amitriptyline though does complain that she occasionally cannot sleep.  She feels that she is not exercising enough to tire herself out to fall asleep  4.  Brain fog symptoms have now improved.  No new issues/concerns.    4/25/24  Patient returns today.  Since she was last seen she has had COVID and has been having long-haul COVID symptoms along with worsening headaches.  1.  Headaches are almost every day.  Headaches are brought on by bright lights.  She does have accommodation at work.  Has been able to work from home which is helping.  In terms of her medications she has been on a higher dose of Topamax.  Is taking 1-1/2 tablet in the morning.  Has been switched from amitriptyline to nortriptyline.  No side effects.  Feels that she is sleeping well.  Propranolol dose has been decreased.  Is using Zofran and meclizine for nausea with the headaches.  Maxalt does work but causes some nausea.    2.  Per the post-COVID clinic she has been working with PT/OT/speech therapy with no significant improvement.  Brain fog is also gotten worse.  No other new  concerns.    6/20/24  Patient returns today  1.  Her brain fog symptoms have improved.  2.  Headaches are slowly getting better.  Still has them multiple times a week but not as severe compared to before.  Jaciel on the high doses of Topamax, nortriptyline, propranolol.  No side effects.  Steroids actually made things worse.  Maxalt makes her more tired.  Is interested in trying other medication since the headaches are still affecting quality of life  No other new concerns.    10/23/24  Patient returns today  1.  Brain fog symptoms have improved  2.  Headaches have gotten much better with the Emgality.  Reports couple of random headaches, couple of headaches with the Emgality is due in couple of headaches at the time of the menstrual cycle.  Relpax is working better than the Maxalt.  No side effects with the medication.  Wants to slowly wean off the other medications  3.  Some issues with insomnia and is primary care doctor has put on a new medication in addition to the nortriptyline.  No other new concerns.    Previous history is reviewed and this is unchanged.    PAST MEDICAL/SURGICAL HISTORY  Past Medical History:   Diagnosis Date     Abnormal Pap smear of cervix 2009, 2012, 2014    see problem list     Cervical high risk HPV (human papillomavirus) test positive 2009, 2012    see problem list     Chickenpox      Depressive disorder      Gestational HTN 01/01/2015     Hypothyroidism      Migraine with aura and without status migrainosus, not intractable      Seasonal allergies     Was on clariten uses occasional sudafed     Patient Active Problem List   Diagnosis     Pneumonia due to 2019 novel coronavirus     COVID-19 long hauler     Anovulation     Prediabetes     Migraine with aura and without status migrainosus, not intractable     Hyperlipidemia LDL goal <100     Nonalcoholic fatty liver disease     Cognitive communication deficit   Significant of migraines, mental illness, suicide attempt, depression.  Feels  the depression is under good control with EMR    FAMILY HISTORY  Family History   Adopted: Yes   Problem Relation Age of Onset     Unknown/Adopted Mother      Unknown/Adopted Father    Patient is adopted and she does not know her family.    SOCIAL HISTORY  Social History     Tobacco Use     Smoking status: Never     Smokeless tobacco: Never   Vaping Use     Vaping status: Never Used   Substance Use Topics     Alcohol use: No     Drug use: No       SYSTEMS REVIEW  Twelve-system ROS was done and other than the HPI this was negative except for neck pain, back pain, arm and leg pain, joint pain, numbness and tingling, sleepiness during the day, sleeping problems, headaches, anxiety, depression, palpitations, bloating, stomach pain, bowel problems, respiratory problems, skin changes, weight gain.  No new concerns/issues.    MEDICATIONS  Current Outpatient Medications   Medication Sig Dispense Refill     aspirin-acetaminophen-caffeine (EXCEDRIN MIGRAINE) 250-250-65 MG tablet Take 1 tablet by mouth daily as needed for headaches       buPROPion (WELLBUTRIN XL) 300 MG 24 hr tablet Take 1 tablet (300 mg) by mouth every morning 90 tablet 3     dicyclomine (BENTYL) 10 MG capsule Take 1 capsule (10 mg) by mouth 4 times daily as needed (abdominal cramping) 90 capsule 11     DULoxetine (CYMBALTA) 20 MG capsule Take 1 capsule (20 mg) by mouth daily for 7 days, THEN 1 capsule (20 mg) 2 times daily for 7 days, THEN 2 capsules (40 mg) 2 times daily for 16 days. 85 capsule 0     eletriptan (RELPAX) 40 MG tablet Take 1 tablet (40 mg) by mouth at onset of headache for migraine May repeat in 2 hours. Max 2 tablets/24 hours. 18 tablet 3     galcanezumab-gnlm (EMGALITY) 120 MG/ML injection Inject 1 mL (120 mg) subcutaneously every 28 days. 1 mL 11     levothyroxine (SYNTHROID) 88 MCG tablet Take 1 tablet (88 mcg) by mouth daily 90 tablet 3     LORazepam (ATIVAN) 0.5 MG tablet Take 1-2 tablets (0.5-1 mg) by mouth every 8 hours as needed for  "anxiety (panic). 30 tablet 2     minoxidil (ROGAINE) 2 % external solution Apply topically 2 times daily. 60 mL 3     modafinil (PROVIGIL) 200 MG tablet Take 1 tablet (200 mg) by mouth daily 90 tablet 3     Naltrexone HCl, Pain, 1.5 MG CAPS Take 1 capsule by mouth daily. For 1-2 weeks then discontinue       nortriptyline (PAMELOR) 10 MG capsule Take 1 capsule (10 mg) by mouth at bedtime . Take with 50 mg dose for total dose of 60 mg 90 capsule 3     nortriptyline (PAMELOR) 50 MG capsule Take 1 capsule (50 mg) by mouth at bedtime . Take with 10 mg dose for total dose of 60 mg 90 capsule 3     propranolol (INDERAL) 20 MG tablet Start with 3 tabs BID. Reduce to 2 tabs BID in 2 week and then 1 tab BID In 2 weeks and then stop if tolerated. 180 tablet 1     propranolol (INDERAL) 60 MG tablet Take 1 tablet (60 mg) by mouth every morning AND 1 tablet (60 mg) every evening. 180 tablet 3     topiramate (TOPAMAX) 100 MG tablet Take 1.5 tablets (150 mg) by mouth daily 135 tablet 3     vitamin D3 (CHOLECALCIFEROL) 50 mcg (2000 units) tablet Take 1 tablet (50 mcg) by mouth daily 90 tablet 3     No current facility-administered medications for this visit.        PHYSICAL EXAMINATION  VITALS: /85 (BP Location: Right arm, Patient Position: Sitting)   Pulse 71   Ht 1.575 m (5' 2\")   Wt 76.2 kg (168 lb)   BMI 30.73 kg/m    GENERAL: Healthy appearing, alert, no acute distress, normal habitus.  CARDIOVASCULAR: Extremities warm and well perfused. Pulses present.   NEUROLOGICAL:  Patient is awake and oriented to self, place and time.  Attention span is normal.  Memory is grossly intact; previously MoCA 26.  Language is fluent and follows commands appropriately.  Appropriate fund of knowledge. Cranial nerves 2-12 are intact. There is no pronator drift.  Motor exam shows 5/5 strength in all extremities.  Tone is symmetric bilaterally in upper and lower extremities.  (Previously reflexes are symmetric and 2+ in upper extremities " and lower extremities. Sensory exam is grossly intact to light touch, pin prick and vibration.)  Finger to nose and heel to shin is without dysmetria.  Romberg is negative.  Gait is normal and the patient is able to do tandem walk and walk on toes and heels.  Exam stable.    DIAGNOSTICS  MRI-images reviewed.  No major structural lesions noted.  Some T2 hyperintensities.  IMPRESSION:  1.  No acute intracranial process.  2.  Nonspecific scattered T2 hyperintensities within the cerebral white matter. These can be seen in association with migraine headaches.    Labs  Component      Latest Ref Rng & Units 8/31/2021 9/13/2021 10/15/2021 12/7/2021   Thyroid Peroxidase Antibody      <35 IU/mL >5,000 (H)      TSH      0.40 - 4.00 mU/L  1.72 0.11 (L) 1.22   T4 Free      0.76 - 1.46 ng/dL   1.32          OUTSIDE RECORDS  Outside referral notes and chart notes were reviewed and pertinent information has been summarized (in addition to the HPI):-Patient has been diagnosed with long-haul Covid. Multiple testing has been ordered. Has been seen with cardiology. Also seeing endocrinology. Has been having brain fog for which she was referred to neurology. Does complain of easy fatigability as well.    LABS  Component      Latest Ref Rng & Units 12/14/2021   Vitamin B12      213 - 816 pg/mL 413   Vitamin B1 Whole Blood Level      70 - 180 nmol/L 104   Ferritin      10 - 130 ng/mL 192 (H)   Methylmalonic Acid      0.00 - 0.40 umol/L 0.21     EEG  IMPRESSION/REPORT/PLAN  This is a normal EEG during wakefulness and drowsiness except for mild generalized background dysrhythmia. Further clinical correlation is needed.     Please note that the absence of epileptiform abnormalities does not exclude the possibility of epilepsy in any patient.      Component      Latest Ref Rng & Units 6/8/2022 11/1/2022   Ferritin      12 - 150 ng/mL 484 (H)    TSH      0.30 - 4.20 uIU/mL  1.01   T4 Free      0.90 - 1.70 ng/dL  1.29        IMPRESSION/REPORT/PLAN  History of anti-TPO antibodies  History of 2019 novel coronavirus disease (COVID-19)  Brain fog/cognitive difficulty-improved  Chronic migraine without aura without status migrainosus, not intractable  Insomnia, unspecified type  Recent COVID infection with worsening symptoms    This is a 39 year old female with history of migraines with worsening migraines after COVID-19 infection and cognitive decline after COVID-19 infection.    Previously amitriptyline and propanolol were helping.  Blood pressure was slightly elevated and amitriptyline was causing daytime somnolence.  Propanolol was also helping with some tachycardia.  These side effects have now improved.  With addition of Topamax headaches have significantly improved.    Maxalt is helping with abortive therapy and will continue.  Previously amitriptyline was also being used for insomnia.    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  Anti-TPO antibodies were positive and she was treated with steroids with no benefit.  MRI and EEG were noncontributory.  She does have a history of thyroid disease and is on levothyroxine.  Recent TSH levels were normal.  This is now improved with therapy.    With worsening of headaches after the COVID she was initially put on nortriptyline, propranolol, Topamax at higher doses.  These have helped but not completely.  Addition of Emgality has been helping more.  Will wean her off the propranolol and continue the Topamax and nortriptyline.  Can consider weaning her off these medications during the next appointment.  Emgality should also become more effective in the next 3 to 6 months as it can take some time to build up.  Maxalt was less effective compared to the Relpax and we will continue the Relpax. Continue Zofran for nausea.      Return back in 4-5 months.    -     topiramate (TOPAMAX) 150 mg daily-when to take at night due to brain fog issues.  -      nortriptyline (PAMELOR) 10 MG capsule; Take 3cap/night and then increase by 1 cap/week to a max of 6 caps/night as needed and tolerated.  -     eletriptan (RELPAX) 40 MG tablet; Take 1 tablet (40 mg) by mouth at onset of headache for migraine May repeat in 2 hours. Max 2 tablets/24 hours.  -     propranolol (INDERAL) 20 MG tablet; Start with 3 tabs BID. Reduce to 2 tabs BID in 2 week and then 1 tab BID In 2 weeks and then stop if tolerated.  -     galcanezumab-gnlm (EMGALITY) 120 MG/ML injection; Inject 1 mL (120 mg) subcutaneously every 28 days.    Return in about 4 months (around 2/23/2025) for In-Clinic Visit (must), After testing.    Over 30 minutes were spent coordinating the care for the patient on the day of the encounter.  This includes previsit, during visit and post visit activities as documented above.  Counseling patient.  Prescription management.  Multiple problems reviewed  (Activities include but not inclusive of reviewing chart, reviewing outside records, reviewing labs and imaging study results as well as the images, patient visit time including getting history and exam,  use if applicable, review of test results with the patient and coming up with a plan in a shared model, counseling patient and family, education and answering patient questions, EMR , EMR diagnosis entry and problem list management, medication reconciliation and prescription management if applicable, paperwork if applicable, printing documents and documentation of the visit activities.)      Ranjan Parisi MD  Neurologist  [facility]  Tel:- [phone]    This note was dictated using voice recognition software.  Any grammatical or context distortions are unintentional and inherent to the software.      Again, thank you for allowing me to participate in the care of your patient.        Sincerely,        Ranjan Parisi MD

## 2024-10-23 NOTE — NURSING NOTE
Chief Complaint   Patient presents with    Migraine     Patient states she is doing really well since starting the injection.      Thelma Daugherty MA

## 2024-10-23 NOTE — PROGRESS NOTES
NEUROLOGY OUTPATIENT PROGRESS NOTE   Oct 23, 2024     CHIEF COMPLAINT/REASON FOR VISIT/REASON FOR CONSULT  Patient presents with:  Migraine: Patient states she is doing really well since starting the injection.     REASON FOR CONSULTATION-headaches and brain fog.    REFERRAL SOURCE  Dr. Lalita Birmingham   Dr. Lalita Birmingham    HISTORY OF PRESENT ILLNESS  Lady Saavedra is a 39 year old female seen today for evaluation of headaches and brain fog.  She reports that she has a history of headache but then in March she got Covid.  She was admitted for Covid related pneumonia.  Was in the hospital for 1 week.  Since then the headaches have been much more frequent.  She is having them every day.  Reports significant photophobia and phonophobia with the headaches.  Headaches can be on any side in different places.  They can be behind the eyes and in the back of the head.  They are throbbing in nature.  Reports no visual aura with these.  Has been put on amitriptyline which has helped her sleep as well as with the headaches but nothing significant to get rid of the headaches completely.  Is using Advil and Tylenol for the headache still would last the whole day.  Is not using them around-the-clock.  Denies any other provoking factors.    Is also been diagnosed with thyroiditis since the Covid infection.  Is working with endocrinology on this.    Reports brain fog has been going on since the Covid infection.  Has not had any improvement.  Was let go of her job.  Has difficulty with memory, staying focused and doing tasks that she could previously do easily.  Does get good sleep at night with the amitriptyline.    Patient has recently been in contact with the Covid rehabilitation clinic.  Has not started the program yet.    2/14/22  Patient returns today  1.  Patient reports that her headaches have significantly improved with the propanolol.  Is taking 60 mg twice a day.  Denies any side effects.  Headaches of 4-5 times  a month.  Headaches are much less severe compared to before and she does not need to take abortive medication.  Did try the Maxalt once without any side effects and it did not work for her.  2.  Continues to have the brain fog.  Has been seen in the [facility] rehabilitation clinic and has not been able to do the therapies due to lack of insurance.  Steroids did not help with the brain fog.  3.  Reports that the amitriptyline is no longer working to help her sleep.  She is up till 2 AM every night.  Discussed about increasing the dose and she is interested in that.  Denies any side effects with the amitriptyline.    4/12/22  Patient returns today  1.  She reports that the headaches are twice a month.  Maxalt does work as abortive therapy though she has to eat something with it otherwise feels nauseous with the medication.  Propanolol amitriptyline is helping.  Some somnolence with the amitriptyline which is getting better.  Discussed about decreasing the amitriptyline and she wants to stay on the higher dose.  It was increased last time because the propanolol was not working by itself.  2.  Continues to have the brain fog.  Has insurance now and has gone to the [facility] rehabilitation clinic though has not been able to see the therapist yet.  Is only seeing her counselor for her mental illness.    3.  Is sleeping better with the amitriptyline at this point though does have some somnolence.  No insomnia.    2/1/23  Patient returns today  1.  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  Headaches have since then gotten worse and she is having 2-3 headaches a week.  About 15 headache days a month.  Remains on the amitriptyline and propanolol.  This daytime somnolence with the amitriptyline has improved.  No side effects with propanolol.  Blood pressure is better at this point as well.  Maxalt does work as abortive therapy.  She does need to use  it with food to avoid nausea.  2.  Brain fog is also improved with time.  3.  Sleeping better with the amitriptyline.  No issues.  No other new symptoms    4/5/23  Patient returns today  1.  She is currently on 100 mg of Topamax.  She takes it in the morning because it prevents her from sleeping at night.  She reports that she had complete resolution of the headaches.  Has not had any headaches in the last month.  2.  Occasionally if she does have a headache she will use Maxalt which is still working  3.  She remains on propanolol.  She feels that is also being helping with the tachycardia.  Remains on amitriptyline though does complain that she occasionally cannot sleep.  She feels that she is not exercising enough to tire herself out to fall asleep  4.  Brain fog symptoms have now improved.  No new issues/concerns.    4/25/24  Patient returns today.  Since she was last seen she has had COVID and has been having long-haul COVID symptoms along with worsening headaches.  1.  Headaches are almost every day.  Headaches are brought on by bright lights.  She does have accommodation at work.  Has been able to work from home which is helping.  In terms of her medications she has been on a higher dose of Topamax.  Is taking 1-1/2 tablet in the morning.  Has been switched from amitriptyline to nortriptyline.  No side effects.  Feels that she is sleeping well.  Propranolol dose has been decreased.  Is using Zofran and meclizine for nausea with the headaches.  Maxalt does work but causes some nausea.    2.  Per the post-COVID clinic she has been working with PT/OT/speech therapy with no significant improvement.  Brain fog is also gotten worse.  No other new concerns.    6/20/24  Patient returns today  1.  Her brain fog symptoms have improved.  2.  Headaches are slowly getting better.  Still has them multiple times a week but not as severe compared to before.  Jaciel on the high doses of Topamax, nortriptyline, propranolol.  No  side effects.  Steroids actually made things worse.  Maxalt makes her more tired.  Is interested in trying other medication since the headaches are still affecting quality of life  No other new concerns.    10/23/24  Patient returns today  1.  Brain fog symptoms have improved  2.  Headaches have gotten much better with the Emgality.  Reports couple of random headaches, couple of headaches with the Emgality is due in couple of headaches at the time of the menstrual cycle.  Relpax is working better than the Maxalt.  No side effects with the medication.  Wants to slowly wean off the other medications  3.  Some issues with insomnia and is primary care doctor has put on a new medication in addition to the nortriptyline.  No other new concerns.    Previous history is reviewed and this is unchanged.    PAST MEDICAL/SURGICAL HISTORY  Past Medical History:   Diagnosis Date    Abnormal Pap smear of cervix 2009, 2012, 2014    see problem list    Cervical high risk HPV (human papillomavirus) test positive 2009, 2012    see problem list    Chickenpox     Depressive disorder     Gestational HTN 01/01/2015    Hypothyroidism     Migraine with aura and without status migrainosus, not intractable     Seasonal allergies     Was on clariten uses occasional sudafed     Patient Active Problem List   Diagnosis    Pneumonia due to 2019 novel coronavirus    COVID-19 long hauler    Anovulation    Prediabetes    Migraine with aura and without status migrainosus, not intractable    Hyperlipidemia LDL goal <100    Nonalcoholic fatty liver disease    Cognitive communication deficit   Significant of migraines, mental illness, suicide attempt, depression.  Feels the depression is under good control with EMR    FAMILY HISTORY  Family History   Adopted: Yes   Problem Relation Age of Onset    Unknown/Adopted Mother     Unknown/Adopted Father    Patient is adopted and she does not know her family.    SOCIAL HISTORY  Social History     Tobacco Use     Smoking status: Never    Smokeless tobacco: Never   Vaping Use    Vaping status: Never Used   Substance Use Topics    Alcohol use: No    Drug use: No       SYSTEMS REVIEW  Twelve-system ROS was done and other than the HPI this was negative except for neck pain, back pain, arm and leg pain, joint pain, numbness and tingling, sleepiness during the day, sleeping problems, headaches, anxiety, depression, palpitations, bloating, stomach pain, bowel problems, respiratory problems, skin changes, weight gain.  No new concerns/issues.    MEDICATIONS  Current Outpatient Medications   Medication Sig Dispense Refill    aspirin-acetaminophen-caffeine (EXCEDRIN MIGRAINE) 250-250-65 MG tablet Take 1 tablet by mouth daily as needed for headaches      buPROPion (WELLBUTRIN XL) 300 MG 24 hr tablet Take 1 tablet (300 mg) by mouth every morning 90 tablet 3    dicyclomine (BENTYL) 10 MG capsule Take 1 capsule (10 mg) by mouth 4 times daily as needed (abdominal cramping) 90 capsule 11    DULoxetine (CYMBALTA) 20 MG capsule Take 1 capsule (20 mg) by mouth daily for 7 days, THEN 1 capsule (20 mg) 2 times daily for 7 days, THEN 2 capsules (40 mg) 2 times daily for 16 days. 85 capsule 0    eletriptan (RELPAX) 40 MG tablet Take 1 tablet (40 mg) by mouth at onset of headache for migraine May repeat in 2 hours. Max 2 tablets/24 hours. 18 tablet 3    galcanezumab-gnlm (EMGALITY) 120 MG/ML injection Inject 1 mL (120 mg) subcutaneously every 28 days. 1 mL 11    levothyroxine (SYNTHROID) 88 MCG tablet Take 1 tablet (88 mcg) by mouth daily 90 tablet 3    LORazepam (ATIVAN) 0.5 MG tablet Take 1-2 tablets (0.5-1 mg) by mouth every 8 hours as needed for anxiety (panic). 30 tablet 2    minoxidil (ROGAINE) 2 % external solution Apply topically 2 times daily. 60 mL 3    modafinil (PROVIGIL) 200 MG tablet Take 1 tablet (200 mg) by mouth daily 90 tablet 3    Naltrexone HCl, Pain, 1.5 MG CAPS Take 1 capsule by mouth daily. For 1-2 weeks then discontinue       "nortriptyline (PAMELOR) 10 MG capsule Take 1 capsule (10 mg) by mouth at bedtime . Take with 50 mg dose for total dose of 60 mg 90 capsule 3    nortriptyline (PAMELOR) 50 MG capsule Take 1 capsule (50 mg) by mouth at bedtime . Take with 10 mg dose for total dose of 60 mg 90 capsule 3    propranolol (INDERAL) 20 MG tablet Start with 3 tabs BID. Reduce to 2 tabs BID in 2 week and then 1 tab BID In 2 weeks and then stop if tolerated. 180 tablet 1    propranolol (INDERAL) 60 MG tablet Take 1 tablet (60 mg) by mouth every morning AND 1 tablet (60 mg) every evening. 180 tablet 3    topiramate (TOPAMAX) 100 MG tablet Take 1.5 tablets (150 mg) by mouth daily 135 tablet 3    vitamin D3 (CHOLECALCIFEROL) 50 mcg (2000 units) tablet Take 1 tablet (50 mcg) by mouth daily 90 tablet 3     No current facility-administered medications for this visit.        PHYSICAL EXAMINATION  VITALS: /85 (BP Location: Right arm, Patient Position: Sitting)   Pulse 71   Ht 1.575 m (5' 2\")   Wt 76.2 kg (168 lb)   BMI 30.73 kg/m    GENERAL: Healthy appearing, alert, no acute distress, normal habitus.  CARDIOVASCULAR: Extremities warm and well perfused. Pulses present.   NEUROLOGICAL:  Patient is awake and oriented to self, place and time.  Attention span is normal.  Memory is grossly intact; previously MoCA 26.  Language is fluent and follows commands appropriately.  Appropriate fund of knowledge. Cranial nerves 2-12 are intact. There is no pronator drift.  Motor exam shows 5/5 strength in all extremities.  Tone is symmetric bilaterally in upper and lower extremities.  (Previously reflexes are symmetric and 2+ in upper extremities and lower extremities. Sensory exam is grossly intact to light touch, pin prick and vibration.)  Finger to nose and heel to shin is without dysmetria.  Romberg is negative.  Gait is normal and the patient is able to do tandem walk and walk on toes and heels.  Exam stable.    DIAGNOSTICS  MRI-images reviewed.  No " major structural lesions noted.  Some T2 hyperintensities.  IMPRESSION:  1.  No acute intracranial process.  2.  Nonspecific scattered T2 hyperintensities within the cerebral white matter. These can be seen in association with migraine headaches.    Labs  Component      Latest Ref Rng & Units 8/31/2021 9/13/2021 10/15/2021 12/7/2021   Thyroid Peroxidase Antibody      <35 IU/mL >5,000 (H)      TSH      0.40 - 4.00 mU/L  1.72 0.11 (L) 1.22   T4 Free      0.76 - 1.46 ng/dL   1.32          OUTSIDE RECORDS  Outside referral notes and chart notes were reviewed and pertinent information has been summarized (in addition to the HPI):-Patient has been diagnosed with long-haul Covid. Multiple testing has been ordered. Has been seen with cardiology. Also seeing endocrinology. Has been having brain fog for which she was referred to neurology. Does complain of easy fatigability as well.    LABS  Component      Latest Ref Rng & Units 12/14/2021   Vitamin B12      213 - 816 pg/mL 413   Vitamin B1 Whole Blood Level      70 - 180 nmol/L 104   Ferritin      10 - 130 ng/mL 192 (H)   Methylmalonic Acid      0.00 - 0.40 umol/L 0.21     EEG  IMPRESSION/REPORT/PLAN  This is a normal EEG during wakefulness and drowsiness except for mild generalized background dysrhythmia. Further clinical correlation is needed.     Please note that the absence of epileptiform abnormalities does not exclude the possibility of epilepsy in any patient.      Component      Latest Ref Rng & Units 6/8/2022 11/1/2022   Ferritin      12 - 150 ng/mL 484 (H)    TSH      0.30 - 4.20 uIU/mL  1.01   T4 Free      0.90 - 1.70 ng/dL  1.29       IMPRESSION/REPORT/PLAN  History of anti-TPO antibodies  History of 2019 novel coronavirus disease (COVID-19)  Brain fog/cognitive difficulty-improved  Chronic migraine without aura without status migrainosus, not intractable  Insomnia, unspecified type  Recent COVID infection with worsening symptoms    This is a 39 year old female  with history of migraines with worsening migraines after COVID-19 infection and cognitive decline after COVID-19 infection.    Previously amitriptyline and propanolol were helping.  Blood pressure was slightly elevated and amitriptyline was causing daytime somnolence.  Propanolol was also helping with some tachycardia.  These side effects have now improved.  With addition of Topamax headaches have significantly improved.    Maxalt is helping with abortive therapy and will continue.  Previously amitriptyline was also being used for insomnia.    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  Anti-TPO antibodies were positive and she was treated with steroids with no benefit.  MRI and EEG were noncontributory.  She does have a history of thyroid disease and is on levothyroxine.  Recent TSH levels were normal.  This is now improved with therapy.    With worsening of headaches after the COVID she was initially put on nortriptyline, propranolol, Topamax at higher doses.  These have helped but not completely.  Addition of Emgality has been helping more.  Will wean her off the propranolol and continue the Topamax and nortriptyline.  Can consider weaning her off these medications during the next appointment.  Emgality should also become more effective in the next 3 to 6 months as it can take some time to build up.  Maxalt was less effective compared to the Relpax and we will continue the Relpax. Continue Zofran for nausea.      Return back in 4-5 months.    -     topiramate (TOPAMAX) 150 mg daily-when to take at night due to brain fog issues.  -     nortriptyline (PAMELOR) 10 MG capsule; Take 3cap/night and then increase by 1 cap/week to a max of 6 caps/night as needed and tolerated.  -     eletriptan (RELPAX) 40 MG tablet; Take 1 tablet (40 mg) by mouth at onset of headache for migraine May repeat in 2 hours. Max 2 tablets/24 hours.  -     propranolol (INDERAL) 20 MG tablet; Start with 3  tabs BID. Reduce to 2 tabs BID in 2 week and then 1 tab BID In 2 weeks and then stop if tolerated.  -     galcanezumab-gnlm (EMGALITY) 120 MG/ML injection; Inject 1 mL (120 mg) subcutaneously every 28 days.    Return in about 4 months (around 2/23/2025) for In-Clinic Visit (must), After testing.    Over 30 minutes were spent coordinating the care for the patient on the day of the encounter.  This includes previsit, during visit and post visit activities as documented above.  Counseling patient.  Prescription management.  Multiple problems reviewed  (Activities include but not inclusive of reviewing chart, reviewing outside records, reviewing labs and imaging study results as well as the images, patient visit time including getting history and exam,  use if applicable, review of test results with the patient and coming up with a plan in a shared model, counseling patient and family, education and answering patient questions, EMR , EMR diagnosis entry and problem list management, medication reconciliation and prescription management if applicable, paperwork if applicable, printing documents and documentation of the visit activities.)      Ranjan Parisi MD  Neurologist  [facility]  Tel:- [phone]    This note was dictated using voice recognition software.  Any grammatical or context distortions are unintentional and inherent to the software.

## 2024-11-03 ASSESSMENT — ANXIETY QUESTIONNAIRES
GAD7 TOTAL SCORE: 3
4. TROUBLE RELAXING: SEVERAL DAYS
7. FEELING AFRAID AS IF SOMETHING AWFUL MIGHT HAPPEN: NOT AT ALL
7. FEELING AFRAID AS IF SOMETHING AWFUL MIGHT HAPPEN: NOT AT ALL
6. BECOMING EASILY ANNOYED OR IRRITABLE: SEVERAL DAYS
5. BEING SO RESTLESS THAT IT IS HARD TO SIT STILL: NOT AT ALL
GAD7 TOTAL SCORE: 3
8. IF YOU CHECKED OFF ANY PROBLEMS, HOW DIFFICULT HAVE THESE MADE IT FOR YOU TO DO YOUR WORK, TAKE CARE OF THINGS AT HOME, OR GET ALONG WITH OTHER PEOPLE?: SOMEWHAT DIFFICULT
2. NOT BEING ABLE TO STOP OR CONTROL WORRYING: NOT AT ALL
3. WORRYING TOO MUCH ABOUT DIFFERENT THINGS: NOT AT ALL
GAD7 TOTAL SCORE: 3
1. FEELING NERVOUS, ANXIOUS, OR ON EDGE: SEVERAL DAYS
IF YOU CHECKED OFF ANY PROBLEMS ON THIS QUESTIONNAIRE, HOW DIFFICULT HAVE THESE PROBLEMS MADE IT FOR YOU TO DO YOUR WORK, TAKE CARE OF THINGS AT HOME, OR GET ALONG WITH OTHER PEOPLE: SOMEWHAT DIFFICULT

## 2024-11-03 ASSESSMENT — PATIENT HEALTH QUESTIONNAIRE - PHQ9
SUM OF ALL RESPONSES TO PHQ QUESTIONS 1-9: 8
10. IF YOU CHECKED OFF ANY PROBLEMS, HOW DIFFICULT HAVE THESE PROBLEMS MADE IT FOR YOU TO DO YOUR WORK, TAKE CARE OF THINGS AT HOME, OR GET ALONG WITH OTHER PEOPLE: SOMEWHAT DIFFICULT
SUM OF ALL RESPONSES TO PHQ QUESTIONS 1-9: 8

## 2024-11-04 ENCOUNTER — VIRTUAL VISIT (OUTPATIENT)
Dept: FAMILY MEDICINE | Facility: CLINIC | Age: 39
End: 2024-11-04
Attending: STUDENT IN AN ORGANIZED HEALTH CARE EDUCATION/TRAINING PROGRAM
Payer: COMMERCIAL

## 2024-11-04 DIAGNOSIS — G43.709 CHRONIC MIGRAINE WITHOUT AURA WITHOUT STATUS MIGRAINOSUS, NOT INTRACTABLE: ICD-10-CM

## 2024-11-04 DIAGNOSIS — G47.8 WAKES UP DURING NIGHT: ICD-10-CM

## 2024-11-04 DIAGNOSIS — F41.0 PANIC DISORDER WITHOUT AGORAPHOBIA: Primary | ICD-10-CM

## 2024-11-04 PROCEDURE — G2211 COMPLEX E/M VISIT ADD ON: HCPCS | Mod: 95 | Performed by: STUDENT IN AN ORGANIZED HEALTH CARE EDUCATION/TRAINING PROGRAM

## 2024-11-04 PROCEDURE — 99214 OFFICE O/P EST MOD 30 MIN: CPT | Mod: 95 | Performed by: STUDENT IN AN ORGANIZED HEALTH CARE EDUCATION/TRAINING PROGRAM

## 2024-11-04 RX ORDER — DULOXETINE 40 MG/1
40 CAPSULE, DELAYED RELEASE ORAL 2 TIMES DAILY
Qty: 180 CAPSULE | Refills: 3 | Status: SHIPPED | OUTPATIENT
Start: 2024-11-04

## 2024-11-04 NOTE — PROGRESS NOTES
Kassy is a 39 year old who is being evaluated via a billable video visit.    How would you like to obtain your AVS? MyChart  If the video visit is dropped, the invitation should be resent by: Text to cell phone: [phone]  Will anyone else be joining your video visit? No      Assessment & Plan     Panic disorder without agoraphobia  Patient is a very pleasant 39-year-old female who has a virtual visit today to follow-up on ongoing post-COVID symptoms.  With regards to the panic disorder without agoraphobia, she has had improvement in depression and anxiety scores.  Her work group that was contributing to a lot of her anxiety is not meeting until March, which she thinks has been helping as well with mental health.  We refilled her duloxetine for the next year at the current dosage, could always increase to 60 mg twice daily if needed.  We did briefly discuss dropping down the nortriptyline dose as she is reporting hallucinations that been going on likely since April or a little earlier than that this year.  She opts to hold off on other dose changes at this time while she continues to work with neurology on chronic migraine medication weaning.  Follow-up TBD pending patient's symptoms.  If she is doing well, follow-up 1 year, if needing to make dose adjustments or medication adjustments, follow-up sooner.  I am happy to make some of the nortriptyline dose changes prior to a follow-up with her, follow-up about 4 to 6 weeks after nortriptyline dose change.  - PRIMARY CARE FOLLOW-UP SCHEDULING  - DULoxetine 40 MG CPEP  Dispense: 180 capsule; Refill: 3    Wakes up during night  Patient continues to have waking up at night, tossing and turning quite a bit.  She is also reporting nighttime hallucinations that are random.  They seem to be improving slightly, are definitely sporadic, but continue to experience this.  She thinks this is started about April or little earlier than that.  In review of her medication changes, at  "the time, she was increased on the dose of nortriptyline from 30 mg up to a max of 60.  She continues at 60 mg daily.  We discussed considering dropping down this dose as noted above.  Did recommend that she consider sleep study for further evaluation.    Chronic migraine without aura without status migrainosus, not intractable  Continues to follow with neurology on this.  Significantly improved since starting the injection medications for management.  She is working on decreasing the propranolol dose currently.  She will reach out to her neurologist in the meantime between now and their upcoming appointment in March to consider decreasing on the topiramate as well.     The longitudinal plan of care for the diagnosis(es)/condition(s) as documented were addressed during this visit. Due to the added complexity in care, I will continue to support Kassy in the subsequent management and with ongoing continuity of care.  I spent a total of 30 minutes on the day of the visit.   Time spent by me doing chart review, history and exam, documentation and further activities per the note        BMI  Estimated body mass index is 30.73 kg/m  as calculated from the following:    Height as of 10/23/24: 1.575 m (5' 2\").    Weight as of 10/23/24: 76.2 kg (168 lb).       Subjective   Kassy is a 39 year old, presenting for the following health issues:  Anxiety, Depression, and Headache        11/4/2024     2:46 PM   Additional Questions   Roomed by Mariposa CARY   Accompanied by Self     History of Present Illness       Mental Health Follow-up:  Patient presents to follow-up on Depression & Anxiety.Patient's depression since last visit has been:  Better  The patient is not having other symptoms associated with depression.  Patient's anxiety since last visit has been:  Better  The patient is not having other symptoms associated with anxiety.  Any significant life events: No  Patient is not feeling anxious or having panic attacks.  Patient has " "no concerns about alcohol or drug use.    Headaches:   Since the patient's last clinic visit, headaches are: no change  The patient is getting headaches:  7-12 a month  She is able to do normal daily activities when she has a migraine.  The patient is taking the following rescue/relief medications:  Excedrin and other   Patient states \"I get total relief\" from the rescue/relief medications.   The patient is taking the following medications to prevent migraines:  Inderal, Topomax and other  In the past 4 weeks, the patient has gone to an Urgent Care or Emergency Room 0 times times due to headaches.    She eats 2-3 servings of fruits and vegetables daily.She consumes 1 sweetened beverage(s) daily.She exercises with enough effort to increase her heart rate 9 or less minutes per day.  She exercises with enough effort to increase her heart rate 3 or less days per week.   She is taking medications regularly.         7/2/2024     1:36 PM 10/3/2024     4:47 PM 11/3/2024     8:54 PM   PHQ   PHQ-9 Total Score 5 13 8    Q9: Thoughts of better off dead/self-harm past 2 weeks Not at all  Not at all  Not at all        Patient-reported          6/30/2024     4:27 PM 10/3/2024     4:52 PM 11/3/2024     8:56 PM   JOSÉ MIGUEL-7 SCORE   Total Score 3 (minimal anxiety) 13 (moderate anxiety) 3 (minimal anxiety)   Total Score 3 13 3        Patient-reported      \"Dent on top of head\" the very top of head (right in the middle)    Seems to be improving  For mental health perspective, the group she was a network is not meeting until March, which she thinks has been helping.  She does think that it Cymbalta has been helping    For headaches, working with neurology and decreasing the propranolol, tapering dosage sent to the pharmacy for her by them.  She feels injections are helping significantly with migraines.      Is having episodes of spacing out, sitting and staring.  Overall, these are not terribly disruptive.    Still having some hallucinations " at night at random.  Had noted this with sleep medicine appointment in August.  Thinks it started 4 to 5 months prior to that.  Used to sleep with her glasses on, stop doing that and that seems to be helping a little bit with the hallucinations.  These are not bothersome for her, just present.    Not sleeping as well, and that she is waking up a lot in the middle of the night.  Tossing and turning.  Is able to fall back asleep most nights.        Review of Systems  Constitutional, HEENT, cardiovascular, pulmonary, gi and gu systems are negative, except as otherwise noted.      Objective           Vitals:  No vitals were obtained today due to virtual visit.    Physical Exam   GENERAL: alert and no distress  EYES: Eyes grossly normal to inspection.  No discharge or erythema, or obvious scleral/conjunctival abnormalities.  RESP: No audible wheeze, cough, or visible cyanosis.    SKIN: Visible skin clear. No significant rash, abnormal pigmentation or lesions.  NEURO: Cranial nerves grossly intact.  Mentation and speech appropriate for age.  PSYCH: Appropriate affect, tone, and pace of words. Laughing more today than previous.           Video-Visit Details    Type of service:  Video Visit   Originating Location (pt. Location): Home    Distant Location (provider location):  On-site  Platform used for Video Visit: Fuad  Signed Electronically by: Brandy Zimmerman MD

## 2024-11-14 ENCOUNTER — TELEPHONE (OUTPATIENT)
Dept: FAMILY MEDICINE | Facility: CLINIC | Age: 39
End: 2024-11-14
Payer: COMMERCIAL

## 2024-11-14 NOTE — TELEPHONE ENCOUNTER
Patient Quality Outreach    Patient is due for the following:   Cervical Cancer Screening - PAP Needed  Depression  -  PHQ-9 needed  Physical Preventive Adult Physical    Action(s) Taken:   Schedule a Adult Preventative  Patient was assigned appropriate questionnaire to complete    Type of outreach:    Sent [email or web address] message.    Questions for provider review:    None           Bailee Kahler

## 2024-11-25 ENCOUNTER — MYC MEDICAL ADVICE (OUTPATIENT)
Dept: FAMILY MEDICINE | Facility: CLINIC | Age: 39
End: 2024-11-25
Payer: COMMERCIAL

## 2024-11-30 DIAGNOSIS — E03.9 HYPOTHYROIDISM, UNSPECIFIED TYPE: ICD-10-CM

## 2024-11-30 DIAGNOSIS — F41.1 GAD (GENERALIZED ANXIETY DISORDER): ICD-10-CM

## 2024-11-30 DIAGNOSIS — F33.1 MODERATE EPISODE OF RECURRENT MAJOR DEPRESSIVE DISORDER (H): ICD-10-CM

## 2024-12-02 ENCOUNTER — OFFICE VISIT (OUTPATIENT)
Dept: FAMILY MEDICINE | Facility: CLINIC | Age: 39
End: 2024-12-02
Payer: COMMERCIAL

## 2024-12-02 VITALS
WEIGHT: 168 LBS | SYSTOLIC BLOOD PRESSURE: 128 MMHG | DIASTOLIC BLOOD PRESSURE: 86 MMHG | OXYGEN SATURATION: 99 % | RESPIRATION RATE: 18 BRPM | HEART RATE: 116 BPM | HEIGHT: 62 IN | BODY MASS INDEX: 30.91 KG/M2 | TEMPERATURE: 97.7 F

## 2024-12-02 DIAGNOSIS — G43.109 MIGRAINE WITH AURA AND WITHOUT STATUS MIGRAINOSUS, NOT INTRACTABLE: ICD-10-CM

## 2024-12-02 DIAGNOSIS — R25.1 TREMOR: ICD-10-CM

## 2024-12-02 DIAGNOSIS — U09.9 COVID-19 LONG HAULER: Primary | ICD-10-CM

## 2024-12-02 DIAGNOSIS — M95.2 SKULL DEFORMITY: ICD-10-CM

## 2024-12-02 DIAGNOSIS — R13.14 PHARYNGOESOPHAGEAL DYSPHAGIA: ICD-10-CM

## 2024-12-02 PROCEDURE — G2211 COMPLEX E/M VISIT ADD ON: HCPCS | Performed by: STUDENT IN AN ORGANIZED HEALTH CARE EDUCATION/TRAINING PROGRAM

## 2024-12-02 PROCEDURE — 99214 OFFICE O/P EST MOD 30 MIN: CPT | Performed by: STUDENT IN AN ORGANIZED HEALTH CARE EDUCATION/TRAINING PROGRAM

## 2024-12-02 RX ORDER — LEVOTHYROXINE SODIUM 88 UG/1
88 TABLET ORAL DAILY
Qty: 90 TABLET | Refills: 0 | Status: SHIPPED | OUTPATIENT
Start: 2024-12-02

## 2024-12-02 RX ORDER — BUPROPION HYDROCHLORIDE 300 MG/1
300 TABLET ORAL EVERY MORNING
Qty: 90 TABLET | Refills: 4 | Status: SHIPPED | OUTPATIENT
Start: 2024-12-02

## 2024-12-02 RX ORDER — PROPRANOLOL HYDROCHLORIDE 10 MG/1
TABLET ORAL
Qty: 420 TABLET | Refills: 0 | Status: SHIPPED | OUTPATIENT
Start: 2024-12-02 | End: 2025-01-31

## 2024-12-02 ASSESSMENT — PAIN SCALES - GENERAL: PAINLEVEL_OUTOF10: MILD PAIN (2)

## 2024-12-02 NOTE — PROGRESS NOTES
Assessment & Plan     COVID-19 long hauler  Patient is a pleasant 39-year-old female who presents today for follow-up on long COVID symptoms.    Tremor  Patient was tapered down on her propranolol, has not had recurrence of migraines and doing well on Emgality.  However, she has developed a significant tremor that waxes and wanes in severity.  Noted on exam today as well.  Possibly due to tapering too quickly on the propranolol.  Will restart her, taper up slowly over the next couple weeks to a dose that is working to prevent the tremors, then work on very slowly tapering her down from there to avoid recurrence.  Suspect that the tremor is more of a withdrawal from the propranolol than an independent disorder.  - propranolol (INDERAL) 10 MG tablet  Dispense: 420 tablet; Refill: 0    Pharyngoesophageal dysphagia  Patient is noticing dysphagia, does not happen all the time, but does notice that things seem to get caught in her throat, then will feel like she has a cough all day.  Did not discuss this with her speech therapist before.  Referral back to speech therapy for consideration of the swallow study, versus more formal evaluation with a specialist.  - Speech Therapy  Referral    Skull deformity  Patient continues to have concern for skull deformity.  She does have prominence of the left parietal skull just lateral to the suture.  We reviewed her prior MRI and CT imaging today and noted the prominence of the sutures.  I will suspect that this is related to sutures and has been a chronic issue, however she really feels that this is new and worsening.  We discussed options for imaging, and ultimately decided to do CT of the head for further evaluation.  - CT Head w/o Contrast    Migraine with aura and without status migrainosus, not intractable  With regards to migraines, they have been stable on the Emgality.  She has been having some sleep disturbance, and were not sure exactly what might be causing  "this.  This did start around the time that she started duloxetine, tapered off the naltrexone, and had been on Emgality for a few months.  Possible that the sleep disturbance is related to medications.  Will continue to work on getting her off of medications as tolerated, will have her contact her neurologist to work on tapering off the topiramate, then consider tapering on the nortriptyline as well as this was primarily used for migraines.  Will need to monitor her mental health during that time.  Certainly possible that duloxetine is causing the sleep disturbance, however if this is helping mental health, will hesitate to discontinue.  That said, could try a lower dose and see if symptoms improve.  Continue to follow-up with regards to all this.    The longitudinal plan of care for the diagnosis(es)/condition(s) as documented were addressed during this visit. Due to the added complexity in care, I will continue to support Kassy in the subsequent management and with ongoing continuity of care.    BMI  Estimated body mass index is 30.73 kg/m  as calculated from the following:    Height as of this encounter: 1.575 m (5' 2\").    Weight as of this encounter: 76.2 kg (168 lb).     Depression Screening Follow Up        11/29/2024     2:43 PM   PHQ   PHQ-9 Total Score 11    Q9: Thoughts of better off dead/self-harm past 2 weeks Not at all        Patient-reported       Follow Up Actions Taken  Crisis resource information provided in After Visit Summary       Subjective   Kassy is a 39 year old, presenting for the following health issues:  Mass (On head), Anxiety, and Depression        12/2/2024    11:13 AM   Additional Questions   Roomed by Mariposa CARY   Accompanied by Self     History of Present Illness       Mental Health Follow-up:  Patient presents to follow-up on Depression & Anxiety.Patient's depression since last visit has been:  Better  The patient is not having other symptoms associated with depression.  Patient's " anxiety since last visit has been:  Medium  The patient is having other symptoms associated with anxiety.  Any significant life events: No  Patient is feeling anxious or having panic attacks.  Patient has no concerns about alcohol or drug use.    Reason for visit:  Head bump    She eats 0-1 servings of fruits and vegetables daily.She consumes 1 sweetened beverage(s) daily.She exercises with enough effort to increase her heart rate 9 or less minutes per day.  She exercises with enough effort to increase her heart rate 3 or less days per week.   She is taking medications regularly.         11/3/2024     8:54 PM 11/21/2024     8:21 AM 11/29/2024     2:43 PM   PHQ   PHQ-9 Total Score 8  5  11    Q9: Thoughts of better off dead/self-harm past 2 weeks Not at all  Not at all  Not at all        Patient-reported            10/3/2024     4:52 PM 11/3/2024     8:56 PM 11/21/2024     8:22 AM   JOSÉ MIGUEL-7 SCORE   Total Score 13 (moderate anxiety) 3 (minimal anxiety) 3 (minimal anxiety)   Total Score 13 3  3        Patient-reported       Tremor started as she was tapering down on the propranolol.   Will get random twitches, those are less common, but more apparent at night. Will twitch her whole body.      Brain fog getting worse again.     Sleep still really crappy. Wakes up a lot  Talks in her sleep still.   Screamed in her sleep. Like high pitch scream.     Falls asleep pretty easily. But waking up throughout the night.   Tries not to nap during the day. Not as tired as she was previously. Not exhausted like has to sleep during the day. 10-15 times, very brief, usually only a few nights that it takes her longer than 5 minutes to fall asleep. Usually falls asleep pretty instantly. Usually waking up just to flip sides.     Food almost feels like it is getting stuck in her throat.     Review of Systems  Constitutional, HEENT, cardiovascular, pulmonary, gi and gu systems are negative, except as otherwise noted.      Objective    BP  "128/86 (BP Location: Right arm, Patient Position: Sitting, Cuff Size: Adult Regular)   Pulse 116   Temp 97.7  F (36.5  C) (Tympanic)   Resp 18   Ht 1.575 m (5' 2\")   Wt 76.2 kg (168 lb)   LMP 11/05/2024   SpO2 99%   BMI 30.73 kg/m    Body mass index is 30.73 kg/m .  Physical Exam  Constitutional:       Appearance: Normal appearance.   HENT:      Head: Normocephalic.     Eyes:      General: No scleral icterus.     Extraocular Movements: Extraocular movements intact.      Conjunctiva/sclera: Conjunctivae normal.   Cardiovascular:      Rate and Rhythm: Normal rate.   Pulmonary:      Effort: Pulmonary effort is normal.   Neurological:      General: No focal deficit present.      Mental Status: She is alert and oriented to person, place, and time.      Coordination: Finger-Nose-Finger Test abnormal (tremulous especially on the left worse than right during our visit).                Signed Electronically by: Brandy Zimmerman MD    "

## 2024-12-02 NOTE — PATIENT INSTRUCTIONS
Contact your neurologist about working on a topiramate taper. You can also let him know we're extending the propranolol taper again because of the tremor, but want to continue to work on getting off of migraine meds.

## 2024-12-09 ENCOUNTER — HOSPITAL ENCOUNTER (OUTPATIENT)
Dept: CT IMAGING | Facility: HOSPITAL | Age: 39
Discharge: HOME | End: 2024-12-09
Attending: STUDENT IN AN ORGANIZED HEALTH CARE EDUCATION/TRAINING PROGRAM | Admitting: STUDENT IN AN ORGANIZED HEALTH CARE EDUCATION/TRAINING PROGRAM
Payer: COMMERCIAL

## 2024-12-09 DIAGNOSIS — M95.2 SKULL DEFORMITY: ICD-10-CM

## 2024-12-09 PROCEDURE — 70450 CT HEAD/BRAIN W/O DYE: CPT

## 2024-12-27 ENCOUNTER — TELEPHONE (OUTPATIENT)
Dept: NEUROLOGY | Facility: CLINIC | Age: 39
End: 2024-12-27

## 2024-12-27 NOTE — TELEPHONE ENCOUNTER
Prior Authorization Retail Medication Request    Medication/Dose: galcanezumab-gnlm (EMGALITY) 120 MG/ML injection   Diagnosis and ICD code (if different than what is on RX):    New/renewal/insurance change PA/secondary ins. PA:  Previously Tried and Failed:    Rationale:      Insurance   Primary:   Insurance ID:      Secondary (if applicable):  Insurance ID:      Pharmacy Information (if different than what is on RX)  Name:    Phone:    Fax:    Clinic Information  Preferred routing pool for dept communication:

## 2024-12-30 NOTE — TELEPHONE ENCOUNTER
Prior Authorization Approval    Medication: EMGALITY 120 MG/ML SC SOAJ  Authorization Effective Date: 12/30/2024  Authorization Expiration Date: 12/30/2025  Approved Dose/Quantity:   Reference #:     Insurance Company: [employer]- Phone [phone] Fax [phone]  Expected CoPay: $    CoPay Card Available:      Financial Assistance Needed:   Which Pharmacy is filling the prescription: [id number] - [address] AT [address]  Pharmacy Notified: YES  Patient Notified: **Instructed pharmacy to notify patient when script is ready to /ship.**

## 2025-01-14 ASSESSMENT — ANXIETY QUESTIONNAIRES
7. FEELING AFRAID AS IF SOMETHING AWFUL MIGHT HAPPEN: NOT AT ALL
IF YOU CHECKED OFF ANY PROBLEMS ON THIS QUESTIONNAIRE, HOW DIFFICULT HAVE THESE PROBLEMS MADE IT FOR YOU TO DO YOUR WORK, TAKE CARE OF THINGS AT HOME, OR GET ALONG WITH OTHER PEOPLE: NOT DIFFICULT AT ALL
GAD7 TOTAL SCORE: 6
8. IF YOU CHECKED OFF ANY PROBLEMS, HOW DIFFICULT HAVE THESE MADE IT FOR YOU TO DO YOUR WORK, TAKE CARE OF THINGS AT HOME, OR GET ALONG WITH OTHER PEOPLE?: NOT DIFFICULT AT ALL
4. TROUBLE RELAXING: MORE THAN HALF THE DAYS
6. BECOMING EASILY ANNOYED OR IRRITABLE: SEVERAL DAYS
GAD7 TOTAL SCORE: 6
7. FEELING AFRAID AS IF SOMETHING AWFUL MIGHT HAPPEN: NOT AT ALL
1. FEELING NERVOUS, ANXIOUS, OR ON EDGE: SEVERAL DAYS
3. WORRYING TOO MUCH ABOUT DIFFERENT THINGS: NOT AT ALL
2. NOT BEING ABLE TO STOP OR CONTROL WORRYING: NOT AT ALL
5. BEING SO RESTLESS THAT IT IS HARD TO SIT STILL: MORE THAN HALF THE DAYS
GAD7 TOTAL SCORE: 6

## 2025-01-16 ENCOUNTER — OFFICE VISIT (OUTPATIENT)
Dept: FAMILY MEDICINE | Facility: CLINIC | Age: 40
End: 2025-01-16
Attending: STUDENT IN AN ORGANIZED HEALTH CARE EDUCATION/TRAINING PROGRAM
Payer: COMMERCIAL

## 2025-01-16 VITALS
HEART RATE: 84 BPM | BODY MASS INDEX: 30.73 KG/M2 | OXYGEN SATURATION: 99 % | DIASTOLIC BLOOD PRESSURE: 86 MMHG | RESPIRATION RATE: 14 BRPM | WEIGHT: 167 LBS | HEIGHT: 62 IN | SYSTOLIC BLOOD PRESSURE: 124 MMHG | TEMPERATURE: 97.9 F

## 2025-01-16 DIAGNOSIS — F41.1 GAD (GENERALIZED ANXIETY DISORDER): ICD-10-CM

## 2025-01-16 DIAGNOSIS — E78.5 HYPERLIPIDEMIA LDL GOAL <100: ICD-10-CM

## 2025-01-16 DIAGNOSIS — E03.9 HYPOTHYROIDISM, UNSPECIFIED TYPE: ICD-10-CM

## 2025-01-16 DIAGNOSIS — R25.1 TREMOR: ICD-10-CM

## 2025-01-16 DIAGNOSIS — F33.1 MODERATE EPISODE OF RECURRENT MAJOR DEPRESSIVE DISORDER (H): ICD-10-CM

## 2025-01-16 DIAGNOSIS — G43.709 CHRONIC MIGRAINE WITHOUT AURA WITHOUT STATUS MIGRAINOSUS, NOT INTRACTABLE: Primary | ICD-10-CM

## 2025-01-16 LAB
CHOLEST SERPL-MCNC: 396 MG/DL
FASTING STATUS PATIENT QL REPORTED: NO
HDLC SERPL-MCNC: 54 MG/DL
LDLC SERPL CALC-MCNC: ABNORMAL MG/DL
NONHDLC SERPL-MCNC: 342 MG/DL
TRIGL SERPL-MCNC: 428 MG/DL
TSH SERPL DL<=0.005 MIU/L-ACNC: 0.92 UIU/ML (ref 0.3–4.2)

## 2025-01-16 RX ORDER — PROPRANOLOL HCL 20 MG
20 TABLET ORAL 2 TIMES DAILY
Qty: 180 TABLET | Refills: 3 | Status: SHIPPED | OUTPATIENT
Start: 2025-01-16

## 2025-01-16 RX ORDER — TOPIRAMATE 50 MG/1
TABLET, FILM COATED ORAL
Qty: 49 TABLET | Refills: 0 | Status: SHIPPED | OUTPATIENT
Start: 2025-01-16 | End: 2025-02-27

## 2025-01-16 ASSESSMENT — PATIENT HEALTH QUESTIONNAIRE - PHQ9
10. IF YOU CHECKED OFF ANY PROBLEMS, HOW DIFFICULT HAVE THESE PROBLEMS MADE IT FOR YOU TO DO YOUR WORK, TAKE CARE OF THINGS AT HOME, OR GET ALONG WITH OTHER PEOPLE: SOMEWHAT DIFFICULT
SUM OF ALL RESPONSES TO PHQ QUESTIONS 1-9: 9
SUM OF ALL RESPONSES TO PHQ QUESTIONS 1-9: 9

## 2025-01-16 ASSESSMENT — PAIN SCALES - GENERAL: PAINLEVEL_OUTOF10: NO PAIN (1)

## 2025-01-16 NOTE — LETTER
January 16, 2025      Lady Saavedra  [address] [address] [place] [id number]        To Whom It May Concern:    Lady Saavedra was seen in our clinic. Beginning 01/16/2025, she can continue to work normal work hours. Because her ongoing chronic illness contributes to daily symptoms that are made worse with travel, she should be allowed to continue to work from home and avoid travel during this time as well. Travel restriction is in place for the next 6 months. If these are to be continued at that time, a new note will be written.       Sincerely,      Brandy Zimmerman      Electronically signed

## 2025-01-16 NOTE — PATIENT INSTRUCTIONS
Let's try going to 1/2 tablet of the modafinil (Provigil) for at lest 1-2 weeks and see how you're doing from an energy perspective. If you're still doing well, then stop it, or if you want, I can send in 100 mg tablets to take 1/2 of those after the first few weeks, and THEN stop it if you're still doing fine.     We'll also try to work on the topiramate dose as well. If your headaches start getting worse again, then we can go back up.

## 2025-01-16 NOTE — PROGRESS NOTES
Assessment & Plan     Chronic migraine without aura without status migrainosus, not intractable  Patient is a very pleasant 39 year old who presents today for follow up on migraines and mental health. Working with neurology and doing significantly better on the Emgality with PRN eletriptan. She was able to cut out propranolol, but did need to increase back for her tremor. She has stuck with 20 mg dose (though sounds like she could easily go up to 30 mg given the ongoing symptoms she is experiencing). Despite recs from neurology to hold off on further med changes, we did opt to decrease topiramate with a taper down (can go slower if needed, such as [phone], or extending the length at each dose). This is her main concern with regards to desiring pregnancy. She knows she is able to contact me at any time and I can go back up on the dosage.   - topiramate (TOPAMAX) 50 MG tablet  Dispense: 49 tablet; Refill: 0  - PRIMARY CARE FOLLOW-UP SCHEDULING    JOSÉ MIGUEL (generalized anxiety disorder)  Moderate episode of recurrent major depressive disorder (H)  She feels mental health is stable for now. Consider cutting back on nortriptyline if she continues to do well in the interim between now and our next visit to decrease pill burden. Continues to have very dry mouth, likely mainly due to nortriptyline given correlation for when it started and starting this medication.     We will be cutting back on modafinil as well, suspect we are over treating her with this at this time.   - PRIMARY CARE FOLLOW-UP SCHEDULING    Tremor  As noted above, she is doing 20 mg twice daily and would like to continue at that dose.   - propranolol (INDERAL) 20 MG tablet  Dispense: 180 tablet; Refill: 3    Hyperlipidemia LDL goal <100  Due for lipids, obtained nonfasting today.   - Lipid panel reflex to direct LDL Non-fasting  - Lipid panel reflex to direct LDL Non-fasting    Hypothyroidism, unspecified type  Due for TSH, obtained today.   -  "TSH  - TSH    The longitudinal plan of care for the diagnosis(es)/condition(s) as documented were addressed during this visit. Due to the added complexity in care, I will continue to support Kassy in the subsequent management and with ongoing continuity of care.    BMI  Estimated body mass index is 30.54 kg/m  as calculated from the following:    Height as of this encounter: 1.575 m (5' 2\").    Weight as of this encounter: 75.8 kg (167 lb).       Subjective   Kassy is a 39 year old, presenting for the following health issues:  Anxiety, Depression, and Headache        1/16/2025     2:22 PM   Additional Questions   Roomed by Mariposa CARY   Accompanied by Self     History of Present Illness       Mental Health Follow-up:  Patient presents to follow-up on Depression & Anxiety.Patient's depression since last visit has been:  Better  The patient is not having other symptoms associated with depression.  Patient's anxiety since last visit has been:  Medium  The patient is having other symptoms associated with anxiety.  Any significant life events: No  Patient is not feeling anxious or having panic attacks.  Patient has no concerns about alcohol or drug use.    Reason for visit:  Medication check-in    She eats 2-3 servings of fruits and vegetables daily.She consumes 1 sweetened beverage(s) daily.She exercises with enough effort to increase her heart rate 30 to 60 minutes per day.  She exercises with enough effort to increase her heart rate 4 days per week.   She is taking medications regularly.         11/21/2024     8:21 AM 11/29/2024     2:43 PM 1/16/2025    12:41 PM   PHQ   PHQ-9 Total Score 5  11  9    Q9: Thoughts of better off dead/self-harm past 2 weeks Not at all Not at all Not at all       Patient-reported            11/3/2024     8:56 PM 11/21/2024     8:22 AM 1/14/2025     1:30 PM   JOSÉ MIGUEL-7 SCORE   Total Score 3 (minimal anxiety) 3 (minimal anxiety) 6 (mild anxiety)   Total Score 3  3  6        Patient-reported        2 " "of propranolol worked for a while. But then the \"stuff\" came back. \"I refuse to go on 3\" I just don't want to. Still slight tremors. The muscle twitches got a lot worse.   Going to bed at night \"hilarious\" because she \"flops around like a fish\". Get them during the day, but worse at night.       \"Feel like I've swung the other day\" with regards to energy  Constantly moving. Sometimes talk so fast and run on sentences that people have a hard time tracking     Also started working out and walking.   Doesn't feel like going to pass out, but if \"travis head too much\" has \"weird\" vision issue where it almost jumps and makes her feel almost dizzy or unstable. If it continued she would feel dizzy.   Only happens if jumping back and forth for a while.   Just lasts one second.     Review of Systems  Constitutional, HEENT, cardiovascular, pulmonary, gi and gu systems are negative, except as otherwise noted.      Objective    /86 (BP Location: Right arm, Patient Position: Sitting, Cuff Size: Adult Regular)   Pulse 84   Temp 97.9  F (36.6  C) (Tympanic)   Resp 14   Ht 1.575 m (5' 2\")   Wt 75.8 kg (167 lb)   LMP 01/01/2025   SpO2 99%   BMI 30.54 kg/m    Body mass index is 30.54 kg/m .  Physical Exam  Constitutional:       Appearance: Normal appearance.   HENT:      Head: Normocephalic.   Eyes:      General: No scleral icterus.     Extraocular Movements: Extraocular movements intact.      Conjunctiva/sclera: Conjunctivae normal.   Cardiovascular:      Rate and Rhythm: Normal rate.   Pulmonary:      Effort: Pulmonary effort is normal.   Neurological:      General: No focal deficit present.      Mental Status: She is alert and oriented to person, place, and time.   Psychiatric:         Attention and Perception: Attention normal.         Mood and Affect: Mood normal.         Speech: Speech is rapid and pressured.         Behavior: Behavior is cooperative.                  Signed Electronically by: Brandy Zimmerman MD    "

## 2025-02-10 NOTE — TELEPHONE ENCOUNTER
AWAM - 3/6 - excision left upper arm mass PA Initiation    Medication: EMGALITY 120 MG/ML SC SOAJ  Insurance Company: [employer]- Phone [phone] Fax [phone]  Pharmacy Filling the Rx: [id number] - [address] AT [address]  Filling Pharmacy Phone: [phone]  Filling Pharmacy Fax: [phone]  Start Date: 12/30/2024

## 2025-03-19 ENCOUNTER — OFFICE VISIT (OUTPATIENT)
Dept: NEUROLOGY | Facility: CLINIC | Age: 40
End: 2025-03-19
Payer: COMMERCIAL

## 2025-03-19 VITALS
DIASTOLIC BLOOD PRESSURE: 92 MMHG | HEIGHT: 62 IN | HEART RATE: 109 BPM | SYSTOLIC BLOOD PRESSURE: 144 MMHG | WEIGHT: 169 LBS | BODY MASS INDEX: 31.1 KG/M2

## 2025-03-19 DIAGNOSIS — G47.00 INSOMNIA, UNSPECIFIED TYPE: Primary | ICD-10-CM

## 2025-03-19 DIAGNOSIS — R25.1 TREMOR: ICD-10-CM

## 2025-03-19 DIAGNOSIS — G43.709 CHRONIC MIGRAINE WITHOUT AURA WITHOUT STATUS MIGRAINOSUS, NOT INTRACTABLE: ICD-10-CM

## 2025-03-19 DIAGNOSIS — F41.9 ANXIETY: ICD-10-CM

## 2025-03-19 PROCEDURE — 99214 OFFICE O/P EST MOD 30 MIN: CPT | Performed by: PSYCHIATRY & NEUROLOGY

## 2025-03-19 PROCEDURE — 3077F SYST BP >= 140 MM HG: CPT | Performed by: PSYCHIATRY & NEUROLOGY

## 2025-03-19 PROCEDURE — 3080F DIAST BP >= 90 MM HG: CPT | Performed by: PSYCHIATRY & NEUROLOGY

## 2025-03-19 PROCEDURE — G2211 COMPLEX E/M VISIT ADD ON: HCPCS | Performed by: PSYCHIATRY & NEUROLOGY

## 2025-03-19 RX ORDER — NORTRIPTYLINE HYDROCHLORIDE 10 MG/1
10 CAPSULE ORAL AT BEDTIME
Qty: 90 CAPSULE | Refills: 3 | Status: SHIPPED | OUTPATIENT
Start: 2025-03-19

## 2025-03-19 RX ORDER — GABAPENTIN 300 MG/1
300 CAPSULE ORAL AT BEDTIME
Qty: 180 CAPSULE | Refills: 1 | Status: SHIPPED | OUTPATIENT
Start: 2025-03-19

## 2025-03-19 NOTE — LETTER
3/19/2025      Lady Saavedra  [address] [address] [place] [id number]      Dear Colleague,    Thank you for referring your patient, Lady Saavedra, to the [facility]UR[facility] [place]. Please see a copy of my visit note below.    NEUROLOGY OUTPATIENT PROGRESS NOTE   Mar 19, 2025     CHIEF COMPLAINT/REASON FOR VISIT/REASON FOR CONSULT  Patient presents with:  Migraine: 4 month follow up. Patient states the migraines are much worse. She states that her tremors are worsening, she noticed she has started twitching as well. She is having difficulty with her memory. Patient would like to hold off on the MoCA currently.     REASON FOR CONSULTATION-headaches and brain fog.    REFERRAL SOURCE  Dr. Lalita Birmingham  CC Dr. Lalita Birmingham    HISTORY OF PRESENT ILLNESS  Lady Saavedra is a 40 year old female seen today for evaluation of headaches and brain fog.  She reports that she has a history of headache but then in March she got Covid.  She was admitted for Covid related pneumonia.  Was in the hospital for 1 week.  Since then the headaches have been much more frequent.  She is having them every day.  Reports significant photophobia and phonophobia with the headaches.  Headaches can be on any side in different places.  They can be behind the eyes and in the back of the head.  They are throbbing in nature.  Reports no visual aura with these.  Has been put on amitriptyline which has helped her sleep as well as with the headaches but nothing significant to get rid of the headaches completely.  Is using Advil and Tylenol for the headache still would last the whole day.  Is not using them around-the-clock.  Denies any other provoking factors.    Is also been diagnosed with thyroiditis since the Covid infection.  Is working with endocrinology on this.    Reports brain fog has been going on since the Covid infection.  Has not had any improvement.  Was let go of her job.  Has difficulty with memory,  staying focused and doing tasks that she could previously do easily.  Does get good sleep at night with the amitriptyline.    Patient has recently been in contact with the [facility] rehabilitation clinic.  Has not started the program yet.    2/14/22  Patient returns today  1.  Patient reports that her headaches have significantly improved with the propanolol.  Is taking 60 mg twice a day.  Denies any side effects.  Headaches of 4-5 times a month.  Headaches are much less severe compared to before and she does not need to take abortive medication.  Did try the Maxalt once without any side effects and it did not work for her.  2.  Continues to have the brain fog.  Has been seen in the [facility] rehabilitation clinic and has not been able to do the therapies due to lack of insurance.  Steroids did not help with the brain fog.  3.  Reports that the amitriptyline is no longer working to help her sleep.  She is up till 2 AM every night.  Discussed about increasing the dose and she is interested in that.  Denies any side effects with the amitriptyline.    4/12/22  Patient returns today  1.  She reports that the headaches are twice a month.  Maxalt does work as abortive therapy though she has to eat something with it otherwise feels nauseous with the medication.  Propanolol amitriptyline is helping.  Some somnolence with the amitriptyline which is getting better.  Discussed about decreasing the amitriptyline and she wants to stay on the higher dose.  It was increased last time because the propanolol was not working by itself.  2.  Continues to have the brain fog.  Has insurance now and has gone to the [facility] rehabilitation clinic though has not been able to see the therapist yet.  Is only seeing her counselor for her mental illness.    3.  Is sleeping better with the amitriptyline at this point though does have some somnolence.  No insomnia.    2/1/23  Patient returns today  1.  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  Headaches have since then gotten worse and she is having 2-3 headaches a week.  About 15 headache days a month.  Remains on the amitriptyline and propanolol.  This daytime somnolence with the amitriptyline has improved.  No side effects with propanolol.  Blood pressure is better at this point as well.  Maxalt does work as abortive therapy.  She does need to use it with food to avoid nausea.  2.  Brain fog is also improved with time.  3.  Sleeping better with the amitriptyline.  No issues.  No other new symptoms    4/5/23  Patient returns today  1.  She is currently on 100 mg of Topamax.  She takes it in the morning because it prevents her from sleeping at night.  She reports that she had complete resolution of the headaches.  Has not had any headaches in the last month.  2.  Occasionally if she does have a headache she will use Maxalt which is still working  3.  She remains on propanolol.  She feels that is also being helping with the tachycardia.  Remains on amitriptyline though does complain that she occasionally cannot sleep.  She feels that she is not exercising enough to tire herself out to fall asleep  4.  Brain fog symptoms have now improved.  No new issues/concerns.    4/25/24  Patient returns today.  Since she was last seen she has had COVID and has been having long-haul COVID symptoms along with worsening headaches.  1.  Headaches are almost every day.  Headaches are brought on by bright lights.  She does have accommodation at work.  Has been able to work from home which is helping.  In terms of her medications she has been on a higher dose of Topamax.  Is taking 1-1/2 tablet in the morning.  Has been switched from amitriptyline to nortriptyline.  No side effects.  Feels that she is sleeping well.  Propranolol dose has been decreased.  Is using Zofran and meclizine for nausea with the headaches.  Maxalt does work but causes some  nausea.    2.  Per the post-COVID clinic she has been working with PT/OT/speech therapy with no significant improvement.  Brain fog is also gotten worse.  No other new concerns.    6/20/24  Patient returns today  1.  Her brain fog symptoms have improved.  2.  Headaches are slowly getting better.  Still has them multiple times a week but not as severe compared to before.  Jaciel on the high doses of Topamax, nortriptyline, propranolol.  No side effects.  Steroids actually made things worse.  Maxalt makes her more tired.  Is interested in trying other medication since the headaches are still affecting quality of life  No other new concerns.    10/23/24  Patient returns today  1.  Brain fog symptoms have improved  2.  Headaches have gotten much better with the Emgality.  Reports couple of random headaches, couple of headaches with the Emgality is due in couple of headaches at the time of the menstrual cycle.  Relpax is working better than the Maxalt.  No side effects with the medication.  Wants to slowly wean off the other medications  3.  Some issues with insomnia and is primary care doctor has put on a new medication in addition to the nortriptyline.  No other new concerns.    3/19/25  Patient returns today  1.  Her symptoms are significantly improved at the last visit.  She had stopped the Topamax and the propranolol.  Since then she has been noticing more tremors in her upper extremities.  Feels more anxious.  Also reports some ongoing memory issues.  2.  Headaches are still under good control with 1-2 headaches a month.  Relpax does work.  Emgality is helping  3.  She is on nortriptyline.  Is not really helping with sleep as she still cannot sleep.  Has tried melatonin in the past.  Possibly is not helping with the headaches either  4.  Previously she was planning on getting pregnant though this is on hold because of new symptoms.  No other new concerns.    Previous history is reviewed and this is  unchanged.    PAST MEDICAL/SURGICAL HISTORY  Past Medical History:   Diagnosis Date     Abnormal Pap smear of cervix 2009, 2012, 2014    see problem list     Cervical high risk HPV (human papillomavirus) test positive 2009, 2012    see problem list     Chickenpox      Depressive disorder      Gestational HTN 01/01/2015     Hypothyroidism      Migraine with aura and without status migrainosus, not intractable      Seasonal allergies     Was on clariten uses occasional sudafed     Patient Active Problem List   Diagnosis     Pneumonia due to 2019 novel coronavirus     COVID-19 long hauler     Anovulation     Prediabetes     Migraine with aura and without status migrainosus, not intractable     Hyperlipidemia LDL goal <100     Nonalcoholic fatty liver disease     Cognitive communication deficit   Significant of migraines, mental illness, suicide attempt, depression.  Feels the depression is under good control with EMR    FAMILY HISTORY  Family History   Adopted: Yes   Problem Relation Age of Onset     Unknown/Adopted Mother      Unknown/Adopted Father    Patient is adopted and she does not know her family.    SOCIAL HISTORY  Social History     Tobacco Use     Smoking status: Never     Smokeless tobacco: Never   Vaping Use     Vaping status: Never Used   Substance Use Topics     Alcohol use: No     Drug use: No       SYSTEMS REVIEW  Twelve-system ROS was done and other than the HPI this was negative except for neck pain, back pain, arm and leg pain, joint pain, numbness and tingling, sleepiness during the day, sleeping problems, headaches, anxiety, depression, palpitations, bloating, stomach pain, bowel problems, respiratory problems, skin changes, weight gain.  No new concerns/issues.    MEDICATIONS  Current Outpatient Medications   Medication Sig Dispense Refill     aspirin-acetaminophen-caffeine (EXCEDRIN MIGRAINE) 250-250-65 MG tablet Take 1 tablet by mouth daily as needed for headaches       buPROPion (WELLBUTRIN  "XL) 300 MG 24 hr tablet Take 1 tablet (300 mg) by mouth every morning 90 tablet 4     dicyclomine (BENTYL) 10 MG capsule Take 1 capsule (10 mg) by mouth 4 times daily as needed (abdominal cramping) 90 capsule 11     DULoxetine 40 MG CPEP Take 40 mg by mouth 2 times daily. 180 capsule 3     eletriptan (RELPAX) 40 MG tablet Take 1 tablet (40 mg) by mouth at onset of headache for migraine May repeat in 2 hours. Max 2 tablets/24 hours. 18 tablet 3     gabapentin (NEURONTIN) 300 MG capsule Take 1 capsule (300 mg) by mouth at bedtime. Can increase to 2 caps/night if needed. 180 capsule 1     galcanezumab-gnlm (EMGALITY) 120 MG/ML injection Inject 1 mL (120 mg) subcutaneously every 28 days. 1 mL 11     levothyroxine (SYNTHROID/LEVOTHROID) 88 MCG tablet Take 1 tablet (88 mcg) by mouth daily 90 tablet 1     LORazepam (ATIVAN) 0.5 MG tablet Take 1-2 tablets (0.5-1 mg) by mouth every 8 hours as needed for anxiety (panic). 30 tablet 2     minoxidil (ROGAINE) 2 % external solution Apply topically 2 times daily. 60 mL 3     modafinil (PROVIGIL) 200 MG tablet Take 1 tablet (200 mg) by mouth daily 90 tablet 1     nortriptyline (PAMELOR) 10 MG capsule Take 1 capsule (10 mg) by mouth at bedtime. At 60 mg. Reduce by 10 mg/week till completely off. 90 capsule 3     rosuvastatin (CRESTOR) 20 MG tablet Take 0.5 tablets (10 mg) by mouth daily for 30 days, THEN 1 tablet (20 mg) daily. 75 tablet 0     vitamin D3 (CHOLECALCIFEROL) 50 mcg (2000 units) tablet Take 1 tablet (50 mcg) by mouth daily 90 tablet 3     No current facility-administered medications for this visit.        PHYSICAL EXAMINATION  VITALS: BP (!) 144/92 (BP Location: Right arm, Patient Position: Sitting)   Pulse 109   Ht 1.575 m (5' 2\")   Wt 76.7 kg (169 lb)   BMI 30.91 kg/m    GENERAL: Healthy appearing, alert, no acute distress, normal habitus.  CARDIOVASCULAR: Extremities warm and well perfused. Pulses present.   NEUROLOGICAL:  Patient is awake and oriented to self, " place and time.  Attention span is normal.  Memory is grossly intact; previously MoCA 26.  Language is fluent and follows commands appropriately.  Appropriate fund of knowledge. Cranial nerves 2-12 are intact. There is no pronator drift.  Motor exam shows 5/5 strength in all extremities.  Tone is symmetric bilaterally in upper and lower extremities.  (Previously reflexes are symmetric and 2+ in upper extremities and lower extremities. Sensory exam is grossly intact to light touch, pin prick and vibration.)  Finger to nose and heel to shin is without dysmetria.  Romberg is negative.  Gait is normal and the patient is able to do tandem walk and walk on toes and heels.  Exam similar to before.  Some generalized tremors.  Some difficulty with walking due to tremors.    DIAGNOSTICS  MRI-images reviewed.  No major structural lesions noted.  Some T2 hyperintensities.  IMPRESSION:  1.  No acute intracranial process.  2.  Nonspecific scattered T2 hyperintensities within the cerebral white matter. These can be seen in association with migraine headaches.    Labs  Component      Latest Ref Rng & Units 8/31/2021 9/13/2021 10/15/2021 12/7/2021   Thyroid Peroxidase Antibody      <35 IU/mL >5,000 (H)      TSH      0.40 - 4.00 mU/L  1.72 0.11 (L) 1.22   T4 Free      0.76 - 1.46 ng/dL   1.32          OUTSIDE RECORDS  Outside referral notes and chart notes were reviewed and pertinent information has been summarized (in addition to the HPI):-Patient has been diagnosed with long-haul Covid. Multiple testing has been ordered. Has been seen with cardiology. Also seeing endocrinology. Has been having brain fog for which she was referred to neurology. Does complain of easy fatigability as well.    LABS  Component      Latest Ref Rng & Units 12/14/2021   Vitamin B12      213 - 816 pg/mL 413   Vitamin B1 Whole Blood Level      70 - 180 nmol/L 104   Ferritin      10 - 130 ng/mL 192 (H)   Methylmalonic Acid      0.00 - 0.40 umol/L 0.21      EEG  IMPRESSION/REPORT/PLAN  This is a normal EEG during wakefulness and drowsiness except for mild generalized background dysrhythmia. Further clinical correlation is needed.     Please note that the absence of epileptiform abnormalities does not exclude the possibility of epilepsy in any patient.      Component      Latest Ref Rng & Units 6/8/2022 11/1/2022   Ferritin      12 - 150 ng/mL 484 (H)    TSH      0.30 - 4.20 uIU/mL  1.01   T4 Free      0.90 - 1.70 ng/dL  1.29       IMPRESSION/REPORT/PLAN  History of anti-TPO antibodies  History of 2019 novel coronavirus disease (COVID-19)  Brain fog/cognitive difficulty-improved  Chronic migraine without aura without status migrainosus, not intractable  Insomnia, unspecified type  Recent COVID infection with worsening symptoms-improved  Tremors  Anxiety    This is a 40 year old female with history of migraines with worsening migraines after COVID-19 infection and cognitive decline after COVID-19 infection.    Previously amitriptyline and propanolol were helping.  Blood pressure was slightly elevated and amitriptyline was causing daytime somnolence.  Propanolol was also helping with some tachycardia.  These side effects have now improved.  With addition of Topamax headaches have significantly improved.    Maxalt is helping with abortive therapy and will continue.  Previously amitriptyline was also being used for insomnia.    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  Anti-TPO antibodies were positive and she was treated with steroids with no benefit.  MRI and EEG were noncontributory.  She does have a history of thyroid disease and is on levothyroxine.  Recent TSH levels were normal.  This is now improved with therapy.    During her last visit her symptoms after the COVID had improved.  Propranolol Topamax was weaned off.  She has new memory problems and worsening tremors all over.  On exam these seem to be more related to  anxiety.  Will wean off the nortriptyline since it is possibly not helping.  Will add gabapentin for insomnia.  Continue Emgality and Relpax for headaches.  Continue Zofran for nausea.  Will check blood work to look for causes of tremors.  She is already on multiple antianxiety medications.  She sees her primary doctor later this week and will have them check for other medications that can be added on to help with her anxiety.  Can consider brain imaging if symptoms do not improve with anxiety medication.    Can see her back in 3 months.      -     eletriptan (RELPAX) 40 MG tablet; Take 1 tablet (40 mg) by mouth at onset of headache for migraine May repeat in 2 hours. Max 2 tablets/24 hours.  -     galcanezumab-gnlm (EMGALITY) 120 MG/ML injection; Inject 1 mL (120 mg) subcutaneously every 28 days.  -     gabapentin (NEURONTIN) 300 MG capsule; Take 1 capsule (300 mg) by mouth at bedtime. Can increase to 2 caps/night if needed.  -     nortriptyline (PAMELOR) 10 MG capsule; Take 1 capsule (10 mg) by mouth at bedtime. At 60 mg. Reduce by 10 mg/week till completely off.  -     Vitamin B12; Future  -     TSH with free T4 reflex; Future  -     Comprehensive metabolic panel; Future    Return in about 3 months (around 6/19/2025) for In-Clinic Visit (must), Add on PAOR, After testing.    Over 33 minutes were spent coordinating the care for the patient on the day of the encounter.  This includes previsit, during visit and post visit activities as documented above.  Counseling patient.  Prescription management.  Worsening symptoms.  Testing ordered.  (Activities include but not inclusive of reviewing chart, reviewing outside records, reviewing labs and imaging study results as well as the images, patient visit time including getting history and exam,  use if applicable, review of test results with the patient and coming up with a plan in a shared model, counseling patient and family, education and answering patient  questions, EMR , EMR diagnosis entry and problem list management, medication reconciliation and prescription management if applicable, paperwork if applicable, printing documents and documentation of the visit activities.)      Ranjan Parisi MD  Neurologist  [facility]  Tel:- [phone]    This note was dictated using voice recognition software.  Any grammatical or context distortions are unintentional and inherent to the software.    The longitudinal plan of care for the diagnosis(es)/condition(s) as documented were addressed during this visit. Due to the added complexity in care, I will continue to support Kassy in the subsequent management and with ongoing continuity of care.      Again, thank you for allowing me to participate in the care of your patient.        Sincerely,        Ranjan Parisi MD    Electronically signed

## 2025-03-19 NOTE — PROGRESS NOTES
NEUROLOGY OUTPATIENT PROGRESS NOTE   Mar 19, 2025     CHIEF COMPLAINT/REASON FOR VISIT/REASON FOR CONSULT  Patient presents with:  Migraine: 4 month follow up. Patient states the migraines are much worse. She states that her tremors are worsening, she noticed she has started twitching as well. She is having difficulty with her memory. Patient would like to hold off on the MoCA currently.     REASON FOR CONSULTATION-headaches and brain fog.    REFERRAL SOURCE  Dr. Lalita Birmingham  CC Dr. Lalita Birmingham    HISTORY OF PRESENT ILLNESS  Lady Saavedra is a 40 year old female seen today for evaluation of headaches and brain fog.  She reports that she has a history of headache but then in March she got Covid.  She was admitted for Covid related pneumonia.  Was in the hospital for 1 week.  Since then the headaches have been much more frequent.  She is having them every day.  Reports significant photophobia and phonophobia with the headaches.  Headaches can be on any side in different places.  They can be behind the eyes and in the back of the head.  They are throbbing in nature.  Reports no visual aura with these.  Has been put on amitriptyline which has helped her sleep as well as with the headaches but nothing significant to get rid of the headaches completely.  Is using Advil and Tylenol for the headache still would last the whole day.  Is not using them around-the-clock.  Denies any other provoking factors.    Is also been diagnosed with thyroiditis since the Covid infection.  Is working with endocrinology on this.    Reports brain fog has been going on since the Covid infection.  Has not had any improvement.  Was let go of her job.  Has difficulty with memory, staying focused and doing tasks that she could previously do easily.  Does get good sleep at night with the amitriptyline.    Patient has recently been in contact with the Covid rehabilitation clinic.  Has not started the program yet.    2/14/22  Patient  returns today  1.  Patient reports that her headaches have significantly improved with the propanolol.  Is taking 60 mg twice a day.  Denies any side effects.  Headaches of 4-5 times a month.  Headaches are much less severe compared to before and she does not need to take abortive medication.  Did try the Maxalt once without any side effects and it did not work for her.  2.  Continues to have the brain fog.  Has been seen in the [facility] rehabilitation clinic and has not been able to do the therapies due to lack of insurance.  Steroids did not help with the brain fog.  3.  Reports that the amitriptyline is no longer working to help her sleep.  She is up till 2 AM every night.  Discussed about increasing the dose and she is interested in that.  Denies any side effects with the amitriptyline.    4/12/22  Patient returns today  1.  She reports that the headaches are twice a month.  Maxalt does work as abortive therapy though she has to eat something with it otherwise feels nauseous with the medication.  Propanolol amitriptyline is helping.  Some somnolence with the amitriptyline which is getting better.  Discussed about decreasing the amitriptyline and she wants to stay on the higher dose.  It was increased last time because the propanolol was not working by itself.  2.  Continues to have the brain fog.  Has insurance now and has gone to the [facility] rehabilitation clinic though has not been able to see the therapist yet.  Is only seeing her counselor for her mental illness.    3.  Is sleeping better with the amitriptyline at this point though does have some somnolence.  No insomnia.    2/1/23  Patient returns today  1.  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  Headaches have since then gotten worse and she is having 2-3 headaches a week.  About 15 headache days a month.  Remains on the amitriptyline and propanolol.  This daytime somnolence  with the amitriptyline has improved.  No side effects with propanolol.  Blood pressure is better at this point as well.  Maxalt does work as abortive therapy.  She does need to use it with food to avoid nausea.  2.  Brain fog is also improved with time.  3.  Sleeping better with the amitriptyline.  No issues.  No other new symptoms    4/5/23  Patient returns today  1.  She is currently on 100 mg of Topamax.  She takes it in the morning because it prevents her from sleeping at night.  She reports that she had complete resolution of the headaches.  Has not had any headaches in the last month.  2.  Occasionally if she does have a headache she will use Maxalt which is still working  3.  She remains on propanolol.  She feels that is also being helping with the tachycardia.  Remains on amitriptyline though does complain that she occasionally cannot sleep.  She feels that she is not exercising enough to tire herself out to fall asleep  4.  Brain fog symptoms have now improved.  No new issues/concerns.    4/25/24  Patient returns today.  Since she was last seen she has had COVID and has been having long-haul COVID symptoms along with worsening headaches.  1.  Headaches are almost every day.  Headaches are brought on by bright lights.  She does have accommodation at work.  Has been able to work from home which is helping.  In terms of her medications she has been on a higher dose of Topamax.  Is taking 1-1/2 tablet in the morning.  Has been switched from amitriptyline to nortriptyline.  No side effects.  Feels that she is sleeping well.  Propranolol dose has been decreased.  Is using Zofran and meclizine for nausea with the headaches.  Maxalt does work but causes some nausea.    2.  Per the post-COVID clinic she has been working with PT/OT/speech therapy with no significant improvement.  Brain fog is also gotten worse.  No other new concerns.    6/20/24  Patient returns today  1.  Her brain fog symptoms have improved.  2.   Headaches are slowly getting better.  Still has them multiple times a week but not as severe compared to before.  Jaciel on the high doses of Topamax, nortriptyline, propranolol.  No side effects.  Steroids actually made things worse.  Maxalt makes her more tired.  Is interested in trying other medication since the headaches are still affecting quality of life  No other new concerns.    10/23/24  Patient returns today  1.  Brain fog symptoms have improved  2.  Headaches have gotten much better with the Emgality.  Reports couple of random headaches, couple of headaches with the Emgality is due in couple of headaches at the time of the menstrual cycle.  Relpax is working better than the Maxalt.  No side effects with the medication.  Wants to slowly wean off the other medications  3.  Some issues with insomnia and is primary care doctor has put on a new medication in addition to the nortriptyline.  No other new concerns.    3/19/25  Patient returns today  1.  Her symptoms are significantly improved at the last visit.  She had stopped the Topamax and the propranolol.  Since then she has been noticing more tremors in her upper extremities.  Feels more anxious.  Also reports some ongoing memory issues.  2.  Headaches are still under good control with 1-2 headaches a month.  Relpax does work.  Emgality is helping  3.  She is on nortriptyline.  Is not really helping with sleep as she still cannot sleep.  Has tried melatonin in the past.  Possibly is not helping with the headaches either  4.  Previously she was planning on getting pregnant though this is on hold because of new symptoms.  No other new concerns.    Previous history is reviewed and this is unchanged.    PAST MEDICAL/SURGICAL HISTORY  Past Medical History:   Diagnosis Date    Abnormal Pap smear of cervix 2009, 2012, 2014    see problem list    Cervical high risk HPV (human papillomavirus) test positive 2009, 2012    see problem list    Chickenpox     Depressive  disorder     Gestational HTN 01/01/2015    Hypothyroidism     Migraine with aura and without status migrainosus, not intractable     Seasonal allergies     Was on clariten uses occasional sudafed     Patient Active Problem List   Diagnosis    Pneumonia due to 2019 novel coronavirus    COVID-19 long hauler    Anovulation    Prediabetes    Migraine with aura and without status migrainosus, not intractable    Hyperlipidemia LDL goal <100    Nonalcoholic fatty liver disease    Cognitive communication deficit   Significant of migraines, mental illness, suicide attempt, depression.  Feels the depression is under good control with EMR    FAMILY HISTORY  Family History   Adopted: Yes   Problem Relation Age of Onset    Unknown/Adopted Mother     Unknown/Adopted Father    Patient is adopted and she does not know her family.    SOCIAL HISTORY  Social History     Tobacco Use    Smoking status: Never    Smokeless tobacco: Never   Vaping Use    Vaping status: Never Used   Substance Use Topics    Alcohol use: No    Drug use: No       SYSTEMS REVIEW  Twelve-system ROS was done and other than the HPI this was negative except for neck pain, back pain, arm and leg pain, joint pain, numbness and tingling, sleepiness during the day, sleeping problems, headaches, anxiety, depression, palpitations, bloating, stomach pain, bowel problems, respiratory problems, skin changes, weight gain.  No new concerns/issues.    MEDICATIONS  Current Outpatient Medications   Medication Sig Dispense Refill    aspirin-acetaminophen-caffeine (EXCEDRIN MIGRAINE) 250-250-65 MG tablet Take 1 tablet by mouth daily as needed for headaches      buPROPion (WELLBUTRIN XL) 300 MG 24 hr tablet Take 1 tablet (300 mg) by mouth every morning 90 tablet 4    dicyclomine (BENTYL) 10 MG capsule Take 1 capsule (10 mg) by mouth 4 times daily as needed (abdominal cramping) 90 capsule 11    DULoxetine 40 MG CPEP Take 40 mg by mouth 2 times daily. 180 capsule 3    eletriptan  "(RELPAX) 40 MG tablet Take 1 tablet (40 mg) by mouth at onset of headache for migraine May repeat in 2 hours. Max 2 tablets/24 hours. 18 tablet 3    gabapentin (NEURONTIN) 300 MG capsule Take 1 capsule (300 mg) by mouth at bedtime. Can increase to 2 caps/night if needed. 180 capsule 1    galcanezumab-gnlm (EMGALITY) 120 MG/ML injection Inject 1 mL (120 mg) subcutaneously every 28 days. 1 mL 11    levothyroxine (SYNTHROID/LEVOTHROID) 88 MCG tablet Take 1 tablet (88 mcg) by mouth daily 90 tablet 1    LORazepam (ATIVAN) 0.5 MG tablet Take 1-2 tablets (0.5-1 mg) by mouth every 8 hours as needed for anxiety (panic). 30 tablet 2    minoxidil (ROGAINE) 2 % external solution Apply topically 2 times daily. 60 mL 3    modafinil (PROVIGIL) 200 MG tablet Take 1 tablet (200 mg) by mouth daily 90 tablet 1    nortriptyline (PAMELOR) 10 MG capsule Take 1 capsule (10 mg) by mouth at bedtime. At 60 mg. Reduce by 10 mg/week till completely off. 90 capsule 3    rosuvastatin (CRESTOR) 20 MG tablet Take 0.5 tablets (10 mg) by mouth daily for 30 days, THEN 1 tablet (20 mg) daily. 75 tablet 0    vitamin D3 (CHOLECALCIFEROL) 50 mcg (2000 units) tablet Take 1 tablet (50 mcg) by mouth daily 90 tablet 3     No current facility-administered medications for this visit.        PHYSICAL EXAMINATION  VITALS: BP (!) 144/92 (BP Location: Right arm, Patient Position: Sitting)   Pulse 109   Ht 1.575 m (5' 2\")   Wt 76.7 kg (169 lb)   BMI 30.91 kg/m    GENERAL: Healthy appearing, alert, no acute distress, normal habitus.  CARDIOVASCULAR: Extremities warm and well perfused. Pulses present.   NEUROLOGICAL:  Patient is awake and oriented to self, place and time.  Attention span is normal.  Memory is grossly intact; previously MoCA 26.  Language is fluent and follows commands appropriately.  Appropriate fund of knowledge. Cranial nerves 2-12 are intact. There is no pronator drift.  Motor exam shows 5/5 strength in all extremities.  Tone is symmetric " bilaterally in upper and lower extremities.  (Previously reflexes are symmetric and 2+ in upper extremities and lower extremities. Sensory exam is grossly intact to light touch, pin prick and vibration.)  Finger to nose and heel to shin is without dysmetria.  Romberg is negative.  Gait is normal and the patient is able to do tandem walk and walk on toes and heels.  Exam similar to before.  Some generalized tremors.  Some difficulty with walking due to tremors.    DIAGNOSTICS  MRI-images reviewed.  No major structural lesions noted.  Some T2 hyperintensities.  IMPRESSION:  1.  No acute intracranial process.  2.  Nonspecific scattered T2 hyperintensities within the cerebral white matter. These can be seen in association with migraine headaches.    Labs  Component      Latest Ref Rng & Units 8/31/2021 9/13/2021 10/15/2021 12/7/2021   Thyroid Peroxidase Antibody      <35 IU/mL >5,000 (H)      TSH      0.40 - 4.00 mU/L  1.72 0.11 (L) 1.22   T4 Free      0.76 - 1.46 ng/dL   1.32          OUTSIDE RECORDS  Outside referral notes and chart notes were reviewed and pertinent information has been summarized (in addition to the HPI):-Patient has been diagnosed with long-haul Covid. Multiple testing has been ordered. Has been seen with cardiology. Also seeing endocrinology. Has been having brain fog for which she was referred to neurology. Does complain of easy fatigability as well.    LABS  Component      Latest Ref Rng & Units 12/14/2021   Vitamin B12      213 - 816 pg/mL 413   Vitamin B1 Whole Blood Level      70 - 180 nmol/L 104   Ferritin      10 - 130 ng/mL 192 (H)   Methylmalonic Acid      0.00 - 0.40 umol/L 0.21     EEG  IMPRESSION/REPORT/PLAN  This is a normal EEG during wakefulness and drowsiness except for mild generalized background dysrhythmia. Further clinical correlation is needed.     Please note that the absence of epileptiform abnormalities does not exclude the possibility of epilepsy in any patient.       Component      Latest Ref Rng & Units 6/8/2022 11/1/2022   Ferritin      12 - 150 ng/mL 484 (H)    TSH      0.30 - 4.20 uIU/mL  1.01   T4 Free      0.90 - 1.70 ng/dL  1.29       IMPRESSION/REPORT/PLAN  History of anti-TPO antibodies  History of 2019 novel coronavirus disease (COVID-19)  Brain fog/cognitive difficulty-improved  Chronic migraine without aura without status migrainosus, not intractable  Insomnia, unspecified type  Recent COVID infection with worsening symptoms-improved  Tremors  Anxiety    This is a 40 year old female with history of migraines with worsening migraines after COVID-19 infection and cognitive decline after COVID-19 infection.    Previously amitriptyline and propanolol were helping.  Blood pressure was slightly elevated and amitriptyline was causing daytime somnolence.  Propanolol was also helping with some tachycardia.  These side effects have now improved.  With addition of Topamax headaches have significantly improved.    Maxalt is helping with abortive therapy and will continue.  Previously amitriptyline was also being used for insomnia.    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  Anti-TPO antibodies were positive and she was treated with steroids with no benefit.  MRI and EEG were noncontributory.  She does have a history of thyroid disease and is on levothyroxine.  Recent TSH levels were normal.  This is now improved with therapy.    During her last visit her symptoms after the COVID had improved.  Propranolol Topamax was weaned off.  She has new memory problems and worsening tremors all over.  On exam these seem to be more related to anxiety.  Will wean off the nortriptyline since it is possibly not helping.  Will add gabapentin for insomnia.  Continue Emgality and Relpax for headaches.  Continue Zofran for nausea.  Will check blood work to look for causes of tremors.  She is already on multiple antianxiety medications.  She sees her primary  doctor later this week and will have them check for other medications that can be added on to help with her anxiety.  Can consider brain imaging if symptoms do not improve with anxiety medication.    Can see her back in 3 months.      -     eletriptan (RELPAX) 40 MG tablet; Take 1 tablet (40 mg) by mouth at onset of headache for migraine May repeat in 2 hours. Max 2 tablets/24 hours.  -     galcanezumab-gnlm (EMGALITY) 120 MG/ML injection; Inject 1 mL (120 mg) subcutaneously every 28 days.  -     gabapentin (NEURONTIN) 300 MG capsule; Take 1 capsule (300 mg) by mouth at bedtime. Can increase to 2 caps/night if needed.  -     nortriptyline (PAMELOR) 10 MG capsule; Take 1 capsule (10 mg) by mouth at bedtime. At 60 mg. Reduce by 10 mg/week till completely off.  -     Vitamin B12; Future  -     TSH with free T4 reflex; Future  -     Comprehensive metabolic panel; Future    Return in about 3 months (around 6/19/2025) for In-Clinic Visit (must), Add on PAOR, After testing.    Over 33 minutes were spent coordinating the care for the patient on the day of the encounter.  This includes previsit, during visit and post visit activities as documented above.  Counseling patient.  Prescription management.  Worsening symptoms.  Testing ordered.  (Activities include but not inclusive of reviewing chart, reviewing outside records, reviewing labs and imaging study results as well as the images, patient visit time including getting history and exam,  use if applicable, review of test results with the patient and coming up with a plan in a shared model, counseling patient and family, education and answering patient questions, EMR , EMR diagnosis entry and problem list management, medication reconciliation and prescription management if applicable, paperwork if applicable, printing documents and documentation of the visit activities.)      Ranjan Parisi MD  Neurologist  [facility] -  [place]  Tel:- [phone]    This note was dictated using voice recognition software.  Any grammatical or context distortions are unintentional and inherent to the software.    The longitudinal plan of care for the diagnosis(es)/condition(s) as documented were addressed during this visit. Due to the added complexity in care, I will continue to support Kassy in the subsequent management and with ongoing continuity of care.

## 2025-03-19 NOTE — NURSING NOTE
Chief Complaint   Patient presents with    Migraine     4 month follow up. Patient states the migraines are much worse. She states that her tremors are worsening, she noticed she has started twitching as well. She is having difficulty with her memory. Patient would like to hold off on the MoCA currently.      Thelma Daugherty MA

## 2025-03-25 ENCOUNTER — TELEPHONE (OUTPATIENT)
Dept: FAMILY MEDICINE | Facility: CLINIC | Age: 40
End: 2025-03-25
Payer: COMMERCIAL

## 2025-03-25 NOTE — TELEPHONE ENCOUNTER
Prior Authorization Retail Medication Request    Medication/Dose: methylphenidate er (OSM) 18mg tabs  Diagnosis and ICD code (if different than what is on RX):     New/renewal/insurance change PA/secondary ins. PA:  Previously Tried and Failed:     Rationale:  Long COVID  Brain fog  For brain fog and memory issues, she stopped the Provigil.  We discussed a trial of methylphenidate to see if that helps with symptom management.  Follow-up in 1 month on this as well.  - methylphenidate HCl ER, OSM, (CONCERTA) 18 MG CR tablet  Dispense: 30 tablet; Refill: 0  - PRIMARY CARE FOLLOW-UP SCHEDULING    Insurance   Primary:    Insurance ID:  key: [id number]    Secondary (if applicable):   Insurance ID:       Pharmacy Information (if different than what is on RX)  Name:     Phone:     Fax:     Clinic Information  Preferred routing pool for dept communication:

## 2025-03-25 NOTE — LETTER
2025    To: [facility]    RE: Lady Saavedra  [address] [address] [place] [id number]  : 1985  MRN: [id number]    To Whom It May Concern,    I am writing on behalf of my patient, Lady Saavedra to document the medical necessity of methylphenidate for the treatment of chronic fatigue related to long COVID syndrome. This letter provides information about the patient's medical history and diagnosis and a statement summarizing my treatment rationale.     Summary of Patient History and Diagnosis  Patient has had long COVID since she was infected in 2021, made significantly worse after re-infection in 2024. She is not having benefit from modafinil, activity pacing, and use of techniques learned during occupational therapy sessions for memory and cognition management post-COVID.     Treatment Rationale  Methylphenidate has been shown in a study for treatment Myalgic Encephalomyelitis/Chronic Fatigue Syndrome (ME/CFS) to be beneficial for daytime fatigue. As patient is exhibiting ME/CFS presentation in the context of long-COVID infection, it is evidence based to attempt methylphenidate for treatment to allow her to be more engaged in gainful employment and her ability to perform activities of daily living.   Aye D, Sung P, Van Natalie B, Jose H. Does methylphenidate reduce the symptoms of chronic fatigue syndrome? Am J Med 2006; 119:167.e23.  [email or web address]://[email or web address]/j.amjmed.2005.07.047     Duration  12 months    Summary  In summary, methylphenidate is medically necessary for this patient s medical condition. Please call my office at [phone] if I can provide you with any additional information to approve my request. I look forward to receiving your timely response and approval of this request.     Sincerely,    Brandy Zimmerman MD

## 2025-03-26 NOTE — TELEPHONE ENCOUNTER
Central Prior Authorization Team   Phone: [phone]    PA Initiation    Medication: Methylphenidate er OSM 18mg  Insurance Company: [employer]- Phone [phone] Fax [phone]  Pharmacy Filling the Rx: [facility] [place] [id number] [facility]  Filling Pharmacy Phone: [phone]  Filling Pharmacy Fax:    Start Date: 3/26/2025    [facility] KEY: [id number]

## 2025-04-07 ENCOUNTER — PATIENT OUTREACH (OUTPATIENT)
Dept: CARE COORDINATION | Facility: CLINIC | Age: 40
End: 2025-04-07
Payer: COMMERCIAL

## 2025-04-08 NOTE — TELEPHONE ENCOUNTER
PRIOR AUTHORIZATION DENIED    Medication: Methylphenidate er OSM 18mg    Denial Date: 4/8/2025    Denial Rational:                Appeal Information:    If you would like to appeal, please supply P/A team with a letter of medical necessity with clinical reason.

## 2025-04-10 ENCOUNTER — ALLIED HEALTH/NURSE VISIT (OUTPATIENT)
Dept: FAMILY MEDICINE | Facility: CLINIC | Age: 40
End: 2025-04-10
Attending: STUDENT IN AN ORGANIZED HEALTH CARE EDUCATION/TRAINING PROGRAM
Payer: COMMERCIAL

## 2025-04-10 DIAGNOSIS — R42 DIZZINESS: ICD-10-CM

## 2025-04-10 NOTE — NURSING NOTE
Lady Saavedra arrived here on 4/10/2025 1:05 PM for 3-7 Days  Zio monitor placement per ordering provider Elsie for the diagnosis dizziness.  Patient s skin was prepped per protocol. Dr. Zimmerman is the supervising MD.  Zio monitor was placed.  Instructions were reviewed with and given to the patient.  Patient verbalized understanding of wear, troubleshooting and monitor return instructions.

## 2025-04-14 NOTE — TELEPHONE ENCOUNTER
Medication Appeal Initiation    We have initiated an appeal for the requested medication:  Medication: Methylphenidate er OSM 18mg  Appeal Start Date:  4/14/2025  Insurance Company:    Comments:     E-Appeal

## 2025-04-21 ASSESSMENT — ANXIETY QUESTIONNAIRES
7. FEELING AFRAID AS IF SOMETHING AWFUL MIGHT HAPPEN: NOT AT ALL
3. WORRYING TOO MUCH ABOUT DIFFERENT THINGS: NOT AT ALL
GAD7 TOTAL SCORE: 4
1. FEELING NERVOUS, ANXIOUS, OR ON EDGE: NOT AT ALL
2. NOT BEING ABLE TO STOP OR CONTROL WORRYING: NOT AT ALL
IF YOU CHECKED OFF ANY PROBLEMS ON THIS QUESTIONNAIRE, HOW DIFFICULT HAVE THESE PROBLEMS MADE IT FOR YOU TO DO YOUR WORK, TAKE CARE OF THINGS AT HOME, OR GET ALONG WITH OTHER PEOPLE: NOT DIFFICULT AT ALL
8. IF YOU CHECKED OFF ANY PROBLEMS, HOW DIFFICULT HAVE THESE MADE IT FOR YOU TO DO YOUR WORK, TAKE CARE OF THINGS AT HOME, OR GET ALONG WITH OTHER PEOPLE?: NOT DIFFICULT AT ALL
GAD7 TOTAL SCORE: 4
5. BEING SO RESTLESS THAT IT IS HARD TO SIT STILL: SEVERAL DAYS
4. TROUBLE RELAXING: MORE THAN HALF THE DAYS
7. FEELING AFRAID AS IF SOMETHING AWFUL MIGHT HAPPEN: NOT AT ALL
GAD7 TOTAL SCORE: 4
6. BECOMING EASILY ANNOYED OR IRRITABLE: SEVERAL DAYS

## 2025-04-21 ASSESSMENT — PATIENT HEALTH QUESTIONNAIRE - PHQ9
10. IF YOU CHECKED OFF ANY PROBLEMS, HOW DIFFICULT HAVE THESE PROBLEMS MADE IT FOR YOU TO DO YOUR WORK, TAKE CARE OF THINGS AT HOME, OR GET ALONG WITH OTHER PEOPLE: NOT DIFFICULT AT ALL
SUM OF ALL RESPONSES TO PHQ QUESTIONS 1-9: 5
SUM OF ALL RESPONSES TO PHQ QUESTIONS 1-9: 5

## 2025-04-22 ENCOUNTER — OFFICE VISIT (OUTPATIENT)
Dept: FAMILY MEDICINE | Facility: CLINIC | Age: 40
End: 2025-04-22
Attending: STUDENT IN AN ORGANIZED HEALTH CARE EDUCATION/TRAINING PROGRAM
Payer: COMMERCIAL

## 2025-04-22 VITALS
BODY MASS INDEX: 31.65 KG/M2 | OXYGEN SATURATION: 100 % | RESPIRATION RATE: 18 BRPM | WEIGHT: 172 LBS | HEART RATE: 106 BPM | DIASTOLIC BLOOD PRESSURE: 88 MMHG | HEIGHT: 62 IN | TEMPERATURE: 98.2 F | SYSTOLIC BLOOD PRESSURE: 130 MMHG

## 2025-04-22 DIAGNOSIS — F41.1 GAD (GENERALIZED ANXIETY DISORDER): ICD-10-CM

## 2025-04-22 DIAGNOSIS — U09.9 LONG COVID: Primary | ICD-10-CM

## 2025-04-22 DIAGNOSIS — R42 DIZZINESS: ICD-10-CM

## 2025-04-22 DIAGNOSIS — F33.1 MODERATE EPISODE OF RECURRENT MAJOR DEPRESSIVE DISORDER (H): ICD-10-CM

## 2025-04-22 DIAGNOSIS — R41.89 BRAIN FOG: ICD-10-CM

## 2025-04-22 DIAGNOSIS — R25.1 TREMOR: ICD-10-CM

## 2025-04-22 DIAGNOSIS — G43.709 CHRONIC MIGRAINE WITHOUT AURA WITHOUT STATUS MIGRAINOSUS, NOT INTRACTABLE: ICD-10-CM

## 2025-04-22 PROCEDURE — 99214 OFFICE O/P EST MOD 30 MIN: CPT | Performed by: STUDENT IN AN ORGANIZED HEALTH CARE EDUCATION/TRAINING PROGRAM

## 2025-04-22 PROCEDURE — 3079F DIAST BP 80-89 MM HG: CPT | Performed by: STUDENT IN AN ORGANIZED HEALTH CARE EDUCATION/TRAINING PROGRAM

## 2025-04-22 PROCEDURE — 3075F SYST BP GE 130 - 139MM HG: CPT | Performed by: STUDENT IN AN ORGANIZED HEALTH CARE EDUCATION/TRAINING PROGRAM

## 2025-04-22 PROCEDURE — 1126F AMNT PAIN NOTED NONE PRSNT: CPT | Performed by: STUDENT IN AN ORGANIZED HEALTH CARE EDUCATION/TRAINING PROGRAM

## 2025-04-22 ASSESSMENT — PAIN SCALES - GENERAL: PAINLEVEL_OUTOF10: NO PAIN (0)

## 2025-04-22 NOTE — TELEPHONE ENCOUNTER
Discussed with patient. We will hold off on further insurance coverage attempts at this time. See note from today's clinic visit.     Brandy Zimmerman MD

## 2025-04-22 NOTE — PROGRESS NOTES
Assessment & Plan     Long COVID  Patient is a very pleasant 40 year old who presents today for follow up on long-covid symptoms. She still has tremor but it is improving. She has discontinued propranolol and topiramate.   - PRIMARY CARE FOLLOW-UP SCHEDULING  - MR Brain w/o & w Contrast    Dizziness  She continues to have a whirring or whooshing sound in her ear (like surround sound) that is sometimes associated with a brief full body spasm/jerking. This appears to be triggered by some olfactory or visual stimuli. She can't get back in to see her neurologist until June. Almost sounds like a brief vasovagal response without syncope, or some autonomic stimuli issue. We briefly discussed some possible repeat workup such as iron/, ferritin, esr, crp, and repeat MRI. We could trial unisom, though this will likely may daytime sleepiness worse. We could trial labetalol (beta blocker option in pregnancy as she desires pregnancy). Ultimately we will start with MR brain, get off the methylphenidate and monitor, and allow her life stressors to improve (in the middle of a move) and will reevaluate in a little over a month.   - MR Brain w/o & w Contrast    Brain fog  For brain fog, she doesn't feel the methylphenidate is doing much compared to the modafinil. As she desires pregnancy, discussed trial of stopping all stimulants and seeing if this keeps daytime fatigue/brain fog stable, and/or if this helps with the nighttime movements she is doing (woke up sore one morning because of how much she was moving in her sleep). As noted above, will trial off medications and monitor.   - PRIMARY CARE FOLLOW-UP SCHEDULING  - MR Brain w/o & w Contrast    JOSÉ MIGUEL (generalized anxiety disorder)  Moderate episode of recurrent major depressive disorder (H)  Actually improved currently. She seems in good spirits today, all things considered. Continue current regimen, but could work on tapering down on the nortriptyline in the future as this may  "be causing some dry mouth issues, that possibly are causing some fissuring in the tongue.   - PRIMARY CARE FOLLOW-UP SCHEDULING    Tremor  For tremor, still present, but improving off topiramate and propranolol. Monitor for now.   - PRIMARY CARE FOLLOW-UP SCHEDULING    Chronic migraine without aura without status migrainosus, not intractable  Stable. Follow up with neurology as previously planned.   - PRIMARY CARE FOLLOW-UP SCHEDULING  - MR Brain w/o & w Contrast      I spent a total of 35 minutes on the day of the visit.   Time spent by me today doing chart review, history and exam, documentation and further activities per the note    BMI  Estimated body mass index is 31.46 kg/m  as calculated from the following:    Height as of this encounter: 1.575 m (5' 2\").    Weight as of this encounter: 78 kg (172 lb).       Jayant Elena is a 40 year old, presenting for the following health issues:  Anxiety, Depression, and Tremors (Follow up, better )        4/22/2025     1:51 PM   Additional Questions   Roomed by Mariposa CARY   Accompanied by Self     History of Present Illness       Mental Health Follow-up:  Patient presents to follow-up on Depression & Anxiety.Patient's depression since last visit has been:  Good  The patient is not having other symptoms associated with depression.  Patient's anxiety since last visit has been:  Good  The patient is not having other symptoms associated with anxiety.  Any significant life events: No  Patient is not feeling anxious or having panic attacks.  Patient has no concerns about alcohol or drug use.    Reason for visit:  Update on meds and tremors    She eats 0-1 servings of fruits and vegetables daily.She consumes 1 sweetened beverage(s) daily.She exercises with enough effort to increase her heart rate 9 or less minutes per day.  She exercises with enough effort to increase her heart rate 3 or less days per week.   She is taking medications regularly.            1/16/2025    " "12:41 PM 3/20/2025     9:28 AM 4/21/2025     2:42 PM   PHQ   PHQ-9 Total Score 9  8  5    Q9: Thoughts of better off dead/self-harm past 2 weeks Not at all Not at all Not at all       Patient-reported          1/14/2025     1:30 PM 3/20/2025     9:29 AM 4/21/2025     2:43 PM   JOSÉ MIGUEL-7 SCORE   Total Score 6 (mild anxiety) 9 (mild anxiety) 4 (minimal anxiety)   Total Score 6  9  4        Patient-reported          Dizzy spells are the same.     Has fallen down the stairs \"more my depth perception\"   Still getting the tremors a little bit, but not as bad.     Hears a \"whomp\" sound in her ears.   Has to stop and close eyes.     Scents or some visual stimuli seem to set it off.   Went to laugh and caused it  Soemtimes when stands up too fasst.   But will also be just watching something and it will do it.     Moving imminently, trying to pack things, ahs to stop often.   Gets really hot, too.     Gets noise in ear, then almost like a jarring zap and her whole body will have a big twitch almost. Has to close eyes.   Then the next moment she is okay. Will usually stop for a second or two to make sure another one not coming on.   Doesn't feel anything different after it comes and goes.     Sleep at night really bad lately, lizbeth 2 weeks or so.   Talking in sleep again. Thrashing about a lot.   Can't get comfortable.   Almost like restless leg symptoms.     Daytime awakeness doing fine.     Nighttime thrashing in the past few weeks.     Review of Systems  Constitutional, HEENT, cardiovascular, pulmonary, gi and gu systems are negative, except as otherwise noted.      Objective    /88 (BP Location: Right arm, Patient Position: Sitting, Cuff Size: Adult Regular)   Pulse 106   Temp 98.2  F (36.8  C) (Tympanic)   Resp 18   Ht 1.575 m (5' 2\")   Wt 78 kg (172 lb)   LMP 03/26/2025   SpO2 100%   BMI 31.46 kg/m    Body mass index is 31.46 kg/m .  Physical Exam  Constitutional:       Appearance: Normal appearance.   HENT:      " Head: Normocephalic.      Ears:      Comments: Mild cerumen in the right canal obstructing TM, but not impacted. Left TM normal.   Eyes:      General: No scleral icterus.     Extraocular Movements: Extraocular movements intact.      Conjunctiva/sclera: Conjunctivae normal.   Cardiovascular:      Rate and Rhythm: Normal rate.   Pulmonary:      Effort: Pulmonary effort is normal.   Neurological:      General: No focal deficit present.      Mental Status: She is alert and oriented to person, place, and time.      Motor: Tremor present.             Results from this visitNo results found for any visits on 04/22/25.          Signed Electronically by: Brandy Zimmerman MD

## 2025-05-02 ENCOUNTER — HOSPITAL ENCOUNTER (OUTPATIENT)
Dept: MRI IMAGING | Facility: CLINIC | Age: 40
Discharge: HOME | End: 2025-05-02
Attending: STUDENT IN AN ORGANIZED HEALTH CARE EDUCATION/TRAINING PROGRAM | Admitting: STUDENT IN AN ORGANIZED HEALTH CARE EDUCATION/TRAINING PROGRAM
Payer: COMMERCIAL

## 2025-05-02 DIAGNOSIS — U09.9 LONG COVID: ICD-10-CM

## 2025-05-02 DIAGNOSIS — G43.709 CHRONIC MIGRAINE WITHOUT AURA WITHOUT STATUS MIGRAINOSUS, NOT INTRACTABLE: ICD-10-CM

## 2025-05-02 DIAGNOSIS — R41.89 BRAIN FOG: ICD-10-CM

## 2025-05-02 DIAGNOSIS — R42 DIZZINESS: ICD-10-CM

## 2025-05-02 PROCEDURE — A9585 GADOBUTROL INJECTION: HCPCS | Performed by: STUDENT IN AN ORGANIZED HEALTH CARE EDUCATION/TRAINING PROGRAM

## 2025-05-02 PROCEDURE — 255N000002 HC RX 255 OP 636: Performed by: STUDENT IN AN ORGANIZED HEALTH CARE EDUCATION/TRAINING PROGRAM

## 2025-05-02 PROCEDURE — 70543 MRI ORBT/FAC/NCK W/O &W/DYE: CPT

## 2025-05-02 RX ORDER — GADOBUTROL 604.72 MG/ML
7 INJECTION INTRAVENOUS ONCE
Status: COMPLETED | OUTPATIENT
Start: 2025-05-02 | End: 2025-05-02

## 2025-05-02 RX ADMIN — GADOBUTROL 7 ML: 604.72 INJECTION INTRAVENOUS at 19:54

## 2025-05-16 ENCOUNTER — MYC MEDICAL ADVICE (OUTPATIENT)
Dept: SLEEP MEDICINE | Facility: CLINIC | Age: 40
End: 2025-05-16
Payer: COMMERCIAL

## 2025-06-02 ENCOUNTER — OFFICE VISIT (OUTPATIENT)
Dept: FAMILY MEDICINE | Facility: CLINIC | Age: 40
End: 2025-06-02
Attending: STUDENT IN AN ORGANIZED HEALTH CARE EDUCATION/TRAINING PROGRAM
Payer: COMMERCIAL

## 2025-06-02 VITALS
HEART RATE: 90 BPM | WEIGHT: 179 LBS | OXYGEN SATURATION: 100 % | BODY MASS INDEX: 32.94 KG/M2 | TEMPERATURE: 98.4 F | DIASTOLIC BLOOD PRESSURE: 84 MMHG | RESPIRATION RATE: 16 BRPM | SYSTOLIC BLOOD PRESSURE: 126 MMHG | HEIGHT: 62 IN

## 2025-06-02 DIAGNOSIS — F41.1 GAD (GENERALIZED ANXIETY DISORDER): Primary | ICD-10-CM

## 2025-06-02 DIAGNOSIS — F33.1 MODERATE EPISODE OF RECURRENT MAJOR DEPRESSIVE DISORDER (H): ICD-10-CM

## 2025-06-02 DIAGNOSIS — R68.89 HEAT INTOLERANCE: ICD-10-CM

## 2025-06-02 DIAGNOSIS — G47.00 INSOMNIA, UNSPECIFIED TYPE: ICD-10-CM

## 2025-06-02 PROCEDURE — 84146 ASSAY OF PROLACTIN: CPT | Performed by: STUDENT IN AN ORGANIZED HEALTH CARE EDUCATION/TRAINING PROGRAM

## 2025-06-02 PROCEDURE — 82670 ASSAY OF TOTAL ESTRADIOL: CPT | Performed by: STUDENT IN AN ORGANIZED HEALTH CARE EDUCATION/TRAINING PROGRAM

## 2025-06-02 PROCEDURE — 36415 COLL VENOUS BLD VENIPUNCTURE: CPT | Performed by: STUDENT IN AN ORGANIZED HEALTH CARE EDUCATION/TRAINING PROGRAM

## 2025-06-02 PROCEDURE — 3079F DIAST BP 80-89 MM HG: CPT | Performed by: STUDENT IN AN ORGANIZED HEALTH CARE EDUCATION/TRAINING PROGRAM

## 2025-06-02 PROCEDURE — 83001 ASSAY OF GONADOTROPIN (FSH): CPT | Performed by: STUDENT IN AN ORGANIZED HEALTH CARE EDUCATION/TRAINING PROGRAM

## 2025-06-02 PROCEDURE — G2211 COMPLEX E/M VISIT ADD ON: HCPCS | Performed by: STUDENT IN AN ORGANIZED HEALTH CARE EDUCATION/TRAINING PROGRAM

## 2025-06-02 PROCEDURE — 99214 OFFICE O/P EST MOD 30 MIN: CPT | Performed by: STUDENT IN AN ORGANIZED HEALTH CARE EDUCATION/TRAINING PROGRAM

## 2025-06-02 PROCEDURE — 84480 ASSAY TRIIODOTHYRONINE (T3): CPT | Performed by: STUDENT IN AN ORGANIZED HEALTH CARE EDUCATION/TRAINING PROGRAM

## 2025-06-02 PROCEDURE — 84443 ASSAY THYROID STIM HORMONE: CPT | Performed by: STUDENT IN AN ORGANIZED HEALTH CARE EDUCATION/TRAINING PROGRAM

## 2025-06-02 PROCEDURE — 83002 ASSAY OF GONADOTROPIN (LH): CPT | Performed by: STUDENT IN AN ORGANIZED HEALTH CARE EDUCATION/TRAINING PROGRAM

## 2025-06-02 PROCEDURE — 84439 ASSAY OF FREE THYROXINE: CPT | Performed by: STUDENT IN AN ORGANIZED HEALTH CARE EDUCATION/TRAINING PROGRAM

## 2025-06-02 PROCEDURE — 3074F SYST BP LT 130 MM HG: CPT | Performed by: STUDENT IN AN ORGANIZED HEALTH CARE EDUCATION/TRAINING PROGRAM

## 2025-06-02 PROCEDURE — 1125F AMNT PAIN NOTED PAIN PRSNT: CPT | Performed by: STUDENT IN AN ORGANIZED HEALTH CARE EDUCATION/TRAINING PROGRAM

## 2025-06-02 RX ORDER — NORTRIPTYLINE HYDROCHLORIDE 10 MG/1
CAPSULE ORAL
Qty: 105 CAPSULE | Refills: 0 | Status: SHIPPED | OUTPATIENT
Start: 2025-06-02 | End: 2025-07-07

## 2025-06-02 RX ORDER — GABAPENTIN 300 MG/1
300 CAPSULE ORAL AT BEDTIME
COMMUNITY
Start: 2025-06-02

## 2025-06-02 ASSESSMENT — ANXIETY QUESTIONNAIRES
5. BEING SO RESTLESS THAT IT IS HARD TO SIT STILL: SEVERAL DAYS
GAD7 TOTAL SCORE: 7
7. FEELING AFRAID AS IF SOMETHING AWFUL MIGHT HAPPEN: NOT AT ALL
2. NOT BEING ABLE TO STOP OR CONTROL WORRYING: NOT AT ALL
8. IF YOU CHECKED OFF ANY PROBLEMS, HOW DIFFICULT HAVE THESE MADE IT FOR YOU TO DO YOUR WORK, TAKE CARE OF THINGS AT HOME, OR GET ALONG WITH OTHER PEOPLE?: SOMEWHAT DIFFICULT
3. WORRYING TOO MUCH ABOUT DIFFERENT THINGS: SEVERAL DAYS
IF YOU CHECKED OFF ANY PROBLEMS ON THIS QUESTIONNAIRE, HOW DIFFICULT HAVE THESE PROBLEMS MADE IT FOR YOU TO DO YOUR WORK, TAKE CARE OF THINGS AT HOME, OR GET ALONG WITH OTHER PEOPLE: SOMEWHAT DIFFICULT
4. TROUBLE RELAXING: MORE THAN HALF THE DAYS
7. FEELING AFRAID AS IF SOMETHING AWFUL MIGHT HAPPEN: NOT AT ALL
GAD7 TOTAL SCORE: 7
6. BECOMING EASILY ANNOYED OR IRRITABLE: MORE THAN HALF THE DAYS
1. FEELING NERVOUS, ANXIOUS, OR ON EDGE: SEVERAL DAYS
GAD7 TOTAL SCORE: 7

## 2025-06-02 ASSESSMENT — PATIENT HEALTH QUESTIONNAIRE - PHQ9
SUM OF ALL RESPONSES TO PHQ QUESTIONS 1-9: 9
SUM OF ALL RESPONSES TO PHQ QUESTIONS 1-9: 9
10. IF YOU CHECKED OFF ANY PROBLEMS, HOW DIFFICULT HAVE THESE PROBLEMS MADE IT FOR YOU TO DO YOUR WORK, TAKE CARE OF THINGS AT HOME, OR GET ALONG WITH OTHER PEOPLE: VERY DIFFICULT

## 2025-06-02 ASSESSMENT — PAIN SCALES - GENERAL: PAINLEVEL_OUTOF10: MILD PAIN (1)

## 2025-06-02 NOTE — PROGRESS NOTES
Assessment & Plan     Patient is a very pleasant 40 year old who presents today for follow up on ongoing symptoms. In there interim, had MRI of the brain without obvious cause of the whiring or reverberating type sound she is experiencing. Similar MRI to previous. Continues to have nighttime hallucinations and awakening, talking in her sleep, thrashing about. Trying gabapentin at night for the past 3 weeks or so without benefit. Also having ongoing mouth sores with ENT visit scheduled for about 1 month from now. Still also having heat intolerance, feeling warm frequently. We've been able to eliminate multiple medications in the past few months, but continues to have these symptoms.     JOSÉ MIGUEL (generalized anxiety disorder)  Moderate episode of recurrent major depressive disorder (H)  We discussed, as we have in the past, the consideration of cutting down/out nortriptyline. Despite anxiety/depression scores being elevated today, sounds like most of this is largely due to her concern regarding her symptoms and the effect on her daily life. We will plan to taper her off nortriptyline. Could slow taper to changing dose ever 2-3 weeks instead of weekly if needed. Follow up 1 month.   - nortriptyline (PAMELOR) 10 MG capsule  Dispense: 105 capsule; Refill: 0    Insomnia, unspecified type  Previously removed from med list, but she has started this back up.   - gabapentin (NEURONTIN) 300 MG capsule    Heat intolerance  For heat intolerance, consider nortriptyline or bupropion as possible contributing causes, however, will check below labs as well for alternative diagnosis evaluation.  - TSH  - T4, free  - T3, total  - Estradiol  - Follicle stimulating hormone  - Luteinizing Hormone  - Prolactin    The longitudinal plan of care for the diagnosis(es)/condition(s) as documented were addressed during this visit. Due to the added complexity in care, I will continue to support Kassy in the subsequent management and with ongoing  "continuity of care.    BMI  Estimated body mass index is 32.74 kg/m  as calculated from the following:    Height as of this encounter: 1.575 m (5' 2\").    Weight as of this encounter: 81.2 kg (179 lb).       Jayant Elena is a 40 year old, presenting for the following health issues:  Headache and Results (MRI)        6/2/2025     1:23 PM   Additional Questions   Roomed by Mariposa CARY   Accompanied by Self     History of Present Illness       Reason for visit:  Medication check up    She eats 0-1 servings of fruits and vegetables daily.She consumes 1 sweetened beverage(s) daily.She exercises with enough effort to increase her heart rate 9 or less minutes per day.  She exercises with enough effort to increase her heart rate 3 or less days per week.   She is taking medications regularly.        Sleep is horrible now  Tried gabapentin - don't know if it is making it worse or what. She isnt' hardly sleeping.   Went to bed at 8;30 last night and didn't get out of bed until 11:50 am today and still tired.   No troubles falling asleep, but still full conversations in sleep, waking up still talking   Going on for months now.       Still with tongue sores. Has ENT pending.     Headache:   \"Have been fine\"     Whirring/whooshing sounds in her ears.   Off methylphenidate   Still gets whirring sounds. Not much worse at all. Not any better.   Higher pitched. Almost the after sound of a bell ringing. Like the reverberating sound. Doesn't feel good but doesn't hurt and causes unsteadiness.   Not heartbeat.     Really intolerant to heat right now. Sweating.     Started gabapentin from neurologist.   Started this back up about 3 weeks ago. And taking it consistently since. No improvement.         3/20/2025     9:28 AM 4/21/2025     2:42 PM 6/2/2025     7:22 AM   PHQ   PHQ-9 Total Score 8  5  9    Q9: Thoughts of better off dead/self-harm past 2 weeks Not at all Not at all Not at all       Patient-reported         3/20/2025     " "9:29 AM 4/21/2025     2:43 PM 6/2/2025     7:23 AM   JOSÉ MIGUEL-7 SCORE   Total Score 9 (mild anxiety) 4 (minimal anxiety) 7 (mild anxiety)   Total Score 9  4  7        Patient-reported     Mostly symptom related. For example, wants to do something, but worried about symptoms. Lack of sleep also not helping anything either.       Review of Systems  Constitutional, HEENT, cardiovascular, pulmonary, gi and gu systems are negative, except as otherwise noted.      Objective    /84 (BP Location: Right arm, Patient Position: Sitting, Cuff Size: Adult Regular)   Pulse 90   Temp 98.4  F (36.9  C) (Tympanic)   Resp 16   Ht 1.575 m (5' 2\")   Wt 81.2 kg (179 lb)   LMP 04/25/2025   SpO2 100%   BMI 32.74 kg/m    Body mass index is 32.74 kg/m .  Physical Exam  Constitutional:       Appearance: Normal appearance.   HENT:      Head: Normocephalic.   Eyes:      General: No scleral icterus.     Extraocular Movements: Extraocular movements intact.      Conjunctiva/sclera: Conjunctivae normal.   Cardiovascular:      Rate and Rhythm: Normal rate.   Pulmonary:      Effort: Pulmonary effort is normal.   Neurological:      General: No focal deficit present.      Mental Status: She is alert and oriented to person, place, and time.          EXAM: MR INTERNAL AUDITORY CANAL (IAC) W/O and W CONTRAST  LOCATION: [facility]  DATE: 5/2/2025     INDICATION: long covid, episodes of whirring sound in ears followed by brief dizziness, resolves within seconds. triggered usually by sights sounds.  COMPARISON: CT head 12/9/2024.  CONTRAST: Gadavist 10 mL IV  TECHNIQUE: Multiplanar multisequence head MRI without and with intravenous contrast including dedicated imaging of the internal auditory canals.     FINDINGS:     IAC: Dedicated imaging of the internal auditory canals demonstrates normal cranial nerve VII and VIII complexes bilaterally. No cerebellopontine angle or internal auditory canal mass. No pathologic cranial " nerve or temporal bone contrast enhancement.   Inner ear structures demonstrate expected fluid signal intensity. No middle ear or mastoid effusion.     INTRACRANIAL CONTENTS: No acute or subacute infarct. No mass, acute hemorrhage, or extra-axial fluid collections. Few small scattered nonspecific foci of T2/FLAIR hyperintense signal in the cerebral white matter, which may represent sequelae migraine headaches, small vessel ischemic disease, though evaluation prior infection/inflammation. Normal ventricles and sulci. Normal position of the cerebellar tonsils. No pathologic contrast enhancement.     OTHER: Accounting for technique no additional abnormalities identified.                                                                      IMPRESSION:  1.  No findings to explain patient's symptoms. Normal MRI of the internal auditory canals and labyrinthine structures.  2.  No acute intracranial pathology.   3.  Minimal nonspecific small cerebral white matter T2 hyperintensities.        Signed Electronically by: Brandy Zimmerman MD

## 2025-06-03 LAB
ESTRADIOL SERPL-MCNC: 34 PG/ML
FSH SERPL IRP2-ACNC: 9.8 MIU/ML
LH SERPL-ACNC: 8.2 MIU/ML
PROLACTIN SERPL 3RD IS-MCNC: 32 NG/ML (ref 5–23)
T3 SERPL-MCNC: 102 NG/DL (ref 85–202)
T4 FREE SERPL-MCNC: 1.24 NG/DL (ref 0.9–1.7)
TSH SERPL DL<=0.005 MIU/L-ACNC: 1.33 UIU/ML (ref 0.3–4.2)

## 2025-06-16 ENCOUNTER — OFFICE VISIT (OUTPATIENT)
Dept: OTOLARYNGOLOGY | Facility: CLINIC | Age: 40
End: 2025-06-16
Attending: STUDENT IN AN ORGANIZED HEALTH CARE EDUCATION/TRAINING PROGRAM
Payer: COMMERCIAL

## 2025-06-16 VITALS
TEMPERATURE: 98.7 F | WEIGHT: 179 LBS | SYSTOLIC BLOOD PRESSURE: 127 MMHG | BODY MASS INDEX: 32.74 KG/M2 | OXYGEN SATURATION: 99 % | DIASTOLIC BLOOD PRESSURE: 92 MMHG | HEART RATE: 102 BPM

## 2025-06-16 DIAGNOSIS — K13.79 MOUTH SORE: Primary | ICD-10-CM

## 2025-06-16 LAB
FOLATE SERPL-MCNC: 16.2 NG/ML (ref 4.6–34.8)
VIT D+METAB SERPL-MCNC: 34 NG/ML (ref 20–50)

## 2025-06-16 PROCEDURE — 36415 COLL VENOUS BLD VENIPUNCTURE: CPT

## 2025-06-16 PROCEDURE — 82746 ASSAY OF FOLIC ACID SERUM: CPT

## 2025-06-16 PROCEDURE — 82180 ASSAY OF ASCORBIC ACID: CPT | Mod: 90

## 2025-06-16 PROCEDURE — 84207 ASSAY OF VITAMIN B-6: CPT | Mod: 90

## 2025-06-16 PROCEDURE — 99000 SPECIMEN HANDLING OFFICE-LAB: CPT

## 2025-06-16 PROCEDURE — 82306 VITAMIN D 25 HYDROXY: CPT

## 2025-06-16 RX ORDER — TRIAMCINOLONE ACETONIDE 0.1 %
PASTE (GRAM) DENTAL
Qty: 30 G | Refills: 0 | Status: SHIPPED | OUTPATIENT
Start: 2025-06-16

## 2025-06-16 ASSESSMENT — PAIN SCALES - GENERAL: PAINLEVEL_OUTOF10: NO PAIN (0)

## 2025-06-16 NOTE — LETTER
6/16/2025      Lady Saavedra  [address] [place] [id number]      Dear Colleague,    Thank you for referring your patient, Lady Saavedra, to the [facility]. Please see a copy of my visit note below.    Chief Complaint   Patient presents with     Consult     Mouth sores x 2.5 months  Sides of tongue, painful with eating     History of Present Illness   Lady Saavedra is a 40 year old female who presents today for evaluation. Referred by Dr.Gina Jeannie Gamino for mouth sore concerns.     The patient presents mouth sores on the sides of the tongue. The patient has noticed for approximately ongoing for 2.5-3 months. It hasn't been changing in size. The sores present in a variety of sizes. It can be painful. No current painful areas. Usually, the pain presents later in the day after frequent talking. Salty, acidic, or sour foods irritate the sores.  No bleeding. No smoker, and no history of chewing tobacco. No lumps or swollen glands in the neck that she is aware of. No concerns with stress, depression, or anxiety.     She has a history of acid reflux back in 2021.     Past Medical History  Patient Active Problem List   Diagnosis     Pneumonia due to 2019 novel coronavirus     COVID-19 long hauler     Anovulation     Prediabetes     Migraine with aura and without status migrainosus, not intractable     Hyperlipidemia LDL goal <100     Nonalcoholic fatty liver disease     Cognitive communication deficit     Current Medications     Current Outpatient Medications:      aspirin-acetaminophen-caffeine (EXCEDRIN MIGRAINE) 250-250-65 MG tablet, Take 1 tablet by mouth daily as needed for headaches, Disp: , Rfl:      buPROPion (WELLBUTRIN XL) 300 MG 24 hr tablet, Take 1 tablet (300 mg) by mouth every morning, Disp: 90 tablet, Rfl: 4     dicyclomine (BENTYL) 10 MG capsule, Take 1 capsule (10 mg) by mouth 4 times daily as needed (abdominal cramping), Disp: 90 capsule, Rfl: 11     DULoxetine 40  MG CPEP, Take 40 mg by mouth 2 times daily., Disp: 180 capsule, Rfl: 3     eletriptan (RELPAX) 40 MG tablet, Take 1 tablet (40 mg) by mouth at onset of headache for migraine May repeat in 2 hours. Max 2 tablets/24 hours., Disp: 18 tablet, Rfl: 3     gabapentin (NEURONTIN) 300 MG capsule, Take 1 capsule (300 mg) by mouth at bedtime., Disp: , Rfl:      galcanezumab-gnlm (EMGALITY) 120 MG/ML injection, Inject 1 mL (120 mg) subcutaneously every 28 days., Disp: 1 mL, Rfl: 11     levothyroxine (SYNTHROID/LEVOTHROID) 88 MCG tablet, Take 1 tablet (88 mcg) by mouth daily, Disp: 90 tablet, Rfl: 1     LORazepam (ATIVAN) 0.5 MG tablet, Take 1-2 tablets (0.5-1 mg) by mouth every 8 hours as needed for anxiety (panic)., Disp: 30 tablet, Rfl: 2     nortriptyline (PAMELOR) 10 MG capsule, Take 5 capsules (50 mg) by mouth at bedtime for 7 days, THEN 4 capsules (40 mg) at bedtime for 7 days, THEN 3 capsules (30 mg) at bedtime for 7 days, THEN 2 capsules (20 mg) at bedtime for 7 days, THEN 1 capsule (10 mg) at bedtime for 7 days., Disp: 105 capsule, Rfl: 0     vitamin D3 (CHOLECALCIFEROL) 50 mcg (2000 units) tablet, Take 1 tablet (50 mcg) by mouth daily, Disp: 90 tablet, Rfl: 3    Allergies  No Known Allergies    Social History   Social History     Socioeconomic History     Marital status:      Number of children: 0   Tobacco Use     Smoking status: Never     Smokeless tobacco: Never   Vaping Use     Vaping status: Never Used   Substance and Sexual Activity     Alcohol use: No     Drug use: No     Sexual activity: Yes     Partners: Male     Birth control/protection: None   Other Topics Concern     Parent/sibling w/ CABG, MI or angioplasty before 65F 55M? No     Social Drivers of Health     Financial Resource Strain: Low Risk  (1/25/2024)    Financial Resource Strain      Within the past 12 months, have you or your family members you live with been unable to get utilities (heat, electricity) when it was really needed?: No   Food  Insecurity: Low Risk  (1/25/2024)    Food Insecurity      Within the past 12 months, did you worry that your food would run out before you got money to buy more?: No      Within the past 12 months, did the food you bought just not last and you didn t have money to get more?: No   Transportation Needs: Low Risk  (1/25/2024)    Transportation Needs      Within the past 12 months, has lack of transportation kept you from medical appointments, getting your medicines, non-medical meetings or appointments, work, or from getting things that you need?: No   Interpersonal Safety: Low Risk  (6/2/2025)    Interpersonal Safety      Do you feel physically and emotionally safe where you currently live?: Yes      Within the past 12 months, have you been hit, slapped, kicked or otherwise physically hurt by someone?: No      Within the past 12 months, have you been humiliated or emotionally abused in other ways by your partner or ex-partner?: No   Housing Stability: Low Risk  (1/25/2024)    Housing Stability      Do you have housing? : Yes      Are you worried about losing your housing?: No       Family History  Family History   Adopted: Yes   Problem Relation Age of Onset     Unknown/Adopted Mother      Unknown/Adopted Father        Review of Systems  As per HPI and PMHx, otherwise 10+ comprehensive system review is negative.    Physical Exam  BP (!) 127/92   Pulse 102   Temp 98.7  F (37.1  C)   Wt 81.2 kg (179 lb)   LMP 04/25/2025   SpO2 99%   BMI 32.74 kg/m    GENERAL: Patient is a pleasant, cooperative 40 year old female in no acute distress.  HEAD: Normocephalic, atraumatic.  Hair and scalp are normal.  EYES: Pupils are equal, round, reactive to light and accommodation.  Extraocular movements are intact.  The sclera nonicteric without injection.  The extraocular structures are normal.  EARS: Normal shape and symmetry.  No tenderness when palpating the mastoid or tragal areas bilaterally.  Otoscopic exam reveals no amount  of cerumen bilaterally.  The bilateral tympanic membranes are round, intact without evidence of effusion, good landmarks.  No retraction, granulation, or drainage.  NOSE: Nares are patent.  Nasal mucosa is pink.  ORAL CAVITY: Dentition is in good repair.  Mucous membranes are moist.  Examination of the oral cavity showed a 0.5 or mm or smaller sores located on the lateral boarder (4 on the right and 1 on the left). It appears to look like canker sores.  Tongue is mobile, protrudes to the midline.  Palate elevates symmetrically.  Tonsils are +0.  No erythema or exudate.  No additional oral cavity or oropharyngeal masses, lesions, ulcerations, leukoplakia.  NECK: Supple, trachea is midline.  There no palpable cervical lymphadenopathy or masses bilaterally.  Palpation of the bilateral parotid and submandibular areas reveal no masses.  No thyromegaly.    NEUROLOGIC: Cranial nerves II through XII are grossly intact.  Voice is strong.  Patient is House-Brackmann I/VI bilaterally.  CARDIOVASCULAR: Extremities are warm and well-perfused.  No significant peripheral edema.  RESPIRATORY: Patient has nonlabored breathing without cough, wheeze, stridor.  PSYCHIATRIC: Patient is alert and oriented.  Mood and affect appear normal.  SKIN: Warm and dry.  No scalp, face, or neck lesions noted.    Assessment and Plan     ICD-10-CM    1. Mouth sore  K13.79 Vitamin C     Vitamin D Deficiency     Folate     Vitamin B6     triamcinolone (KENALOG) 0.1 % paste     magic mouthwash (ENTER INGREDIENTS IN COMMENTS) suspension     Herpes Simplex Virus 1 and 2 IgG        It was my pleasure seeing Lady Saavedra today in clinic.  The patient presents with ongoing mouth sores. We discussed ruling out any vitamin deficiencies. She did have her TSH and b12 recently drawn (normal), so we will skip those. We will get a vitamin C,D, & B6. Also, discussed getting HSV lab, considering she has had cold sores in the past, which could also present on her  tongue. In the meantime, I prescribed magic mouthwash (Equal parts of Maalox, Dexamethasone, and Lidocaine) for pain and Kenalog dental paste.     I will send a message through [employer] with results. I will also check in with her via [employer] in 2 weeks.   If symptoms persist and labs are normal, will need to get a biopsy.     MAUDE Martini CNP  Otolaryngology  [facility]      Again, thank you for allowing me to participate in the care of your patient.        Sincerely,        MAUDE Martini CNP    Electronically signed

## 2025-06-16 NOTE — NURSING NOTE
"Initial BP (!) 127/92   Pulse 102   Temp 98.7  F (37.1  C)   Wt 81.2 kg (179 lb)   LMP 04/25/2025   SpO2 99%   BMI 32.74 kg/m   Estimated body mass index is 32.74 kg/m  as calculated from the following:    Height as of 6/2/25: 1.575 m (5' 2\").    Weight as of this encounter: 81.2 kg (179 lb). .  Shelly Christensen MA on 6/16/2025 at 9:51 AM    "

## 2025-06-16 NOTE — PROGRESS NOTES
Chief Complaint   Patient presents with    Consult     Mouth sores x 2.5 months  Sides of tongue, painful with eating     History of Present Illness   Lady Saavedra is a 40 year old female who presents today for evaluation. Referred by Dr.Gina Jeannie Gamino for mouth sore concerns.     The patient presents mouth sores on the sides of the tongue. The patient has noticed for approximately ongoing for 2.5-3 months. It hasn't been changing in size. The sores present in a variety of sizes. It can be painful. No current painful areas. Usually, the pain presents later in the day after frequent talking. Salty, acidic, or sour foods irritate the sores.  No bleeding. No smoker, and no history of chewing tobacco. No lumps or swollen glands in the neck that she is aware of. No concerns with stress, depression, or anxiety.     She has a history of acid reflux back in 2021.     Past Medical History  Patient Active Problem List   Diagnosis    Pneumonia due to 2019 novel coronavirus    COVID-19 long hauler    Anovulation    Prediabetes    Migraine with aura and without status migrainosus, not intractable    Hyperlipidemia LDL goal <100    Nonalcoholic fatty liver disease    Cognitive communication deficit     Current Medications     Current Outpatient Medications:     aspirin-acetaminophen-caffeine (EXCEDRIN MIGRAINE) 250-250-65 MG tablet, Take 1 tablet by mouth daily as needed for headaches, Disp: , Rfl:     buPROPion (WELLBUTRIN XL) 300 MG 24 hr tablet, Take 1 tablet (300 mg) by mouth every morning, Disp: 90 tablet, Rfl: 4    dicyclomine (BENTYL) 10 MG capsule, Take 1 capsule (10 mg) by mouth 4 times daily as needed (abdominal cramping), Disp: 90 capsule, Rfl: 11    DULoxetine 40 MG CPEP, Take 40 mg by mouth 2 times daily., Disp: 180 capsule, Rfl: 3    eletriptan (RELPAX) 40 MG tablet, Take 1 tablet (40 mg) by mouth at onset of headache for migraine May repeat in 2 hours. Max 2 tablets/24 hours., Disp: 18 tablet, Rfl: 3     gabapentin (NEURONTIN) 300 MG capsule, Take 1 capsule (300 mg) by mouth at bedtime., Disp: , Rfl:     galcanezumab-gnlm (EMGALITY) 120 MG/ML injection, Inject 1 mL (120 mg) subcutaneously every 28 days., Disp: 1 mL, Rfl: 11    levothyroxine (SYNTHROID/LEVOTHROID) 88 MCG tablet, Take 1 tablet (88 mcg) by mouth daily, Disp: 90 tablet, Rfl: 1    LORazepam (ATIVAN) 0.5 MG tablet, Take 1-2 tablets (0.5-1 mg) by mouth every 8 hours as needed for anxiety (panic)., Disp: 30 tablet, Rfl: 2    nortriptyline (PAMELOR) 10 MG capsule, Take 5 capsules (50 mg) by mouth at bedtime for 7 days, THEN 4 capsules (40 mg) at bedtime for 7 days, THEN 3 capsules (30 mg) at bedtime for 7 days, THEN 2 capsules (20 mg) at bedtime for 7 days, THEN 1 capsule (10 mg) at bedtime for 7 days., Disp: 105 capsule, Rfl: 0    vitamin D3 (CHOLECALCIFEROL) 50 mcg (2000 units) tablet, Take 1 tablet (50 mcg) by mouth daily, Disp: 90 tablet, Rfl: 3    Allergies  No Known Allergies    Social History   Social History     Socioeconomic History    Marital status:     Number of children: 0   Tobacco Use    Smoking status: Never    Smokeless tobacco: Never   Vaping Use    Vaping status: Never Used   Substance and Sexual Activity    Alcohol use: No    Drug use: No    Sexual activity: Yes     Partners: Male     Birth control/protection: None   Other Topics Concern    Parent/sibling w/ CABG, MI or angioplasty before 65F 55M? No     Social Drivers of Health     Financial Resource Strain: Low Risk  (1/25/2024)    Financial Resource Strain     Within the past 12 months, have you or your family members you live with been unable to get utilities (heat, electricity) when it was really needed?: No   Food Insecurity: Low Risk  (1/25/2024)    Food Insecurity     Within the past 12 months, did you worry that your food would run out before you got money to buy more?: No     Within the past 12 months, did the food you bought just not last and you didn t have money to get  more?: No   Transportation Needs: Low Risk  (1/25/2024)    Transportation Needs     Within the past 12 months, has lack of transportation kept you from medical appointments, getting your medicines, non-medical meetings or appointments, work, or from getting things that you need?: No   Interpersonal Safety: Low Risk  (6/2/2025)    Interpersonal Safety     Do you feel physically and emotionally safe where you currently live?: Yes     Within the past 12 months, have you been hit, slapped, kicked or otherwise physically hurt by someone?: No     Within the past 12 months, have you been humiliated or emotionally abused in other ways by your partner or ex-partner?: No   Housing Stability: Low Risk  (1/25/2024)    Housing Stability     Do you have housing? : Yes     Are you worried about losing your housing?: No       Family History  Family History   Adopted: Yes   Problem Relation Age of Onset    Unknown/Adopted Mother     Unknown/Adopted Father        Review of Systems  As per HPI and PMHx, otherwise 10+ comprehensive system review is negative.    Physical Exam  BP (!) 127/92   Pulse 102   Temp 98.7  F (37.1  C)   Wt 81.2 kg (179 lb)   LMP 04/25/2025   SpO2 99%   BMI 32.74 kg/m    GENERAL: Patient is a pleasant, cooperative 40 year old female in no acute distress.  HEAD: Normocephalic, atraumatic.  Hair and scalp are normal.  EYES: Pupils are equal, round, reactive to light and accommodation.  Extraocular movements are intact.  The sclera nonicteric without injection.  The extraocular structures are normal.  EARS: Normal shape and symmetry.  No tenderness when palpating the mastoid or tragal areas bilaterally.  Otoscopic exam reveals no amount of cerumen bilaterally.  The bilateral tympanic membranes are round, intact without evidence of effusion, good landmarks.  No retraction, granulation, or drainage.  NOSE: Nares are patent.  Nasal mucosa is pink.  ORAL CAVITY: Dentition is in good repair.  Mucous membranes are  moist.  Examination of the oral cavity showed a 0.5 or mm or smaller sores located on the lateral boarder (4 on the right and 1 on the left). It appears to look like canker sores.  Tongue is mobile, protrudes to the midline.  Palate elevates symmetrically.  Tonsils are +0.  No erythema or exudate.  No additional oral cavity or oropharyngeal masses, lesions, ulcerations, leukoplakia.  NECK: Supple, trachea is midline.  There no palpable cervical lymphadenopathy or masses bilaterally.  Palpation of the bilateral parotid and submandibular areas reveal no masses.  No thyromegaly.    NEUROLOGIC: Cranial nerves II through XII are grossly intact.  Voice is strong.  Patient is House-Brackmann I/VI bilaterally.  CARDIOVASCULAR: Extremities are warm and well-perfused.  No significant peripheral edema.  RESPIRATORY: Patient has nonlabored breathing without cough, wheeze, stridor.  PSYCHIATRIC: Patient is alert and oriented.  Mood and affect appear normal.  SKIN: Warm and dry.  No scalp, face, or neck lesions noted.    Assessment and Plan     ICD-10-CM    1. Mouth sore  K13.79 Vitamin C     Vitamin D Deficiency     Folate     Vitamin B6     triamcinolone (KENALOG) 0.1 % paste     magic mouthwash (ENTER INGREDIENTS IN COMMENTS) suspension     Herpes Simplex Virus 1 and 2 IgG        It was my pleasure seeing Lady Saavedra today in clinic.  The patient presents with ongoing mouth sores. We discussed ruling out any vitamin deficiencies. She did have her TSH and b12 recently drawn (normal), so we will skip those. We will get a vitamin C,D, & B6. Also, discussed getting HSV lab, considering she has had cold sores in the past, which could also present on her tongue. In the meantime, I prescribed magic mouthwash (Equal parts of Maalox, Dexamethasone, and Lidocaine) for pain and Kenalog dental paste.     I will send a message through [employer] with results. I will also check in with her via [employer] in 2 weeks.   If symptoms persist and  labs are normal, will need to get a biopsy.     MAUDE Martini CNP  Otolaryngology  [facility]

## 2025-06-16 NOTE — PATIENT INSTRUCTIONS
You were seen by Corinne Bueno CNP.  If you have questions or concerns regarding your appointment today, you can reach out to our call center at [phone].  The following has been recommended at your appointment today:    Let's get labs. I will send a [employer] message with results.    the medications as start using as prescribed.   I will follow up in 2 weeks through [employer].

## 2025-06-17 DIAGNOSIS — R41.89 BRAIN FOG: ICD-10-CM

## 2025-06-17 DIAGNOSIS — U09.9 LONG COVID: Primary | ICD-10-CM

## 2025-06-17 LAB
HSV1 IGG SERPL QL IA: 5.36 INDEX
HSV1 IGG SERPL QL IA: ABNORMAL
HSV2 IGG SERPL QL IA: 0.14 INDEX
HSV2 IGG SERPL QL IA: ABNORMAL

## 2025-06-17 RX ORDER — METHYLPHENIDATE HYDROCHLORIDE 18 MG/1
18 TABLET ORAL EVERY MORNING
Qty: 30 TABLET | Refills: 0 | Status: SHIPPED | OUTPATIENT
Start: 2025-06-17

## 2025-06-17 RX ORDER — METHYLPHENIDATE HYDROCHLORIDE 18 MG/1
18 TABLET ORAL EVERY MORNING
Qty: 30 TABLET | Refills: 0 | Status: CANCELLED | OUTPATIENT
Start: 2025-06-17

## 2025-06-18 LAB — VIT C SERPL-MCNC: 27 UMOL/L

## 2025-06-19 LAB — PYRIDOXAL PHOS SERPL-SCNC: 39.3 NMOL/L

## 2025-06-25 ENCOUNTER — OFFICE VISIT (OUTPATIENT)
Dept: NEUROLOGY | Facility: CLINIC | Age: 40
End: 2025-06-25
Payer: COMMERCIAL

## 2025-06-25 VITALS
HEIGHT: 62 IN | HEART RATE: 102 BPM | BODY MASS INDEX: 33.68 KG/M2 | DIASTOLIC BLOOD PRESSURE: 107 MMHG | SYSTOLIC BLOOD PRESSURE: 144 MMHG | WEIGHT: 183 LBS

## 2025-06-25 DIAGNOSIS — F33.1 MODERATE EPISODE OF RECURRENT MAJOR DEPRESSIVE DISORDER (H): ICD-10-CM

## 2025-06-25 DIAGNOSIS — F41.1 GAD (GENERALIZED ANXIETY DISORDER): ICD-10-CM

## 2025-06-25 DIAGNOSIS — R42 DIZZINESS: ICD-10-CM

## 2025-06-25 DIAGNOSIS — U09.9 LONG COVID: ICD-10-CM

## 2025-06-25 DIAGNOSIS — R25.3 JERKING: Primary | ICD-10-CM

## 2025-06-25 DIAGNOSIS — T88.7XXA MEDICATION SIDE EFFECTS: ICD-10-CM

## 2025-06-25 PROCEDURE — G2211 COMPLEX E/M VISIT ADD ON: HCPCS | Performed by: PSYCHIATRY & NEUROLOGY

## 2025-06-25 PROCEDURE — 3077F SYST BP >= 140 MM HG: CPT | Performed by: PSYCHIATRY & NEUROLOGY

## 2025-06-25 PROCEDURE — 99215 OFFICE O/P EST HI 40 MIN: CPT | Performed by: PSYCHIATRY & NEUROLOGY

## 2025-06-25 PROCEDURE — 3080F DIAST BP >= 90 MM HG: CPT | Performed by: PSYCHIATRY & NEUROLOGY

## 2025-06-25 RX ORDER — NORTRIPTYLINE HYDROCHLORIDE 10 MG/1
50 CAPSULE ORAL AT BEDTIME
Qty: 450 CAPSULE | Refills: 1 | Status: SHIPPED | OUTPATIENT
Start: 2025-06-25 | End: 2025-12-22

## 2025-06-25 NOTE — NURSING NOTE
Chief Complaint   Patient presents with    Headache     3 month follow up headaches. Pt states headache is ok but now is she getting dizziness. Pt gets dizzy everyday at random times, last not longer then a few seconds.      Trisha Winters MA on 6/25/2025 at 12:56 PM     29-Aug-2015

## 2025-06-25 NOTE — PROGRESS NOTES
NEUROLOGY OUTPATIENT PROGRESS NOTE   Jun 25, 2025     CHIEF COMPLAINT/REASON FOR VISIT/REASON FOR CONSULT  Patient presents with:  Headache: 3 month follow up headaches. Pt states headache is ok but now is she getting dizziness. Pt gets dizzy everyday at random times, last not longer then a few seconds.     REASON FOR CONSULTATION-headaches and brain fog.    REFERRAL SOURCE  Dr. Lalita Birmingham   Dr. Lalita Birmingham    HISTORY OF PRESENT ILLNESS  Lady Saavedra is a 40 year old female seen today for evaluation of headaches and brain fog.  She reports that she has a history of headache but then in March she got Covid.  She was admitted for Covid related pneumonia.  Was in the hospital for 1 week.  Since then the headaches have been much more frequent.  She is having them every day.  Reports significant photophobia and phonophobia with the headaches.  Headaches can be on any side in different places.  They can be behind the eyes and in the back of the head.  They are throbbing in nature.  Reports no visual aura with these.  Has been put on amitriptyline which has helped her sleep as well as with the headaches but nothing significant to get rid of the headaches completely.  Is using Advil and Tylenol for the headache still would last the whole day.  Is not using them around-the-clock.  Denies any other provoking factors.    Is also been diagnosed with thyroiditis since the Covid infection.  Is working with endocrinology on this.    Reports brain fog has been going on since the Covid infection.  Has not had any improvement.  Was let go of her job.  Has difficulty with memory, staying focused and doing tasks that she could previously do easily.  Does get good sleep at night with the amitriptyline.    Patient has recently been in contact with the Covid rehabilitation clinic.  Has not started the program yet.    2/14/22  Patient returns today  1.  Patient reports that her headaches have significantly improved with  the propanolol.  Is taking 60 mg twice a day.  Denies any side effects.  Headaches of 4-5 times a month.  Headaches are much less severe compared to before and she does not need to take abortive medication.  Did try the Maxalt once without any side effects and it did not work for her.  2.  Continues to have the brain fog.  Has been seen in the [facility] rehabilitation clinic and has not been able to do the therapies due to lack of insurance.  Steroids did not help with the brain fog.  3.  Reports that the amitriptyline is no longer working to help her sleep.  She is up till 2 AM every night.  Discussed about increasing the dose and she is interested in that.  Denies any side effects with the amitriptyline.    4/12/22  Patient returns today  1.  She reports that the headaches are twice a month.  Maxalt does work as abortive therapy though she has to eat something with it otherwise feels nauseous with the medication.  Propanolol amitriptyline is helping.  Some somnolence with the amitriptyline which is getting better.  Discussed about decreasing the amitriptyline and she wants to stay on the higher dose.  It was increased last time because the propanolol was not working by itself.  2.  Continues to have the brain fog.  Has insurance now and has gone to the [facility] rehabilitation clinic though has not been able to see the therapist yet.  Is only seeing her counselor for her mental illness.    3.  Is sleeping better with the amitriptyline at this point though does have some somnolence.  No insomnia.    2/1/23  Patient returns today  1.  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  Headaches have since then gotten worse and she is having 2-3 headaches a week.  About 15 headache days a month.  Remains on the amitriptyline and propanolol.  This daytime somnolence with the amitriptyline has improved.  No side effects with propanolol.  Blood pressure  is better at this point as well.  Maxalt does work as abortive therapy.  She does need to use it with food to avoid nausea.  2.  Brain fog is also improved with time.  3.  Sleeping better with the amitriptyline.  No issues.  No other new symptoms    4/5/23  Patient returns today  1.  She is currently on 100 mg of Topamax.  She takes it in the morning because it prevents her from sleeping at night.  She reports that she had complete resolution of the headaches.  Has not had any headaches in the last month.  2.  Occasionally if she does have a headache she will use Maxalt which is still working  3.  She remains on propanolol.  She feels that is also being helping with the tachycardia.  Remains on amitriptyline though does complain that she occasionally cannot sleep.  She feels that she is not exercising enough to tire herself out to fall asleep  4.  Brain fog symptoms have now improved.  No new issues/concerns.    4/25/24  Patient returns today.  Since she was last seen she has had COVID and has been having long-haul COVID symptoms along with worsening headaches.  1.  Headaches are almost every day.  Headaches are brought on by bright lights.  She does have accommodation at work.  Has been able to work from home which is helping.  In terms of her medications she has been on a higher dose of Topamax.  Is taking 1-1/2 tablet in the morning.  Has been switched from amitriptyline to nortriptyline.  No side effects.  Feels that she is sleeping well.  Propranolol dose has been decreased.  Is using Zofran and meclizine for nausea with the headaches.  Maxalt does work but causes some nausea.    2.  Per the post-COVID clinic she has been working with PT/OT/speech therapy with no significant improvement.  Brain fog is also gotten worse.  No other new concerns.    6/20/24  Patient returns today  1.  Her brain fog symptoms have improved.  2.  Headaches are slowly getting better.  Still has them multiple times a week but not as  severe compared to before.  Jaciel on the high doses of Topamax, nortriptyline, propranolol.  No side effects.  Steroids actually made things worse.  Maxalt makes her more tired.  Is interested in trying other medication since the headaches are still affecting quality of life  No other new concerns.    10/23/24  Patient returns today  1.  Brain fog symptoms have improved  2.  Headaches have gotten much better with the Emgality.  Reports couple of random headaches, couple of headaches with the Emgality is due in couple of headaches at the time of the menstrual cycle.  Relpax is working better than the Maxalt.  No side effects with the medication.  Wants to slowly wean off the other medications  3.  Some issues with insomnia and is primary care doctor has put on a new medication in addition to the nortriptyline.  No other new concerns.    3/19/25  Patient returns today  1.  Her symptoms are significantly improved at the last visit.  She had stopped the Topamax and the propranolol.  Since then she has been noticing more tremors in her upper extremities.  Feels more anxious.  Also reports some ongoing memory issues.  2.  Headaches are still under good control with 1-2 headaches a month.  Relpax does work.  Emgality is helping  3.  She is on nortriptyline.  Is not really helping with sleep as she still cannot sleep.  Has tried melatonin in the past.  Possibly is not helping with the headaches either  4.  Previously she was planning on getting pregnant though this is on hold because of new symptoms.  No other new concerns.    6/25/25  Patient returns today  1.  Headaches are almost nonexistent.  Remains on the Emgality and Relpax which are helping.  Few headaches related to stress.  2.  Gabapentin caused more.  Because more side effects and she is interested in going back on the nortriptyline.  3.  She has been having some jerking activity involving the whole body done by lights and sounds in spells.  These are brief.  She  is having them 20-30 times a day.  Sometimes some videos can bring these on.  She was seen by primary care and they ordered MRI of the brain which came back negative.  She is interested in further workup.  4.  She does have a history of anxiety feels that is under good control.  Is on Wellbutrin which is working.  5.  Previously she was having some tremors which are under good control at this point  No new concerns.    Previous history is reviewed and this is unchanged.    PAST MEDICAL/SURGICAL HISTORY  Past Medical History:   Diagnosis Date    Abnormal Pap smear of cervix 2009, 2012, 2014    see problem list    Cervical high risk HPV (human papillomavirus) test positive 2009, 2012    see problem list    Chickenpox     Depressive disorder     Gestational HTN 01/01/2015    Hypothyroidism     Migraine with aura and without status migrainosus, not intractable     Seasonal allergies     Was on clariten uses occasional sudafed     Patient Active Problem List   Diagnosis    Pneumonia due to 2019 novel coronavirus    COVID-19 long hauler    Anovulation    Prediabetes    Migraine with aura and without status migrainosus, not intractable    Hyperlipidemia LDL goal <100    Nonalcoholic fatty liver disease    Cognitive communication deficit   Significant of migraines, mental illness, suicide attempt, depression.  Feels the depression is under good control with EMR    FAMILY HISTORY  Family History   Adopted: Yes   Problem Relation Age of Onset    Unknown/Adopted Mother     Unknown/Adopted Father    Patient is adopted and she does not know her family.    SOCIAL HISTORY  Social History     Tobacco Use    Smoking status: Never    Smokeless tobacco: Never   Vaping Use    Vaping status: Never Used   Substance Use Topics    Alcohol use: No    Drug use: No       SYSTEMS REVIEW  Twelve-system ROS was done and other than the HPI this was negative except for neck pain, back pain, arm and leg pain, joint pain, numbness and tingling,  sleepiness during the day, sleeping problems, headaches, anxiety, depression, palpitations, bloating, stomach pain, bowel problems, respiratory problems, skin changes, weight gain.  No new concerns/issues.    MEDICATIONS  Current Outpatient Medications   Medication Sig Dispense Refill    aspirin-acetaminophen-caffeine (EXCEDRIN MIGRAINE) 250-250-65 MG tablet Take 1 tablet by mouth daily as needed for headaches      buPROPion (WELLBUTRIN XL) 300 MG 24 hr tablet Take 1 tablet (300 mg) by mouth every morning 90 tablet 4    dicyclomine (BENTYL) 10 MG capsule Take 1 capsule (10 mg) by mouth 4 times daily as needed (abdominal cramping) 90 capsule 11    DULoxetine 40 MG CPEP Take 40 mg by mouth 2 times daily. 180 capsule 3    eletriptan (RELPAX) 40 MG tablet Take 1 tablet (40 mg) by mouth at onset of headache for migraine May repeat in 2 hours. Max 2 tablets/24 hours. 18 tablet 3    galcanezumab-gnlm (EMGALITY) 120 MG/ML injection Inject 1 mL (120 mg) subcutaneously every 28 days. 1 mL 11    levothyroxine (SYNTHROID/LEVOTHROID) 88 MCG tablet Take 1 tablet (88 mcg) by mouth daily 90 tablet 1    LORazepam (ATIVAN) 0.5 MG tablet Take 1-2 tablets (0.5-1 mg) by mouth every 8 hours as needed for anxiety (panic). 30 tablet 2    magic mouthwash (ENTER INGREDIENTS IN COMMENTS) suspension Swish (5-10 minutes) and spit 5 mLs three time daily.   Equal parts of Lidocaine, Dexamethasone, and Maalox 480 mL 0    methylphenidate HCl ER, OSM, (CONCERTA) 18 MG CR tablet Take 1 tablet (18 mg) by mouth every morning. 30 tablet 0    nortriptyline (PAMELOR) 10 MG capsule Take 5 capsules (50 mg) by mouth at bedtime. 450 capsule 1    triamcinolone (KENALOG) 0.1 % paste Apply a pea size to sores 2 times daily for 2 weeks. 30 g 0    vitamin D3 (CHOLECALCIFEROL) 50 mcg (2000 units) tablet Take 1 tablet (50 mcg) by mouth daily 90 tablet 3     No current facility-administered medications for this visit.        PHYSICAL EXAMINATION  VITALS: BP (!)  "144/107 (BP Location: Right arm, Patient Position: Sitting)   Pulse 102   Ht 1.575 m (5' 2\")   Wt 83 kg (183 lb)   LMP 04/25/2025   BMI 33.47 kg/m    GENERAL: Healthy appearing, alert, no acute distress, normal habitus.  CARDIOVASCULAR: Extremities warm and well perfused. Pulses present.   NEUROLOGICAL:  Patient is awake and oriented to self, place and time.  Attention span is normal.  Memory is grossly intact; previously MoCA 26.  Language is fluent and follows commands appropriately.  Appropriate fund of knowledge. Cranial nerves 2-12 are intact. There is no pronator drift.  Motor exam shows 5/5 strength in all extremities.  Tone is symmetric bilaterally in upper and lower extremities.  (Previously reflexes are symmetric and 2+ in upper extremities and lower extremities. Sensory exam is grossly intact to light touch, pin prick and vibration.)  Finger to nose and heel to shin is without dysmetria.  Romberg is negative.  Gait is normal and the patient is able to do tandem walk and walk on toes and heels.  Exam stable.  Some hyperreflexia today.  No tremors were present.    DIAGNOSTICS  MRI-images reviewed.  No major structural lesions noted.  Some T2 hyperintensities.  IMPRESSION:  1.  No acute intracranial process.  2.  Nonspecific scattered T2 hyperintensities within the cerebral white matter. These can be seen in association with migraine headaches.    Labs  Component      Latest Ref Rng & Units 8/31/2021 9/13/2021 10/15/2021 12/7/2021   Thyroid Peroxidase Antibody      <35 IU/mL >5,000 (H)      TSH      0.40 - 4.00 mU/L  1.72 0.11 (L) 1.22   T4 Free      0.76 - 1.46 ng/dL   1.32          OUTSIDE RECORDS  Outside referral notes and chart notes were reviewed and pertinent information has been summarized (in addition to the HPI):-Patient has been diagnosed with long-haul Covid. Multiple testing has been ordered. Has been seen with cardiology. Also seeing endocrinology. Has been having brain fog for which she " was referred to neurology. Does complain of easy fatigability as well.    LABS  Component      Latest Ref Rng & Units 12/14/2021   Vitamin B12      213 - 816 pg/mL 413   Vitamin B1 Whole Blood Level      70 - 180 nmol/L 104   Ferritin      10 - 130 ng/mL 192 (H)   Methylmalonic Acid      0.00 - 0.40 umol/L 0.21     EEG  IMPRESSION/REPORT/PLAN  This is a normal EEG during wakefulness and drowsiness except for mild generalized background dysrhythmia. Further clinical correlation is needed.     Please note that the absence of epileptiform abnormalities does not exclude the possibility of epilepsy in any patient.      Component      Latest Ref Rng & Units 6/8/2022 11/1/2022   Ferritin      12 - 150 ng/mL 484 (H)    TSH      0.30 - 4.20 uIU/mL  1.01   T4 Free      0.90 - 1.70 ng/dL  1.29     MRI brain-images reviewed.  IMPRESSION:  1.  No findings to explain patient's symptoms. Normal MRI of the internal auditory canals and labyrinthine structures.  2.  No acute intracranial pathology.   3.  Minimal nonspecific small cerebral white matter T2 hyperintensities.       Component      Latest Ref Rng 3/21/2025  4:48 PM   Sodium      135 - 145 mmol/L 141    Potassium      3.4 - 5.3 mmol/L 3.9    Carbon Dioxide (CO2)      22 - 29 mmol/L 20 (L)    Anion Gap      7 - 15 mmol/L 16 (H)    Urea Nitrogen      6.0 - 20.0 mg/dL 11.6    Creatinine      0.51 - 0.95 mg/dL 0.67    GFR Estimate      >60 mL/min/1.73m2 >90    Calcium      8.8 - 10.4 mg/dL 10.1    Chloride      98 - 107 mmol/L 105    Glucose      70 - 99 mg/dL 101 (H)    Alkaline Phosphatase      40 - 150 U/L 68    AST      0 - 45 U/L 23    ALT      0 - 50 U/L 35    Protein Total      6.4 - 8.3 g/dL 7.6    Albumin      3.5 - 5.2 g/dL 4.7    Bilirubin Total      <=1.2 mg/dL <0.2    TSH      0.30 - 4.20 uIU/mL 0.49    Vitamin B12      232 - 1,245 pg/mL 1,209         Legend:  (L) Low  (H) High    Primary care notes reviewed.    IMPRESSION/REPORT/PLAN  History of anti-TPO  antibodies  History of 2019 novel coronavirus disease (COVID-19)  Brain fog/cognitive difficulty-improved  Chronic migraine without aura without status migrainosus, not intractable  Insomnia, unspecified type  Recent COVID infection with worsening symptoms-improved  Tremors  Anxiety  Whole body jerking episodes  Medication side effects  Hyperreflexia    This is a 40 year old female with history of migraines with worsening migraines after COVID-19 infection and cognitive decline after COVID-19 infection.    Her migraines under good control with Relpax and Emgality and will continue.  Zofran for nausea.    Previously for prevention of headaches she has tried amitriptyline, propranolol, Topamax.  Discussed side effects of high blood pressure, daytime somnolence and some tremors.  These have now improved with stopping the medication.    For abortive therapy Maxalt has been tried.    She was using amitriptyline for insomnia.  She was on nortriptyline we will try to wean her off the gabapentin and she had side effects to that.  She is interested in going back to the nortriptyline.    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  Anti-TPO antibodies were positive and she was treated with steroids with no benefit.  MRI and EEG were noncontributory.  She does have a history of thyroid disease and is on levothyroxine.  Recent TSH levels were normal.  This is now improved with therapy.    She had new onset of memory problems/worsening tremors which have now improved with stopping some of her medications.  Blood work/MRI brain have been negative.    She is now having new symptoms of whole body jerking brought on by visual, auditory and olfactory stimulus.  MRI brain was negative for structural lesions.  Will check an EEG to evaluate if these are epileptic.  Could be related anxiety though she feels it is under good control.  She is on Wellbutrin which can cause seizures.  She is also on  Concerta which can increase risk of seizures.  Will check an electrolyte panel/ammonia as well.  Could consider MRI C-spine for whole body jerking.    Can see her back in 3 months.      -     eletriptan (RELPAX) 40 MG tablet; Take 1 tablet (40 mg) by mouth at onset of headache for migraine May repeat in 2 hours. Max 2 tablets/24 hours.  -     galcanezumab-gnlm (EMGALITY) 120 MG/ML injection; Inject 1 mL (120 mg) subcutaneously every 28 days.  -     EEG; Future  -     Comprehensive metabolic panel; Future  -     Ammonia; Future  -     nortriptyline (PAMELOR) 10 MG capsule; Take 5 capsules (50 mg) by mouth at bedtime.      Return in about 3 months (around 9/25/2025) for In-Clinic Visit (must), Add on PAON or PAOR, After testing.    Over 40 minutes were spent coordinating the care for the patient on the day of the encounter.  This includes previsit, during visit and post visit activities as documented above.  Counseling patient.  Multiple problems reviewed/addressed.  Prescription management.  Testing ordered.  New problem.  High risk.  Primary care notes reviewed  (Activities include but not inclusive of reviewing chart, reviewing outside records, reviewing labs and imaging study results as well as the images, patient visit time including getting history and exam,  use if applicable, review of test results with the patient and coming up with a plan in a shared model, counseling patient and family, education and answering patient questions, EMR , EMR diagnosis entry and problem list management, medication reconciliation and prescription management if applicable, paperwork if applicable, printing documents and documentation of the visit activities.)      Ranjan Parisi MD  Neurologist  [facility]  Tel:- [phone]    This note was dictated using voice recognition software.  Any grammatical or context distortions are unintentional and inherent to the  software.    The longitudinal plan of care for the diagnosis(es)/condition(s) as documented were addressed during this visit. Due to the added complexity in care, I will continue to support Kassy in the subsequent management and with ongoing continuity of care.

## 2025-06-25 NOTE — LETTER
6/25/2025      Lady Saavedra  [id number] [address] [facility] [id number]      Dear Colleague,    Thank you for referring your patient, Lady Saavedra, to the [facility] [place]. Please see a copy of my visit note below.    NEUROLOGY OUTPATIENT PROGRESS NOTE   Jun 25, 2025     CHIEF COMPLAINT/REASON FOR VISIT/REASON FOR CONSULT  Patient presents with:  Headache: 3 month follow up headaches. Pt states headache is ok but now is she getting dizziness. Pt gets dizzy everyday at random times, last not longer then a few seconds.     REASON FOR CONSULTATION-headaches and brain fog.    REFERRAL SOURCE  Dr. Lalita Birmingham  CC Dr. Lalita Birmingham    HISTORY OF PRESENT ILLNESS  Lady Saavedra is a 40 year old female seen today for evaluation of headaches and brain fog.  She reports that she has a history of headache but then in March she got Covid.  She was admitted for Covid related pneumonia.  Was in the hospital for 1 week.  Since then the headaches have been much more frequent.  She is having them every day.  Reports significant photophobia and phonophobia with the headaches.  Headaches can be on any side in different places.  They can be behind the eyes and in the back of the head.  They are throbbing in nature.  Reports no visual aura with these.  Has been put on amitriptyline which has helped her sleep as well as with the headaches but nothing significant to get rid of the headaches completely.  Is using Advil and Tylenol for the headache still would last the whole day.  Is not using them around-the-clock.  Denies any other provoking factors.    Is also been diagnosed with thyroiditis since the Covid infection.  Is working with endocrinology on this.    Reports brain fog has been going on since the Covid infection.  Has not had any improvement.  Was let go of her job.  Has difficulty with memory, staying focused and doing tasks that she could previously do easily.  Does get good sleep at  night with the amitriptyline.    Patient has recently been in contact with the [facility] rehabilitation clinic.  Has not started the program yet.    2/14/22  Patient returns today  1.  Patient reports that her headaches have significantly improved with the propanolol.  Is taking 60 mg twice a day.  Denies any side effects.  Headaches of 4-5 times a month.  Headaches are much less severe compared to before and she does not need to take abortive medication.  Did try the Maxalt once without any side effects and it did not work for her.  2.  Continues to have the brain fog.  Has been seen in the [facility] rehabilitation clinic and has not been able to do the therapies due to lack of insurance.  Steroids did not help with the brain fog.  3.  Reports that the amitriptyline is no longer working to help her sleep.  She is up till 2 AM every night.  Discussed about increasing the dose and she is interested in that.  Denies any side effects with the amitriptyline.    4/12/22  Patient returns today  1.  She reports that the headaches are twice a month.  Maxalt does work as abortive therapy though she has to eat something with it otherwise feels nauseous with the medication.  Propanolol amitriptyline is helping.  Some somnolence with the amitriptyline which is getting better.  Discussed about decreasing the amitriptyline and she wants to stay on the higher dose.  It was increased last time because the propanolol was not working by itself.  2.  Continues to have the brain fog.  Has insurance now and has gone to the [facility] rehabilitation clinic though has not been able to see the therapist yet.  Is only seeing her counselor for her mental illness.    3.  Is sleeping better with the amitriptyline at this point though does have some somnolence.  No insomnia.    2/1/23  Patient returns today  1.  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  Headaches have since  then gotten worse and she is having 2-3 headaches a week.  About 15 headache days a month.  Remains on the amitriptyline and propanolol.  This daytime somnolence with the amitriptyline has improved.  No side effects with propanolol.  Blood pressure is better at this point as well.  Maxalt does work as abortive therapy.  She does need to use it with food to avoid nausea.  2.  Brain fog is also improved with time.  3.  Sleeping better with the amitriptyline.  No issues.  No other new symptoms    4/5/23  Patient returns today  1.  She is currently on 100 mg of Topamax.  She takes it in the morning because it prevents her from sleeping at night.  She reports that she had complete resolution of the headaches.  Has not had any headaches in the last month.  2.  Occasionally if she does have a headache she will use Maxalt which is still working  3.  She remains on propanolol.  She feels that is also being helping with the tachycardia.  Remains on amitriptyline though does complain that she occasionally cannot sleep.  She feels that she is not exercising enough to tire herself out to fall asleep  4.  Brain fog symptoms have now improved.  No new issues/concerns.    4/25/24  Patient returns today.  Since she was last seen she has had COVID and has been having long-haul COVID symptoms along with worsening headaches.  1.  Headaches are almost every day.  Headaches are brought on by bright lights.  She does have accommodation at work.  Has been able to work from home which is helping.  In terms of her medications she has been on a higher dose of Topamax.  Is taking 1-1/2 tablet in the morning.  Has been switched from amitriptyline to nortriptyline.  No side effects.  Feels that she is sleeping well.  Propranolol dose has been decreased.  Is using Zofran and meclizine for nausea with the headaches.  Maxalt does work but causes some nausea.    2.  Per the post-COVID clinic she has been working with PT/OT/speech therapy with no  significant improvement.  Brain fog is also gotten worse.  No other new concerns.    6/20/24  Patient returns today  1.  Her brain fog symptoms have improved.  2.  Headaches are slowly getting better.  Still has them multiple times a week but not as severe compared to before.  Jaciel on the high doses of Topamax, nortriptyline, propranolol.  No side effects.  Steroids actually made things worse.  Maxalt makes her more tired.  Is interested in trying other medication since the headaches are still affecting quality of life  No other new concerns.    10/23/24  Patient returns today  1.  Brain fog symptoms have improved  2.  Headaches have gotten much better with the Emgality.  Reports couple of random headaches, couple of headaches with the Emgality is due in couple of headaches at the time of the menstrual cycle.  Relpax is working better than the Maxalt.  No side effects with the medication.  Wants to slowly wean off the other medications  3.  Some issues with insomnia and is primary care doctor has put on a new medication in addition to the nortriptyline.  No other new concerns.    3/19/25  Patient returns today  1.  Her symptoms are significantly improved at the last visit.  She had stopped the Topamax and the propranolol.  Since then she has been noticing more tremors in her upper extremities.  Feels more anxious.  Also reports some ongoing memory issues.  2.  Headaches are still under good control with 1-2 headaches a month.  Relpax does work.  Emgality is helping  3.  She is on nortriptyline.  Is not really helping with sleep as she still cannot sleep.  Has tried melatonin in the past.  Possibly is not helping with the headaches either  4.  Previously she was planning on getting pregnant though this is on hold because of new symptoms.  No other new concerns.    6/25/25  Patient returns today  1.  Headaches are almost nonexistent.  Remains on the Emgality and Relpax which are helping.  Few headaches related to  stress.  2.  Gabapentin caused more.  Because more side effects and she is interested in going back on the nortriptyline.  3.  She has been having some jerking activity involving the whole body done by lights and sounds in spells.  These are brief.  She is having them 20-30 times a day.  Sometimes some videos can bring these on.  She was seen by primary care and they ordered MRI of the brain which came back negative.  She is interested in further workup.  4.  She does have a history of anxiety feels that is under good control.  Is on Wellbutrin which is working.  5.  Previously she was having some tremors which are under good control at this point  No new concerns.    Previous history is reviewed and this is unchanged.    PAST MEDICAL/SURGICAL HISTORY  Past Medical History:   Diagnosis Date     Abnormal Pap smear of cervix 2009, 2012, 2014    see problem list     Cervical high risk HPV (human papillomavirus) test positive 2009, 2012    see problem list     Chickenpox      Depressive disorder      Gestational HTN 01/01/2015     Hypothyroidism      Migraine with aura and without status migrainosus, not intractable      Seasonal allergies     Was on clariten uses occasional sudafed     Patient Active Problem List   Diagnosis     Pneumonia due to 2019 novel coronavirus     COVID-19 long hauler     Anovulation     Prediabetes     Migraine with aura and without status migrainosus, not intractable     Hyperlipidemia LDL goal <100     Nonalcoholic fatty liver disease     Cognitive communication deficit   Significant of migraines, mental illness, suicide attempt, depression.  Feels the depression is under good control with EMR    FAMILY HISTORY  Family History   Adopted: Yes   Problem Relation Age of Onset     Unknown/Adopted Mother      Unknown/Adopted Father    Patient is adopted and she does not know her family.    SOCIAL HISTORY  Social History     Tobacco Use     Smoking status: Never     Smokeless tobacco: Never    Vaping Use     Vaping status: Never Used   Substance Use Topics     Alcohol use: No     Drug use: No       SYSTEMS REVIEW  Twelve-system ROS was done and other than the HPI this was negative except for neck pain, back pain, arm and leg pain, joint pain, numbness and tingling, sleepiness during the day, sleeping problems, headaches, anxiety, depression, palpitations, bloating, stomach pain, bowel problems, respiratory problems, skin changes, weight gain.  No new concerns/issues.    MEDICATIONS  Current Outpatient Medications   Medication Sig Dispense Refill     aspirin-acetaminophen-caffeine (EXCEDRIN MIGRAINE) 250-250-65 MG tablet Take 1 tablet by mouth daily as needed for headaches       buPROPion (WELLBUTRIN XL) 300 MG 24 hr tablet Take 1 tablet (300 mg) by mouth every morning 90 tablet 4     dicyclomine (BENTYL) 10 MG capsule Take 1 capsule (10 mg) by mouth 4 times daily as needed (abdominal cramping) 90 capsule 11     DULoxetine 40 MG CPEP Take 40 mg by mouth 2 times daily. 180 capsule 3     eletriptan (RELPAX) 40 MG tablet Take 1 tablet (40 mg) by mouth at onset of headache for migraine May repeat in 2 hours. Max 2 tablets/24 hours. 18 tablet 3     galcanezumab-gnlm (EMGALITY) 120 MG/ML injection Inject 1 mL (120 mg) subcutaneously every 28 days. 1 mL 11     levothyroxine (SYNTHROID/LEVOTHROID) 88 MCG tablet Take 1 tablet (88 mcg) by mouth daily 90 tablet 1     LORazepam (ATIVAN) 0.5 MG tablet Take 1-2 tablets (0.5-1 mg) by mouth every 8 hours as needed for anxiety (panic). 30 tablet 2     magic mouthwash (ENTER INGREDIENTS IN COMMENTS) suspension Swish (5-10 minutes) and spit 5 mLs three time daily.   Equal parts of Lidocaine, Dexamethasone, and Maalox 480 mL 0     methylphenidate HCl ER, OSM, (CONCERTA) 18 MG CR tablet Take 1 tablet (18 mg) by mouth every morning. 30 tablet 0     nortriptyline (PAMELOR) 10 MG capsule Take 5 capsules (50 mg) by mouth at bedtime. 450 capsule 1     triamcinolone (KENALOG) 0.1  "% paste Apply a pea size to sores 2 times daily for 2 weeks. 30 g 0     vitamin D3 (CHOLECALCIFEROL) 50 mcg (2000 units) tablet Take 1 tablet (50 mcg) by mouth daily 90 tablet 3     No current facility-administered medications for this visit.        PHYSICAL EXAMINATION  VITALS: BP (!) 144/107 (BP Location: Right arm, Patient Position: Sitting)   Pulse 102   Ht 1.575 m (5' 2\")   Wt 83 kg (183 lb)   LMP 04/25/2025   BMI 33.47 kg/m    GENERAL: Healthy appearing, alert, no acute distress, normal habitus.  CARDIOVASCULAR: Extremities warm and well perfused. Pulses present.   NEUROLOGICAL:  Patient is awake and oriented to self, place and time.  Attention span is normal.  Memory is grossly intact; previously MoCA 26.  Language is fluent and follows commands appropriately.  Appropriate fund of knowledge. Cranial nerves 2-12 are intact. There is no pronator drift.  Motor exam shows 5/5 strength in all extremities.  Tone is symmetric bilaterally in upper and lower extremities.  (Previously reflexes are symmetric and 2+ in upper extremities and lower extremities. Sensory exam is grossly intact to light touch, pin prick and vibration.)  Finger to nose and heel to shin is without dysmetria.  Romberg is negative.  Gait is normal and the patient is able to do tandem walk and walk on toes and heels.  Exam stable.  Some hyperreflexia today.  No tremors were present.    DIAGNOSTICS  MRI-images reviewed.  No major structural lesions noted.  Some T2 hyperintensities.  IMPRESSION:  1.  No acute intracranial process.  2.  Nonspecific scattered T2 hyperintensities within the cerebral white matter. These can be seen in association with migraine headaches.    Labs  Component      Latest Ref Rng & Units 8/31/2021 9/13/2021 10/15/2021 12/7/2021   Thyroid Peroxidase Antibody      <35 IU/mL >5,000 (H)      TSH      0.40 - 4.00 mU/L  1.72 0.11 (L) 1.22   T4 Free      0.76 - 1.46 ng/dL   1.32          OUTSIDE RECORDS  Outside referral " notes and chart notes were reviewed and pertinent information has been summarized (in addition to the HPI):-Patient has been diagnosed with long-haul Covid. Multiple testing has been ordered. Has been seen with cardiology. Also seeing endocrinology. Has been having brain fog for which she was referred to neurology. Does complain of easy fatigability as well.    LABS  Component      Latest Ref Rng & Units 12/14/2021   Vitamin B12      213 - 816 pg/mL 413   Vitamin B1 Whole Blood Level      70 - 180 nmol/L 104   Ferritin      10 - 130 ng/mL 192 (H)   Methylmalonic Acid      0.00 - 0.40 umol/L 0.21     EEG  IMPRESSION/REPORT/PLAN  This is a normal EEG during wakefulness and drowsiness except for mild generalized background dysrhythmia. Further clinical correlation is needed.     Please note that the absence of epileptiform abnormalities does not exclude the possibility of epilepsy in any patient.      Component      Latest Ref Rng & Units 6/8/2022 11/1/2022   Ferritin      12 - 150 ng/mL 484 (H)    TSH      0.30 - 4.20 uIU/mL  1.01   T4 Free      0.90 - 1.70 ng/dL  1.29     MRI brain-images reviewed.  IMPRESSION:  1.  No findings to explain patient's symptoms. Normal MRI of the internal auditory canals and labyrinthine structures.  2.  No acute intracranial pathology.   3.  Minimal nonspecific small cerebral white matter T2 hyperintensities.       Component      Latest Ref Rng 3/21/2025  4:48 PM   Sodium      135 - 145 mmol/L 141    Potassium      3.4 - 5.3 mmol/L 3.9    Carbon Dioxide (CO2)      22 - 29 mmol/L 20 (L)    Anion Gap      7 - 15 mmol/L 16 (H)    Urea Nitrogen      6.0 - 20.0 mg/dL 11.6    Creatinine      0.51 - 0.95 mg/dL 0.67    GFR Estimate      >60 mL/min/1.73m2 >90    Calcium      8.8 - 10.4 mg/dL 10.1    Chloride      98 - 107 mmol/L 105    Glucose      70 - 99 mg/dL 101 (H)    Alkaline Phosphatase      40 - 150 U/L 68    AST      0 - 45 U/L 23    ALT      0 - 50 U/L 35    Protein Total      6.4 -  8.3 g/dL 7.6    Albumin      3.5 - 5.2 g/dL 4.7    Bilirubin Total      <=1.2 mg/dL <0.2    TSH      0.30 - 4.20 uIU/mL 0.49    Vitamin B12      232 - 1,245 pg/mL 1,209         Legend:  (L) Low  (H) High    Primary care notes reviewed.    IMPRESSION/REPORT/PLAN  History of anti-TPO antibodies  History of 2019 novel coronavirus disease (COVID-19)  Brain fog/cognitive difficulty-improved  Chronic migraine without aura without status migrainosus, not intractable  Insomnia, unspecified type  Recent COVID infection with worsening symptoms-improved  Tremors  Anxiety  Whole body jerking episodes  Medication side effects  Hyperreflexia    This is a 40 year old female with history of migraines with worsening migraines after COVID-19 infection and cognitive decline after COVID-19 infection.    Her migraines under good control with Relpax and Emgality and will continue.  Zofran for nausea.    Previously for prevention of headaches she has tried amitriptyline, propranolol, Topamax.  Discussed side effects of high blood pressure, daytime somnolence and some tremors.  These have now improved with stopping the medication.    For abortive therapy Maxalt has been tried.    She was using amitriptyline for insomnia.  She was on nortriptyline we will try to wean her off the gabapentin and she had side effects to that.  She is interested in going back to the nortriptyline.    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  Anti-TPO antibodies were positive and she was treated with steroids with no benefit.  MRI and EEG were noncontributory.  She does have a history of thyroid disease and is on levothyroxine.  Recent TSH levels were normal.  This is now improved with therapy.    She had new onset of memory problems/worsening tremors which have now improved with stopping some of her medications.  Blood work/MRI brain have been negative.    She is now having new symptoms of whole body jerking brought on by  visual, auditory and olfactory stimulus.  MRI brain was negative for structural lesions.  Will check an EEG to evaluate if these are epileptic.  Could be related anxiety though she feels it is under good control.  She is on Wellbutrin which can cause seizures.  Will check an electrolyte panel/ammonia as well.  Could consider MRI C-spine for whole body jerking.    Can see her back in 3 months.      -     eletriptan (RELPAX) 40 MG tablet; Take 1 tablet (40 mg) by mouth at onset of headache for migraine May repeat in 2 hours. Max 2 tablets/24 hours.  -     galcanezumab-gnlm (EMGALITY) 120 MG/ML injection; Inject 1 mL (120 mg) subcutaneously every 28 days.  -     EEG; Future  -     Comprehensive metabolic panel; Future  -     Ammonia; Future  -     nortriptyline (PAMELOR) 10 MG capsule; Take 5 capsules (50 mg) by mouth at bedtime.      Return in about 3 months (around 9/25/2025) for In-Clinic Visit (must), Add on PAON or PAOR, After testing.    Over 40 minutes were spent coordinating the care for the patient on the day of the encounter.  This includes previsit, during visit and post visit activities as documented above.  Counseling patient.  Multiple problems reviewed/addressed.  Prescription management.  Testing ordered.  New problem.  High risk.  Primary care notes reviewed  (Activities include but not inclusive of reviewing chart, reviewing outside records, reviewing labs and imaging study results as well as the images, patient visit time including getting history and exam,  use if applicable, review of test results with the patient and coming up with a plan in a shared model, counseling patient and family, education and answering patient questions, EMR , EMR diagnosis entry and problem list management, medication reconciliation and prescription management if applicable, paperwork if applicable, printing documents and documentation of the visit activities.)      Ranjan Parisi,  MD  Neurologist  [facility]  Tel:- [phone]    This note was dictated using voice recognition software.  Any grammatical or context distortions are unintentional and inherent to the software.    The longitudinal plan of care for the diagnosis(es)/condition(s) as documented were addressed during this visit. Due to the added complexity in care, I will continue to support Kassy in the subsequent management and with ongoing continuity of care.      Again, thank you for allowing me to participate in the care of your patient.        Sincerely,        Ranjan Parisi MD    Electronically signed